# Patient Record
Sex: MALE | HISPANIC OR LATINO | Employment: UNEMPLOYED | URBAN - METROPOLITAN AREA
[De-identification: names, ages, dates, MRNs, and addresses within clinical notes are randomized per-mention and may not be internally consistent; named-entity substitution may affect disease eponyms.]

---

## 2017-07-14 ENCOUNTER — OUTSIDE FACILITY SERVICE (OUTPATIENT)
Dept: FAMILY MEDICINE CLINIC | Facility: CLINIC | Age: 62
End: 2017-07-14

## 2017-07-14 PROCEDURE — 99309 SBSQ NF CARE MODERATE MDM 30: CPT | Performed by: NURSE PRACTITIONER

## 2017-07-24 ENCOUNTER — OUTSIDE FACILITY SERVICE (OUTPATIENT)
Dept: FAMILY MEDICINE CLINIC | Facility: CLINIC | Age: 62
End: 2017-07-24

## 2017-07-24 PROCEDURE — 99309 SBSQ NF CARE MODERATE MDM 30: CPT | Performed by: NURSE PRACTITIONER

## 2017-07-27 ENCOUNTER — OUTSIDE FACILITY SERVICE (OUTPATIENT)
Dept: FAMILY MEDICINE CLINIC | Facility: CLINIC | Age: 62
End: 2017-07-27

## 2017-07-27 PROCEDURE — 99304 1ST NF CARE SF/LOW MDM 25: CPT | Performed by: FAMILY MEDICINE

## 2017-07-31 ENCOUNTER — OUTSIDE FACILITY SERVICE (OUTPATIENT)
Dept: FAMILY MEDICINE CLINIC | Facility: CLINIC | Age: 62
End: 2017-07-31

## 2017-07-31 PROCEDURE — 99308 SBSQ NF CARE LOW MDM 20: CPT | Performed by: NURSE PRACTITIONER

## 2017-08-10 ENCOUNTER — OUTSIDE FACILITY SERVICE (OUTPATIENT)
Dept: FAMILY MEDICINE CLINIC | Facility: CLINIC | Age: 62
End: 2017-08-10

## 2017-08-10 PROCEDURE — 99307 SBSQ NF CARE SF MDM 10: CPT | Performed by: FAMILY MEDICINE

## 2017-09-04 ENCOUNTER — APPOINTMENT (OUTPATIENT)
Dept: CARDIOLOGY | Facility: HOSPITAL | Age: 62
End: 2017-09-04
Attending: INTERNAL MEDICINE

## 2017-09-04 ENCOUNTER — APPOINTMENT (OUTPATIENT)
Dept: GENERAL RADIOLOGY | Facility: HOSPITAL | Age: 62
End: 2017-09-04

## 2017-09-04 ENCOUNTER — HOSPITAL ENCOUNTER (INPATIENT)
Facility: HOSPITAL | Age: 62
LOS: 32 days | Discharge: NURSING, REHAB OR LONG-TERM CARE | End: 2017-10-06
Attending: FAMILY MEDICINE | Admitting: FAMILY MEDICINE

## 2017-09-04 ENCOUNTER — APPOINTMENT (OUTPATIENT)
Dept: CT IMAGING | Facility: HOSPITAL | Age: 62
End: 2017-09-04

## 2017-09-04 DIAGNOSIS — J96.02 ACUTE RESPIRATORY FAILURE WITH HYPERCAPNIA (HCC): ICD-10-CM

## 2017-09-04 DIAGNOSIS — J95.03 TRACHEAL STENOSIS DUE TO TRACHEOSTOMY (HCC): ICD-10-CM

## 2017-09-04 DIAGNOSIS — R53.1 GENERAL WEAKNESS: ICD-10-CM

## 2017-09-04 DIAGNOSIS — R13.12 OROPHARYNGEAL DYSPHAGIA: ICD-10-CM

## 2017-09-04 DIAGNOSIS — J18.9 PNEUMONIA OF LEFT LOWER LOBE DUE TO INFECTIOUS ORGANISM: Primary | ICD-10-CM

## 2017-09-04 LAB
ALBUMIN SERPL-MCNC: 3.6 G/DL (ref 3.5–5.2)
ALBUMIN/GLOB SERPL: 0.8 G/DL
ALP SERPL-CCNC: 139 U/L (ref 39–117)
ALT SERPL W P-5'-P-CCNC: 16 U/L (ref 1–41)
ANION GAP SERPL CALCULATED.3IONS-SCNC: 5.6 MMOL/L
ANISOCYTOSIS BLD QL: NORMAL
ARTERIAL PATENCY WRIST A: POSITIVE
AST SERPL-CCNC: 18 U/L (ref 1–40)
ATMOSPHERIC PRESS: 747.7 MMHG
ATMOSPHERIC PRESS: 750.5 MMHG
ATMOSPHERIC PRESS: 751.6 MMHG
BASE EXCESS BLDA CALC-SCNC: 10.4 MMOL/L (ref 0–2)
BASE EXCESS BLDA CALC-SCNC: 12.8 MMOL/L (ref 0–2)
BASE EXCESS BLDA CALC-SCNC: 12.9 MMOL/L (ref 0–2)
BASOPHILS # BLD AUTO: 0.01 10*3/MM3 (ref 0–0.2)
BASOPHILS NFR BLD AUTO: 0.1 % (ref 0–1.5)
BDY SITE: ABNORMAL
BILIRUB SERPL-MCNC: 0.3 MG/DL (ref 0.1–1.2)
BUN BLD-MCNC: 15 MG/DL (ref 8–23)
BUN/CREAT SERPL: 18.8 (ref 7–25)
CALCIUM SPEC-SCNC: 9.4 MG/DL (ref 8.6–10.5)
CHLORIDE SERPL-SCNC: 92 MMOL/L (ref 98–107)
CO2 SERPL-SCNC: 44.4 MMOL/L (ref 22–29)
CREAT BLD-MCNC: 0.8 MG/DL (ref 0.76–1.27)
D-LACTATE SERPL-SCNC: 0.4 MMOL/L (ref 0.5–2)
DEPRECATED RDW RBC AUTO: 53.1 FL (ref 37–54)
EOSINOPHIL # BLD AUTO: 0.04 10*3/MM3 (ref 0–0.7)
EOSINOPHIL NFR BLD AUTO: 0.6 % (ref 0.3–6.2)
ERYTHROCYTE [DISTWIDTH] IN BLOOD BY AUTOMATED COUNT: 17 % (ref 11.5–14.5)
GAS FLOW AIRWAY: 4 LPM
GFR SERPL CREATININE-BSD FRML MDRD: 119 ML/MIN/1.73
GFR SERPL CREATININE-BSD FRML MDRD: 98 ML/MIN/1.73
GLOBULIN UR ELPH-MCNC: 4.5 GM/DL
GLUCOSE BLD-MCNC: 191 MG/DL (ref 65–99)
GLUCOSE BLDC GLUCOMTR-MCNC: 100 MG/DL (ref 70–130)
GLUCOSE BLDC GLUCOMTR-MCNC: 140 MG/DL (ref 70–130)
GLUCOSE BLDC GLUCOMTR-MCNC: 64 MG/DL (ref 70–130)
GLUCOSE BLDC GLUCOMTR-MCNC: 77 MG/DL (ref 70–130)
HCO3 BLDA-SCNC: 41.3 MMOL/L (ref 22–28)
HCO3 BLDA-SCNC: 47 MMOL/L (ref 22–28)
HCO3 BLDA-SCNC: 47.8 MMOL/L (ref 22–28)
HCT VFR BLD AUTO: 37.7 % (ref 40.4–52.2)
HGB BLD-MCNC: 10.1 G/DL (ref 13.7–17.6)
HOLD SPECIMEN: NORMAL
HOLD SPECIMEN: NORMAL
HOROWITZ INDEX BLD+IHG-RTO: 100 %
HOROWITZ INDEX BLD+IHG-RTO: 40 %
IMM GRANULOCYTES # BLD: 0.04 10*3/MM3 (ref 0–0.03)
IMM GRANULOCYTES NFR BLD: 0.6 % (ref 0–0.5)
LYMPHOCYTES # BLD AUTO: 0.95 10*3/MM3 (ref 0.9–4.8)
LYMPHOCYTES NFR BLD AUTO: 13.8 % (ref 19.6–45.3)
MCH RBC QN AUTO: 23 PG (ref 27–32.7)
MCHC RBC AUTO-ENTMCNC: 26.8 G/DL (ref 32.6–36.4)
MCV RBC AUTO: 85.7 FL (ref 79.8–96.2)
MODALITY: ABNORMAL
MONOCYTES # BLD AUTO: 0.56 10*3/MM3 (ref 0.2–1.2)
MONOCYTES NFR BLD AUTO: 8.2 % (ref 5–12)
NEUTROPHILS # BLD AUTO: 5.26 10*3/MM3 (ref 1.9–8.1)
NEUTROPHILS NFR BLD AUTO: 76.7 % (ref 42.7–76)
NRBC BLD MANUAL-RTO: 0 /100 WBC (ref 0–0)
NT-PROBNP SERPL-MCNC: 1456 PG/ML (ref 5–900)
O2 A-A PPRESDIFF RESPIRATORY: 0.3 MMHG
O2 A-A PPRESDIFF RESPIRATORY: 0.5 MMHG
OVALOCYTES BLD QL SMEAR: NORMAL
PCO2 BLDA: 134.6 MM HG (ref 35–45)
PCO2 BLDA: 75 MM HG (ref 35–45)
PCO2 BLDA: >157.4 MM HG (ref 35–45)
PEEP RESPIRATORY: 5 CM[H2O]
PEEP RESPIRATORY: 5 CM[H2O]
PH BLDA: 7.01 PH UNITS (ref 7.35–7.45)
PH BLDA: 7.15 PH UNITS (ref 7.35–7.45)
PH BLDA: 7.35 PH UNITS (ref 7.35–7.45)
PLAT MORPH BLD: NORMAL
PLATELET # BLD AUTO: 193 10*3/MM3 (ref 140–500)
PMV BLD AUTO: 9.5 FL (ref 6–12)
PO2 BLDA: 303.1 MM HG (ref 80–100)
PO2 BLDA: 69.7 MM HG (ref 80–100)
PO2 BLDA: 72.3 MM HG (ref 80–100)
POTASSIUM BLD-SCNC: 4.2 MMOL/L (ref 3.5–5.2)
PROCALCITONIN SERPL-MCNC: 0.05 NG/ML (ref 0.1–0.25)
PROT SERPL-MCNC: 8.1 G/DL (ref 6–8.5)
RBC # BLD AUTO: 4.4 10*6/MM3 (ref 4.6–6)
SAO2 % BLDCOA: 79.6 % (ref 92–99)
SAO2 % BLDCOA: 91.6 % (ref 92–99)
SAO2 % BLDCOA: 99.8 % (ref 92–99)
SET MECH RESP RATE: 20
SET MECH RESP RATE: 20
SODIUM BLD-SCNC: 142 MMOL/L (ref 136–145)
SPHEROCYTES BLD QL SMEAR: NORMAL
STOMATOCYTES BLD QL SMEAR: NORMAL
TOTAL RATE: 20 BREATHS/MINUTE
TOTAL RATE: 20 BREATHS/MINUTE
TOTAL RATE: 22 BREATHS/MINUTE
TROPONIN T SERPL-MCNC: <0.01 NG/ML (ref 0–0.03)
VENTILATOR MODE: ABNORMAL
VENTILATOR MODE: AC
VT ON VENT VENT: 400 ML
VT ON VENT VENT: 400 ML
WBC MORPH BLD: NORMAL
WBC NRBC COR # BLD: 6.86 10*3/MM3 (ref 4.5–10.7)
WHOLE BLOOD HOLD SPECIMEN: NORMAL
WHOLE BLOOD HOLD SPECIMEN: NORMAL

## 2017-09-04 PROCEDURE — 93306 TTE W/DOPPLER COMPLETE: CPT | Performed by: INTERNAL MEDICINE

## 2017-09-04 PROCEDURE — 87070 CULTURE OTHR SPECIMN AEROBIC: CPT | Performed by: INTERNAL MEDICINE

## 2017-09-04 PROCEDURE — 94799 UNLISTED PULMONARY SVC/PX: CPT

## 2017-09-04 PROCEDURE — 25010000002 FENTANYL CITRATE (PF) 100 MCG/2ML SOLUTION: Performed by: INTERNAL MEDICINE

## 2017-09-04 PROCEDURE — 25010000002 VANCOMYCIN 10 G RECONSTITUTED SOLUTION: Performed by: INTERNAL MEDICINE

## 2017-09-04 PROCEDURE — 87205 SMEAR GRAM STAIN: CPT | Performed by: INTERNAL MEDICINE

## 2017-09-04 PROCEDURE — 82962 GLUCOSE BLOOD TEST: CPT

## 2017-09-04 PROCEDURE — 36600 WITHDRAWAL OF ARTERIAL BLOOD: CPT

## 2017-09-04 PROCEDURE — 87581 M.PNEUMON DNA AMP PROBE: CPT | Performed by: INTERNAL MEDICINE

## 2017-09-04 PROCEDURE — 93010 ELECTROCARDIOGRAM REPORT: CPT | Performed by: INTERNAL MEDICINE

## 2017-09-04 PROCEDURE — 82803 BLOOD GASES ANY COMBINATION: CPT

## 2017-09-04 PROCEDURE — 71010 HC CHEST PA OR AP: CPT

## 2017-09-04 PROCEDURE — 85025 COMPLETE CBC W/AUTO DIFF WBC: CPT | Performed by: FAMILY MEDICINE

## 2017-09-04 PROCEDURE — 87486 CHLMYD PNEUM DNA AMP PROBE: CPT | Performed by: INTERNAL MEDICINE

## 2017-09-04 PROCEDURE — 83605 ASSAY OF LACTIC ACID: CPT | Performed by: FAMILY MEDICINE

## 2017-09-04 PROCEDURE — 85007 BL SMEAR W/DIFF WBC COUNT: CPT | Performed by: FAMILY MEDICINE

## 2017-09-04 PROCEDURE — 25010000002 ENOXAPARIN PER 10 MG: Performed by: INTERNAL MEDICINE

## 2017-09-04 PROCEDURE — 80053 COMPREHEN METABOLIC PANEL: CPT | Performed by: FAMILY MEDICINE

## 2017-09-04 PROCEDURE — 31500 INSERT EMERGENCY AIRWAY: CPT

## 2017-09-04 PROCEDURE — 84145 PROCALCITONIN (PCT): CPT | Performed by: FAMILY MEDICINE

## 2017-09-04 PROCEDURE — 71250 CT THORAX DX C-: CPT

## 2017-09-04 PROCEDURE — 25010000002 PROPOFOL 10 MG/ML EMULSION

## 2017-09-04 PROCEDURE — 5A1945Z RESPIRATORY VENTILATION, 24-96 CONSECUTIVE HOURS: ICD-10-PCS | Performed by: INTERNAL MEDICINE

## 2017-09-04 PROCEDURE — 25010000002 SUCCINYLCHOLINE PER 20 MG: Performed by: FAMILY MEDICINE

## 2017-09-04 PROCEDURE — 25010000002 METHYLPREDNISOLONE PER 40 MG: Performed by: INTERNAL MEDICINE

## 2017-09-04 PROCEDURE — 93005 ELECTROCARDIOGRAM TRACING: CPT | Performed by: FAMILY MEDICINE

## 2017-09-04 PROCEDURE — 87040 BLOOD CULTURE FOR BACTERIA: CPT | Performed by: FAMILY MEDICINE

## 2017-09-04 PROCEDURE — 0BH17EZ INSERTION OF ENDOTRACHEAL AIRWAY INTO TRACHEA, VIA NATURAL OR ARTIFICIAL OPENING: ICD-10-PCS | Performed by: INTERNAL MEDICINE

## 2017-09-04 PROCEDURE — 99285 EMERGENCY DEPT VISIT HI MDM: CPT

## 2017-09-04 PROCEDURE — 25010000002 MEROPENEM: Performed by: FAMILY MEDICINE

## 2017-09-04 PROCEDURE — 87798 DETECT AGENT NOS DNA AMP: CPT | Performed by: INTERNAL MEDICINE

## 2017-09-04 PROCEDURE — 99221 1ST HOSP IP/OBS SF/LOW 40: CPT | Performed by: FAMILY MEDICINE

## 2017-09-04 PROCEDURE — 94660 CPAP INITIATION&MGMT: CPT

## 2017-09-04 PROCEDURE — 93306 TTE W/DOPPLER COMPLETE: CPT

## 2017-09-04 PROCEDURE — 84484 ASSAY OF TROPONIN QUANT: CPT | Performed by: INTERNAL MEDICINE

## 2017-09-04 PROCEDURE — 87633 RESP VIRUS 12-25 TARGETS: CPT | Performed by: INTERNAL MEDICINE

## 2017-09-04 PROCEDURE — 94640 AIRWAY INHALATION TREATMENT: CPT

## 2017-09-04 PROCEDURE — 25010000002 VANCOMYCIN: Performed by: FAMILY MEDICINE

## 2017-09-04 PROCEDURE — 25010000002 PROPOFOL 1000 MG/ML EMULSION: Performed by: FAMILY MEDICINE

## 2017-09-04 PROCEDURE — 94002 VENT MGMT INPAT INIT DAY: CPT

## 2017-09-04 PROCEDURE — 83880 ASSAY OF NATRIURETIC PEPTIDE: CPT | Performed by: FAMILY MEDICINE

## 2017-09-04 PROCEDURE — 31500 INSERT EMERGENCY AIRWAY: CPT | Performed by: FAMILY MEDICINE

## 2017-09-04 PROCEDURE — 25010000002 MEROPENEM: Performed by: INTERNAL MEDICINE

## 2017-09-04 RX ORDER — SUCCINYLCHOLINE CHLORIDE 20 MG/ML
100 INJECTION INTRAMUSCULAR; INTRAVENOUS ONCE
Status: COMPLETED | OUTPATIENT
Start: 2017-09-04 | End: 2017-09-04

## 2017-09-04 RX ORDER — ATORVASTATIN CALCIUM 20 MG/1
20 TABLET, FILM COATED ORAL NIGHTLY
COMMUNITY

## 2017-09-04 RX ORDER — MAGNESIUM SULFATE 1 G/100ML
1 INJECTION INTRAVENOUS AS NEEDED
Status: DISCONTINUED | OUTPATIENT
Start: 2017-09-04 | End: 2017-10-06 | Stop reason: HOSPADM

## 2017-09-04 RX ORDER — IPRATROPIUM BROMIDE AND ALBUTEROL SULFATE 2.5; .5 MG/3ML; MG/3ML
3 SOLUTION RESPIRATORY (INHALATION)
COMMUNITY

## 2017-09-04 RX ORDER — FERROUS SULFATE 325(65) MG
325 TABLET ORAL 2 TIMES DAILY
COMMUNITY

## 2017-09-04 RX ORDER — SODIUM CHLORIDE 0.9 % (FLUSH) 0.9 %
10 SYRINGE (ML) INJECTION AS NEEDED
Status: DISCONTINUED | OUTPATIENT
Start: 2017-09-04 | End: 2017-10-06 | Stop reason: HOSPADM

## 2017-09-04 RX ORDER — ACETAMINOPHEN 325 MG/1
650 TABLET ORAL EVERY 4 HOURS PRN
Status: DISCONTINUED | OUTPATIENT
Start: 2017-09-04 | End: 2017-10-06 | Stop reason: HOSPADM

## 2017-09-04 RX ORDER — PROPOFOL 10 MG/ML
100 VIAL (ML) INTRAVENOUS ONCE
Status: COMPLETED | OUTPATIENT
Start: 2017-09-04 | End: 2017-09-04

## 2017-09-04 RX ORDER — ALBUTEROL SULFATE 90 UG/1
6 AEROSOL, METERED RESPIRATORY (INHALATION)
Status: DISCONTINUED | OUTPATIENT
Start: 2017-09-04 | End: 2017-09-05

## 2017-09-04 RX ORDER — SUCCINYLCHOLINE CHLORIDE 20 MG/ML
INJECTION INTRAMUSCULAR; INTRAVENOUS
Status: DISPENSED
Start: 2017-09-04 | End: 2017-09-04

## 2017-09-04 RX ORDER — POTASSIUM CHLORIDE 750 MG/1
40 CAPSULE, EXTENDED RELEASE ORAL AS NEEDED
Status: DISCONTINUED | OUTPATIENT
Start: 2017-09-04 | End: 2017-10-06 | Stop reason: HOSPADM

## 2017-09-04 RX ORDER — FAMOTIDINE 10 MG/ML
20 INJECTION, SOLUTION INTRAVENOUS EVERY 12 HOURS SCHEDULED
Status: DISCONTINUED | OUTPATIENT
Start: 2017-09-04 | End: 2017-09-06

## 2017-09-04 RX ORDER — SUCCINYLCHOLINE CHLORIDE 20 MG/ML
10 INJECTION INTRAMUSCULAR; INTRAVENOUS ONCE
Status: DISCONTINUED | OUTPATIENT
Start: 2017-09-04 | End: 2017-09-04

## 2017-09-04 RX ORDER — IPRATROPIUM BROMIDE AND ALBUTEROL SULFATE 2.5; .5 MG/3ML; MG/3ML
3 SOLUTION RESPIRATORY (INHALATION)
Status: DISCONTINUED | OUTPATIENT
Start: 2017-09-04 | End: 2017-09-09

## 2017-09-04 RX ORDER — FENTANYL CITRATE 50 UG/ML
25 INJECTION, SOLUTION INTRAMUSCULAR; INTRAVENOUS
Status: DISCONTINUED | OUTPATIENT
Start: 2017-09-04 | End: 2017-09-06

## 2017-09-04 RX ORDER — SENNA AND DOCUSATE SODIUM 50; 8.6 MG/1; MG/1
2 TABLET, FILM COATED ORAL NIGHTLY
Status: DISCONTINUED | OUTPATIENT
Start: 2017-09-04 | End: 2017-10-06 | Stop reason: HOSPADM

## 2017-09-04 RX ORDER — POTASSIUM CHLORIDE 1.5 G/1.77G
40 POWDER, FOR SOLUTION ORAL AS NEEDED
Status: DISCONTINUED | OUTPATIENT
Start: 2017-09-04 | End: 2017-10-06 | Stop reason: HOSPADM

## 2017-09-04 RX ORDER — SODIUM CHLORIDE 9 MG/ML
75 INJECTION, SOLUTION INTRAVENOUS CONTINUOUS
Status: DISCONTINUED | OUTPATIENT
Start: 2017-09-04 | End: 2017-09-05

## 2017-09-04 RX ORDER — METHYLPREDNISOLONE SODIUM SUCCINATE 40 MG/ML
40 INJECTION, POWDER, LYOPHILIZED, FOR SOLUTION INTRAMUSCULAR; INTRAVENOUS EVERY 8 HOURS
Status: DISCONTINUED | OUTPATIENT
Start: 2017-09-04 | End: 2017-09-06

## 2017-09-04 RX ORDER — PROPOFOL 10 MG/ML
VIAL (ML) INTRAVENOUS
Status: COMPLETED
Start: 2017-09-04 | End: 2017-09-04

## 2017-09-04 RX ORDER — MAGNESIUM SULFATE HEPTAHYDRATE 40 MG/ML
2 INJECTION, SOLUTION INTRAVENOUS AS NEEDED
Status: DISCONTINUED | OUTPATIENT
Start: 2017-09-04 | End: 2017-10-06 | Stop reason: HOSPADM

## 2017-09-04 RX ORDER — POTASSIUM CHLORIDE 7.45 MG/ML
10 INJECTION INTRAVENOUS
Status: DISCONTINUED | OUTPATIENT
Start: 2017-09-04 | End: 2017-10-06 | Stop reason: HOSPADM

## 2017-09-04 RX ORDER — BISACODYL 5 MG/1
10 TABLET, DELAYED RELEASE ORAL DAILY PRN
Status: ON HOLD | COMMUNITY
End: 2017-09-08

## 2017-09-04 RX ORDER — ONDANSETRON 2 MG/ML
4 INJECTION INTRAMUSCULAR; INTRAVENOUS EVERY 6 HOURS PRN
Status: DISCONTINUED | OUTPATIENT
Start: 2017-09-04 | End: 2017-10-06 | Stop reason: HOSPADM

## 2017-09-04 RX ORDER — DEXTROSE MONOHYDRATE 25 G/50ML
25 INJECTION, SOLUTION INTRAVENOUS
Status: DISCONTINUED | OUTPATIENT
Start: 2017-09-04 | End: 2017-10-04

## 2017-09-04 RX ORDER — ALBUTEROL SULFATE 2.5 MG/3ML
2.5 SOLUTION RESPIRATORY (INHALATION) EVERY 4 HOURS PRN
COMMUNITY

## 2017-09-04 RX ORDER — ONDANSETRON 4 MG/1
4 TABLET, ORALLY DISINTEGRATING ORAL EVERY 6 HOURS PRN
Status: DISCONTINUED | OUTPATIENT
Start: 2017-09-04 | End: 2017-10-06 | Stop reason: HOSPADM

## 2017-09-04 RX ORDER — NICOTINE POLACRILEX 4 MG
15 LOZENGE BUCCAL
Status: DISCONTINUED | OUTPATIENT
Start: 2017-09-04 | End: 2017-10-04

## 2017-09-04 RX ORDER — ACETAMINOPHEN 650 MG/1
650 SUPPOSITORY RECTAL EVERY 4 HOURS PRN
Status: DISCONTINUED | OUTPATIENT
Start: 2017-09-04 | End: 2017-10-06 | Stop reason: HOSPADM

## 2017-09-04 RX ORDER — ONDANSETRON 4 MG/1
4 TABLET, FILM COATED ORAL EVERY 6 HOURS PRN
Status: DISCONTINUED | OUTPATIENT
Start: 2017-09-04 | End: 2017-10-06 | Stop reason: HOSPADM

## 2017-09-04 RX ORDER — SODIUM CHLORIDE 0.9 % (FLUSH) 0.9 %
1-10 SYRINGE (ML) INJECTION AS NEEDED
Status: DISCONTINUED | OUTPATIENT
Start: 2017-09-04 | End: 2017-10-06 | Stop reason: HOSPADM

## 2017-09-04 RX ADMIN — SODIUM CHLORIDE 75 ML/HR: 9 INJECTION, SOLUTION INTRAVENOUS at 16:25

## 2017-09-04 RX ADMIN — ALBUTEROL SULFATE 6 PUFF: 90 AEROSOL, METERED RESPIRATORY (INHALATION) at 14:49

## 2017-09-04 RX ADMIN — FAMOTIDINE 20 MG: 10 INJECTION INTRAVENOUS at 20:56

## 2017-09-04 RX ADMIN — Medication 20 MG: at 08:48

## 2017-09-04 RX ADMIN — MEROPENEM 1 G: 1 INJECTION, POWDER, FOR SOLUTION INTRAVENOUS at 16:11

## 2017-09-04 RX ADMIN — PROPOFOL 25 MCG/KG/MIN: 10 INJECTION, EMULSION INTRAVENOUS at 09:10

## 2017-09-04 RX ADMIN — FENTANYL CITRATE 25 MCG: 50 INJECTION INTRAMUSCULAR; INTRAVENOUS at 20:56

## 2017-09-04 RX ADMIN — METHYLPREDNISOLONE SODIUM SUCCINATE 40 MG: 40 INJECTION, POWDER, FOR SOLUTION INTRAMUSCULAR; INTRAVENOUS at 13:48

## 2017-09-04 RX ADMIN — PROPOFOL 35 MCG/KG/MIN: 10 INJECTION, EMULSION INTRAVENOUS at 21:14

## 2017-09-04 RX ADMIN — MEROPENEM 1 G: 1 INJECTION, POWDER, FOR SOLUTION INTRAVENOUS at 09:35

## 2017-09-04 RX ADMIN — ALBUTEROL SULFATE 6 PUFF: 90 AEROSOL, METERED RESPIRATORY (INHALATION) at 20:44

## 2017-09-04 RX ADMIN — PROPOFOL 25 MCG/KG/MIN: 10 INJECTION, EMULSION INTRAVENOUS at 16:11

## 2017-09-04 RX ADMIN — ENOXAPARIN SODIUM 40 MG: 40 INJECTION SUBCUTANEOUS at 13:48

## 2017-09-04 RX ADMIN — PROPOFOL 20 MG: 10 INJECTION, EMULSION INTRAVENOUS at 08:48

## 2017-09-04 RX ADMIN — FAMOTIDINE 20 MG: 10 INJECTION INTRAVENOUS at 13:48

## 2017-09-04 RX ADMIN — MEROPENEM 1 G: 1 INJECTION, POWDER, FOR SOLUTION INTRAVENOUS at 21:22

## 2017-09-04 RX ADMIN — VANCOMYCIN HYDROCHLORIDE 1750 MG: 1 INJECTION, POWDER, LYOPHILIZED, FOR SOLUTION INTRAVENOUS at 10:28

## 2017-09-04 RX ADMIN — VANCOMYCIN HYDROCHLORIDE 1500 MG: 10 INJECTION, POWDER, LYOPHILIZED, FOR SOLUTION INTRAVENOUS at 17:25

## 2017-09-04 RX ADMIN — METHYLPREDNISOLONE SODIUM SUCCINATE 40 MG: 40 INJECTION, POWDER, FOR SOLUTION INTRAMUSCULAR; INTRAVENOUS at 20:57

## 2017-09-04 RX ADMIN — SUCCINYLCHOLINE CHLORIDE 100 MG: 20 INJECTION, SOLUTION INTRAMUSCULAR; INTRAVENOUS at 08:47

## 2017-09-05 ENCOUNTER — APPOINTMENT (OUTPATIENT)
Dept: CT IMAGING | Facility: HOSPITAL | Age: 62
End: 2017-09-05

## 2017-09-05 LAB
ALBUMIN SERPL-MCNC: 2.8 G/DL (ref 3.5–5.2)
ALBUMIN/GLOB SERPL: 0.7 G/DL
ALP SERPL-CCNC: 127 U/L (ref 39–117)
ALT SERPL W P-5'-P-CCNC: 16 U/L (ref 1–41)
ANION GAP SERPL CALCULATED.3IONS-SCNC: 12.3 MMOL/L
APTT PPP: 30.9 SECONDS (ref 22.7–35.4)
ARTERIAL PATENCY WRIST A: POSITIVE
AST SERPL-CCNC: 17 U/L (ref 1–40)
ATMOSPHERIC PRESS: 748.9 MMHG
B PERT DNA SPEC QL NAA+PROBE: NOT DETECTED
BASE EXCESS BLDA CALC-SCNC: 11.2 MMOL/L (ref 0–2)
BASOPHILS # BLD AUTO: 0 10*3/MM3 (ref 0–0.2)
BASOPHILS # BLD AUTO: 0 10*3/MM3 (ref 0–0.2)
BASOPHILS NFR BLD AUTO: 0 % (ref 0–1.5)
BASOPHILS NFR BLD AUTO: 0 % (ref 0–1.5)
BDY SITE: ABNORMAL
BH CV ECHO MEAS - ACS: 2.1 CM
BH CV ECHO MEAS - AO MEAN PG (FULL): 1 MMHG
BH CV ECHO MEAS - AO MEAN PG: 3 MMHG
BH CV ECHO MEAS - AO V2 MAX: 120 CM/SEC
BH CV ECHO MEAS - AO V2 MEAN: 83.5 CM/SEC
BH CV ECHO MEAS - AO V2 VTI: 21.9 CM
BH CV ECHO MEAS - AVA(I,A): 2.8 CM^2
BH CV ECHO MEAS - AVA(I,D): 2.8 CM^2
BH CV ECHO MEAS - BSA(HAYCOCK): 2 M^2
BH CV ECHO MEAS - BSA: 2 M^2
BH CV ECHO MEAS - BZI_BMI: 25.8 KILOGRAMS/M^2
BH CV ECHO MEAS - BZI_METRIC_HEIGHT: 177.8 CM
BH CV ECHO MEAS - BZI_METRIC_WEIGHT: 81.6 KG
BH CV ECHO MEAS - CONTRAST EF 4CH: 51.7 ML/M^2
BH CV ECHO MEAS - EDV(CUBED): 91.1 ML
BH CV ECHO MEAS - EDV(MOD-SP4): 87 ML
BH CV ECHO MEAS - EDV(TEICH): 92.4 ML
BH CV ECHO MEAS - EF(CUBED): 60.6 %
BH CV ECHO MEAS - EF(MOD-SP4): 51.7 %
BH CV ECHO MEAS - EF(TEICH): 52.3 %
BH CV ECHO MEAS - ESV(CUBED): 35.9 ML
BH CV ECHO MEAS - ESV(MOD-SP4): 42 ML
BH CV ECHO MEAS - ESV(TEICH): 44.1 ML
BH CV ECHO MEAS - FS: 26.7 %
BH CV ECHO MEAS - IVS/LVPW: 1.2
BH CV ECHO MEAS - IVSD: 1.1 CM
BH CV ECHO MEAS - LA DIMENSION: 6 CM
BH CV ECHO MEAS - LAT PEAK E' VEL: 11 CM/SEC
BH CV ECHO MEAS - LV DIASTOLIC VOL/BSA (35-75): 43.6 ML/M^2
BH CV ECHO MEAS - LV MASS(C)D: 153.3 GRAMS
BH CV ECHO MEAS - LV MASS(C)DI: 76.8 GRAMS/M^2
BH CV ECHO MEAS - LV MEAN PG: 2 MMHG
BH CV ECHO MEAS - LV SYSTOLIC VOL/BSA (12-30): 21 ML/M^2
BH CV ECHO MEAS - LV V1 MAX: 101 CM/SEC
BH CV ECHO MEAS - LV V1 MEAN: 73.1 CM/SEC
BH CV ECHO MEAS - LV V1 VTI: 22 CM
BH CV ECHO MEAS - LVIDD: 4.5 CM
BH CV ECHO MEAS - LVIDS: 3.3 CM
BH CV ECHO MEAS - LVLD AP4: 7.9 CM
BH CV ECHO MEAS - LVLS AP4: 6.8 CM
BH CV ECHO MEAS - LVOT AREA (M): 2.8 CM^2
BH CV ECHO MEAS - LVOT AREA: 2.8 CM^2
BH CV ECHO MEAS - LVOT DIAM: 1.9 CM
BH CV ECHO MEAS - LVPWD: 0.9 CM
BH CV ECHO MEAS - MED PEAK E' VEL: 6 CM/SEC
BH CV ECHO MEAS - MV A DUR: 0.09 SEC
BH CV ECHO MEAS - MV A MAX VEL: 85.4 CM/SEC
BH CV ECHO MEAS - MV DEC SLOPE: 728 CM/SEC^2
BH CV ECHO MEAS - MV DEC TIME: 0.18 SEC
BH CV ECHO MEAS - MV E MAX VEL: 103 CM/SEC
BH CV ECHO MEAS - MV E/A: 1.2
BH CV ECHO MEAS - MV MEAN PG: 3 MMHG
BH CV ECHO MEAS - MV P1/2T MAX VEL: 130 CM/SEC
BH CV ECHO MEAS - MV P1/2T: 52.3 MSEC
BH CV ECHO MEAS - MV V2 MEAN: 71.8 CM/SEC
BH CV ECHO MEAS - MV V2 VTI: 31.3 CM
BH CV ECHO MEAS - MVA P1/2T LCG: 1.7 CM^2
BH CV ECHO MEAS - MVA(P1/2T): 4.2 CM^2
BH CV ECHO MEAS - MVA(VTI): 2 CM^2
BH CV ECHO MEAS - PA ACC SLOPE: 15.4 CM/SEC^2
BH CV ECHO MEAS - PA ACC TIME: 0.09 SEC
BH CV ECHO MEAS - PA MAX PG (FULL): 2.3 MMHG
BH CV ECHO MEAS - PA MAX PG: 4.2 MMHG
BH CV ECHO MEAS - PA PR(ACCEL): 39.4 MMHG
BH CV ECHO MEAS - PA V2 MAX: 102 CM/SEC
BH CV ECHO MEAS - PULM A REVS DUR: 0.11 SEC
BH CV ECHO MEAS - PULM A REVS VEL: 33.3 CM/SEC
BH CV ECHO MEAS - PULM DIAS VEL: 51.5 CM/SEC
BH CV ECHO MEAS - PULM S/D: 1.1
BH CV ECHO MEAS - PULM SYS VEL: 54.6 CM/SEC
BH CV ECHO MEAS - PVA(V,A): 5.7 CM^2
BH CV ECHO MEAS - PVA(V,D): 5.7 CM^2
BH CV ECHO MEAS - QP/QS: 1.7
BH CV ECHO MEAS - RAP SYSTOLE: 15 MMHG
BH CV ECHO MEAS - RV MAX PG: 1.8 MMHG
BH CV ECHO MEAS - RV MEAN PG: 1 MMHG
BH CV ECHO MEAS - RV V1 MAX: 67.6 CM/SEC
BH CV ECHO MEAS - RV V1 MEAN: 43.9 CM/SEC
BH CV ECHO MEAS - RV V1 VTI: 12.3 CM
BH CV ECHO MEAS - RVOT AREA: 8.6 CM^2
BH CV ECHO MEAS - RVOT DIAM: 3.3 CM
BH CV ECHO MEAS - RVSP: 73 MMHG
BH CV ECHO MEAS - SI(CUBED): 27.7 ML/M^2
BH CV ECHO MEAS - SI(LVOT): 31.3 ML/M^2
BH CV ECHO MEAS - SI(MOD-SP4): 22.5 ML/M^2
BH CV ECHO MEAS - SI(TEICH): 24.2 ML/M^2
BH CV ECHO MEAS - SV(CUBED): 55.2 ML
BH CV ECHO MEAS - SV(LVOT): 62.4 ML
BH CV ECHO MEAS - SV(MOD-SP4): 45 ML
BH CV ECHO MEAS - SV(RVOT): 105.2 ML
BH CV ECHO MEAS - SV(TEICH): 48.3 ML
BH CV ECHO MEAS - TR MAX VEL: 380 CM/SEC
BH CV XLRA - RV BASE: 4.8 CM
BH CV XLRA - TDI S': 7 CM/SEC
BILIRUB SERPL-MCNC: 0.2 MG/DL (ref 0.1–1.2)
BUN BLD-MCNC: 19 MG/DL (ref 8–23)
BUN/CREAT SERPL: 26.8 (ref 7–25)
C PNEUM DNA NPH QL NAA+NON-PROBE: NOT DETECTED
CALCIUM SPEC-SCNC: 8.7 MG/DL (ref 8.6–10.5)
CHLORIDE SERPL-SCNC: 95 MMOL/L (ref 98–107)
CO2 SERPL-SCNC: 33.7 MMOL/L (ref 22–29)
CREAT BLD-MCNC: 0.71 MG/DL (ref 0.76–1.27)
DEPRECATED RDW RBC AUTO: 48.1 FL (ref 37–54)
DEPRECATED RDW RBC AUTO: 49.5 FL (ref 37–54)
E/E' RATIO: 14
EOSINOPHIL # BLD AUTO: 0 10*3/MM3 (ref 0–0.7)
EOSINOPHIL # BLD AUTO: 0 10*3/MM3 (ref 0–0.7)
EOSINOPHIL NFR BLD AUTO: 0 % (ref 0.3–6.2)
EOSINOPHIL NFR BLD AUTO: 0 % (ref 0.3–6.2)
ERYTHROCYTE [DISTWIDTH] IN BLOOD BY AUTOMATED COUNT: 16.6 % (ref 11.5–14.5)
ERYTHROCYTE [DISTWIDTH] IN BLOOD BY AUTOMATED COUNT: 16.7 % (ref 11.5–14.5)
FLUAV H1 2009 PAND RNA NPH QL NAA+PROBE: NOT DETECTED
FLUAV H1 HA GENE NPH QL NAA+PROBE: NOT DETECTED
FLUAV H3 RNA NPH QL NAA+PROBE: NOT DETECTED
FLUAV SUBTYP SPEC NAA+PROBE: NOT DETECTED
FLUBV RNA ISLT QL NAA+PROBE: NOT DETECTED
GFR SERPL CREATININE-BSD FRML MDRD: 112 ML/MIN/1.73
GFR SERPL CREATININE-BSD FRML MDRD: 136 ML/MIN/1.73
GLOBULIN UR ELPH-MCNC: 4.2 GM/DL
GLUCOSE BLD-MCNC: 125 MG/DL (ref 65–99)
GLUCOSE BLDC GLUCOMTR-MCNC: 107 MG/DL (ref 70–130)
GLUCOSE BLDC GLUCOMTR-MCNC: 119 MG/DL (ref 70–130)
GLUCOSE BLDC GLUCOMTR-MCNC: 124 MG/DL (ref 70–130)
GLUCOSE BLDC GLUCOMTR-MCNC: 128 MG/DL (ref 70–130)
GLUCOSE BLDC GLUCOMTR-MCNC: 135 MG/DL (ref 70–130)
GLUCOSE BLDC GLUCOMTR-MCNC: 155 MG/DL (ref 70–130)
GLUCOSE BLDC GLUCOMTR-MCNC: 166 MG/DL (ref 70–130)
GLUCOSE BLDC GLUCOMTR-MCNC: 184 MG/DL (ref 70–130)
HADV DNA SPEC NAA+PROBE: NOT DETECTED
HCO3 BLDA-SCNC: 39.3 MMOL/L (ref 22–28)
HCOV 229E RNA SPEC QL NAA+PROBE: NOT DETECTED
HCOV HKU1 RNA SPEC QL NAA+PROBE: NOT DETECTED
HCOV NL63 RNA SPEC QL NAA+PROBE: NOT DETECTED
HCOV OC43 RNA SPEC QL NAA+PROBE: NOT DETECTED
HCT VFR BLD AUTO: 32.6 % (ref 40.4–52.2)
HCT VFR BLD AUTO: 34 % (ref 40.4–52.2)
HGB BLD-MCNC: 9.6 G/DL (ref 13.7–17.6)
HGB BLD-MCNC: 9.7 G/DL (ref 13.7–17.6)
HMPV RNA NPH QL NAA+NON-PROBE: NOT DETECTED
HOROWITZ INDEX BLD+IHG-RTO: 40 %
HPIV1 RNA SPEC QL NAA+PROBE: NOT DETECTED
HPIV2 RNA SPEC QL NAA+PROBE: NOT DETECTED
HPIV3 RNA NPH QL NAA+PROBE: NOT DETECTED
HPIV4 P GENE NPH QL NAA+PROBE: NOT DETECTED
IMM GRANULOCYTES # BLD: 0 10*3/MM3 (ref 0–0.03)
IMM GRANULOCYTES # BLD: 0 10*3/MM3 (ref 0–0.03)
IMM GRANULOCYTES NFR BLD: 0 % (ref 0–0.5)
IMM GRANULOCYTES NFR BLD: 0 % (ref 0–0.5)
INR PPP: 1.32 (ref 0.9–1.1)
LEFT ATRIUM VOLUME INDEX: 20 ML/M2
LEFT ATRIUM VOLUME: 40 CM3
LYMPHOCYTES # BLD AUTO: 0.68 10*3/MM3 (ref 0.9–4.8)
LYMPHOCYTES # BLD AUTO: 0.72 10*3/MM3 (ref 0.9–4.8)
LYMPHOCYTES NFR BLD AUTO: 15.4 % (ref 19.6–45.3)
LYMPHOCYTES NFR BLD AUTO: 16 % (ref 19.6–45.3)
M PNEUMO IGG SER IA-ACNC: NOT DETECTED
MCH RBC QN AUTO: 23.2 PG (ref 27–32.7)
MCH RBC QN AUTO: 23.2 PG (ref 27–32.7)
MCHC RBC AUTO-ENTMCNC: 28.5 G/DL (ref 32.6–36.4)
MCHC RBC AUTO-ENTMCNC: 29.4 G/DL (ref 32.6–36.4)
MCV RBC AUTO: 78.7 FL (ref 79.8–96.2)
MCV RBC AUTO: 81.3 FL (ref 79.8–96.2)
MODALITY: ABNORMAL
MONOCYTES # BLD AUTO: 0.09 10*3/MM3 (ref 0.2–1.2)
MONOCYTES # BLD AUTO: 0.15 10*3/MM3 (ref 0.2–1.2)
MONOCYTES NFR BLD AUTO: 2.1 % (ref 5–12)
MONOCYTES NFR BLD AUTO: 3.2 % (ref 5–12)
NEUTROPHILS # BLD AUTO: 3.49 10*3/MM3 (ref 1.9–8.1)
NEUTROPHILS # BLD AUTO: 3.81 10*3/MM3 (ref 1.9–8.1)
NEUTROPHILS NFR BLD AUTO: 81.4 % (ref 42.7–76)
NEUTROPHILS NFR BLD AUTO: 81.9 % (ref 42.7–76)
O2 A-A PPRESDIFF RESPIRATORY: 0.4 MMHG
PCO2 BLDA: 69.8 MM HG (ref 35–45)
PEEP RESPIRATORY: 5 CM[H2O]
PH BLDA: 7.36 PH UNITS (ref 7.35–7.45)
PLATELET # BLD AUTO: 208 10*3/MM3 (ref 140–500)
PLATELET # BLD AUTO: 210 10*3/MM3 (ref 140–500)
PMV BLD AUTO: 10 FL (ref 6–12)
PMV BLD AUTO: 9.6 FL (ref 6–12)
PO2 BLDA: 77.3 MM HG (ref 80–100)
POTASSIUM BLD-SCNC: 4.4 MMOL/L (ref 3.5–5.2)
PROCALCITONIN SERPL-MCNC: 0.04 NG/ML (ref 0.1–0.25)
PROT SERPL-MCNC: 7 G/DL (ref 6–8.5)
PROTHROMBIN TIME: 15.9 SECONDS (ref 11.7–14.2)
PSV: 8 CMH2O
RBC # BLD AUTO: 4.14 10*6/MM3 (ref 4.6–6)
RBC # BLD AUTO: 4.18 10*6/MM3 (ref 4.6–6)
RHINOVIRUS RNA SPEC NAA+PROBE: NOT DETECTED
RSV RNA NPH QL NAA+NON-PROBE: NOT DETECTED
SAO2 % BLDCOA: 94 % (ref 92–99)
SET MECH RESP RATE: 23
SODIUM BLD-SCNC: 141 MMOL/L (ref 136–145)
TOTAL RATE: 23 BREATHS/MINUTE
TRIGL SERPL-MCNC: 56 MG/DL (ref 0–150)
TROPONIN T SERPL-MCNC: <0.01 NG/ML (ref 0–0.03)
TROPONIN T SERPL-MCNC: <0.01 NG/ML (ref 0–0.03)
VENTILATOR MODE: ABNORMAL
WBC NRBC COR # BLD: 4.26 10*3/MM3 (ref 4.5–10.7)
WBC NRBC COR # BLD: 4.68 10*3/MM3 (ref 4.5–10.7)

## 2017-09-05 PROCEDURE — 25010000002 MEROPENEM: Performed by: INTERNAL MEDICINE

## 2017-09-05 PROCEDURE — 84484 ASSAY OF TROPONIN QUANT: CPT | Performed by: INTERNAL MEDICINE

## 2017-09-05 PROCEDURE — 97162 PT EVAL MOD COMPLEX 30 MIN: CPT

## 2017-09-05 PROCEDURE — 25010000002 PROPOFOL 1000 MG/ML EMULSION: Performed by: FAMILY MEDICINE

## 2017-09-05 PROCEDURE — 94799 UNLISTED PULMONARY SVC/PX: CPT

## 2017-09-05 PROCEDURE — 97110 THERAPEUTIC EXERCISES: CPT

## 2017-09-05 PROCEDURE — 36600 WITHDRAWAL OF ARTERIAL BLOOD: CPT

## 2017-09-05 PROCEDURE — 71275 CT ANGIOGRAPHY CHEST: CPT

## 2017-09-05 PROCEDURE — 84478 ASSAY OF TRIGLYCERIDES: CPT | Performed by: FAMILY MEDICINE

## 2017-09-05 PROCEDURE — 63710000001 INSULIN ASPART PER 5 UNITS: Performed by: INTERNAL MEDICINE

## 2017-09-05 PROCEDURE — 25010000002 FUROSEMIDE PER 20 MG: Performed by: INTERNAL MEDICINE

## 2017-09-05 PROCEDURE — 80053 COMPREHEN METABOLIC PANEL: CPT | Performed by: INTERNAL MEDICINE

## 2017-09-05 PROCEDURE — 94003 VENT MGMT INPAT SUBQ DAY: CPT

## 2017-09-05 PROCEDURE — 85025 COMPLETE CBC W/AUTO DIFF WBC: CPT | Performed by: INTERNAL MEDICINE

## 2017-09-05 PROCEDURE — 82962 GLUCOSE BLOOD TEST: CPT

## 2017-09-05 PROCEDURE — 85610 PROTHROMBIN TIME: CPT | Performed by: INTERNAL MEDICINE

## 2017-09-05 PROCEDURE — 94640 AIRWAY INHALATION TREATMENT: CPT

## 2017-09-05 PROCEDURE — 0 IOPAMIDOL PER 1 ML: Performed by: FAMILY MEDICINE

## 2017-09-05 PROCEDURE — 25010000002 ENOXAPARIN PER 10 MG: Performed by: INTERNAL MEDICINE

## 2017-09-05 PROCEDURE — 99231 SBSQ HOSP IP/OBS SF/LOW 25: CPT | Performed by: FAMILY MEDICINE

## 2017-09-05 PROCEDURE — 82803 BLOOD GASES ANY COMBINATION: CPT

## 2017-09-05 PROCEDURE — 25010000002 METHYLPREDNISOLONE PER 40 MG: Performed by: INTERNAL MEDICINE

## 2017-09-05 PROCEDURE — 84145 PROCALCITONIN (PCT): CPT | Performed by: INTERNAL MEDICINE

## 2017-09-05 PROCEDURE — 85730 THROMBOPLASTIN TIME PARTIAL: CPT | Performed by: INTERNAL MEDICINE

## 2017-09-05 PROCEDURE — 25010000002 VANCOMYCIN 10 G RECONSTITUTED SOLUTION: Performed by: INTERNAL MEDICINE

## 2017-09-05 PROCEDURE — 25010000002 FENTANYL CITRATE (PF) 100 MCG/2ML SOLUTION: Performed by: INTERNAL MEDICINE

## 2017-09-05 RX ORDER — FUROSEMIDE 10 MG/ML
40 INJECTION INTRAMUSCULAR; INTRAVENOUS ONCE
Status: COMPLETED | OUTPATIENT
Start: 2017-09-05 | End: 2017-09-05

## 2017-09-05 RX ORDER — TRAZODONE HYDROCHLORIDE 50 MG/1
50 TABLET ORAL NIGHTLY
COMMUNITY

## 2017-09-05 RX ORDER — FUROSEMIDE 40 MG/1
40 TABLET ORAL DAILY
COMMUNITY

## 2017-09-05 RX ORDER — ALBUTEROL SULFATE 2.5 MG/3ML
2.5 SOLUTION RESPIRATORY (INHALATION)
Status: DISCONTINUED | OUTPATIENT
Start: 2017-09-05 | End: 2017-09-09

## 2017-09-05 RX ORDER — SODIUM CHLORIDE 9 MG/ML
9 INJECTION, SOLUTION INTRAVENOUS CONTINUOUS
Status: DISCONTINUED | OUTPATIENT
Start: 2017-09-06 | End: 2017-10-06 | Stop reason: HOSPADM

## 2017-09-05 RX ORDER — BUDESONIDE AND FORMOTEROL FUMARATE DIHYDRATE 160; 4.5 UG/1; UG/1
2 AEROSOL RESPIRATORY (INHALATION)
COMMUNITY

## 2017-09-05 RX ORDER — ESCITALOPRAM OXALATE 20 MG/1
20 TABLET ORAL NIGHTLY
COMMUNITY
End: 2018-05-24 | Stop reason: SDUPTHER

## 2017-09-05 RX ORDER — FLUTICASONE PROPIONATE 220 UG/1
1 AEROSOL, METERED RESPIRATORY (INHALATION)
COMMUNITY
End: 2017-10-06 | Stop reason: HOSPADM

## 2017-09-05 RX ORDER — UREA 10 %
3 LOTION (ML) TOPICAL NIGHTLY PRN
COMMUNITY

## 2017-09-05 RX ORDER — HEPARIN SODIUM 5000 [USP'U]/ML
40-80 INJECTION, SOLUTION INTRAVENOUS; SUBCUTANEOUS EVERY 6 HOURS PRN
Status: DISCONTINUED | OUTPATIENT
Start: 2017-09-05 | End: 2017-09-05

## 2017-09-05 RX ADMIN — PROPOFOL 35 MCG/KG/MIN: 10 INJECTION, EMULSION INTRAVENOUS at 02:37

## 2017-09-05 RX ADMIN — FENTANYL CITRATE 25 MCG: 50 INJECTION INTRAMUSCULAR; INTRAVENOUS at 04:12

## 2017-09-05 RX ADMIN — MEROPENEM 1 G: 1 INJECTION, POWDER, FOR SOLUTION INTRAVENOUS at 21:34

## 2017-09-05 RX ADMIN — ALBUTEROL SULFATE 2.5 MG: 2.5 SOLUTION RESPIRATORY (INHALATION) at 14:32

## 2017-09-05 RX ADMIN — ALBUTEROL SULFATE 6 PUFF: 90 AEROSOL, METERED RESPIRATORY (INHALATION) at 09:53

## 2017-09-05 RX ADMIN — ALBUTEROL SULFATE 6 PUFF: 90 AEROSOL, METERED RESPIRATORY (INHALATION) at 11:15

## 2017-09-05 RX ADMIN — ALBUTEROL SULFATE 2.5 MG: 2.5 SOLUTION RESPIRATORY (INHALATION) at 19:36

## 2017-09-05 RX ADMIN — MEROPENEM 1 G: 1 INJECTION, POWDER, FOR SOLUTION INTRAVENOUS at 03:54

## 2017-09-05 RX ADMIN — FAMOTIDINE 20 MG: 10 INJECTION INTRAVENOUS at 23:01

## 2017-09-05 RX ADMIN — INSULIN ASPART 13 UNITS: 100 INJECTION, SOLUTION INTRAVENOUS; SUBCUTANEOUS at 23:50

## 2017-09-05 RX ADMIN — FAMOTIDINE 20 MG: 10 INJECTION INTRAVENOUS at 10:21

## 2017-09-05 RX ADMIN — MEROPENEM 1 G: 1 INJECTION, POWDER, FOR SOLUTION INTRAVENOUS at 12:41

## 2017-09-05 RX ADMIN — VANCOMYCIN HYDROCHLORIDE 1500 MG: 10 INJECTION, POWDER, LYOPHILIZED, FOR SOLUTION INTRAVENOUS at 18:51

## 2017-09-05 RX ADMIN — IOPAMIDOL 95 ML: 755 INJECTION, SOLUTION INTRAVENOUS at 10:15

## 2017-09-05 RX ADMIN — SODIUM CHLORIDE 75 ML/HR: 9 INJECTION, SOLUTION INTRAVENOUS at 02:37

## 2017-09-05 RX ADMIN — METHYLPREDNISOLONE SODIUM SUCCINATE 40 MG: 40 INJECTION, POWDER, FOR SOLUTION INTRAMUSCULAR; INTRAVENOUS at 21:29

## 2017-09-05 RX ADMIN — INSULIN ASPART 5 UNITS: 100 INJECTION, SOLUTION INTRAVENOUS; SUBCUTANEOUS at 21:04

## 2017-09-05 RX ADMIN — ENOXAPARIN SODIUM 80 MG: 80 INJECTION SUBCUTANEOUS at 12:40

## 2017-09-05 RX ADMIN — METHYLPREDNISOLONE SODIUM SUCCINATE 40 MG: 40 INJECTION, POWDER, FOR SOLUTION INTRAMUSCULAR; INTRAVENOUS at 12:40

## 2017-09-05 RX ADMIN — IPRATROPIUM BROMIDE AND ALBUTEROL SULFATE 3 ML: .5; 3 SOLUTION RESPIRATORY (INHALATION) at 23:09

## 2017-09-05 RX ADMIN — FENTANYL CITRATE 25 MCG: 50 INJECTION INTRAMUSCULAR; INTRAVENOUS at 01:28

## 2017-09-05 RX ADMIN — VANCOMYCIN HYDROCHLORIDE 1500 MG: 10 INJECTION, POWDER, LYOPHILIZED, FOR SOLUTION INTRAVENOUS at 05:40

## 2017-09-05 RX ADMIN — PROPOFOL 35 MCG/KG/MIN: 10 INJECTION, EMULSION INTRAVENOUS at 07:13

## 2017-09-05 RX ADMIN — ENOXAPARIN SODIUM 80 MG: 80 INJECTION SUBCUTANEOUS at 23:50

## 2017-09-05 RX ADMIN — METHYLPREDNISOLONE SODIUM SUCCINATE 40 MG: 40 INJECTION, POWDER, FOR SOLUTION INTRAMUSCULAR; INTRAVENOUS at 03:54

## 2017-09-05 RX ADMIN — FUROSEMIDE 40 MG: 10 INJECTION, SOLUTION INTRAMUSCULAR; INTRAVENOUS at 10:22

## 2017-09-06 ENCOUNTER — APPOINTMENT (OUTPATIENT)
Dept: GENERAL RADIOLOGY | Facility: HOSPITAL | Age: 62
End: 2017-09-06
Attending: INTERNAL MEDICINE

## 2017-09-06 LAB
ANION GAP SERPL CALCULATED.3IONS-SCNC: 8 MMOL/L
ANISOCYTOSIS BLD QL: NORMAL
ARTERIAL PATENCY WRIST A: POSITIVE
ARTERIAL PATENCY WRIST A: POSITIVE
ATMOSPHERIC PRESS: 748 MMHG
ATMOSPHERIC PRESS: 750.4 MMHG
BACTERIA SPEC RESP CULT: NORMAL
BASE EXCESS BLDA CALC-SCNC: 10.6 MMOL/L (ref 0–2)
BASE EXCESS BLDA CALC-SCNC: 12.9 MMOL/L (ref 0–2)
BASOPHILS # BLD AUTO: 0 10*3/MM3 (ref 0–0.2)
BASOPHILS NFR BLD AUTO: 0 % (ref 0–1.5)
BDY SITE: ABNORMAL
BDY SITE: ABNORMAL
BUN BLD-MCNC: 23 MG/DL (ref 8–23)
BUN/CREAT SERPL: 32.9 (ref 7–25)
CALCIUM SPEC-SCNC: 9.2 MG/DL (ref 8.6–10.5)
CHLORIDE SERPL-SCNC: 95 MMOL/L (ref 98–107)
CO2 SERPL-SCNC: 37 MMOL/L (ref 22–29)
CREAT BLD-MCNC: 0.7 MG/DL (ref 0.76–1.27)
DEPRECATED RDW RBC AUTO: 50.9 FL (ref 37–54)
EOSINOPHIL # BLD AUTO: 0 10*3/MM3 (ref 0–0.7)
EOSINOPHIL NFR BLD AUTO: 0 % (ref 0.3–6.2)
ERYTHROCYTE [DISTWIDTH] IN BLOOD BY AUTOMATED COUNT: 16.8 % (ref 11.5–14.5)
GAS FLOW AIRWAY: 5 LPM
GFR SERPL CREATININE-BSD FRML MDRD: 114 ML/MIN/1.73
GFR SERPL CREATININE-BSD FRML MDRD: 138 ML/MIN/1.73
GLUCOSE BLD-MCNC: 137 MG/DL (ref 65–99)
GLUCOSE BLDC GLUCOMTR-MCNC: 139 MG/DL (ref 70–130)
GLUCOSE BLDC GLUCOMTR-MCNC: 161 MG/DL (ref 70–130)
GLUCOSE BLDC GLUCOMTR-MCNC: 176 MG/DL (ref 70–130)
GLUCOSE BLDC GLUCOMTR-MCNC: 196 MG/DL (ref 70–130)
GLUCOSE BLDC GLUCOMTR-MCNC: 196 MG/DL (ref 70–130)
GLUCOSE BLDC GLUCOMTR-MCNC: 261 MG/DL (ref 70–130)
GRAM STN SPEC: NORMAL
HCO3 BLDA-SCNC: 41.7 MMOL/L (ref 22–28)
HCO3 BLDA-SCNC: 42.5 MMOL/L (ref 22–28)
HCT VFR BLD AUTO: 35.5 % (ref 40.4–52.2)
HGB BLD-MCNC: 10.1 G/DL (ref 13.7–17.6)
HOROWITZ INDEX BLD+IHG-RTO: 40 %
HYPOCHROMIA BLD QL: NORMAL
IMM GRANULOCYTES # BLD: 0.02 10*3/MM3 (ref 0–0.03)
IMM GRANULOCYTES NFR BLD: 0.4 % (ref 0–0.5)
LYMPHOCYTES # BLD AUTO: 0.39 10*3/MM3 (ref 0.9–4.8)
LYMPHOCYTES NFR BLD AUTO: 7.4 % (ref 19.6–45.3)
MCH RBC QN AUTO: 23.4 PG (ref 27–32.7)
MCHC RBC AUTO-ENTMCNC: 28.5 G/DL (ref 32.6–36.4)
MCV RBC AUTO: 82.2 FL (ref 79.8–96.2)
MODALITY: ABNORMAL
MODALITY: ABNORMAL
MONOCYTES # BLD AUTO: 0.58 10*3/MM3 (ref 0.2–1.2)
MONOCYTES NFR BLD AUTO: 11.1 % (ref 5–12)
NEUTROPHILS # BLD AUTO: 4.25 10*3/MM3 (ref 1.9–8.1)
NEUTROPHILS NFR BLD AUTO: 81.1 % (ref 42.7–76)
NRBC BLD MANUAL-RTO: 0 /100 WBC (ref 0–0)
O2 A-A PPRESDIFF RESPIRATORY: 0.5 MMHG
OVALOCYTES BLD QL SMEAR: NORMAL
PCO2 BLDA: 81.9 MM HG (ref 35–45)
PCO2 BLDA: 95.4 MM HG (ref 35–45)
PH BLDA: 7.25 PH UNITS (ref 7.35–7.45)
PH BLDA: 7.32 PH UNITS (ref 7.35–7.45)
PLAT MORPH BLD: NORMAL
PLATELET # BLD AUTO: 216 10*3/MM3 (ref 140–500)
PMV BLD AUTO: 9.9 FL (ref 6–12)
PO2 BLDA: 105.1 MM HG (ref 80–100)
PO2 BLDA: 99.7 MM HG (ref 80–100)
POTASSIUM BLD-SCNC: 4.3 MMOL/L (ref 3.5–5.2)
RBC # BLD AUTO: 4.32 10*6/MM3 (ref 4.6–6)
SAO2 % BLDCOA: 96.3 % (ref 92–99)
SAO2 % BLDCOA: 96.6 % (ref 92–99)
SET MECH RESP RATE: 14
SET MECH RESP RATE: 26
SODIUM BLD-SCNC: 140 MMOL/L (ref 136–145)
TARGETS BLD QL SMEAR: NORMAL
TOTAL RATE: 21 BREATHS/MINUTE
VANCOMYCIN TROUGH SERPL-MCNC: 23.9 MCG/ML (ref 5–20)
VT ON VENT VENT: 600 ML
WBC MORPH BLD: NORMAL
WBC NRBC COR # BLD: 5.24 10*3/MM3 (ref 4.5–10.7)

## 2017-09-06 PROCEDURE — 82803 BLOOD GASES ANY COMBINATION: CPT

## 2017-09-06 PROCEDURE — 80202 ASSAY OF VANCOMYCIN: CPT | Performed by: INTERNAL MEDICINE

## 2017-09-06 PROCEDURE — 63710000001 PREDNISONE PER 1 MG: Performed by: INTERNAL MEDICINE

## 2017-09-06 PROCEDURE — 82962 GLUCOSE BLOOD TEST: CPT

## 2017-09-06 PROCEDURE — 25010000002 METHYLPREDNISOLONE PER 40 MG: Performed by: INTERNAL MEDICINE

## 2017-09-06 PROCEDURE — 25010000002 MEROPENEM: Performed by: INTERNAL MEDICINE

## 2017-09-06 PROCEDURE — 94799 UNLISTED PULMONARY SVC/PX: CPT

## 2017-09-06 PROCEDURE — 99232 SBSQ HOSP IP/OBS MODERATE 35: CPT | Performed by: FAMILY MEDICINE

## 2017-09-06 PROCEDURE — 94660 CPAP INITIATION&MGMT: CPT

## 2017-09-06 PROCEDURE — 71010 HC CHEST PA OR AP: CPT

## 2017-09-06 PROCEDURE — 25010000002 VANCOMYCIN 10 G RECONSTITUTED SOLUTION: Performed by: INTERNAL MEDICINE

## 2017-09-06 PROCEDURE — 85007 BL SMEAR W/DIFF WBC COUNT: CPT | Performed by: INTERNAL MEDICINE

## 2017-09-06 PROCEDURE — 80048 BASIC METABOLIC PNL TOTAL CA: CPT | Performed by: INTERNAL MEDICINE

## 2017-09-06 PROCEDURE — 36600 WITHDRAWAL OF ARTERIAL BLOOD: CPT

## 2017-09-06 PROCEDURE — 63710000001 INSULIN ASPART PER 5 UNITS: Performed by: INTERNAL MEDICINE

## 2017-09-06 PROCEDURE — 85025 COMPLETE CBC W/AUTO DIFF WBC: CPT | Performed by: INTERNAL MEDICINE

## 2017-09-06 RX ORDER — FUROSEMIDE 40 MG/1
40 TABLET ORAL DAILY
Status: DISCONTINUED | OUTPATIENT
Start: 2017-09-06 | End: 2017-09-07

## 2017-09-06 RX ORDER — SILDENAFIL CITRATE 20 MG/1
20 TABLET ORAL 3 TIMES DAILY
Status: DISCONTINUED | OUTPATIENT
Start: 2017-09-06 | End: 2017-09-09

## 2017-09-06 RX ORDER — ATORVASTATIN CALCIUM 20 MG/1
20 TABLET, FILM COATED ORAL NIGHTLY
Status: DISCONTINUED | OUTPATIENT
Start: 2017-09-06 | End: 2017-10-06 | Stop reason: HOSPADM

## 2017-09-06 RX ORDER — FAMOTIDINE 20 MG/1
20 TABLET, FILM COATED ORAL 2 TIMES DAILY
Status: DISCONTINUED | OUTPATIENT
Start: 2017-09-06 | End: 2017-10-06 | Stop reason: HOSPADM

## 2017-09-06 RX ORDER — PREDNISONE 20 MG/1
40 TABLET ORAL
Status: DISCONTINUED | OUTPATIENT
Start: 2017-09-06 | End: 2017-09-08

## 2017-09-06 RX ORDER — GUAIFENESIN 600 MG/1
1200 TABLET, EXTENDED RELEASE ORAL EVERY 12 HOURS SCHEDULED
Status: DISCONTINUED | OUTPATIENT
Start: 2017-09-06 | End: 2017-10-06 | Stop reason: HOSPADM

## 2017-09-06 RX ORDER — SODIUM CHLORIDE FOR INHALATION 7 %
4 VIAL, NEBULIZER (ML) INHALATION
Status: DISCONTINUED | OUTPATIENT
Start: 2017-09-06 | End: 2017-09-15

## 2017-09-06 RX ADMIN — ALBUTEROL SULFATE 2.5 MG: 2.5 SOLUTION RESPIRATORY (INHALATION) at 16:45

## 2017-09-06 RX ADMIN — APIXABAN 5 MG: 5 TABLET, FILM COATED ORAL at 11:31

## 2017-09-06 RX ADMIN — METOPROLOL TARTRATE 12.5 MG: 25 TABLET ORAL at 20:12

## 2017-09-06 RX ADMIN — IPRATROPIUM BROMIDE AND ALBUTEROL SULFATE 3 ML: .5; 3 SOLUTION RESPIRATORY (INHALATION) at 03:53

## 2017-09-06 RX ADMIN — INSULIN ASPART 5 UNITS: 100 INJECTION, SOLUTION INTRAVENOUS; SUBCUTANEOUS at 04:06

## 2017-09-06 RX ADMIN — ALBUTEROL SULFATE 2.5 MG: 2.5 SOLUTION RESPIRATORY (INHALATION) at 08:20

## 2017-09-06 RX ADMIN — SENNOSIDES AND DOCUSATE SODIUM 2 TABLET: 8.6; 5 TABLET ORAL at 20:16

## 2017-09-06 RX ADMIN — METHYLPREDNISOLONE SODIUM SUCCINATE 40 MG: 40 INJECTION, POWDER, FOR SOLUTION INTRAMUSCULAR; INTRAVENOUS at 04:06

## 2017-09-06 RX ADMIN — INSULIN ASPART 6 UNITS: 100 INJECTION, SOLUTION INTRAVENOUS; SUBCUTANEOUS at 08:33

## 2017-09-06 RX ADMIN — VANCOMYCIN HYDROCHLORIDE 1250 MG: 10 INJECTION, POWDER, LYOPHILIZED, FOR SOLUTION INTRAVENOUS at 14:05

## 2017-09-06 RX ADMIN — ALBUTEROL SULFATE 2.5 MG: 2.5 SOLUTION RESPIRATORY (INHALATION) at 11:41

## 2017-09-06 RX ADMIN — MEROPENEM 1 G: 1 INJECTION, POWDER, FOR SOLUTION INTRAVENOUS at 04:06

## 2017-09-06 RX ADMIN — MEROPENEM 1 G: 1 INJECTION, POWDER, FOR SOLUTION INTRAVENOUS at 20:14

## 2017-09-06 RX ADMIN — VANCOMYCIN HYDROCHLORIDE 1500 MG: 10 INJECTION, POWDER, LYOPHILIZED, FOR SOLUTION INTRAVENOUS at 06:06

## 2017-09-06 RX ADMIN — FUROSEMIDE 40 MG: 40 TABLET ORAL at 11:33

## 2017-09-06 RX ADMIN — SILDENAFIL 20 MG: 20 TABLET, FILM COATED ORAL at 20:14

## 2017-09-06 RX ADMIN — GUAIFENESIN 1200 MG: 600 TABLET, EXTENDED RELEASE ORAL at 20:19

## 2017-09-06 RX ADMIN — GUAIFENESIN 1200 MG: 600 TABLET, EXTENDED RELEASE ORAL at 14:06

## 2017-09-06 RX ADMIN — FAMOTIDINE 20 MG: 10 INJECTION INTRAVENOUS at 09:35

## 2017-09-06 RX ADMIN — FAMOTIDINE 20 MG: 20 TABLET, FILM COATED ORAL at 20:13

## 2017-09-06 RX ADMIN — ALBUTEROL SULFATE 2.5 MG: 2.5 SOLUTION RESPIRATORY (INHALATION) at 20:43

## 2017-09-06 RX ADMIN — METOPROLOL TARTRATE 12.5 MG: 25 TABLET ORAL at 11:31

## 2017-09-06 RX ADMIN — SODIUM CHLORIDE 9 ML/HR: 9 INJECTION, SOLUTION INTRAVENOUS at 04:06

## 2017-09-06 RX ADMIN — MEROPENEM 1 G: 1 INJECTION, POWDER, FOR SOLUTION INTRAVENOUS at 11:30

## 2017-09-06 RX ADMIN — APIXABAN 5 MG: 5 TABLET, FILM COATED ORAL at 20:13

## 2017-09-06 RX ADMIN — ATORVASTATIN CALCIUM 20 MG: 20 TABLET, FILM COATED ORAL at 20:13

## 2017-09-06 RX ADMIN — PREDNISONE 40 MG: 20 TABLET ORAL at 14:06

## 2017-09-07 ENCOUNTER — APPOINTMENT (OUTPATIENT)
Dept: GENERAL RADIOLOGY | Facility: HOSPITAL | Age: 62
End: 2017-09-07
Attending: INTERNAL MEDICINE

## 2017-09-07 LAB
ALBUMIN SERPL-MCNC: 3.4 G/DL (ref 3.5–5.2)
ALBUMIN/GLOB SERPL: 0.9 G/DL
ALP SERPL-CCNC: 110 U/L (ref 39–117)
ALT SERPL W P-5'-P-CCNC: 16 U/L (ref 1–41)
ANION GAP SERPL CALCULATED.3IONS-SCNC: 4.1 MMOL/L
AST SERPL-CCNC: 18 U/L (ref 1–40)
BASOPHILS # BLD AUTO: 0 10*3/MM3 (ref 0–0.2)
BASOPHILS NFR BLD AUTO: 0 % (ref 0–1.5)
BILIRUB SERPL-MCNC: 0.3 MG/DL (ref 0.1–1.2)
BUN BLD-MCNC: 22 MG/DL (ref 8–23)
BUN/CREAT SERPL: 29.7 (ref 7–25)
CALCIUM SPEC-SCNC: 8.7 MG/DL (ref 8.6–10.5)
CHLORIDE SERPL-SCNC: 99 MMOL/L (ref 98–107)
CO2 SERPL-SCNC: 41.9 MMOL/L (ref 22–29)
CREAT BLD-MCNC: 0.74 MG/DL (ref 0.76–1.27)
DEPRECATED RDW RBC AUTO: 51 FL (ref 37–54)
EOSINOPHIL # BLD AUTO: 0.01 10*3/MM3 (ref 0–0.7)
EOSINOPHIL NFR BLD AUTO: 0.2 % (ref 0.3–6.2)
ERYTHROCYTE [DISTWIDTH] IN BLOOD BY AUTOMATED COUNT: 16.9 % (ref 11.5–14.5)
GFR SERPL CREATININE-BSD FRML MDRD: 107 ML/MIN/1.73
GFR SERPL CREATININE-BSD FRML MDRD: 130 ML/MIN/1.73
GLOBULIN UR ELPH-MCNC: 3.9 GM/DL
GLUCOSE BLD-MCNC: 121 MG/DL (ref 65–99)
GLUCOSE BLDC GLUCOMTR-MCNC: 113 MG/DL (ref 70–130)
GLUCOSE BLDC GLUCOMTR-MCNC: 120 MG/DL (ref 70–130)
GLUCOSE BLDC GLUCOMTR-MCNC: 129 MG/DL (ref 70–130)
GLUCOSE BLDC GLUCOMTR-MCNC: 136 MG/DL (ref 70–130)
GLUCOSE BLDC GLUCOMTR-MCNC: 139 MG/DL (ref 70–130)
GLUCOSE BLDC GLUCOMTR-MCNC: 247 MG/DL (ref 70–130)
GLUCOSE BLDC GLUCOMTR-MCNC: 283 MG/DL (ref 70–130)
HCT VFR BLD AUTO: 34.9 % (ref 40.4–52.2)
HGB BLD-MCNC: 9.7 G/DL (ref 13.7–17.6)
IMM GRANULOCYTES # BLD: 0 10*3/MM3 (ref 0–0.03)
IMM GRANULOCYTES NFR BLD: 0 % (ref 0–0.5)
LYMPHOCYTES # BLD AUTO: 1.12 10*3/MM3 (ref 0.9–4.8)
LYMPHOCYTES NFR BLD AUTO: 17.4 % (ref 19.6–45.3)
MCH RBC QN AUTO: 22.9 PG (ref 27–32.7)
MCHC RBC AUTO-ENTMCNC: 27.8 G/DL (ref 32.6–36.4)
MCV RBC AUTO: 82.3 FL (ref 79.8–96.2)
MONOCYTES # BLD AUTO: 0.96 10*3/MM3 (ref 0.2–1.2)
MONOCYTES NFR BLD AUTO: 14.9 % (ref 5–12)
NEUTROPHILS # BLD AUTO: 4.36 10*3/MM3 (ref 1.9–8.1)
NEUTROPHILS NFR BLD AUTO: 67.5 % (ref 42.7–76)
PLATELET # BLD AUTO: 225 10*3/MM3 (ref 140–500)
PMV BLD AUTO: 9.5 FL (ref 6–12)
POTASSIUM BLD-SCNC: 4.3 MMOL/L (ref 3.5–5.2)
PROT SERPL-MCNC: 7.3 G/DL (ref 6–8.5)
RBC # BLD AUTO: 4.24 10*6/MM3 (ref 4.6–6)
SODIUM BLD-SCNC: 145 MMOL/L (ref 136–145)
VANCOMYCIN TROUGH SERPL-MCNC: 24.6 MCG/ML (ref 5–20)
WBC NRBC COR # BLD: 6.45 10*3/MM3 (ref 4.5–10.7)

## 2017-09-07 PROCEDURE — 25010000002 VANCOMYCIN 10 G RECONSTITUTED SOLUTION: Performed by: INTERNAL MEDICINE

## 2017-09-07 PROCEDURE — 85025 COMPLETE CBC W/AUTO DIFF WBC: CPT | Performed by: FAMILY MEDICINE

## 2017-09-07 PROCEDURE — 99232 SBSQ HOSP IP/OBS MODERATE 35: CPT | Performed by: FAMILY MEDICINE

## 2017-09-07 PROCEDURE — 94660 CPAP INITIATION&MGMT: CPT

## 2017-09-07 PROCEDURE — 87350 HEPATITIS BE AG IA: CPT | Performed by: INTERNAL MEDICINE

## 2017-09-07 PROCEDURE — 86704 HEP B CORE ANTIBODY TOTAL: CPT | Performed by: INTERNAL MEDICINE

## 2017-09-07 PROCEDURE — 94799 UNLISTED PULMONARY SVC/PX: CPT

## 2017-09-07 PROCEDURE — 86706 HEP B SURFACE ANTIBODY: CPT | Performed by: INTERNAL MEDICINE

## 2017-09-07 PROCEDURE — 25010000002 FUROSEMIDE PER 20 MG: Performed by: INTERNAL MEDICINE

## 2017-09-07 PROCEDURE — 82962 GLUCOSE BLOOD TEST: CPT

## 2017-09-07 PROCEDURE — 25010000002 MEROPENEM: Performed by: INTERNAL MEDICINE

## 2017-09-07 PROCEDURE — 97110 THERAPEUTIC EXERCISES: CPT

## 2017-09-07 PROCEDURE — 25010000002 METHYLPREDNISOLONE PER 40 MG: Performed by: INTERNAL MEDICINE

## 2017-09-07 PROCEDURE — 80053 COMPREHEN METABOLIC PANEL: CPT | Performed by: FAMILY MEDICINE

## 2017-09-07 PROCEDURE — 71010 HC CHEST PA OR AP: CPT

## 2017-09-07 PROCEDURE — 63710000001 INSULIN ASPART PER 5 UNITS: Performed by: INTERNAL MEDICINE

## 2017-09-07 PROCEDURE — 80202 ASSAY OF VANCOMYCIN: CPT | Performed by: INTERNAL MEDICINE

## 2017-09-07 PROCEDURE — 86707 HEPATITIS BE ANTIBODY: CPT | Performed by: INTERNAL MEDICINE

## 2017-09-07 PROCEDURE — 80074 ACUTE HEPATITIS PANEL: CPT | Performed by: INTERNAL MEDICINE

## 2017-09-07 RX ORDER — FUROSEMIDE 10 MG/ML
40 INJECTION INTRAMUSCULAR; INTRAVENOUS ONCE
Status: DISCONTINUED | OUTPATIENT
Start: 2017-09-07 | End: 2017-09-07

## 2017-09-07 RX ORDER — METHYLPREDNISOLONE SODIUM SUCCINATE 40 MG/ML
40 INJECTION, POWDER, LYOPHILIZED, FOR SOLUTION INTRAMUSCULAR; INTRAVENOUS EVERY 8 HOURS
Status: DISCONTINUED | OUTPATIENT
Start: 2017-09-07 | End: 2017-09-10

## 2017-09-07 RX ORDER — FUROSEMIDE 10 MG/ML
40 INJECTION INTRAMUSCULAR; INTRAVENOUS EVERY 6 HOURS
Status: COMPLETED | OUTPATIENT
Start: 2017-09-07 | End: 2017-09-07

## 2017-09-07 RX ADMIN — INSULIN ASPART 4 UNITS: 100 INJECTION, SOLUTION INTRAVENOUS; SUBCUTANEOUS at 22:17

## 2017-09-07 RX ADMIN — ALBUTEROL SULFATE 2.5 MG: 2.5 SOLUTION RESPIRATORY (INHALATION) at 07:20

## 2017-09-07 RX ADMIN — GUAIFENESIN 1200 MG: 600 TABLET, EXTENDED RELEASE ORAL at 09:13

## 2017-09-07 RX ADMIN — METOPROLOL TARTRATE 12.5 MG: 25 TABLET ORAL at 17:13

## 2017-09-07 RX ADMIN — SENNOSIDES AND DOCUSATE SODIUM 2 TABLET: 8.6; 5 TABLET ORAL at 20:01

## 2017-09-07 RX ADMIN — SILDENAFIL 20 MG: 20 TABLET, FILM COATED ORAL at 17:06

## 2017-09-07 RX ADMIN — FUROSEMIDE 40 MG: 10 INJECTION, SOLUTION INTRAMUSCULAR; INTRAVENOUS at 09:12

## 2017-09-07 RX ADMIN — VANCOMYCIN HYDROCHLORIDE 1250 MG: 10 INJECTION, POWDER, LYOPHILIZED, FOR SOLUTION INTRAVENOUS at 01:01

## 2017-09-07 RX ADMIN — FAMOTIDINE 20 MG: 20 TABLET, FILM COATED ORAL at 17:14

## 2017-09-07 RX ADMIN — METHYLPREDNISOLONE SODIUM SUCCINATE 40 MG: 40 INJECTION, POWDER, LYOPHILIZED, FOR SOLUTION INTRAMUSCULAR; INTRAVENOUS at 09:24

## 2017-09-07 RX ADMIN — SILDENAFIL 20 MG: 20 TABLET, FILM COATED ORAL at 20:01

## 2017-09-07 RX ADMIN — ATORVASTATIN CALCIUM 20 MG: 20 TABLET, FILM COATED ORAL at 20:01

## 2017-09-07 RX ADMIN — FUROSEMIDE 40 MG: 10 INJECTION, SOLUTION INTRAMUSCULAR; INTRAVENOUS at 15:37

## 2017-09-07 RX ADMIN — SODIUM CHLORIDE SOLN NEBU 7% 4 ML: 7 NEBU SOLN at 19:44

## 2017-09-07 RX ADMIN — APIXABAN 5 MG: 5 TABLET, FILM COATED ORAL at 09:11

## 2017-09-07 RX ADMIN — MEROPENEM 1 G: 1 INJECTION, POWDER, FOR SOLUTION INTRAVENOUS at 20:01

## 2017-09-07 RX ADMIN — MEROPENEM 1 G: 1 INJECTION, POWDER, FOR SOLUTION INTRAVENOUS at 03:57

## 2017-09-07 RX ADMIN — METHYLPREDNISOLONE SODIUM SUCCINATE 40 MG: 40 INJECTION, POWDER, LYOPHILIZED, FOR SOLUTION INTRAMUSCULAR; INTRAVENOUS at 17:12

## 2017-09-07 RX ADMIN — ALBUTEROL SULFATE 2.5 MG: 2.5 SOLUTION RESPIRATORY (INHALATION) at 12:19

## 2017-09-07 RX ADMIN — FAMOTIDINE 20 MG: 20 TABLET, FILM COATED ORAL at 09:12

## 2017-09-07 RX ADMIN — MEROPENEM 1 G: 1 INJECTION, POWDER, FOR SOLUTION INTRAVENOUS at 11:52

## 2017-09-07 RX ADMIN — VANCOMYCIN HYDROCHLORIDE 750 MG: 750 INJECTION, POWDER, LYOPHILIZED, FOR SOLUTION INTRAVENOUS at 17:23

## 2017-09-07 RX ADMIN — GUAIFENESIN 1200 MG: 600 TABLET, EXTENDED RELEASE ORAL at 20:01

## 2017-09-07 RX ADMIN — METOPROLOL TARTRATE 12.5 MG: 25 TABLET ORAL at 09:14

## 2017-09-07 RX ADMIN — APIXABAN 5 MG: 5 TABLET, FILM COATED ORAL at 17:13

## 2017-09-07 RX ADMIN — ALBUTEROL SULFATE 2.5 MG: 2.5 SOLUTION RESPIRATORY (INHALATION) at 19:43

## 2017-09-07 RX ADMIN — ALBUTEROL SULFATE 2.5 MG: 2.5 SOLUTION RESPIRATORY (INHALATION) at 16:20

## 2017-09-07 RX ADMIN — SODIUM CHLORIDE SOLN NEBU 7% 4 ML: 7 NEBU SOLN at 07:20

## 2017-09-07 RX ADMIN — SILDENAFIL 20 MG: 20 TABLET, FILM COATED ORAL at 09:16

## 2017-09-08 LAB
GLUCOSE BLDC GLUCOMTR-MCNC: 226 MG/DL (ref 70–130)
GLUCOSE BLDC GLUCOMTR-MCNC: 226 MG/DL (ref 70–130)
GLUCOSE BLDC GLUCOMTR-MCNC: 247 MG/DL (ref 70–130)
GLUCOSE BLDC GLUCOMTR-MCNC: 279 MG/DL (ref 70–130)
GLUCOSE BLDC GLUCOMTR-MCNC: 279 MG/DL (ref 70–130)
GLUCOSE BLDC GLUCOMTR-MCNC: 280 MG/DL (ref 70–130)
HBV CORE AB SER DONR QL IA: POSITIVE
HBV CORE IGM SERPL QL IA: NEGATIVE
HBV E AB SERPL QL IA: NEGATIVE
HBV E AG SERPL QL IA: NEGATIVE
HBV SURFACE AB SER QL: REACTIVE
HBV SURFACE AG SERPL QL IA: NEGATIVE
HCV AB S/CO SERPL IA: <0.1 S/CO RATIO (ref 0–0.9)
LABORATORY COMMENT REPORT: NORMAL
VANCOMYCIN TROUGH SERPL-MCNC: 13.7 MCG/ML (ref 5–20)

## 2017-09-08 PROCEDURE — 94799 UNLISTED PULMONARY SVC/PX: CPT

## 2017-09-08 PROCEDURE — 80202 ASSAY OF VANCOMYCIN: CPT | Performed by: INTERNAL MEDICINE

## 2017-09-08 PROCEDURE — 25010000002 MEROPENEM: Performed by: INTERNAL MEDICINE

## 2017-09-08 PROCEDURE — 63710000001 INSULIN DETEMER PER 5 UNITS: Performed by: INTERNAL MEDICINE

## 2017-09-08 PROCEDURE — 25010000002 METHYLPREDNISOLONE PER 40 MG: Performed by: INTERNAL MEDICINE

## 2017-09-08 PROCEDURE — 63710000001 INSULIN ASPART PER 5 UNITS: Performed by: INTERNAL MEDICINE

## 2017-09-08 PROCEDURE — 25010000002 VANCOMYCIN PER 500 MG: Performed by: INTERNAL MEDICINE

## 2017-09-08 PROCEDURE — 63710000001 PREDNISONE PER 1 MG: Performed by: INTERNAL MEDICINE

## 2017-09-08 PROCEDURE — 82962 GLUCOSE BLOOD TEST: CPT

## 2017-09-08 PROCEDURE — 97110 THERAPEUTIC EXERCISES: CPT

## 2017-09-08 PROCEDURE — 99231 SBSQ HOSP IP/OBS SF/LOW 25: CPT | Performed by: FAMILY MEDICINE

## 2017-09-08 RX ORDER — VANCOMYCIN HYDROCHLORIDE 1 G/200ML
1000 INJECTION, SOLUTION INTRAVENOUS EVERY 12 HOURS
Status: DISCONTINUED | OUTPATIENT
Start: 2017-09-08 | End: 2017-09-14

## 2017-09-08 RX ORDER — PANTOPRAZOLE SODIUM 40 MG/1
40 TABLET, DELAYED RELEASE ORAL DAILY
COMMUNITY

## 2017-09-08 RX ORDER — FUROSEMIDE 40 MG/1
40 TABLET ORAL DAILY
Status: DISCONTINUED | OUTPATIENT
Start: 2017-09-08 | End: 2017-09-13

## 2017-09-08 RX ORDER — ONDANSETRON 4 MG/1
4 TABLET, FILM COATED ORAL EVERY 6 HOURS PRN
COMMUNITY

## 2017-09-08 RX ORDER — ACETAMINOPHEN 325 MG/1
325 TABLET ORAL EVERY 4 HOURS PRN
COMMUNITY

## 2017-09-08 RX ORDER — BISACODYL 10 MG
10 SUPPOSITORY, RECTAL RECTAL DAILY PRN
COMMUNITY
End: 2017-10-06 | Stop reason: HOSPADM

## 2017-09-08 RX ORDER — SILDENAFIL CITRATE 20 MG/1
20 TABLET ORAL 3 TIMES DAILY
Status: ON HOLD | COMMUNITY
End: 2017-09-09

## 2017-09-08 RX ADMIN — MEROPENEM 1 G: 1 INJECTION, POWDER, FOR SOLUTION INTRAVENOUS at 11:19

## 2017-09-08 RX ADMIN — APIXABAN 5 MG: 5 TABLET, FILM COATED ORAL at 08:34

## 2017-09-08 RX ADMIN — INSULIN DETEMIR 8 UNITS: 100 INJECTION, SOLUTION SUBCUTANEOUS at 12:40

## 2017-09-08 RX ADMIN — SILDENAFIL 20 MG: 20 TABLET, FILM COATED ORAL at 08:36

## 2017-09-08 RX ADMIN — METOPROLOL TARTRATE 12.5 MG: 25 TABLET ORAL at 17:02

## 2017-09-08 RX ADMIN — PREDNISONE 40 MG: 20 TABLET ORAL at 08:34

## 2017-09-08 RX ADMIN — SILDENAFIL 20 MG: 20 TABLET, FILM COATED ORAL at 17:02

## 2017-09-08 RX ADMIN — INSULIN ASPART 6 UNITS: 100 INJECTION, SOLUTION INTRAVENOUS; SUBCUTANEOUS at 18:53

## 2017-09-08 RX ADMIN — GUAIFENESIN 1200 MG: 600 TABLET, EXTENDED RELEASE ORAL at 08:36

## 2017-09-08 RX ADMIN — SENNOSIDES AND DOCUSATE SODIUM 2 TABLET: 8.6; 5 TABLET ORAL at 21:42

## 2017-09-08 RX ADMIN — INSULIN ASPART 6 UNITS: 100 INJECTION, SOLUTION INTRAVENOUS; SUBCUTANEOUS at 11:46

## 2017-09-08 RX ADMIN — ALBUTEROL SULFATE 2.5 MG: 2.5 SOLUTION RESPIRATORY (INHALATION) at 06:28

## 2017-09-08 RX ADMIN — MEROPENEM 1 G: 1 INJECTION, POWDER, FOR SOLUTION INTRAVENOUS at 04:46

## 2017-09-08 RX ADMIN — MEROPENEM 1 G: 1 INJECTION, POWDER, FOR SOLUTION INTRAVENOUS at 23:00

## 2017-09-08 RX ADMIN — METOPROLOL TARTRATE 12.5 MG: 25 TABLET ORAL at 08:35

## 2017-09-08 RX ADMIN — GUAIFENESIN 1200 MG: 600 TABLET, EXTENDED RELEASE ORAL at 21:42

## 2017-09-08 RX ADMIN — APIXABAN 5 MG: 5 TABLET, FILM COATED ORAL at 17:02

## 2017-09-08 RX ADMIN — ALBUTEROL SULFATE 2.5 MG: 2.5 SOLUTION RESPIRATORY (INHALATION) at 10:08

## 2017-09-08 RX ADMIN — FAMOTIDINE 20 MG: 20 TABLET, FILM COATED ORAL at 22:59

## 2017-09-08 RX ADMIN — SILDENAFIL 20 MG: 20 TABLET, FILM COATED ORAL at 22:59

## 2017-09-08 RX ADMIN — FUROSEMIDE 40 MG: 40 TABLET ORAL at 11:11

## 2017-09-08 RX ADMIN — VANCOMYCIN HYDROCHLORIDE 750 MG: 750 INJECTION, POWDER, LYOPHILIZED, FOR SOLUTION INTRAVENOUS at 06:24

## 2017-09-08 RX ADMIN — SODIUM CHLORIDE SOLN NEBU 7% 4 ML: 7 NEBU SOLN at 10:08

## 2017-09-08 RX ADMIN — METHYLPREDNISOLONE SODIUM SUCCINATE 40 MG: 40 INJECTION, POWDER, LYOPHILIZED, FOR SOLUTION INTRAMUSCULAR; INTRAVENOUS at 02:39

## 2017-09-08 RX ADMIN — ALBUTEROL SULFATE 2.5 MG: 2.5 SOLUTION RESPIRATORY (INHALATION) at 14:51

## 2017-09-08 RX ADMIN — IPRATROPIUM BROMIDE AND ALBUTEROL SULFATE 3 ML: .5; 3 SOLUTION RESPIRATORY (INHALATION) at 18:34

## 2017-09-08 RX ADMIN — ATORVASTATIN CALCIUM 20 MG: 20 TABLET, FILM COATED ORAL at 21:42

## 2017-09-08 RX ADMIN — METHYLPREDNISOLONE SODIUM SUCCINATE 40 MG: 40 INJECTION, POWDER, LYOPHILIZED, FOR SOLUTION INTRAMUSCULAR; INTRAVENOUS at 17:02

## 2017-09-08 RX ADMIN — ALBUTEROL SULFATE 2.5 MG: 2.5 SOLUTION RESPIRATORY (INHALATION) at 21:55

## 2017-09-08 RX ADMIN — SODIUM CHLORIDE SOLN NEBU 7% 4 ML: 7 NEBU SOLN at 21:55

## 2017-09-08 RX ADMIN — VANCOMYCIN HYDROCHLORIDE 1000 MG: 1 INJECTION, SOLUTION INTRAVENOUS at 21:41

## 2017-09-08 RX ADMIN — METHYLPREDNISOLONE SODIUM SUCCINATE 40 MG: 40 INJECTION, POWDER, LYOPHILIZED, FOR SOLUTION INTRAMUSCULAR; INTRAVENOUS at 11:11

## 2017-09-08 RX ADMIN — INSULIN ASPART 4 UNITS: 100 INJECTION, SOLUTION INTRAVENOUS; SUBCUTANEOUS at 21:42

## 2017-09-08 RX ADMIN — INSULIN ASPART 4 UNITS: 100 INJECTION, SOLUTION INTRAVENOUS; SUBCUTANEOUS at 08:33

## 2017-09-08 RX ADMIN — FAMOTIDINE 20 MG: 20 TABLET, FILM COATED ORAL at 08:37

## 2017-09-09 LAB
ANION GAP SERPL CALCULATED.3IONS-SCNC: 7.7 MMOL/L
BACTERIA SPEC AEROBE CULT: NORMAL
BACTERIA SPEC AEROBE CULT: NORMAL
BASOPHILS # BLD AUTO: 0 10*3/MM3 (ref 0–0.2)
BASOPHILS NFR BLD AUTO: 0 % (ref 0–1.5)
BUN BLD-MCNC: 22 MG/DL (ref 8–23)
BUN/CREAT SERPL: 37.3 (ref 7–25)
CALCIUM SPEC-SCNC: 8.6 MG/DL (ref 8.6–10.5)
CHLORIDE SERPL-SCNC: 92 MMOL/L (ref 98–107)
CO2 SERPL-SCNC: 40.3 MMOL/L (ref 22–29)
CREAT BLD-MCNC: 0.59 MG/DL (ref 0.76–1.27)
DEPRECATED RDW RBC AUTO: 48.6 FL (ref 37–54)
EOSINOPHIL # BLD AUTO: 0 10*3/MM3 (ref 0–0.7)
EOSINOPHIL NFR BLD AUTO: 0 % (ref 0.3–6.2)
ERYTHROCYTE [DISTWIDTH] IN BLOOD BY AUTOMATED COUNT: 16.6 % (ref 11.5–14.5)
GFR SERPL CREATININE-BSD FRML MDRD: 139 ML/MIN/1.73
GFR SERPL CREATININE-BSD FRML MDRD: >150 ML/MIN/1.73
GLUCOSE BLD-MCNC: 228 MG/DL (ref 65–99)
GLUCOSE BLDC GLUCOMTR-MCNC: 198 MG/DL (ref 70–130)
GLUCOSE BLDC GLUCOMTR-MCNC: 286 MG/DL (ref 70–130)
GLUCOSE BLDC GLUCOMTR-MCNC: 303 MG/DL (ref 70–130)
GLUCOSE BLDC GLUCOMTR-MCNC: 341 MG/DL (ref 70–130)
HCT VFR BLD AUTO: 35.6 % (ref 40.4–52.2)
HGB BLD-MCNC: 10.2 G/DL (ref 13.7–17.6)
IMM GRANULOCYTES # BLD: 0 10*3/MM3 (ref 0–0.03)
IMM GRANULOCYTES NFR BLD: 0 % (ref 0–0.5)
LYMPHOCYTES # BLD AUTO: 0.55 10*3/MM3 (ref 0.9–4.8)
LYMPHOCYTES NFR BLD AUTO: 9.6 % (ref 19.6–45.3)
MCH RBC QN AUTO: 23 PG (ref 27–32.7)
MCHC RBC AUTO-ENTMCNC: 28.7 G/DL (ref 32.6–36.4)
MCV RBC AUTO: 80.2 FL (ref 79.8–96.2)
MONOCYTES # BLD AUTO: 0.63 10*3/MM3 (ref 0.2–1.2)
MONOCYTES NFR BLD AUTO: 11 % (ref 5–12)
NEUTROPHILS # BLD AUTO: 4.57 10*3/MM3 (ref 1.9–8.1)
NEUTROPHILS NFR BLD AUTO: 79.4 % (ref 42.7–76)
NT-PROBNP SERPL-MCNC: 6883 PG/ML (ref 5–900)
PLATELET # BLD AUTO: 239 10*3/MM3 (ref 140–500)
PMV BLD AUTO: 9.6 FL (ref 6–12)
POTASSIUM BLD-SCNC: 4.1 MMOL/L (ref 3.5–5.2)
RBC # BLD AUTO: 4.44 10*6/MM3 (ref 4.6–6)
SODIUM BLD-SCNC: 140 MMOL/L (ref 136–145)
WBC NRBC COR # BLD: 5.75 10*3/MM3 (ref 4.5–10.7)

## 2017-09-09 PROCEDURE — 94799 UNLISTED PULMONARY SVC/PX: CPT

## 2017-09-09 PROCEDURE — 82962 GLUCOSE BLOOD TEST: CPT

## 2017-09-09 PROCEDURE — 85025 COMPLETE CBC W/AUTO DIFF WBC: CPT | Performed by: FAMILY MEDICINE

## 2017-09-09 PROCEDURE — 80048 BASIC METABOLIC PNL TOTAL CA: CPT | Performed by: FAMILY MEDICINE

## 2017-09-09 PROCEDURE — 63710000001 INSULIN DETEMER PER 5 UNITS: Performed by: INTERNAL MEDICINE

## 2017-09-09 PROCEDURE — 25010000002 VANCOMYCIN PER 500 MG: Performed by: INTERNAL MEDICINE

## 2017-09-09 PROCEDURE — 83880 ASSAY OF NATRIURETIC PEPTIDE: CPT | Performed by: FAMILY MEDICINE

## 2017-09-09 PROCEDURE — 25010000002 METHYLPREDNISOLONE PER 40 MG: Performed by: INTERNAL MEDICINE

## 2017-09-09 PROCEDURE — 25010000002 MEROPENEM: Performed by: INTERNAL MEDICINE

## 2017-09-09 PROCEDURE — 63710000001 INSULIN ASPART PER 5 UNITS: Performed by: INTERNAL MEDICINE

## 2017-09-09 RX ORDER — IPRATROPIUM BROMIDE AND ALBUTEROL SULFATE 2.5; .5 MG/3ML; MG/3ML
3 SOLUTION RESPIRATORY (INHALATION)
Status: DISCONTINUED | OUTPATIENT
Start: 2017-09-09 | End: 2017-09-21

## 2017-09-09 RX ORDER — IPRATROPIUM BROMIDE AND ALBUTEROL SULFATE 2.5; .5 MG/3ML; MG/3ML
3 SOLUTION RESPIRATORY (INHALATION) EVERY 4 HOURS PRN
Status: DISCONTINUED | OUTPATIENT
Start: 2017-09-09 | End: 2017-10-04 | Stop reason: SDUPTHER

## 2017-09-09 RX ADMIN — FAMOTIDINE 20 MG: 20 TABLET, FILM COATED ORAL at 17:50

## 2017-09-09 RX ADMIN — METHYLPREDNISOLONE SODIUM SUCCINATE 40 MG: 40 INJECTION, POWDER, LYOPHILIZED, FOR SOLUTION INTRAMUSCULAR; INTRAVENOUS at 02:18

## 2017-09-09 RX ADMIN — MEROPENEM 1 G: 1 INJECTION, POWDER, FOR SOLUTION INTRAVENOUS at 23:55

## 2017-09-09 RX ADMIN — INSULIN ASPART 7 UNITS: 100 INJECTION, SOLUTION INTRAVENOUS; SUBCUTANEOUS at 20:35

## 2017-09-09 RX ADMIN — SODIUM CHLORIDE SOLN NEBU 7% 4 ML: 7 NEBU SOLN at 07:24

## 2017-09-09 RX ADMIN — ALBUTEROL SULFATE 2.5 MG: 2.5 SOLUTION RESPIRATORY (INHALATION) at 07:21

## 2017-09-09 RX ADMIN — ATORVASTATIN CALCIUM 20 MG: 20 TABLET, FILM COATED ORAL at 20:35

## 2017-09-09 RX ADMIN — METOPROLOL TARTRATE 12.5 MG: 25 TABLET ORAL at 17:50

## 2017-09-09 RX ADMIN — GUAIFENESIN 1200 MG: 600 TABLET, EXTENDED RELEASE ORAL at 08:45

## 2017-09-09 RX ADMIN — SILDENAFIL 20 MG: 20 TABLET, FILM COATED ORAL at 08:44

## 2017-09-09 RX ADMIN — METHYLPREDNISOLONE SODIUM SUCCINATE 40 MG: 40 INJECTION, POWDER, LYOPHILIZED, FOR SOLUTION INTRAMUSCULAR; INTRAVENOUS at 10:47

## 2017-09-09 RX ADMIN — MEROPENEM 1 G: 1 INJECTION, POWDER, FOR SOLUTION INTRAVENOUS at 07:32

## 2017-09-09 RX ADMIN — VANCOMYCIN HYDROCHLORIDE 1000 MG: 1 INJECTION, SOLUTION INTRAVENOUS at 08:50

## 2017-09-09 RX ADMIN — INSULIN DETEMIR 8 UNITS: 100 INJECTION, SOLUTION SUBCUTANEOUS at 08:45

## 2017-09-09 RX ADMIN — VANCOMYCIN HYDROCHLORIDE 1000 MG: 1 INJECTION, SOLUTION INTRAVENOUS at 20:36

## 2017-09-09 RX ADMIN — INSULIN ASPART 7 UNITS: 100 INJECTION, SOLUTION INTRAVENOUS; SUBCUTANEOUS at 17:50

## 2017-09-09 RX ADMIN — APIXABAN 5 MG: 5 TABLET, FILM COATED ORAL at 17:50

## 2017-09-09 RX ADMIN — IPRATROPIUM BROMIDE AND ALBUTEROL SULFATE 3 ML: .5; 3 SOLUTION RESPIRATORY (INHALATION) at 11:29

## 2017-09-09 RX ADMIN — METHYLPREDNISOLONE SODIUM SUCCINATE 40 MG: 40 INJECTION, POWDER, LYOPHILIZED, FOR SOLUTION INTRAMUSCULAR; INTRAVENOUS at 17:50

## 2017-09-09 RX ADMIN — GUAIFENESIN 1200 MG: 600 TABLET, EXTENDED RELEASE ORAL at 20:35

## 2017-09-09 RX ADMIN — INSULIN ASPART 2 UNITS: 100 INJECTION, SOLUTION INTRAVENOUS; SUBCUTANEOUS at 08:44

## 2017-09-09 RX ADMIN — IPRATROPIUM BROMIDE AND ALBUTEROL SULFATE 3 ML: .5; 3 SOLUTION RESPIRATORY (INHALATION) at 15:43

## 2017-09-09 RX ADMIN — APIXABAN 5 MG: 5 TABLET, FILM COATED ORAL at 08:45

## 2017-09-09 RX ADMIN — IPRATROPIUM BROMIDE AND ALBUTEROL SULFATE 3 ML: .5; 3 SOLUTION RESPIRATORY (INHALATION) at 23:18

## 2017-09-09 RX ADMIN — MEROPENEM 1 G: 1 INJECTION, POWDER, FOR SOLUTION INTRAVENOUS at 14:19

## 2017-09-09 RX ADMIN — FUROSEMIDE 40 MG: 40 TABLET ORAL at 08:45

## 2017-09-09 RX ADMIN — METOPROLOL TARTRATE 12.5 MG: 25 TABLET ORAL at 08:45

## 2017-09-09 RX ADMIN — SODIUM CHLORIDE SOLN NEBU 7% 4 ML: 7 NEBU SOLN at 19:21

## 2017-09-09 RX ADMIN — SENNOSIDES AND DOCUSATE SODIUM 2 TABLET: 8.6; 5 TABLET ORAL at 20:35

## 2017-09-09 RX ADMIN — FAMOTIDINE 20 MG: 20 TABLET, FILM COATED ORAL at 08:45

## 2017-09-09 RX ADMIN — IPRATROPIUM BROMIDE AND ALBUTEROL SULFATE 3 ML: .5; 3 SOLUTION RESPIRATORY (INHALATION) at 19:20

## 2017-09-09 RX ADMIN — INSULIN ASPART 6 UNITS: 100 INJECTION, SOLUTION INTRAVENOUS; SUBCUTANEOUS at 12:16

## 2017-09-10 LAB
GLUCOSE BLDC GLUCOMTR-MCNC: 259 MG/DL (ref 70–130)
GLUCOSE BLDC GLUCOMTR-MCNC: 292 MG/DL (ref 70–130)
GLUCOSE BLDC GLUCOMTR-MCNC: 302 MG/DL (ref 70–130)
GLUCOSE BLDC GLUCOMTR-MCNC: 305 MG/DL (ref 70–130)
GLUCOSE BLDC GLUCOMTR-MCNC: 361 MG/DL (ref 70–130)
VANCOMYCIN TROUGH SERPL-MCNC: 16.5 MCG/ML (ref 5–20)

## 2017-09-10 PROCEDURE — 80202 ASSAY OF VANCOMYCIN: CPT | Performed by: INTERNAL MEDICINE

## 2017-09-10 PROCEDURE — 25010000002 METHYLPREDNISOLONE PER 40 MG: Performed by: INTERNAL MEDICINE

## 2017-09-10 PROCEDURE — 94799 UNLISTED PULMONARY SVC/PX: CPT

## 2017-09-10 PROCEDURE — 93010 ELECTROCARDIOGRAM REPORT: CPT | Performed by: INTERNAL MEDICINE

## 2017-09-10 PROCEDURE — 93005 ELECTROCARDIOGRAM TRACING: CPT | Performed by: FAMILY MEDICINE

## 2017-09-10 PROCEDURE — 82962 GLUCOSE BLOOD TEST: CPT

## 2017-09-10 PROCEDURE — 63710000001 INSULIN ASPART PER 5 UNITS: Performed by: INTERNAL MEDICINE

## 2017-09-10 PROCEDURE — 25010000002 MEROPENEM: Performed by: INTERNAL MEDICINE

## 2017-09-10 PROCEDURE — 25010000002 VANCOMYCIN PER 500 MG: Performed by: INTERNAL MEDICINE

## 2017-09-10 RX ORDER — METHYLPREDNISOLONE SODIUM SUCCINATE 40 MG/ML
40 INJECTION, POWDER, LYOPHILIZED, FOR SOLUTION INTRAMUSCULAR; INTRAVENOUS EVERY 12 HOURS
Status: DISCONTINUED | OUTPATIENT
Start: 2017-09-10 | End: 2017-09-11

## 2017-09-10 RX ADMIN — VANCOMYCIN HYDROCHLORIDE 1000 MG: 1 INJECTION, SOLUTION INTRAVENOUS at 20:06

## 2017-09-10 RX ADMIN — METHYLPREDNISOLONE SODIUM SUCCINATE 40 MG: 40 INJECTION, POWDER, LYOPHILIZED, FOR SOLUTION INTRAMUSCULAR; INTRAVENOUS at 10:10

## 2017-09-10 RX ADMIN — INSULIN ASPART 8 UNITS: 100 INJECTION, SOLUTION INTRAVENOUS; SUBCUTANEOUS at 18:00

## 2017-09-10 RX ADMIN — FAMOTIDINE 20 MG: 20 TABLET, FILM COATED ORAL at 18:01

## 2017-09-10 RX ADMIN — IPRATROPIUM BROMIDE AND ALBUTEROL SULFATE 3 ML: .5; 3 SOLUTION RESPIRATORY (INHALATION) at 15:37

## 2017-09-10 RX ADMIN — ATORVASTATIN CALCIUM 20 MG: 20 TABLET, FILM COATED ORAL at 20:06

## 2017-09-10 RX ADMIN — METOPROLOL TARTRATE 12.5 MG: 25 TABLET ORAL at 18:01

## 2017-09-10 RX ADMIN — APIXABAN 5 MG: 5 TABLET, FILM COATED ORAL at 18:01

## 2017-09-10 RX ADMIN — METHYLPREDNISOLONE SODIUM SUCCINATE 40 MG: 40 INJECTION, POWDER, FOR SOLUTION INTRAMUSCULAR; INTRAVENOUS at 23:15

## 2017-09-10 RX ADMIN — INSULIN ASPART 6 UNITS: 100 INJECTION, SOLUTION INTRAVENOUS; SUBCUTANEOUS at 08:48

## 2017-09-10 RX ADMIN — IPRATROPIUM BROMIDE AND ALBUTEROL SULFATE 3 ML: .5; 3 SOLUTION RESPIRATORY (INHALATION) at 07:20

## 2017-09-10 RX ADMIN — INSULIN ASPART 8 UNITS: 100 INJECTION, SOLUTION INTRAVENOUS; SUBCUTANEOUS at 20:17

## 2017-09-10 RX ADMIN — GUAIFENESIN 1200 MG: 600 TABLET, EXTENDED RELEASE ORAL at 20:06

## 2017-09-10 RX ADMIN — METOPROLOL TARTRATE 12.5 MG: 25 TABLET ORAL at 08:49

## 2017-09-10 RX ADMIN — MEROPENEM 1 G: 1 INJECTION, POWDER, FOR SOLUTION INTRAVENOUS at 08:48

## 2017-09-10 RX ADMIN — IPRATROPIUM BROMIDE AND ALBUTEROL SULFATE 3 ML: .5; 3 SOLUTION RESPIRATORY (INHALATION) at 10:56

## 2017-09-10 RX ADMIN — INSULIN DETEMIR 8 UNITS: 100 INJECTION, SOLUTION SUBCUTANEOUS at 08:50

## 2017-09-10 RX ADMIN — GUAIFENESIN 1200 MG: 600 TABLET, EXTENDED RELEASE ORAL at 08:48

## 2017-09-10 RX ADMIN — MEROPENEM 1 G: 1 INJECTION, POWDER, FOR SOLUTION INTRAVENOUS at 15:55

## 2017-09-10 RX ADMIN — FUROSEMIDE 40 MG: 40 TABLET ORAL at 08:48

## 2017-09-10 RX ADMIN — IPRATROPIUM BROMIDE AND ALBUTEROL SULFATE 3 ML: .5; 3 SOLUTION RESPIRATORY (INHALATION) at 03:24

## 2017-09-10 RX ADMIN — INSULIN ASPART 6 UNITS: 100 INJECTION, SOLUTION INTRAVENOUS; SUBCUTANEOUS at 12:06

## 2017-09-10 RX ADMIN — APIXABAN 5 MG: 5 TABLET, FILM COATED ORAL at 08:48

## 2017-09-10 RX ADMIN — VANCOMYCIN HYDROCHLORIDE 1000 MG: 1 INJECTION, SOLUTION INTRAVENOUS at 10:10

## 2017-09-10 RX ADMIN — IPRATROPIUM BROMIDE AND ALBUTEROL SULFATE 3 ML: .5; 3 SOLUTION RESPIRATORY (INHALATION) at 23:55

## 2017-09-10 RX ADMIN — MEROPENEM 1 G: 1 INJECTION, POWDER, FOR SOLUTION INTRAVENOUS at 23:15

## 2017-09-10 RX ADMIN — FAMOTIDINE 20 MG: 20 TABLET, FILM COATED ORAL at 08:48

## 2017-09-10 RX ADMIN — METHYLPREDNISOLONE SODIUM SUCCINATE 40 MG: 40 INJECTION, POWDER, LYOPHILIZED, FOR SOLUTION INTRAMUSCULAR; INTRAVENOUS at 02:12

## 2017-09-10 RX ADMIN — IPRATROPIUM BROMIDE AND ALBUTEROL SULFATE 3 ML: .5; 3 SOLUTION RESPIRATORY (INHALATION) at 19:40

## 2017-09-10 RX ADMIN — SENNOSIDES AND DOCUSATE SODIUM 2 TABLET: 8.6; 5 TABLET ORAL at 20:06

## 2017-09-10 RX ADMIN — SODIUM CHLORIDE SOLN NEBU 7% 4 ML: 7 NEBU SOLN at 07:20

## 2017-09-10 RX ADMIN — SODIUM CHLORIDE SOLN NEBU 7% 4 ML: 7 NEBU SOLN at 19:48

## 2017-09-11 ENCOUNTER — APPOINTMENT (OUTPATIENT)
Dept: GENERAL RADIOLOGY | Facility: HOSPITAL | Age: 62
End: 2017-09-11
Attending: FAMILY MEDICINE

## 2017-09-11 LAB
CREAT BLD-MCNC: 0.65 MG/DL (ref 0.76–1.27)
GFR SERPL CREATININE-BSD FRML MDRD: 124 ML/MIN/1.73
GFR SERPL CREATININE-BSD FRML MDRD: >150 ML/MIN/1.73
GLUCOSE BLDC GLUCOMTR-MCNC: 240 MG/DL (ref 70–130)
GLUCOSE BLDC GLUCOMTR-MCNC: 284 MG/DL (ref 70–130)
GLUCOSE BLDC GLUCOMTR-MCNC: 295 MG/DL (ref 70–130)
GLUCOSE BLDC GLUCOMTR-MCNC: 300 MG/DL (ref 70–130)

## 2017-09-11 PROCEDURE — 97110 THERAPEUTIC EXERCISES: CPT

## 2017-09-11 PROCEDURE — 25010000002 METHYLPREDNISOLONE PER 40 MG: Performed by: INTERNAL MEDICINE

## 2017-09-11 PROCEDURE — 63710000001 INSULIN ASPART PER 5 UNITS: Performed by: INTERNAL MEDICINE

## 2017-09-11 PROCEDURE — 25010000002 VANCOMYCIN PER 500 MG: Performed by: INTERNAL MEDICINE

## 2017-09-11 PROCEDURE — 82962 GLUCOSE BLOOD TEST: CPT

## 2017-09-11 PROCEDURE — 25010000002 MEROPENEM: Performed by: INTERNAL MEDICINE

## 2017-09-11 PROCEDURE — 82565 ASSAY OF CREATININE: CPT | Performed by: INTERNAL MEDICINE

## 2017-09-11 PROCEDURE — 94799 UNLISTED PULMONARY SVC/PX: CPT

## 2017-09-11 PROCEDURE — 25010000002 FUROSEMIDE PER 20 MG: Performed by: FAMILY MEDICINE

## 2017-09-11 PROCEDURE — 71010 HC CHEST PA OR AP: CPT

## 2017-09-11 PROCEDURE — 25010000002 METHYLPREDNISOLONE PER 125 MG: Performed by: INTERNAL MEDICINE

## 2017-09-11 PROCEDURE — 99231 SBSQ HOSP IP/OBS SF/LOW 25: CPT | Performed by: FAMILY MEDICINE

## 2017-09-11 PROCEDURE — 94660 CPAP INITIATION&MGMT: CPT

## 2017-09-11 RX ORDER — DEXTROSE MONOHYDRATE 25 G/50ML
25 INJECTION, SOLUTION INTRAVENOUS
Status: DISCONTINUED | OUTPATIENT
Start: 2017-09-11 | End: 2017-09-13 | Stop reason: SDUPTHER

## 2017-09-11 RX ORDER — NICOTINE POLACRILEX 4 MG
15 LOZENGE BUCCAL
Status: DISCONTINUED | OUTPATIENT
Start: 2017-09-11 | End: 2017-09-13 | Stop reason: SDUPTHER

## 2017-09-11 RX ORDER — FUROSEMIDE 10 MG/ML
40 INJECTION INTRAMUSCULAR; INTRAVENOUS ONCE
Status: COMPLETED | OUTPATIENT
Start: 2017-09-11 | End: 2017-09-11

## 2017-09-11 RX ORDER — METHYLPREDNISOLONE SODIUM SUCCINATE 125 MG/2ML
60 INJECTION, POWDER, LYOPHILIZED, FOR SOLUTION INTRAMUSCULAR; INTRAVENOUS EVERY 6 HOURS
Status: DISCONTINUED | OUTPATIENT
Start: 2017-09-11 | End: 2017-09-15

## 2017-09-11 RX ORDER — TRAZODONE HYDROCHLORIDE 50 MG/1
50 TABLET ORAL NIGHTLY
Status: DISCONTINUED | OUTPATIENT
Start: 2017-09-11 | End: 2017-10-06 | Stop reason: HOSPADM

## 2017-09-11 RX ADMIN — APIXABAN 5 MG: 5 TABLET, FILM COATED ORAL at 17:56

## 2017-09-11 RX ADMIN — VANCOMYCIN HYDROCHLORIDE 1000 MG: 1 INJECTION, SOLUTION INTRAVENOUS at 21:57

## 2017-09-11 RX ADMIN — METHYLPREDNISOLONE SODIUM SUCCINATE 40 MG: 40 INJECTION, POWDER, FOR SOLUTION INTRAMUSCULAR; INTRAVENOUS at 10:02

## 2017-09-11 RX ADMIN — GUAIFENESIN 1200 MG: 600 TABLET, EXTENDED RELEASE ORAL at 08:21

## 2017-09-11 RX ADMIN — SODIUM CHLORIDE SOLN NEBU 7% 4 ML: 7 NEBU SOLN at 21:37

## 2017-09-11 RX ADMIN — METOPROLOL TARTRATE 12.5 MG: 25 TABLET ORAL at 17:55

## 2017-09-11 RX ADMIN — APIXABAN 5 MG: 5 TABLET, FILM COATED ORAL at 08:21

## 2017-09-11 RX ADMIN — IPRATROPIUM BROMIDE AND ALBUTEROL SULFATE 3 ML: .5; 3 SOLUTION RESPIRATORY (INHALATION) at 11:31

## 2017-09-11 RX ADMIN — FUROSEMIDE 40 MG: 40 TABLET ORAL at 08:21

## 2017-09-11 RX ADMIN — SENNOSIDES AND DOCUSATE SODIUM 2 TABLET: 8.6; 5 TABLET ORAL at 20:22

## 2017-09-11 RX ADMIN — VANCOMYCIN HYDROCHLORIDE 1000 MG: 1 INJECTION, SOLUTION INTRAVENOUS at 08:27

## 2017-09-11 RX ADMIN — METOPROLOL TARTRATE 12.5 MG: 25 TABLET ORAL at 08:21

## 2017-09-11 RX ADMIN — METHYLPREDNISOLONE SODIUM SUCCINATE 60 MG: 125 INJECTION, POWDER, FOR SOLUTION INTRAMUSCULAR; INTRAVENOUS at 21:56

## 2017-09-11 RX ADMIN — INSULIN ASPART 6 UNITS: 100 INJECTION, SOLUTION INTRAVENOUS; SUBCUTANEOUS at 17:56

## 2017-09-11 RX ADMIN — FUROSEMIDE 40 MG: 10 INJECTION, SOLUTION INTRAMUSCULAR; INTRAVENOUS at 22:30

## 2017-09-11 RX ADMIN — INSULIN ASPART 4 UNITS: 100 INJECTION, SOLUTION INTRAVENOUS; SUBCUTANEOUS at 12:38

## 2017-09-11 RX ADMIN — INSULIN ASPART 6 UNITS: 100 INJECTION, SOLUTION INTRAVENOUS; SUBCUTANEOUS at 21:56

## 2017-09-11 RX ADMIN — ATORVASTATIN CALCIUM 20 MG: 20 TABLET, FILM COATED ORAL at 20:21

## 2017-09-11 RX ADMIN — GUAIFENESIN 1200 MG: 600 TABLET, EXTENDED RELEASE ORAL at 20:22

## 2017-09-11 RX ADMIN — MEROPENEM 1 G: 1 INJECTION, POWDER, FOR SOLUTION INTRAVENOUS at 06:05

## 2017-09-11 RX ADMIN — IPRATROPIUM BROMIDE AND ALBUTEROL SULFATE 3 ML: .5; 3 SOLUTION RESPIRATORY (INHALATION) at 07:01

## 2017-09-11 RX ADMIN — FAMOTIDINE 20 MG: 20 TABLET, FILM COATED ORAL at 17:56

## 2017-09-11 RX ADMIN — IPRATROPIUM BROMIDE AND ALBUTEROL SULFATE 3 ML: .5; 3 SOLUTION RESPIRATORY (INHALATION) at 21:37

## 2017-09-11 RX ADMIN — TRAZODONE HYDROCHLORIDE 50 MG: 50 TABLET ORAL at 21:57

## 2017-09-11 RX ADMIN — METHYLPREDNISOLONE SODIUM SUCCINATE 60 MG: 125 INJECTION, POWDER, FOR SOLUTION INTRAMUSCULAR; INTRAVENOUS at 18:52

## 2017-09-11 RX ADMIN — INSULIN ASPART 7 UNITS: 100 INJECTION, SOLUTION INTRAVENOUS; SUBCUTANEOUS at 08:20

## 2017-09-11 RX ADMIN — SODIUM CHLORIDE SOLN NEBU 7% 4 ML: 7 NEBU SOLN at 07:02

## 2017-09-11 RX ADMIN — IPRATROPIUM BROMIDE AND ALBUTEROL SULFATE 3 ML: .5; 3 SOLUTION RESPIRATORY (INHALATION) at 16:10

## 2017-09-11 RX ADMIN — FAMOTIDINE 20 MG: 20 TABLET, FILM COATED ORAL at 08:21

## 2017-09-11 RX ADMIN — IPRATROPIUM BROMIDE AND ALBUTEROL SULFATE 3 ML: .5; 3 SOLUTION RESPIRATORY (INHALATION) at 03:23

## 2017-09-11 RX ADMIN — MEROPENEM 1 G: 1 INJECTION, POWDER, FOR SOLUTION INTRAVENOUS at 15:45

## 2017-09-12 ENCOUNTER — APPOINTMENT (OUTPATIENT)
Dept: GENERAL RADIOLOGY | Facility: HOSPITAL | Age: 62
End: 2017-09-12
Attending: INTERNAL MEDICINE

## 2017-09-12 LAB
ANION GAP SERPL CALCULATED.3IONS-SCNC: 7.6 MMOL/L
BASOPHILS # BLD AUTO: 0 10*3/MM3 (ref 0–0.2)
BASOPHILS NFR BLD AUTO: 0 % (ref 0–1.5)
BUN BLD-MCNC: 21 MG/DL (ref 8–23)
BUN/CREAT SERPL: 31.8 (ref 7–25)
CALCIUM SPEC-SCNC: 8.7 MG/DL (ref 8.6–10.5)
CHLORIDE SERPL-SCNC: 84 MMOL/L (ref 98–107)
CO2 SERPL-SCNC: 45.4 MMOL/L (ref 22–29)
CREAT BLD-MCNC: 0.66 MG/DL (ref 0.76–1.27)
DEPRECATED RDW RBC AUTO: 50.2 FL (ref 37–54)
EOSINOPHIL # BLD AUTO: 0 10*3/MM3 (ref 0–0.7)
EOSINOPHIL NFR BLD AUTO: 0 % (ref 0.3–6.2)
ERYTHROCYTE [DISTWIDTH] IN BLOOD BY AUTOMATED COUNT: 16.6 % (ref 11.5–14.5)
GFR SERPL CREATININE-BSD FRML MDRD: 122 ML/MIN/1.73
GFR SERPL CREATININE-BSD FRML MDRD: 148 ML/MIN/1.73
GLUCOSE BLD-MCNC: 303 MG/DL (ref 65–99)
GLUCOSE BLDC GLUCOMTR-MCNC: 246 MG/DL (ref 70–130)
GLUCOSE BLDC GLUCOMTR-MCNC: 283 MG/DL (ref 70–130)
GLUCOSE BLDC GLUCOMTR-MCNC: 301 MG/DL (ref 70–130)
GLUCOSE BLDC GLUCOMTR-MCNC: 332 MG/DL (ref 70–130)
GLUCOSE BLDC GLUCOMTR-MCNC: 343 MG/DL (ref 70–130)
HCT VFR BLD AUTO: 38.7 % (ref 40.4–52.2)
HGB BLD-MCNC: 10.9 G/DL (ref 13.7–17.6)
IMM GRANULOCYTES # BLD: 0.02 10*3/MM3 (ref 0–0.03)
IMM GRANULOCYTES NFR BLD: 0.2 % (ref 0–0.5)
LYMPHOCYTES # BLD AUTO: 0.56 10*3/MM3 (ref 0.9–4.8)
LYMPHOCYTES NFR BLD AUTO: 6.5 % (ref 19.6–45.3)
MAGNESIUM SERPL-MCNC: 2.5 MG/DL (ref 1.6–2.4)
MCH RBC QN AUTO: 23.1 PG (ref 27–32.7)
MCHC RBC AUTO-ENTMCNC: 28.2 G/DL (ref 32.6–36.4)
MCV RBC AUTO: 82 FL (ref 79.8–96.2)
MONOCYTES # BLD AUTO: 0.39 10*3/MM3 (ref 0.2–1.2)
MONOCYTES NFR BLD AUTO: 4.5 % (ref 5–12)
NEUTROPHILS # BLD AUTO: 7.67 10*3/MM3 (ref 1.9–8.1)
NEUTROPHILS NFR BLD AUTO: 88.8 % (ref 42.7–76)
NT-PROBNP SERPL-MCNC: 8296 PG/ML (ref 5–900)
PLATELET # BLD AUTO: 282 10*3/MM3 (ref 140–500)
PMV BLD AUTO: 10.1 FL (ref 6–12)
POTASSIUM BLD-SCNC: 4.5 MMOL/L (ref 3.5–5.2)
RBC # BLD AUTO: 4.72 10*6/MM3 (ref 4.6–6)
SODIUM BLD-SCNC: 137 MMOL/L (ref 136–145)
WBC NRBC COR # BLD: 8.64 10*3/MM3 (ref 4.5–10.7)

## 2017-09-12 PROCEDURE — 25010000002 METHYLPREDNISOLONE PER 125 MG: Performed by: INTERNAL MEDICINE

## 2017-09-12 PROCEDURE — 25010000002 MEROPENEM: Performed by: INTERNAL MEDICINE

## 2017-09-12 PROCEDURE — 83735 ASSAY OF MAGNESIUM: CPT | Performed by: FAMILY MEDICINE

## 2017-09-12 PROCEDURE — 71020 HC CHEST PA AND LATERAL: CPT

## 2017-09-12 PROCEDURE — 83880 ASSAY OF NATRIURETIC PEPTIDE: CPT | Performed by: FAMILY MEDICINE

## 2017-09-12 PROCEDURE — 25010000002 VANCOMYCIN PER 500 MG: Performed by: INTERNAL MEDICINE

## 2017-09-12 PROCEDURE — 80048 BASIC METABOLIC PNL TOTAL CA: CPT | Performed by: INTERNAL MEDICINE

## 2017-09-12 PROCEDURE — 82962 GLUCOSE BLOOD TEST: CPT

## 2017-09-12 PROCEDURE — 63710000001 INSULIN ASPART PER 5 UNITS: Performed by: FAMILY MEDICINE

## 2017-09-12 PROCEDURE — 94799 UNLISTED PULMONARY SVC/PX: CPT

## 2017-09-12 PROCEDURE — 85025 COMPLETE CBC W/AUTO DIFF WBC: CPT | Performed by: FAMILY MEDICINE

## 2017-09-12 PROCEDURE — 99231 SBSQ HOSP IP/OBS SF/LOW 25: CPT | Performed by: FAMILY MEDICINE

## 2017-09-12 PROCEDURE — 97110 THERAPEUTIC EXERCISES: CPT

## 2017-09-12 RX ORDER — FUROSEMIDE 40 MG/1
40 TABLET ORAL ONCE
Status: COMPLETED | OUTPATIENT
Start: 2017-09-12 | End: 2017-09-12

## 2017-09-12 RX ADMIN — TRAZODONE HYDROCHLORIDE 50 MG: 50 TABLET ORAL at 21:59

## 2017-09-12 RX ADMIN — INSULIN ASPART 12 UNITS: 100 INJECTION, SOLUTION INTRAVENOUS; SUBCUTANEOUS at 17:32

## 2017-09-12 RX ADMIN — SODIUM CHLORIDE SOLN NEBU 7% 4 ML: 7 NEBU SOLN at 07:26

## 2017-09-12 RX ADMIN — METOPROLOL TARTRATE 12.5 MG: 25 TABLET ORAL at 08:57

## 2017-09-12 RX ADMIN — INSULIN ASPART 16 UNITS: 100 INJECTION, SOLUTION INTRAVENOUS; SUBCUTANEOUS at 21:59

## 2017-09-12 RX ADMIN — GUAIFENESIN 1200 MG: 600 TABLET, EXTENDED RELEASE ORAL at 21:59

## 2017-09-12 RX ADMIN — IPRATROPIUM BROMIDE AND ALBUTEROL SULFATE 3 ML: .5; 3 SOLUTION RESPIRATORY (INHALATION) at 11:27

## 2017-09-12 RX ADMIN — IPRATROPIUM BROMIDE AND ALBUTEROL SULFATE 3 ML: .5; 3 SOLUTION RESPIRATORY (INHALATION) at 07:19

## 2017-09-12 RX ADMIN — FAMOTIDINE 20 MG: 20 TABLET, FILM COATED ORAL at 17:32

## 2017-09-12 RX ADMIN — MEROPENEM 1 G: 1 INJECTION, POWDER, FOR SOLUTION INTRAVENOUS at 06:07

## 2017-09-12 RX ADMIN — IPRATROPIUM BROMIDE AND ALBUTEROL SULFATE 3 ML: .5; 3 SOLUTION RESPIRATORY (INHALATION) at 23:12

## 2017-09-12 RX ADMIN — IPRATROPIUM BROMIDE AND ALBUTEROL SULFATE 3 ML: .5; 3 SOLUTION RESPIRATORY (INHALATION) at 03:37

## 2017-09-12 RX ADMIN — VANCOMYCIN HYDROCHLORIDE 1000 MG: 1 INJECTION, SOLUTION INTRAVENOUS at 08:59

## 2017-09-12 RX ADMIN — FUROSEMIDE 40 MG: 40 TABLET ORAL at 08:56

## 2017-09-12 RX ADMIN — INSULIN ASPART 12 UNITS: 100 INJECTION, SOLUTION INTRAVENOUS; SUBCUTANEOUS at 08:58

## 2017-09-12 RX ADMIN — FUROSEMIDE 40 MG: 40 TABLET ORAL at 11:39

## 2017-09-12 RX ADMIN — MEROPENEM 1 G: 1 INJECTION, POWDER, FOR SOLUTION INTRAVENOUS at 00:42

## 2017-09-12 RX ADMIN — IPRATROPIUM BROMIDE AND ALBUTEROL SULFATE 3 ML: .5; 3 SOLUTION RESPIRATORY (INHALATION) at 20:20

## 2017-09-12 RX ADMIN — METOPROLOL TARTRATE 12.5 MG: 25 TABLET ORAL at 17:31

## 2017-09-12 RX ADMIN — ATORVASTATIN CALCIUM 20 MG: 20 TABLET, FILM COATED ORAL at 21:59

## 2017-09-12 RX ADMIN — FAMOTIDINE 20 MG: 20 TABLET, FILM COATED ORAL at 08:57

## 2017-09-12 RX ADMIN — METHYLPREDNISOLONE SODIUM SUCCINATE 60 MG: 125 INJECTION, POWDER, FOR SOLUTION INTRAMUSCULAR; INTRAVENOUS at 05:18

## 2017-09-12 RX ADMIN — APIXABAN 5 MG: 5 TABLET, FILM COATED ORAL at 08:56

## 2017-09-12 RX ADMIN — VANCOMYCIN HYDROCHLORIDE 1000 MG: 1 INJECTION, SOLUTION INTRAVENOUS at 19:58

## 2017-09-12 RX ADMIN — SENNOSIDES AND DOCUSATE SODIUM 2 TABLET: 8.6; 5 TABLET ORAL at 21:59

## 2017-09-12 RX ADMIN — MEROPENEM 1 G: 1 INJECTION, POWDER, FOR SOLUTION INTRAVENOUS at 23:07

## 2017-09-12 RX ADMIN — METHYLPREDNISOLONE SODIUM SUCCINATE 60 MG: 125 INJECTION, POWDER, FOR SOLUTION INTRAMUSCULAR; INTRAVENOUS at 23:07

## 2017-09-12 RX ADMIN — METHYLPREDNISOLONE SODIUM SUCCINATE 60 MG: 125 INJECTION, POWDER, FOR SOLUTION INTRAMUSCULAR; INTRAVENOUS at 11:21

## 2017-09-12 RX ADMIN — APIXABAN 5 MG: 5 TABLET, FILM COATED ORAL at 17:32

## 2017-09-12 RX ADMIN — MEROPENEM 1 G: 1 INJECTION, POWDER, FOR SOLUTION INTRAVENOUS at 14:33

## 2017-09-12 RX ADMIN — SODIUM CHLORIDE SOLN NEBU 7% 4 ML: 7 NEBU SOLN at 20:25

## 2017-09-12 RX ADMIN — GUAIFENESIN 1200 MG: 600 TABLET, EXTENDED RELEASE ORAL at 08:56

## 2017-09-12 RX ADMIN — METHYLPREDNISOLONE SODIUM SUCCINATE 60 MG: 125 INJECTION, POWDER, FOR SOLUTION INTRAMUSCULAR; INTRAVENOUS at 16:28

## 2017-09-12 RX ADMIN — INSULIN ASPART 16 UNITS: 100 INJECTION, SOLUTION INTRAVENOUS; SUBCUTANEOUS at 11:38

## 2017-09-12 RX ADMIN — IPRATROPIUM BROMIDE AND ALBUTEROL SULFATE 3 ML: .5; 3 SOLUTION RESPIRATORY (INHALATION) at 15:54

## 2017-09-13 LAB
GLUCOSE BLDC GLUCOMTR-MCNC: 205 MG/DL (ref 70–130)
GLUCOSE BLDC GLUCOMTR-MCNC: 313 MG/DL (ref 70–130)
GLUCOSE BLDC GLUCOMTR-MCNC: 314 MG/DL (ref 70–130)
GLUCOSE BLDC GLUCOMTR-MCNC: 316 MG/DL (ref 70–130)

## 2017-09-13 PROCEDURE — 97110 THERAPEUTIC EXERCISES: CPT

## 2017-09-13 PROCEDURE — 63710000001 INSULIN ASPART PER 5 UNITS: Performed by: FAMILY MEDICINE

## 2017-09-13 PROCEDURE — 25010000002 VANCOMYCIN PER 500 MG: Performed by: INTERNAL MEDICINE

## 2017-09-13 PROCEDURE — 94799 UNLISTED PULMONARY SVC/PX: CPT

## 2017-09-13 PROCEDURE — 99232 SBSQ HOSP IP/OBS MODERATE 35: CPT | Performed by: FAMILY MEDICINE

## 2017-09-13 PROCEDURE — 25010000002 METHYLPREDNISOLONE PER 125 MG: Performed by: INTERNAL MEDICINE

## 2017-09-13 PROCEDURE — 82962 GLUCOSE BLOOD TEST: CPT

## 2017-09-13 PROCEDURE — 25010000002 MEROPENEM: Performed by: INTERNAL MEDICINE

## 2017-09-13 RX ORDER — FUROSEMIDE 80 MG
80 TABLET ORAL DAILY
Status: DISCONTINUED | OUTPATIENT
Start: 2017-09-14 | End: 2017-09-16

## 2017-09-13 RX ADMIN — SENNOSIDES AND DOCUSATE SODIUM 2 TABLET: 8.6; 5 TABLET ORAL at 20:43

## 2017-09-13 RX ADMIN — FAMOTIDINE 20 MG: 20 TABLET, FILM COATED ORAL at 17:27

## 2017-09-13 RX ADMIN — APIXABAN 5 MG: 5 TABLET, FILM COATED ORAL at 09:45

## 2017-09-13 RX ADMIN — SODIUM CHLORIDE SOLN NEBU 7% 4 ML: 7 NEBU SOLN at 20:30

## 2017-09-13 RX ADMIN — ATORVASTATIN CALCIUM 20 MG: 20 TABLET, FILM COATED ORAL at 20:43

## 2017-09-13 RX ADMIN — METHYLPREDNISOLONE SODIUM SUCCINATE 60 MG: 125 INJECTION, POWDER, FOR SOLUTION INTRAMUSCULAR; INTRAVENOUS at 09:44

## 2017-09-13 RX ADMIN — IPRATROPIUM BROMIDE AND ALBUTEROL SULFATE 3 ML: .5; 3 SOLUTION RESPIRATORY (INHALATION) at 23:57

## 2017-09-13 RX ADMIN — IPRATROPIUM BROMIDE AND ALBUTEROL SULFATE 3 ML: .5; 3 SOLUTION RESPIRATORY (INHALATION) at 09:18

## 2017-09-13 RX ADMIN — INSULIN ASPART 16 UNITS: 100 INJECTION, SOLUTION INTRAVENOUS; SUBCUTANEOUS at 17:26

## 2017-09-13 RX ADMIN — TRAZODONE HYDROCHLORIDE 50 MG: 50 TABLET ORAL at 20:43

## 2017-09-13 RX ADMIN — METHYLPREDNISOLONE SODIUM SUCCINATE 60 MG: 125 INJECTION, POWDER, FOR SOLUTION INTRAMUSCULAR; INTRAVENOUS at 16:19

## 2017-09-13 RX ADMIN — INSULIN ASPART 8 UNITS: 100 INJECTION, SOLUTION INTRAVENOUS; SUBCUTANEOUS at 20:43

## 2017-09-13 RX ADMIN — IPRATROPIUM BROMIDE AND ALBUTEROL SULFATE 3 ML: .5; 3 SOLUTION RESPIRATORY (INHALATION) at 03:13

## 2017-09-13 RX ADMIN — IPRATROPIUM BROMIDE AND ALBUTEROL SULFATE 3 ML: .5; 3 SOLUTION RESPIRATORY (INHALATION) at 11:56

## 2017-09-13 RX ADMIN — FAMOTIDINE 20 MG: 20 TABLET, FILM COATED ORAL at 09:45

## 2017-09-13 RX ADMIN — METOPROLOL TARTRATE 12.5 MG: 25 TABLET ORAL at 09:45

## 2017-09-13 RX ADMIN — APIXABAN 5 MG: 5 TABLET, FILM COATED ORAL at 17:27

## 2017-09-13 RX ADMIN — GUAIFENESIN 1200 MG: 600 TABLET, EXTENDED RELEASE ORAL at 09:45

## 2017-09-13 RX ADMIN — IPRATROPIUM BROMIDE AND ALBUTEROL SULFATE 3 ML: .5; 3 SOLUTION RESPIRATORY (INHALATION) at 15:25

## 2017-09-13 RX ADMIN — VANCOMYCIN HYDROCHLORIDE 1000 MG: 1 INJECTION, SOLUTION INTRAVENOUS at 09:41

## 2017-09-13 RX ADMIN — GUAIFENESIN 1200 MG: 600 TABLET, EXTENDED RELEASE ORAL at 20:43

## 2017-09-13 RX ADMIN — MEROPENEM 1 G: 1 INJECTION, POWDER, FOR SOLUTION INTRAVENOUS at 06:15

## 2017-09-13 RX ADMIN — METHYLPREDNISOLONE SODIUM SUCCINATE 60 MG: 125 INJECTION, POWDER, FOR SOLUTION INTRAMUSCULAR; INTRAVENOUS at 04:15

## 2017-09-13 RX ADMIN — METOPROLOL TARTRATE 12.5 MG: 25 TABLET ORAL at 17:26

## 2017-09-13 RX ADMIN — VANCOMYCIN HYDROCHLORIDE 1000 MG: 1 INJECTION, SOLUTION INTRAVENOUS at 20:43

## 2017-09-13 RX ADMIN — MEROPENEM 1 G: 1 INJECTION, POWDER, FOR SOLUTION INTRAVENOUS at 16:19

## 2017-09-13 RX ADMIN — IPRATROPIUM BROMIDE AND ALBUTEROL SULFATE 3 ML: .5; 3 SOLUTION RESPIRATORY (INHALATION) at 19:11

## 2017-09-13 RX ADMIN — METHYLPREDNISOLONE SODIUM SUCCINATE 60 MG: 125 INJECTION, POWDER, FOR SOLUTION INTRAMUSCULAR; INTRAVENOUS at 22:54

## 2017-09-13 RX ADMIN — MEROPENEM 1 G: 1 INJECTION, POWDER, FOR SOLUTION INTRAVENOUS at 22:54

## 2017-09-13 RX ADMIN — FUROSEMIDE 80 MG: 80 TABLET ORAL at 13:59

## 2017-09-13 RX ADMIN — INSULIN ASPART 16 UNITS: 100 INJECTION, SOLUTION INTRAVENOUS; SUBCUTANEOUS at 09:44

## 2017-09-13 RX ADMIN — INSULIN ASPART 16 UNITS: 100 INJECTION, SOLUTION INTRAVENOUS; SUBCUTANEOUS at 12:25

## 2017-09-14 ENCOUNTER — APPOINTMENT (OUTPATIENT)
Dept: GENERAL RADIOLOGY | Facility: HOSPITAL | Age: 62
End: 2017-09-14
Attending: INTERNAL MEDICINE

## 2017-09-14 LAB
ANION GAP SERPL CALCULATED.3IONS-SCNC: 5.7 MMOL/L
BASOPHILS # BLD AUTO: 0 10*3/MM3 (ref 0–0.2)
BASOPHILS NFR BLD AUTO: 0 % (ref 0–1.5)
BUN BLD-MCNC: 24 MG/DL (ref 8–23)
BUN/CREAT SERPL: 38.7 (ref 7–25)
CALCIUM SPEC-SCNC: 8.9 MG/DL (ref 8.6–10.5)
CHLORIDE SERPL-SCNC: 89 MMOL/L (ref 98–107)
CO2 SERPL-SCNC: 44.3 MMOL/L (ref 22–29)
CREAT BLD-MCNC: 0.62 MG/DL (ref 0.76–1.27)
DEPRECATED RDW RBC AUTO: 51.7 FL (ref 37–54)
EOSINOPHIL # BLD AUTO: 0 10*3/MM3 (ref 0–0.7)
EOSINOPHIL NFR BLD AUTO: 0 % (ref 0.3–6.2)
ERYTHROCYTE [DISTWIDTH] IN BLOOD BY AUTOMATED COUNT: 17.2 % (ref 11.5–14.5)
GFR SERPL CREATININE-BSD FRML MDRD: 131 ML/MIN/1.73
GFR SERPL CREATININE-BSD FRML MDRD: >150 ML/MIN/1.73
GLUCOSE BLD-MCNC: 356 MG/DL (ref 65–99)
GLUCOSE BLDC GLUCOMTR-MCNC: 278 MG/DL (ref 70–130)
GLUCOSE BLDC GLUCOMTR-MCNC: 334 MG/DL (ref 70–130)
GLUCOSE BLDC GLUCOMTR-MCNC: 348 MG/DL (ref 70–130)
GLUCOSE BLDC GLUCOMTR-MCNC: 379 MG/DL (ref 70–130)
GLUCOSE BLDC GLUCOMTR-MCNC: 401 MG/DL (ref 70–130)
HCT VFR BLD AUTO: 39.9 % (ref 40.4–52.2)
HGB BLD-MCNC: 11 G/DL (ref 13.7–17.6)
IMM GRANULOCYTES # BLD: 0.03 10*3/MM3 (ref 0–0.03)
IMM GRANULOCYTES NFR BLD: 0.4 % (ref 0–0.5)
LYMPHOCYTES # BLD AUTO: 0.46 10*3/MM3 (ref 0.9–4.8)
LYMPHOCYTES NFR BLD AUTO: 5.5 % (ref 19.6–45.3)
MCH RBC QN AUTO: 23 PG (ref 27–32.7)
MCHC RBC AUTO-ENTMCNC: 27.6 G/DL (ref 32.6–36.4)
MCV RBC AUTO: 83.5 FL (ref 79.8–96.2)
MONOCYTES # BLD AUTO: 0.28 10*3/MM3 (ref 0.2–1.2)
MONOCYTES NFR BLD AUTO: 3.3 % (ref 5–12)
NEUTROPHILS # BLD AUTO: 7.64 10*3/MM3 (ref 1.9–8.1)
NEUTROPHILS NFR BLD AUTO: 90.8 % (ref 42.7–76)
NT-PROBNP SERPL-MCNC: 3432 PG/ML (ref 5–900)
PLATELET # BLD AUTO: 312 10*3/MM3 (ref 140–500)
PMV BLD AUTO: 10 FL (ref 6–12)
POTASSIUM BLD-SCNC: 4.9 MMOL/L (ref 3.5–5.2)
RBC # BLD AUTO: 4.78 10*6/MM3 (ref 4.6–6)
SODIUM BLD-SCNC: 139 MMOL/L (ref 136–145)
WBC NRBC COR # BLD: 8.41 10*3/MM3 (ref 4.5–10.7)

## 2017-09-14 PROCEDURE — 80048 BASIC METABOLIC PNL TOTAL CA: CPT | Performed by: FAMILY MEDICINE

## 2017-09-14 PROCEDURE — 97110 THERAPEUTIC EXERCISES: CPT

## 2017-09-14 PROCEDURE — 99232 SBSQ HOSP IP/OBS MODERATE 35: CPT | Performed by: FAMILY MEDICINE

## 2017-09-14 PROCEDURE — 25010000002 METHYLPREDNISOLONE PER 125 MG: Performed by: INTERNAL MEDICINE

## 2017-09-14 PROCEDURE — 63710000001 INSULIN ASPART PER 5 UNITS: Performed by: FAMILY MEDICINE

## 2017-09-14 PROCEDURE — 85025 COMPLETE CBC W/AUTO DIFF WBC: CPT | Performed by: FAMILY MEDICINE

## 2017-09-14 PROCEDURE — 94799 UNLISTED PULMONARY SVC/PX: CPT

## 2017-09-14 PROCEDURE — 83880 ASSAY OF NATRIURETIC PEPTIDE: CPT | Performed by: FAMILY MEDICINE

## 2017-09-14 PROCEDURE — 25010000002 FUROSEMIDE PER 20 MG: Performed by: FAMILY MEDICINE

## 2017-09-14 PROCEDURE — 25010000002 VANCOMYCIN PER 500 MG: Performed by: INTERNAL MEDICINE

## 2017-09-14 PROCEDURE — 82962 GLUCOSE BLOOD TEST: CPT

## 2017-09-14 PROCEDURE — 71020 HC CHEST PA AND LATERAL: CPT

## 2017-09-14 PROCEDURE — 25010000002 MEROPENEM: Performed by: INTERNAL MEDICINE

## 2017-09-14 RX ORDER — FUROSEMIDE 10 MG/ML
40 INJECTION INTRAMUSCULAR; INTRAVENOUS ONCE
Status: COMPLETED | OUTPATIENT
Start: 2017-09-14 | End: 2017-09-14

## 2017-09-14 RX ADMIN — IPRATROPIUM BROMIDE AND ALBUTEROL SULFATE 3 ML: .5; 3 SOLUTION RESPIRATORY (INHALATION) at 23:28

## 2017-09-14 RX ADMIN — VANCOMYCIN HYDROCHLORIDE 1000 MG: 1 INJECTION, SOLUTION INTRAVENOUS at 08:55

## 2017-09-14 RX ADMIN — METHYLPREDNISOLONE SODIUM SUCCINATE 60 MG: 125 INJECTION, POWDER, FOR SOLUTION INTRAMUSCULAR; INTRAVENOUS at 16:15

## 2017-09-14 RX ADMIN — IPRATROPIUM BROMIDE AND ALBUTEROL SULFATE 3 ML: .5; 3 SOLUTION RESPIRATORY (INHALATION) at 15:31

## 2017-09-14 RX ADMIN — INSULIN ASPART 20 UNITS: 100 INJECTION, SOLUTION INTRAVENOUS; SUBCUTANEOUS at 06:39

## 2017-09-14 RX ADMIN — FUROSEMIDE 40 MG: 10 INJECTION, SOLUTION INTRAMUSCULAR; INTRAVENOUS at 14:40

## 2017-09-14 RX ADMIN — INSULIN ASPART 12 UNITS: 100 INJECTION, SOLUTION INTRAVENOUS; SUBCUTANEOUS at 11:37

## 2017-09-14 RX ADMIN — FAMOTIDINE 20 MG: 20 TABLET, FILM COATED ORAL at 08:55

## 2017-09-14 RX ADMIN — MEROPENEM 1 G: 1 INJECTION, POWDER, FOR SOLUTION INTRAVENOUS at 22:38

## 2017-09-14 RX ADMIN — METHYLPREDNISOLONE SODIUM SUCCINATE 60 MG: 125 INJECTION, POWDER, FOR SOLUTION INTRAMUSCULAR; INTRAVENOUS at 05:06

## 2017-09-14 RX ADMIN — MEROPENEM 1 G: 1 INJECTION, POWDER, FOR SOLUTION INTRAVENOUS at 06:22

## 2017-09-14 RX ADMIN — FUROSEMIDE 80 MG: 80 TABLET ORAL at 08:55

## 2017-09-14 RX ADMIN — IPRATROPIUM BROMIDE AND ALBUTEROL SULFATE 3 ML: .5; 3 SOLUTION RESPIRATORY (INHALATION) at 07:35

## 2017-09-14 RX ADMIN — FAMOTIDINE 20 MG: 20 TABLET, FILM COATED ORAL at 18:06

## 2017-09-14 RX ADMIN — SODIUM CHLORIDE SOLN NEBU 7% 4 ML: 7 NEBU SOLN at 20:08

## 2017-09-14 RX ADMIN — METOPROLOL TARTRATE 12.5 MG: 25 TABLET ORAL at 08:55

## 2017-09-14 RX ADMIN — SODIUM CHLORIDE SOLN NEBU 7% 4 ML: 7 NEBU SOLN at 07:35

## 2017-09-14 RX ADMIN — GUAIFENESIN 1200 MG: 600 TABLET, EXTENDED RELEASE ORAL at 08:55

## 2017-09-14 RX ADMIN — ATORVASTATIN CALCIUM 20 MG: 20 TABLET, FILM COATED ORAL at 21:07

## 2017-09-14 RX ADMIN — METHYLPREDNISOLONE SODIUM SUCCINATE 60 MG: 125 INJECTION, POWDER, FOR SOLUTION INTRAMUSCULAR; INTRAVENOUS at 22:38

## 2017-09-14 RX ADMIN — INSULIN ASPART 16 UNITS: 100 INJECTION, SOLUTION INTRAVENOUS; SUBCUTANEOUS at 21:07

## 2017-09-14 RX ADMIN — APIXABAN 5 MG: 5 TABLET, FILM COATED ORAL at 18:06

## 2017-09-14 RX ADMIN — INSULIN ASPART 24 UNITS: 100 INJECTION, SOLUTION INTRAVENOUS; SUBCUTANEOUS at 18:06

## 2017-09-14 RX ADMIN — TRAZODONE HYDROCHLORIDE 50 MG: 50 TABLET ORAL at 21:07

## 2017-09-14 RX ADMIN — GUAIFENESIN 1200 MG: 600 TABLET, EXTENDED RELEASE ORAL at 21:07

## 2017-09-14 RX ADMIN — SENNOSIDES AND DOCUSATE SODIUM 2 TABLET: 8.6; 5 TABLET ORAL at 21:07

## 2017-09-14 RX ADMIN — IPRATROPIUM BROMIDE AND ALBUTEROL SULFATE 3 ML: .5; 3 SOLUTION RESPIRATORY (INHALATION) at 20:08

## 2017-09-14 RX ADMIN — METOPROLOL TARTRATE 12.5 MG: 25 TABLET ORAL at 18:06

## 2017-09-14 RX ADMIN — APIXABAN 5 MG: 5 TABLET, FILM COATED ORAL at 08:55

## 2017-09-14 RX ADMIN — MEROPENEM 1 G: 1 INJECTION, POWDER, FOR SOLUTION INTRAVENOUS at 14:40

## 2017-09-14 RX ADMIN — IPRATROPIUM BROMIDE AND ALBUTEROL SULFATE 3 ML: .5; 3 SOLUTION RESPIRATORY (INHALATION) at 11:08

## 2017-09-14 RX ADMIN — METHYLPREDNISOLONE SODIUM SUCCINATE 60 MG: 125 INJECTION, POWDER, FOR SOLUTION INTRAMUSCULAR; INTRAVENOUS at 10:19

## 2017-09-15 LAB
ANION GAP SERPL CALCULATED.3IONS-SCNC: 5 MMOL/L
BASOPHILS # BLD AUTO: 0 10*3/MM3 (ref 0–0.2)
BASOPHILS NFR BLD AUTO: 0 % (ref 0–1.5)
BUN BLD-MCNC: 29 MG/DL (ref 8–23)
BUN/CREAT SERPL: 42.6 (ref 7–25)
CALCIUM SPEC-SCNC: 9.1 MG/DL (ref 8.6–10.5)
CHLORIDE SERPL-SCNC: 90 MMOL/L (ref 98–107)
CO2 SERPL-SCNC: 47 MMOL/L (ref 22–29)
CREAT BLD-MCNC: 0.68 MG/DL (ref 0.76–1.27)
DEPRECATED RDW RBC AUTO: 52.4 FL (ref 37–54)
EOSINOPHIL # BLD AUTO: 0 10*3/MM3 (ref 0–0.7)
EOSINOPHIL NFR BLD AUTO: 0 % (ref 0.3–6.2)
ERYTHROCYTE [DISTWIDTH] IN BLOOD BY AUTOMATED COUNT: 17.3 % (ref 11.5–14.5)
GFR SERPL CREATININE-BSD FRML MDRD: 118 ML/MIN/1.73
GFR SERPL CREATININE-BSD FRML MDRD: 143 ML/MIN/1.73
GLUCOSE BLD-MCNC: 86 MG/DL (ref 65–99)
GLUCOSE BLDC GLUCOMTR-MCNC: 108 MG/DL (ref 70–130)
GLUCOSE BLDC GLUCOMTR-MCNC: 281 MG/DL (ref 70–130)
GLUCOSE BLDC GLUCOMTR-MCNC: 324 MG/DL (ref 70–130)
GLUCOSE BLDC GLUCOMTR-MCNC: 326 MG/DL (ref 70–130)
HCT VFR BLD AUTO: 40.8 % (ref 40.4–52.2)
HGB BLD-MCNC: 11.3 G/DL (ref 13.7–17.6)
IMM GRANULOCYTES # BLD: 0.02 10*3/MM3 (ref 0–0.03)
IMM GRANULOCYTES NFR BLD: 0.2 % (ref 0–0.5)
LYMPHOCYTES # BLD AUTO: 0.9 10*3/MM3 (ref 0.9–4.8)
LYMPHOCYTES NFR BLD AUTO: 8.7 % (ref 19.6–45.3)
MCH RBC QN AUTO: 23.1 PG (ref 27–32.7)
MCHC RBC AUTO-ENTMCNC: 27.7 G/DL (ref 32.6–36.4)
MCV RBC AUTO: 83.4 FL (ref 79.8–96.2)
MONOCYTES # BLD AUTO: 0.89 10*3/MM3 (ref 0.2–1.2)
MONOCYTES NFR BLD AUTO: 8.6 % (ref 5–12)
NEUTROPHILS # BLD AUTO: 8.53 10*3/MM3 (ref 1.9–8.1)
NEUTROPHILS NFR BLD AUTO: 82.5 % (ref 42.7–76)
NT-PROBNP SERPL-MCNC: 2826 PG/ML (ref 5–900)
PLATELET # BLD AUTO: 291 10*3/MM3 (ref 140–500)
PMV BLD AUTO: 9.5 FL (ref 6–12)
POTASSIUM BLD-SCNC: 4.4 MMOL/L (ref 3.5–5.2)
RBC # BLD AUTO: 4.89 10*6/MM3 (ref 4.6–6)
SODIUM BLD-SCNC: 142 MMOL/L (ref 136–145)
WBC NRBC COR # BLD: 10.34 10*3/MM3 (ref 4.5–10.7)

## 2017-09-15 PROCEDURE — 94799 UNLISTED PULMONARY SVC/PX: CPT

## 2017-09-15 PROCEDURE — 94660 CPAP INITIATION&MGMT: CPT

## 2017-09-15 PROCEDURE — 83880 ASSAY OF NATRIURETIC PEPTIDE: CPT | Performed by: FAMILY MEDICINE

## 2017-09-15 PROCEDURE — 99232 SBSQ HOSP IP/OBS MODERATE 35: CPT | Performed by: FAMILY MEDICINE

## 2017-09-15 PROCEDURE — 85025 COMPLETE CBC W/AUTO DIFF WBC: CPT | Performed by: FAMILY MEDICINE

## 2017-09-15 PROCEDURE — 25010000002 METHYLPREDNISOLONE PER 125 MG: Performed by: INTERNAL MEDICINE

## 2017-09-15 PROCEDURE — 25010000002 MEROPENEM: Performed by: INTERNAL MEDICINE

## 2017-09-15 PROCEDURE — 63710000001 INSULIN ASPART PER 5 UNITS: Performed by: FAMILY MEDICINE

## 2017-09-15 PROCEDURE — 80048 BASIC METABOLIC PNL TOTAL CA: CPT | Performed by: INTERNAL MEDICINE

## 2017-09-15 PROCEDURE — 82962 GLUCOSE BLOOD TEST: CPT

## 2017-09-15 PROCEDURE — 97110 THERAPEUTIC EXERCISES: CPT

## 2017-09-15 RX ORDER — TAMSULOSIN HYDROCHLORIDE 0.4 MG/1
0.4 CAPSULE ORAL DAILY
Status: DISCONTINUED | OUTPATIENT
Start: 2017-09-15 | End: 2017-09-19

## 2017-09-15 RX ORDER — BUDESONIDE AND FORMOTEROL FUMARATE DIHYDRATE 160; 4.5 UG/1; UG/1
2 AEROSOL RESPIRATORY (INHALATION)
Status: DISCONTINUED | OUTPATIENT
Start: 2017-09-15 | End: 2017-09-21

## 2017-09-15 RX ORDER — PREDNISONE 20 MG/1
40 TABLET ORAL
Status: DISCONTINUED | OUTPATIENT
Start: 2017-09-16 | End: 2017-09-16

## 2017-09-15 RX ADMIN — TRAZODONE HYDROCHLORIDE 50 MG: 50 TABLET ORAL at 21:08

## 2017-09-15 RX ADMIN — METHYLPREDNISOLONE SODIUM SUCCINATE 60 MG: 125 INJECTION, POWDER, FOR SOLUTION INTRAMUSCULAR; INTRAVENOUS at 15:38

## 2017-09-15 RX ADMIN — FAMOTIDINE 20 MG: 20 TABLET, FILM COATED ORAL at 18:32

## 2017-09-15 RX ADMIN — METOPROLOL TARTRATE 12.5 MG: 25 TABLET ORAL at 18:32

## 2017-09-15 RX ADMIN — IPRATROPIUM BROMIDE AND ALBUTEROL SULFATE 3 ML: .5; 3 SOLUTION RESPIRATORY (INHALATION) at 15:31

## 2017-09-15 RX ADMIN — APIXABAN 5 MG: 5 TABLET, FILM COATED ORAL at 18:32

## 2017-09-15 RX ADMIN — GUAIFENESIN 1200 MG: 600 TABLET, EXTENDED RELEASE ORAL at 08:28

## 2017-09-15 RX ADMIN — INSULIN ASPART 16 UNITS: 100 INJECTION, SOLUTION INTRAVENOUS; SUBCUTANEOUS at 12:12

## 2017-09-15 RX ADMIN — TAMSULOSIN HYDROCHLORIDE 0.4 MG: 0.4 CAPSULE ORAL at 12:12

## 2017-09-15 RX ADMIN — MEROPENEM 1 G: 1 INJECTION, POWDER, FOR SOLUTION INTRAVENOUS at 15:00

## 2017-09-15 RX ADMIN — GUAIFENESIN 1200 MG: 600 TABLET, EXTENDED RELEASE ORAL at 21:08

## 2017-09-15 RX ADMIN — FAMOTIDINE 20 MG: 20 TABLET, FILM COATED ORAL at 08:28

## 2017-09-15 RX ADMIN — IPRATROPIUM BROMIDE AND ALBUTEROL SULFATE 3 ML: .5; 3 SOLUTION RESPIRATORY (INHALATION) at 07:08

## 2017-09-15 RX ADMIN — FUROSEMIDE 80 MG: 80 TABLET ORAL at 08:28

## 2017-09-15 RX ADMIN — ATORVASTATIN CALCIUM 20 MG: 20 TABLET, FILM COATED ORAL at 21:08

## 2017-09-15 RX ADMIN — INSULIN ASPART 16 UNITS: 100 INJECTION, SOLUTION INTRAVENOUS; SUBCUTANEOUS at 21:08

## 2017-09-15 RX ADMIN — SENNOSIDES AND DOCUSATE SODIUM 2 TABLET: 8.6; 5 TABLET ORAL at 21:08

## 2017-09-15 RX ADMIN — INSULIN DETEMIR 20 UNITS: 100 INJECTION, SOLUTION SUBCUTANEOUS at 08:29

## 2017-09-15 RX ADMIN — INSULIN ASPART 12 UNITS: 100 INJECTION, SOLUTION INTRAVENOUS; SUBCUTANEOUS at 18:32

## 2017-09-15 RX ADMIN — IPRATROPIUM BROMIDE AND ALBUTEROL SULFATE 3 ML: .5; 3 SOLUTION RESPIRATORY (INHALATION) at 11:05

## 2017-09-15 RX ADMIN — APIXABAN 5 MG: 5 TABLET, FILM COATED ORAL at 08:28

## 2017-09-15 RX ADMIN — BUDESONIDE AND FORMOTEROL FUMARATE DIHYDRATE 2 PUFF: 160; 4.5 AEROSOL RESPIRATORY (INHALATION) at 20:19

## 2017-09-15 RX ADMIN — METOPROLOL TARTRATE 12.5 MG: 25 TABLET ORAL at 08:28

## 2017-09-15 RX ADMIN — IPRATROPIUM BROMIDE AND ALBUTEROL SULFATE 3 ML: .5; 3 SOLUTION RESPIRATORY (INHALATION) at 03:48

## 2017-09-15 RX ADMIN — METHYLPREDNISOLONE SODIUM SUCCINATE 60 MG: 125 INJECTION, POWDER, FOR SOLUTION INTRAMUSCULAR; INTRAVENOUS at 04:15

## 2017-09-15 RX ADMIN — SODIUM CHLORIDE SOLN NEBU 7% 4 ML: 7 NEBU SOLN at 07:08

## 2017-09-15 RX ADMIN — MEROPENEM 1 G: 1 INJECTION, POWDER, FOR SOLUTION INTRAVENOUS at 06:50

## 2017-09-15 RX ADMIN — IPRATROPIUM BROMIDE AND ALBUTEROL SULFATE 3 ML: .5; 3 SOLUTION RESPIRATORY (INHALATION) at 20:19

## 2017-09-15 RX ADMIN — METHYLPREDNISOLONE SODIUM SUCCINATE 60 MG: 125 INJECTION, POWDER, FOR SOLUTION INTRAMUSCULAR; INTRAVENOUS at 11:16

## 2017-09-16 LAB
ANION GAP SERPL CALCULATED.3IONS-SCNC: 9.1 MMOL/L
BASOPHILS # BLD AUTO: 0.01 10*3/MM3 (ref 0–0.2)
BASOPHILS NFR BLD AUTO: 0.1 % (ref 0–1.5)
BUN BLD-MCNC: 30 MG/DL (ref 8–23)
BUN/CREAT SERPL: 46.2 (ref 7–25)
CALCIUM SPEC-SCNC: 9.3 MG/DL (ref 8.6–10.5)
CHLORIDE SERPL-SCNC: 87 MMOL/L (ref 98–107)
CO2 SERPL-SCNC: 42.9 MMOL/L (ref 22–29)
CREAT BLD-MCNC: 0.65 MG/DL (ref 0.76–1.27)
DEPRECATED RDW RBC AUTO: 50.9 FL (ref 37–54)
EOSINOPHIL # BLD AUTO: 0 10*3/MM3 (ref 0–0.7)
EOSINOPHIL NFR BLD AUTO: 0 % (ref 0.3–6.2)
ERYTHROCYTE [DISTWIDTH] IN BLOOD BY AUTOMATED COUNT: 17.6 % (ref 11.5–14.5)
GFR SERPL CREATININE-BSD FRML MDRD: 124 ML/MIN/1.73
GFR SERPL CREATININE-BSD FRML MDRD: >150 ML/MIN/1.73
GLUCOSE BLD-MCNC: 193 MG/DL (ref 65–99)
GLUCOSE BLDC GLUCOMTR-MCNC: 180 MG/DL (ref 70–130)
GLUCOSE BLDC GLUCOMTR-MCNC: 195 MG/DL (ref 70–130)
GLUCOSE BLDC GLUCOMTR-MCNC: 239 MG/DL (ref 70–130)
GLUCOSE BLDC GLUCOMTR-MCNC: 344 MG/DL (ref 70–130)
HCT VFR BLD AUTO: 41.1 % (ref 40.4–52.2)
HGB BLD-MCNC: 11.8 G/DL (ref 13.7–17.6)
IMM GRANULOCYTES # BLD: 0.04 10*3/MM3 (ref 0–0.03)
IMM GRANULOCYTES NFR BLD: 0.4 % (ref 0–0.5)
LYMPHOCYTES # BLD AUTO: 1.7 10*3/MM3 (ref 0.9–4.8)
LYMPHOCYTES NFR BLD AUTO: 15.7 % (ref 19.6–45.3)
MCH RBC QN AUTO: 23.1 PG (ref 27–32.7)
MCHC RBC AUTO-ENTMCNC: 28.7 G/DL (ref 32.6–36.4)
MCV RBC AUTO: 80.6 FL (ref 79.8–96.2)
MONOCYTES # BLD AUTO: 1.45 10*3/MM3 (ref 0.2–1.2)
MONOCYTES NFR BLD AUTO: 13.4 % (ref 5–12)
NEUTROPHILS # BLD AUTO: 7.66 10*3/MM3 (ref 1.9–8.1)
NEUTROPHILS NFR BLD AUTO: 70.4 % (ref 42.7–76)
NRBC BLD MANUAL-RTO: 0 /100 WBC (ref 0–0)
NT-PROBNP SERPL-MCNC: 1587 PG/ML (ref 5–900)
PLATELET # BLD AUTO: 304 10*3/MM3 (ref 140–500)
PMV BLD AUTO: 9.5 FL (ref 6–12)
POTASSIUM BLD-SCNC: 4.4 MMOL/L (ref 3.5–5.2)
RBC # BLD AUTO: 5.1 10*6/MM3 (ref 4.6–6)
SODIUM BLD-SCNC: 139 MMOL/L (ref 136–145)
WBC NRBC COR # BLD: 10.86 10*3/MM3 (ref 4.5–10.7)

## 2017-09-16 PROCEDURE — 99232 SBSQ HOSP IP/OBS MODERATE 35: CPT | Performed by: FAMILY MEDICINE

## 2017-09-16 PROCEDURE — 80048 BASIC METABOLIC PNL TOTAL CA: CPT | Performed by: FAMILY MEDICINE

## 2017-09-16 PROCEDURE — 63710000001 INSULIN ASPART PER 5 UNITS: Performed by: FAMILY MEDICINE

## 2017-09-16 PROCEDURE — 63710000001 PREDNISONE PER 1 MG: Performed by: INTERNAL MEDICINE

## 2017-09-16 PROCEDURE — 85025 COMPLETE CBC W/AUTO DIFF WBC: CPT | Performed by: FAMILY MEDICINE

## 2017-09-16 PROCEDURE — 83880 ASSAY OF NATRIURETIC PEPTIDE: CPT | Performed by: FAMILY MEDICINE

## 2017-09-16 PROCEDURE — 94799 UNLISTED PULMONARY SVC/PX: CPT

## 2017-09-16 PROCEDURE — 82962 GLUCOSE BLOOD TEST: CPT

## 2017-09-16 PROCEDURE — 97110 THERAPEUTIC EXERCISES: CPT | Performed by: PHYSICAL THERAPIST

## 2017-09-16 PROCEDURE — 94660 CPAP INITIATION&MGMT: CPT

## 2017-09-16 RX ORDER — FUROSEMIDE 40 MG/1
40 TABLET ORAL 2 TIMES DAILY
Status: DISCONTINUED | OUTPATIENT
Start: 2017-09-17 | End: 2017-09-20

## 2017-09-16 RX ORDER — AMLODIPINE BESYLATE 10 MG/1
10 TABLET ORAL
Status: DISCONTINUED | OUTPATIENT
Start: 2017-09-16 | End: 2017-10-06 | Stop reason: HOSPADM

## 2017-09-16 RX ORDER — PREDNISONE 20 MG/1
20 TABLET ORAL
Status: COMPLETED | OUTPATIENT
Start: 2017-09-17 | End: 2017-09-18

## 2017-09-16 RX ADMIN — INSULIN DETEMIR 20 UNITS: 100 INJECTION, SOLUTION SUBCUTANEOUS at 09:23

## 2017-09-16 RX ADMIN — IPRATROPIUM BROMIDE AND ALBUTEROL SULFATE 3 ML: .5; 3 SOLUTION RESPIRATORY (INHALATION) at 07:32

## 2017-09-16 RX ADMIN — INSULIN ASPART 16 UNITS: 100 INJECTION, SOLUTION INTRAVENOUS; SUBCUTANEOUS at 17:27

## 2017-09-16 RX ADMIN — FUROSEMIDE 80 MG: 80 TABLET ORAL at 09:19

## 2017-09-16 RX ADMIN — TAMSULOSIN HYDROCHLORIDE 0.4 MG: 0.4 CAPSULE ORAL at 09:19

## 2017-09-16 RX ADMIN — FAMOTIDINE 20 MG: 20 TABLET, FILM COATED ORAL at 09:19

## 2017-09-16 RX ADMIN — BUDESONIDE AND FORMOTEROL FUMARATE DIHYDRATE 2 PUFF: 160; 4.5 AEROSOL RESPIRATORY (INHALATION) at 20:17

## 2017-09-16 RX ADMIN — IPRATROPIUM BROMIDE AND ALBUTEROL SULFATE 3 ML: .5; 3 SOLUTION RESPIRATORY (INHALATION) at 04:06

## 2017-09-16 RX ADMIN — METOPROLOL TARTRATE 12.5 MG: 25 TABLET ORAL at 09:19

## 2017-09-16 RX ADMIN — APIXABAN 5 MG: 5 TABLET, FILM COATED ORAL at 17:26

## 2017-09-16 RX ADMIN — INSULIN ASPART 4 UNITS: 100 INJECTION, SOLUTION INTRAVENOUS; SUBCUTANEOUS at 21:49

## 2017-09-16 RX ADMIN — GUAIFENESIN 1200 MG: 600 TABLET, EXTENDED RELEASE ORAL at 21:49

## 2017-09-16 RX ADMIN — GUAIFENESIN 1200 MG: 600 TABLET, EXTENDED RELEASE ORAL at 09:19

## 2017-09-16 RX ADMIN — IPRATROPIUM BROMIDE AND ALBUTEROL SULFATE 3 ML: .5; 3 SOLUTION RESPIRATORY (INHALATION) at 15:04

## 2017-09-16 RX ADMIN — FAMOTIDINE 20 MG: 20 TABLET, FILM COATED ORAL at 17:26

## 2017-09-16 RX ADMIN — IPRATROPIUM BROMIDE AND ALBUTEROL SULFATE 3 ML: .5; 3 SOLUTION RESPIRATORY (INHALATION) at 00:08

## 2017-09-16 RX ADMIN — ATORVASTATIN CALCIUM 20 MG: 20 TABLET, FILM COATED ORAL at 21:49

## 2017-09-16 RX ADMIN — SENNOSIDES AND DOCUSATE SODIUM 2 TABLET: 8.6; 5 TABLET ORAL at 21:49

## 2017-09-16 RX ADMIN — BUDESONIDE AND FORMOTEROL FUMARATE DIHYDRATE 2 PUFF: 160; 4.5 AEROSOL RESPIRATORY (INHALATION) at 07:33

## 2017-09-16 RX ADMIN — METOPROLOL TARTRATE 12.5 MG: 25 TABLET ORAL at 17:26

## 2017-09-16 RX ADMIN — IPRATROPIUM BROMIDE AND ALBUTEROL SULFATE 3 ML: .5; 3 SOLUTION RESPIRATORY (INHALATION) at 20:17

## 2017-09-16 RX ADMIN — INSULIN ASPART 4 UNITS: 100 INJECTION, SOLUTION INTRAVENOUS; SUBCUTANEOUS at 09:19

## 2017-09-16 RX ADMIN — TRAZODONE HYDROCHLORIDE 50 MG: 50 TABLET ORAL at 21:49

## 2017-09-16 RX ADMIN — PREDNISONE 40 MG: 20 TABLET ORAL at 09:19

## 2017-09-16 RX ADMIN — AMLODIPINE BESYLATE 10 MG: 10 TABLET ORAL at 21:49

## 2017-09-16 RX ADMIN — IPRATROPIUM BROMIDE AND ALBUTEROL SULFATE 3 ML: .5; 3 SOLUTION RESPIRATORY (INHALATION) at 23:33

## 2017-09-16 RX ADMIN — INSULIN ASPART 8 UNITS: 100 INJECTION, SOLUTION INTRAVENOUS; SUBCUTANEOUS at 12:00

## 2017-09-16 RX ADMIN — IPRATROPIUM BROMIDE AND ALBUTEROL SULFATE 3 ML: .5; 3 SOLUTION RESPIRATORY (INHALATION) at 10:19

## 2017-09-16 RX ADMIN — APIXABAN 5 MG: 5 TABLET, FILM COATED ORAL at 09:19

## 2017-09-17 LAB
ALBUMIN SERPL-MCNC: 3.6 G/DL (ref 3.5–5.2)
ALBUMIN/GLOB SERPL: 1.3 G/DL
ALP SERPL-CCNC: 99 U/L (ref 39–117)
ALT SERPL W P-5'-P-CCNC: 51 U/L (ref 1–41)
ANION GAP SERPL CALCULATED.3IONS-SCNC: 6.6 MMOL/L
AST SERPL-CCNC: 23 U/L (ref 1–40)
BASOPHILS # BLD AUTO: 0 10*3/MM3 (ref 0–0.2)
BASOPHILS NFR BLD AUTO: 0 % (ref 0–1.5)
BILIRUB SERPL-MCNC: 0.4 MG/DL (ref 0.1–1.2)
BUN BLD-MCNC: 32 MG/DL (ref 8–23)
BUN/CREAT SERPL: 46.4 (ref 7–25)
CALCIUM SPEC-SCNC: 8.9 MG/DL (ref 8.6–10.5)
CHLORIDE SERPL-SCNC: 91 MMOL/L (ref 98–107)
CO2 SERPL-SCNC: 41.4 MMOL/L (ref 22–29)
CREAT BLD-MCNC: 0.69 MG/DL (ref 0.76–1.27)
DEPRECATED RDW RBC AUTO: 51.6 FL (ref 37–54)
EOSINOPHIL # BLD AUTO: 0.06 10*3/MM3 (ref 0–0.7)
EOSINOPHIL NFR BLD AUTO: 0.5 % (ref 0.3–6.2)
ERYTHROCYTE [DISTWIDTH] IN BLOOD BY AUTOMATED COUNT: 17.8 % (ref 11.5–14.5)
GFR SERPL CREATININE-BSD FRML MDRD: 116 ML/MIN/1.73
GFR SERPL CREATININE-BSD FRML MDRD: 141 ML/MIN/1.73
GLOBULIN UR ELPH-MCNC: 2.8 GM/DL
GLUCOSE BLD-MCNC: 288 MG/DL (ref 65–99)
GLUCOSE BLDC GLUCOMTR-MCNC: 108 MG/DL (ref 70–130)
GLUCOSE BLDC GLUCOMTR-MCNC: 125 MG/DL (ref 70–130)
GLUCOSE BLDC GLUCOMTR-MCNC: 234 MG/DL (ref 70–130)
GLUCOSE BLDC GLUCOMTR-MCNC: 386 MG/DL (ref 70–130)
GLUCOSE BLDC GLUCOMTR-MCNC: 426 MG/DL (ref 70–130)
HCT VFR BLD AUTO: 39 % (ref 40.4–52.2)
HGB BLD-MCNC: 11.4 G/DL (ref 13.7–17.6)
IMM GRANULOCYTES # BLD: 0.04 10*3/MM3 (ref 0–0.03)
IMM GRANULOCYTES NFR BLD: 0.3 % (ref 0–0.5)
LYMPHOCYTES # BLD AUTO: 2.08 10*3/MM3 (ref 0.9–4.8)
LYMPHOCYTES NFR BLD AUTO: 17.7 % (ref 19.6–45.3)
MCH RBC QN AUTO: 23.6 PG (ref 27–32.7)
MCHC RBC AUTO-ENTMCNC: 29.2 G/DL (ref 32.6–36.4)
MCV RBC AUTO: 80.6 FL (ref 79.8–96.2)
MONOCYTES # BLD AUTO: 1.39 10*3/MM3 (ref 0.2–1.2)
MONOCYTES NFR BLD AUTO: 11.8 % (ref 5–12)
NEUTROPHILS # BLD AUTO: 8.19 10*3/MM3 (ref 1.9–8.1)
NEUTROPHILS NFR BLD AUTO: 69.7 % (ref 42.7–76)
PLATELET # BLD AUTO: 287 10*3/MM3 (ref 140–500)
PMV BLD AUTO: 9.8 FL (ref 6–12)
POTASSIUM BLD-SCNC: 4.1 MMOL/L (ref 3.5–5.2)
PROT SERPL-MCNC: 6.4 G/DL (ref 6–8.5)
RBC # BLD AUTO: 4.84 10*6/MM3 (ref 4.6–6)
SODIUM BLD-SCNC: 139 MMOL/L (ref 136–145)
WBC NRBC COR # BLD: 11.76 10*3/MM3 (ref 4.5–10.7)

## 2017-09-17 PROCEDURE — 94799 UNLISTED PULMONARY SVC/PX: CPT

## 2017-09-17 PROCEDURE — 63710000001 PREDNISONE PER 1 MG: Performed by: INTERNAL MEDICINE

## 2017-09-17 PROCEDURE — 85025 COMPLETE CBC W/AUTO DIFF WBC: CPT | Performed by: FAMILY MEDICINE

## 2017-09-17 PROCEDURE — 80053 COMPREHEN METABOLIC PANEL: CPT | Performed by: FAMILY MEDICINE

## 2017-09-17 PROCEDURE — 82962 GLUCOSE BLOOD TEST: CPT

## 2017-09-17 PROCEDURE — 63710000001 INSULIN ASPART PER 5 UNITS: Performed by: FAMILY MEDICINE

## 2017-09-17 PROCEDURE — 99232 SBSQ HOSP IP/OBS MODERATE 35: CPT | Performed by: FAMILY MEDICINE

## 2017-09-17 PROCEDURE — 97110 THERAPEUTIC EXERCISES: CPT | Performed by: PHYSICAL THERAPIST

## 2017-09-17 RX ADMIN — GUAIFENESIN 1200 MG: 600 TABLET, EXTENDED RELEASE ORAL at 08:29

## 2017-09-17 RX ADMIN — BUDESONIDE AND FORMOTEROL FUMARATE DIHYDRATE 2 PUFF: 160; 4.5 AEROSOL RESPIRATORY (INHALATION) at 19:28

## 2017-09-17 RX ADMIN — IPRATROPIUM BROMIDE AND ALBUTEROL SULFATE 3 ML: .5; 3 SOLUTION RESPIRATORY (INHALATION) at 03:25

## 2017-09-17 RX ADMIN — AMLODIPINE BESYLATE 10 MG: 10 TABLET ORAL at 22:33

## 2017-09-17 RX ADMIN — APIXABAN 5 MG: 5 TABLET, FILM COATED ORAL at 17:19

## 2017-09-17 RX ADMIN — INSULIN ASPART 8 UNITS: 100 INJECTION, SOLUTION INTRAVENOUS; SUBCUTANEOUS at 08:28

## 2017-09-17 RX ADMIN — METOPROLOL TARTRATE 12.5 MG: 25 TABLET ORAL at 17:19

## 2017-09-17 RX ADMIN — FAMOTIDINE 20 MG: 20 TABLET, FILM COATED ORAL at 17:19

## 2017-09-17 RX ADMIN — FUROSEMIDE 40 MG: 40 TABLET ORAL at 08:29

## 2017-09-17 RX ADMIN — GUAIFENESIN 1200 MG: 600 TABLET, EXTENDED RELEASE ORAL at 20:52

## 2017-09-17 RX ADMIN — ATORVASTATIN CALCIUM 20 MG: 20 TABLET, FILM COATED ORAL at 20:52

## 2017-09-17 RX ADMIN — BUDESONIDE AND FORMOTEROL FUMARATE DIHYDRATE 2 PUFF: 160; 4.5 AEROSOL RESPIRATORY (INHALATION) at 06:51

## 2017-09-17 RX ADMIN — IPRATROPIUM BROMIDE AND ALBUTEROL SULFATE 3 ML: .5; 3 SOLUTION RESPIRATORY (INHALATION) at 06:51

## 2017-09-17 RX ADMIN — TRAZODONE HYDROCHLORIDE 50 MG: 50 TABLET ORAL at 20:52

## 2017-09-17 RX ADMIN — IPRATROPIUM BROMIDE AND ALBUTEROL SULFATE 3 ML: .5; 3 SOLUTION RESPIRATORY (INHALATION) at 19:28

## 2017-09-17 RX ADMIN — PREDNISONE 20 MG: 20 TABLET ORAL at 08:29

## 2017-09-17 RX ADMIN — IPRATROPIUM BROMIDE AND ALBUTEROL SULFATE 3 ML: .5; 3 SOLUTION RESPIRATORY (INHALATION) at 14:34

## 2017-09-17 RX ADMIN — IPRATROPIUM BROMIDE AND ALBUTEROL SULFATE 3 ML: .5; 3 SOLUTION RESPIRATORY (INHALATION) at 09:55

## 2017-09-17 RX ADMIN — SENNOSIDES AND DOCUSATE SODIUM 2 TABLET: 8.6; 5 TABLET ORAL at 20:52

## 2017-09-17 RX ADMIN — TAMSULOSIN HYDROCHLORIDE 0.4 MG: 0.4 CAPSULE ORAL at 08:29

## 2017-09-17 RX ADMIN — INSULIN DETEMIR 20 UNITS: 100 INJECTION, SOLUTION SUBCUTANEOUS at 08:36

## 2017-09-17 RX ADMIN — INSULIN ASPART 20 UNITS: 100 INJECTION, SOLUTION INTRAVENOUS; SUBCUTANEOUS at 17:19

## 2017-09-17 RX ADMIN — FUROSEMIDE 40 MG: 40 TABLET ORAL at 17:18

## 2017-09-17 RX ADMIN — METOPROLOL TARTRATE 12.5 MG: 25 TABLET ORAL at 08:28

## 2017-09-17 RX ADMIN — IPRATROPIUM BROMIDE AND ALBUTEROL SULFATE 3 ML: .5; 3 SOLUTION RESPIRATORY (INHALATION) at 23:23

## 2017-09-17 RX ADMIN — FAMOTIDINE 20 MG: 20 TABLET, FILM COATED ORAL at 08:29

## 2017-09-17 RX ADMIN — APIXABAN 5 MG: 5 TABLET, FILM COATED ORAL at 08:29

## 2017-09-18 ENCOUNTER — APPOINTMENT (OUTPATIENT)
Dept: GENERAL RADIOLOGY | Facility: HOSPITAL | Age: 62
End: 2017-09-18
Attending: FAMILY MEDICINE

## 2017-09-18 LAB
ANION GAP SERPL CALCULATED.3IONS-SCNC: 9.7 MMOL/L
BASOPHILS # BLD AUTO: 0.01 10*3/MM3 (ref 0–0.2)
BASOPHILS NFR BLD AUTO: 0.1 % (ref 0–1.5)
BUN BLD-MCNC: 28 MG/DL (ref 8–23)
BUN/CREAT SERPL: 33.3 (ref 7–25)
CALCIUM SPEC-SCNC: 8.8 MG/DL (ref 8.6–10.5)
CHLORIDE SERPL-SCNC: 91 MMOL/L (ref 98–107)
CO2 SERPL-SCNC: 37.3 MMOL/L (ref 22–29)
CREAT BLD-MCNC: 0.84 MG/DL (ref 0.76–1.27)
DEPRECATED RDW RBC AUTO: 51.8 FL (ref 37–54)
EOSINOPHIL # BLD AUTO: 0.22 10*3/MM3 (ref 0–0.7)
EOSINOPHIL NFR BLD AUTO: 1.6 % (ref 0.3–6.2)
ERYTHROCYTE [DISTWIDTH] IN BLOOD BY AUTOMATED COUNT: 18.1 % (ref 11.5–14.5)
GFR SERPL CREATININE-BSD FRML MDRD: 112 ML/MIN/1.73
GFR SERPL CREATININE-BSD FRML MDRD: 93 ML/MIN/1.73
GLUCOSE BLD-MCNC: 165 MG/DL (ref 65–99)
GLUCOSE BLDC GLUCOMTR-MCNC: 151 MG/DL (ref 70–130)
GLUCOSE BLDC GLUCOMTR-MCNC: 192 MG/DL (ref 70–130)
GLUCOSE BLDC GLUCOMTR-MCNC: 340 MG/DL (ref 70–130)
GLUCOSE BLDC GLUCOMTR-MCNC: 80 MG/DL (ref 70–130)
HCT VFR BLD AUTO: 40.4 % (ref 40.4–52.2)
HGB BLD-MCNC: 11.8 G/DL (ref 13.7–17.6)
IMM GRANULOCYTES # BLD: 0.06 10*3/MM3 (ref 0–0.03)
IMM GRANULOCYTES NFR BLD: 0.4 % (ref 0–0.5)
LYMPHOCYTES # BLD AUTO: 2.42 10*3/MM3 (ref 0.9–4.8)
LYMPHOCYTES NFR BLD AUTO: 17.7 % (ref 19.6–45.3)
MCH RBC QN AUTO: 23.5 PG (ref 27–32.7)
MCHC RBC AUTO-ENTMCNC: 29.2 G/DL (ref 32.6–36.4)
MCV RBC AUTO: 80.3 FL (ref 79.8–96.2)
MONOCYTES # BLD AUTO: 1.43 10*3/MM3 (ref 0.2–1.2)
MONOCYTES NFR BLD AUTO: 10.5 % (ref 5–12)
NEUTROPHILS # BLD AUTO: 9.54 10*3/MM3 (ref 1.9–8.1)
NEUTROPHILS NFR BLD AUTO: 69.7 % (ref 42.7–76)
NT-PROBNP SERPL-MCNC: 759.1 PG/ML (ref 5–900)
PLATELET # BLD AUTO: 259 10*3/MM3 (ref 140–500)
PMV BLD AUTO: 9.5 FL (ref 6–12)
POTASSIUM BLD-SCNC: 4.2 MMOL/L (ref 3.5–5.2)
RBC # BLD AUTO: 5.03 10*6/MM3 (ref 4.6–6)
SODIUM BLD-SCNC: 138 MMOL/L (ref 136–145)
WBC NRBC COR # BLD: 13.68 10*3/MM3 (ref 4.5–10.7)

## 2017-09-18 PROCEDURE — 83880 ASSAY OF NATRIURETIC PEPTIDE: CPT | Performed by: FAMILY MEDICINE

## 2017-09-18 PROCEDURE — 97110 THERAPEUTIC EXERCISES: CPT

## 2017-09-18 PROCEDURE — 63710000001 PREDNISONE PER 1 MG: Performed by: INTERNAL MEDICINE

## 2017-09-18 PROCEDURE — 94799 UNLISTED PULMONARY SVC/PX: CPT

## 2017-09-18 PROCEDURE — 99232 SBSQ HOSP IP/OBS MODERATE 35: CPT | Performed by: FAMILY MEDICINE

## 2017-09-18 PROCEDURE — 82962 GLUCOSE BLOOD TEST: CPT

## 2017-09-18 PROCEDURE — 80048 BASIC METABOLIC PNL TOTAL CA: CPT | Performed by: FAMILY MEDICINE

## 2017-09-18 PROCEDURE — 71020 HC CHEST PA AND LATERAL: CPT

## 2017-09-18 PROCEDURE — 85025 COMPLETE CBC W/AUTO DIFF WBC: CPT | Performed by: FAMILY MEDICINE

## 2017-09-18 PROCEDURE — 63710000001 INSULIN ASPART PER 5 UNITS: Performed by: FAMILY MEDICINE

## 2017-09-18 RX ORDER — ZOLPIDEM TARTRATE 5 MG/1
5 TABLET ORAL NIGHTLY PRN
Status: DISCONTINUED | OUTPATIENT
Start: 2017-09-18 | End: 2017-10-06 | Stop reason: HOSPADM

## 2017-09-18 RX ADMIN — TAMSULOSIN HYDROCHLORIDE 0.4 MG: 0.4 CAPSULE ORAL at 08:15

## 2017-09-18 RX ADMIN — INSULIN ASPART 4 UNITS: 100 INJECTION, SOLUTION INTRAVENOUS; SUBCUTANEOUS at 22:39

## 2017-09-18 RX ADMIN — IPRATROPIUM BROMIDE AND ALBUTEROL SULFATE 3 ML: .5; 3 SOLUTION RESPIRATORY (INHALATION) at 23:19

## 2017-09-18 RX ADMIN — INSULIN DETEMIR 20 UNITS: 100 INJECTION, SOLUTION SUBCUTANEOUS at 08:18

## 2017-09-18 RX ADMIN — INSULIN ASPART 16 UNITS: 100 INJECTION, SOLUTION INTRAVENOUS; SUBCUTANEOUS at 17:08

## 2017-09-18 RX ADMIN — FUROSEMIDE 40 MG: 40 TABLET ORAL at 20:07

## 2017-09-18 RX ADMIN — GUAIFENESIN 1200 MG: 600 TABLET, EXTENDED RELEASE ORAL at 20:07

## 2017-09-18 RX ADMIN — AMLODIPINE BESYLATE 10 MG: 10 TABLET ORAL at 08:15

## 2017-09-18 RX ADMIN — METOPROLOL TARTRATE 12.5 MG: 25 TABLET ORAL at 08:16

## 2017-09-18 RX ADMIN — INSULIN ASPART 4 UNITS: 100 INJECTION, SOLUTION INTRAVENOUS; SUBCUTANEOUS at 08:16

## 2017-09-18 RX ADMIN — IPRATROPIUM BROMIDE AND ALBUTEROL SULFATE 3 ML: .5; 3 SOLUTION RESPIRATORY (INHALATION) at 07:57

## 2017-09-18 RX ADMIN — BUDESONIDE AND FORMOTEROL FUMARATE DIHYDRATE 2 PUFF: 160; 4.5 AEROSOL RESPIRATORY (INHALATION) at 07:57

## 2017-09-18 RX ADMIN — METOPROLOL TARTRATE 12.5 MG: 25 TABLET ORAL at 17:09

## 2017-09-18 RX ADMIN — IPRATROPIUM BROMIDE AND ALBUTEROL SULFATE 3 ML: .5; 3 SOLUTION RESPIRATORY (INHALATION) at 02:59

## 2017-09-18 RX ADMIN — APIXABAN 5 MG: 5 TABLET, FILM COATED ORAL at 08:16

## 2017-09-18 RX ADMIN — PREDNISONE 20 MG: 20 TABLET ORAL at 08:15

## 2017-09-18 RX ADMIN — ACETAMINOPHEN 650 MG: 325 TABLET ORAL at 20:17

## 2017-09-18 RX ADMIN — BUDESONIDE AND FORMOTEROL FUMARATE DIHYDRATE 2 PUFF: 160; 4.5 AEROSOL RESPIRATORY (INHALATION) at 19:16

## 2017-09-18 RX ADMIN — IPRATROPIUM BROMIDE AND ALBUTEROL SULFATE 3 ML: .5; 3 SOLUTION RESPIRATORY (INHALATION) at 15:48

## 2017-09-18 RX ADMIN — ZOLPIDEM TARTRATE 5 MG: 5 TABLET, FILM COATED ORAL at 21:51

## 2017-09-18 RX ADMIN — SENNOSIDES AND DOCUSATE SODIUM 2 TABLET: 8.6; 5 TABLET ORAL at 20:05

## 2017-09-18 RX ADMIN — IPRATROPIUM BROMIDE AND ALBUTEROL SULFATE 3 ML: .5; 3 SOLUTION RESPIRATORY (INHALATION) at 19:16

## 2017-09-18 RX ADMIN — ATORVASTATIN CALCIUM 20 MG: 20 TABLET, FILM COATED ORAL at 20:06

## 2017-09-18 RX ADMIN — IPRATROPIUM BROMIDE AND ALBUTEROL SULFATE 3 ML: .5; 3 SOLUTION RESPIRATORY (INHALATION) at 11:52

## 2017-09-18 RX ADMIN — FAMOTIDINE 20 MG: 20 TABLET, FILM COATED ORAL at 17:07

## 2017-09-18 RX ADMIN — APIXABAN 5 MG: 5 TABLET, FILM COATED ORAL at 17:07

## 2017-09-18 RX ADMIN — FAMOTIDINE 20 MG: 20 TABLET, FILM COATED ORAL at 08:15

## 2017-09-18 RX ADMIN — GUAIFENESIN 1200 MG: 600 TABLET, EXTENDED RELEASE ORAL at 08:16

## 2017-09-18 RX ADMIN — ACETAMINOPHEN 650 MG: 325 TABLET ORAL at 08:28

## 2017-09-18 RX ADMIN — FUROSEMIDE 40 MG: 40 TABLET ORAL at 08:15

## 2017-09-19 LAB
ANION GAP SERPL CALCULATED.3IONS-SCNC: 8.4 MMOL/L
BASOPHILS # BLD AUTO: 0 10*3/MM3 (ref 0–0.2)
BASOPHILS NFR BLD AUTO: 0 % (ref 0–1.5)
BUN BLD-MCNC: 20 MG/DL (ref 8–23)
BUN/CREAT SERPL: 30.3 (ref 7–25)
C DIFF TOX GENS STL QL NAA+PROBE: POSITIVE
CALCIUM SPEC-SCNC: 8.7 MG/DL (ref 8.6–10.5)
CHLORIDE SERPL-SCNC: 93 MMOL/L (ref 98–107)
CO2 SERPL-SCNC: 36.6 MMOL/L (ref 22–29)
CREAT BLD-MCNC: 0.66 MG/DL (ref 0.76–1.27)
DEPRECATED RDW RBC AUTO: 52.6 FL (ref 37–54)
EOSINOPHIL # BLD AUTO: 0.42 10*3/MM3 (ref 0–0.7)
EOSINOPHIL NFR BLD AUTO: 3.2 % (ref 0.3–6.2)
ERYTHROCYTE [DISTWIDTH] IN BLOOD BY AUTOMATED COUNT: 18.1 % (ref 11.5–14.5)
GFR SERPL CREATININE-BSD FRML MDRD: 122 ML/MIN/1.73
GFR SERPL CREATININE-BSD FRML MDRD: 148 ML/MIN/1.73
GLUCOSE BLD-MCNC: 194 MG/DL (ref 65–99)
GLUCOSE BLDC GLUCOMTR-MCNC: 156 MG/DL (ref 70–130)
GLUCOSE BLDC GLUCOMTR-MCNC: 159 MG/DL (ref 70–130)
GLUCOSE BLDC GLUCOMTR-MCNC: 164 MG/DL (ref 70–130)
GLUCOSE BLDC GLUCOMTR-MCNC: 180 MG/DL (ref 70–130)
GLUCOSE BLDC GLUCOMTR-MCNC: 270 MG/DL (ref 70–130)
HCT VFR BLD AUTO: 38.9 % (ref 40.4–52.2)
HGB BLD-MCNC: 11.2 G/DL (ref 13.7–17.6)
IMM GRANULOCYTES # BLD: 0.06 10*3/MM3 (ref 0–0.03)
IMM GRANULOCYTES NFR BLD: 0.5 % (ref 0–0.5)
LYMPHOCYTES # BLD AUTO: 1.81 10*3/MM3 (ref 0.9–4.8)
LYMPHOCYTES NFR BLD AUTO: 14 % (ref 19.6–45.3)
MCH RBC QN AUTO: 23.2 PG (ref 27–32.7)
MCHC RBC AUTO-ENTMCNC: 28.8 G/DL (ref 32.6–36.4)
MCV RBC AUTO: 80.7 FL (ref 79.8–96.2)
MONOCYTES # BLD AUTO: 1.43 10*3/MM3 (ref 0.2–1.2)
MONOCYTES NFR BLD AUTO: 11.1 % (ref 5–12)
NEUTROPHILS # BLD AUTO: 9.21 10*3/MM3 (ref 1.9–8.1)
NEUTROPHILS NFR BLD AUTO: 71.2 % (ref 42.7–76)
NT-PROBNP SERPL-MCNC: 512.7 PG/ML (ref 0–900)
PLATELET # BLD AUTO: 247 10*3/MM3 (ref 140–500)
PMV BLD AUTO: 9.5 FL (ref 6–12)
POTASSIUM BLD-SCNC: 4.2 MMOL/L (ref 3.5–5.2)
RBC # BLD AUTO: 4.82 10*6/MM3 (ref 4.6–6)
SODIUM BLD-SCNC: 138 MMOL/L (ref 136–145)
WBC NRBC COR # BLD: 12.93 10*3/MM3 (ref 4.5–10.7)

## 2017-09-19 PROCEDURE — 80048 BASIC METABOLIC PNL TOTAL CA: CPT | Performed by: FAMILY MEDICINE

## 2017-09-19 PROCEDURE — 63710000001 INSULIN ASPART PER 5 UNITS: Performed by: FAMILY MEDICINE

## 2017-09-19 PROCEDURE — 94799 UNLISTED PULMONARY SVC/PX: CPT

## 2017-09-19 PROCEDURE — 85025 COMPLETE CBC W/AUTO DIFF WBC: CPT | Performed by: FAMILY MEDICINE

## 2017-09-19 PROCEDURE — 83880 ASSAY OF NATRIURETIC PEPTIDE: CPT | Performed by: FAMILY MEDICINE

## 2017-09-19 PROCEDURE — 99232 SBSQ HOSP IP/OBS MODERATE 35: CPT | Performed by: FAMILY MEDICINE

## 2017-09-19 PROCEDURE — 87493 C DIFF AMPLIFIED PROBE: CPT | Performed by: FAMILY MEDICINE

## 2017-09-19 PROCEDURE — 97110 THERAPEUTIC EXERCISES: CPT

## 2017-09-19 PROCEDURE — 82962 GLUCOSE BLOOD TEST: CPT

## 2017-09-19 RX ORDER — TAMSULOSIN HYDROCHLORIDE 0.4 MG/1
0.4 CAPSULE ORAL 2 TIMES DAILY
Status: DISCONTINUED | OUTPATIENT
Start: 2017-09-19 | End: 2017-10-06 | Stop reason: HOSPADM

## 2017-09-19 RX ADMIN — INSULIN ASPART 4 UNITS: 100 INJECTION, SOLUTION INTRAVENOUS; SUBCUTANEOUS at 12:22

## 2017-09-19 RX ADMIN — TAMSULOSIN HYDROCHLORIDE 0.4 MG: 0.4 CAPSULE ORAL at 08:09

## 2017-09-19 RX ADMIN — BUDESONIDE AND FORMOTEROL FUMARATE DIHYDRATE 2 PUFF: 160; 4.5 AEROSOL RESPIRATORY (INHALATION) at 19:35

## 2017-09-19 RX ADMIN — INSULIN DETEMIR 20 UNITS: 100 INJECTION, SOLUTION SUBCUTANEOUS at 08:11

## 2017-09-19 RX ADMIN — ATORVASTATIN CALCIUM 20 MG: 20 TABLET, FILM COATED ORAL at 20:20

## 2017-09-19 RX ADMIN — GUAIFENESIN 1200 MG: 600 TABLET, EXTENDED RELEASE ORAL at 08:09

## 2017-09-19 RX ADMIN — IPRATROPIUM BROMIDE AND ALBUTEROL SULFATE 3 ML: .5; 3 SOLUTION RESPIRATORY (INHALATION) at 23:34

## 2017-09-19 RX ADMIN — FUROSEMIDE 40 MG: 40 TABLET ORAL at 08:09

## 2017-09-19 RX ADMIN — AMLODIPINE BESYLATE 10 MG: 10 TABLET ORAL at 08:09

## 2017-09-19 RX ADMIN — METOPROLOL TARTRATE 12.5 MG: 25 TABLET ORAL at 17:30

## 2017-09-19 RX ADMIN — SENNOSIDES AND DOCUSATE SODIUM 2 TABLET: 8.6; 5 TABLET ORAL at 20:20

## 2017-09-19 RX ADMIN — APIXABAN 5 MG: 5 TABLET, FILM COATED ORAL at 17:30

## 2017-09-19 RX ADMIN — IPRATROPIUM BROMIDE AND ALBUTEROL SULFATE 3 ML: .5; 3 SOLUTION RESPIRATORY (INHALATION) at 10:55

## 2017-09-19 RX ADMIN — GUAIFENESIN 1200 MG: 600 TABLET, EXTENDED RELEASE ORAL at 20:20

## 2017-09-19 RX ADMIN — FAMOTIDINE 20 MG: 20 TABLET, FILM COATED ORAL at 17:30

## 2017-09-19 RX ADMIN — APIXABAN 5 MG: 5 TABLET, FILM COATED ORAL at 08:09

## 2017-09-19 RX ADMIN — FUROSEMIDE 40 MG: 40 TABLET ORAL at 17:30

## 2017-09-19 RX ADMIN — BUDESONIDE AND FORMOTEROL FUMARATE DIHYDRATE 2 PUFF: 160; 4.5 AEROSOL RESPIRATORY (INHALATION) at 07:02

## 2017-09-19 RX ADMIN — INSULIN ASPART 4 UNITS: 100 INJECTION, SOLUTION INTRAVENOUS; SUBCUTANEOUS at 17:32

## 2017-09-19 RX ADMIN — IPRATROPIUM BROMIDE AND ALBUTEROL SULFATE 3 ML: .5; 3 SOLUTION RESPIRATORY (INHALATION) at 07:02

## 2017-09-19 RX ADMIN — TAMSULOSIN HYDROCHLORIDE 0.4 MG: 0.4 CAPSULE ORAL at 17:34

## 2017-09-19 RX ADMIN — IPRATROPIUM BROMIDE AND ALBUTEROL SULFATE 3 ML: .5; 3 SOLUTION RESPIRATORY (INHALATION) at 19:35

## 2017-09-19 RX ADMIN — INSULIN ASPART 4 UNITS: 100 INJECTION, SOLUTION INTRAVENOUS; SUBCUTANEOUS at 07:52

## 2017-09-19 RX ADMIN — ACETAMINOPHEN 650 MG: 325 TABLET ORAL at 02:20

## 2017-09-19 RX ADMIN — INSULIN ASPART 12 UNITS: 100 INJECTION, SOLUTION INTRAVENOUS; SUBCUTANEOUS at 21:33

## 2017-09-19 RX ADMIN — ZOLPIDEM TARTRATE 5 MG: 5 TABLET, FILM COATED ORAL at 21:32

## 2017-09-19 RX ADMIN — IPRATROPIUM BROMIDE AND ALBUTEROL SULFATE 3 ML: .5; 3 SOLUTION RESPIRATORY (INHALATION) at 04:19

## 2017-09-19 RX ADMIN — IPRATROPIUM BROMIDE AND ALBUTEROL SULFATE 3 ML: .5; 3 SOLUTION RESPIRATORY (INHALATION) at 15:40

## 2017-09-19 RX ADMIN — FAMOTIDINE 20 MG: 20 TABLET, FILM COATED ORAL at 08:09

## 2017-09-19 RX ADMIN — METOPROLOL TARTRATE 12.5 MG: 25 TABLET ORAL at 08:09

## 2017-09-19 RX ADMIN — METRONIDAZOLE 500 MG: 500 INJECTION, SOLUTION INTRAVENOUS at 23:42

## 2017-09-20 ENCOUNTER — APPOINTMENT (OUTPATIENT)
Dept: CARDIOLOGY | Facility: HOSPITAL | Age: 62
End: 2017-09-20
Attending: INTERNAL MEDICINE

## 2017-09-20 ENCOUNTER — APPOINTMENT (OUTPATIENT)
Dept: GENERAL RADIOLOGY | Facility: HOSPITAL | Age: 62
End: 2017-09-20
Attending: INTERNAL MEDICINE

## 2017-09-20 LAB
ANION GAP SERPL CALCULATED.3IONS-SCNC: 10.2 MMOL/L
BASOPHILS # BLD AUTO: 0.01 10*3/MM3 (ref 0–0.2)
BASOPHILS NFR BLD AUTO: 0.1 % (ref 0–1.5)
BH CV ECHO MEAS - ACS: 1.6 CM
BH CV ECHO MEAS - AO MEAN PG (FULL): 5 MMHG
BH CV ECHO MEAS - AO MEAN PG: 6 MMHG
BH CV ECHO MEAS - AO ROOT AREA (BSA CORRECTED): 1.2
BH CV ECHO MEAS - AO ROOT AREA: 3.8 CM^2
BH CV ECHO MEAS - AO ROOT DIAM: 2.2 CM
BH CV ECHO MEAS - AO V2 MAX: 165 CM/SEC
BH CV ECHO MEAS - AO V2 MEAN: 109 CM/SEC
BH CV ECHO MEAS - AO V2 VTI: 27.1 CM
BH CV ECHO MEAS - AVA(I,A): 1.5 CM^2
BH CV ECHO MEAS - AVA(I,D): 1.5 CM^2
BH CV ECHO MEAS - BSA(HAYCOCK): 1.9 M^2
BH CV ECHO MEAS - BSA: 1.9 M^2
BH CV ECHO MEAS - BZI_BMI: 22.5 KILOGRAMS/M^2
BH CV ECHO MEAS - BZI_METRIC_HEIGHT: 177.8 CM
BH CV ECHO MEAS - BZI_METRIC_WEIGHT: 71.2 KG
BH CV ECHO MEAS - CONTRAST EF 4CH: 63.1 ML/M^2
BH CV ECHO MEAS - EDV(CUBED): 79.5 ML
BH CV ECHO MEAS - EDV(MOD-SP4): 84 ML
BH CV ECHO MEAS - EDV(TEICH): 83.1 ML
BH CV ECHO MEAS - EF(CUBED): 80.3 %
BH CV ECHO MEAS - EF(MOD-SP4): 63.1 %
BH CV ECHO MEAS - EF(TEICH): 73.1 %
BH CV ECHO MEAS - ESV(CUBED): 15.6 ML
BH CV ECHO MEAS - ESV(MOD-SP4): 31 ML
BH CV ECHO MEAS - ESV(TEICH): 22.3 ML
BH CV ECHO MEAS - FS: 41.9 %
BH CV ECHO MEAS - IVS/LVPW: 1
BH CV ECHO MEAS - IVSD: 1.2 CM
BH CV ECHO MEAS - LAT PEAK E' VEL: 9 CM/SEC
BH CV ECHO MEAS - LV DIASTOLIC VOL/BSA (35-75): 44.6 ML/M^2
BH CV ECHO MEAS - LV MASS(C)D: 184.7 GRAMS
BH CV ECHO MEAS - LV MASS(C)DI: 98.1 GRAMS/M^2
BH CV ECHO MEAS - LV MEAN PG: 1 MMHG
BH CV ECHO MEAS - LV SYSTOLIC VOL/BSA (12-30): 16.5 ML/M^2
BH CV ECHO MEAS - LV V1 MAX: 73 CM/SEC
BH CV ECHO MEAS - LV V1 MEAN: 50.3 CM/SEC
BH CV ECHO MEAS - LV V1 VTI: 12.9 CM
BH CV ECHO MEAS - LVIDD: 4.3 CM
BH CV ECHO MEAS - LVIDS: 2.5 CM
BH CV ECHO MEAS - LVLD AP4: 6.9 CM
BH CV ECHO MEAS - LVLS AP4: 5.4 CM
BH CV ECHO MEAS - LVOT AREA (M): 3.1 CM^2
BH CV ECHO MEAS - LVOT AREA: 3.1 CM^2
BH CV ECHO MEAS - LVOT DIAM: 2 CM
BH CV ECHO MEAS - LVPWD: 1.2 CM
BH CV ECHO MEAS - MED PEAK E' VEL: 5 CM/SEC
BH CV ECHO MEAS - MV A DUR: 0.13 SEC
BH CV ECHO MEAS - MV A MAX VEL: 50.5 CM/SEC
BH CV ECHO MEAS - MV DEC SLOPE: 518 CM/SEC^2
BH CV ECHO MEAS - MV DEC TIME: 0.12 SEC
BH CV ECHO MEAS - MV E MAX VEL: 81.8 CM/SEC
BH CV ECHO MEAS - MV E/A: 1.6
BH CV ECHO MEAS - MV MEAN PG: 1 MMHG
BH CV ECHO MEAS - MV P1/2T MAX VEL: 86.9 CM/SEC
BH CV ECHO MEAS - MV P1/2T: 49.1 MSEC
BH CV ECHO MEAS - MV V2 MEAN: 55.1 CM/SEC
BH CV ECHO MEAS - MV V2 VTI: 18.9 CM
BH CV ECHO MEAS - MVA P1/2T LCG: 2.5 CM^2
BH CV ECHO MEAS - MVA(P1/2T): 4.5 CM^2
BH CV ECHO MEAS - MVA(VTI): 2.1 CM^2
BH CV ECHO MEAS - PA ACC SLOPE: 44.1 CM/SEC^2
BH CV ECHO MEAS - PA ACC TIME: 0.07 SEC
BH CV ECHO MEAS - PA MAX PG (FULL): 1.1 MMHG
BH CV ECHO MEAS - PA MAX PG: 3.4 MMHG
BH CV ECHO MEAS - PA PR(ACCEL): 45.7 MMHG
BH CV ECHO MEAS - PA V2 MAX: 92.3 CM/SEC
BH CV ECHO MEAS - PULM A REVS DUR: 0.09 SEC
BH CV ECHO MEAS - PULM A REVS VEL: 34.3 CM/SEC
BH CV ECHO MEAS - PULM DIAS VEL: 62.1 CM/SEC
BH CV ECHO MEAS - PULM S/D: 0.89
BH CV ECHO MEAS - PULM SYS VEL: 55.1 CM/SEC
BH CV ECHO MEAS - PVA(V,A): 9.2 CM^2
BH CV ECHO MEAS - PVA(V,D): 9.2 CM^2
BH CV ECHO MEAS - QP/QS: 2.9
BH CV ECHO MEAS - RAP SYSTOLE: 3 MMHG
BH CV ECHO MEAS - RV MAX PG: 2.3 MMHG
BH CV ECHO MEAS - RV MEAN PG: 1 MMHG
BH CV ECHO MEAS - RV V1 MAX: 75.2 CM/SEC
BH CV ECHO MEAS - RV V1 MEAN: 42.2 CM/SEC
BH CV ECHO MEAS - RV V1 VTI: 10.5 CM
BH CV ECHO MEAS - RVOT AREA: 11.3 CM^2
BH CV ECHO MEAS - RVOT DIAM: 3.8 CM
BH CV ECHO MEAS - RVSP: 52.8 MMHG
BH CV ECHO MEAS - SI(AO): 54.7 ML/M^2
BH CV ECHO MEAS - SI(CUBED): 33.9 ML/M^2
BH CV ECHO MEAS - SI(LVOT): 21.5 ML/M^2
BH CV ECHO MEAS - SI(MOD-SP4): 28.1 ML/M^2
BH CV ECHO MEAS - SI(TEICH): 32.3 ML/M^2
BH CV ECHO MEAS - SUP REN AO DIAM: 1.4 CM
BH CV ECHO MEAS - SV(AO): 103 ML
BH CV ECHO MEAS - SV(CUBED): 63.9 ML
BH CV ECHO MEAS - SV(LVOT): 40.5 ML
BH CV ECHO MEAS - SV(MOD-SP4): 53 ML
BH CV ECHO MEAS - SV(RVOT): 119.1 ML
BH CV ECHO MEAS - SV(TEICH): 60.7 ML
BH CV ECHO MEAS - TAPSE (>1.6): 2.4 CM2
BH CV ECHO MEAS - TR MAX VEL: 353 CM/SEC
BH CV VAS BP RIGHT ARM: NORMAL MMHG
BH CV XLRA - RV BASE: 4.4 CM
BH CV XLRA - TDI S': 8 CM/SEC
BUN BLD-MCNC: 19 MG/DL (ref 8–23)
BUN/CREAT SERPL: 27.5 (ref 7–25)
CALCIUM SPEC-SCNC: 9 MG/DL (ref 8.6–10.5)
CHLORIDE SERPL-SCNC: 91 MMOL/L (ref 98–107)
CO2 SERPL-SCNC: 35.8 MMOL/L (ref 22–29)
CREAT BLD-MCNC: 0.69 MG/DL (ref 0.76–1.27)
DEPRECATED RDW RBC AUTO: 51.4 FL (ref 37–54)
E/E' RATIO: 13
EOSINOPHIL # BLD AUTO: 0.6 10*3/MM3 (ref 0–0.7)
EOSINOPHIL NFR BLD AUTO: 4 % (ref 0.3–6.2)
ERYTHROCYTE [DISTWIDTH] IN BLOOD BY AUTOMATED COUNT: 18.4 % (ref 11.5–14.5)
GFR SERPL CREATININE-BSD FRML MDRD: 116 ML/MIN/1.73
GFR SERPL CREATININE-BSD FRML MDRD: 141 ML/MIN/1.73
GLUCOSE BLD-MCNC: 89 MG/DL (ref 65–99)
GLUCOSE BLDC GLUCOMTR-MCNC: 119 MG/DL (ref 70–130)
GLUCOSE BLDC GLUCOMTR-MCNC: 187 MG/DL (ref 70–130)
GLUCOSE BLDC GLUCOMTR-MCNC: 205 MG/DL (ref 70–130)
GLUCOSE BLDC GLUCOMTR-MCNC: 322 MG/DL (ref 70–130)
HCT VFR BLD AUTO: 38.3 % (ref 40.4–52.2)
HGB BLD-MCNC: 11.2 G/DL (ref 13.7–17.6)
IMM GRANULOCYTES # BLD: 0.08 10*3/MM3 (ref 0–0.03)
IMM GRANULOCYTES NFR BLD: 0.5 % (ref 0–0.5)
LV EF 2D ECHO EST: 63 %
LYMPHOCYTES # BLD AUTO: 2.34 10*3/MM3 (ref 0.9–4.8)
LYMPHOCYTES NFR BLD AUTO: 15.6 % (ref 19.6–45.3)
MCH RBC QN AUTO: 23.3 PG (ref 27–32.7)
MCHC RBC AUTO-ENTMCNC: 29.2 G/DL (ref 32.6–36.4)
MCV RBC AUTO: 79.6 FL (ref 79.8–96.2)
MONOCYTES # BLD AUTO: 1.11 10*3/MM3 (ref 0.2–1.2)
MONOCYTES NFR BLD AUTO: 7.4 % (ref 5–12)
NEUTROPHILS # BLD AUTO: 10.87 10*3/MM3 (ref 1.9–8.1)
NEUTROPHILS NFR BLD AUTO: 72.4 % (ref 42.7–76)
NT-PROBNP SERPL-MCNC: 467.8 PG/ML (ref 5–900)
PLATELET # BLD AUTO: 232 10*3/MM3 (ref 140–500)
PMV BLD AUTO: 9.5 FL (ref 6–12)
POTASSIUM BLD-SCNC: 4.3 MMOL/L (ref 3.5–5.2)
RBC # BLD AUTO: 4.81 10*6/MM3 (ref 4.6–6)
SODIUM BLD-SCNC: 137 MMOL/L (ref 136–145)
WBC NRBC COR # BLD: 15.01 10*3/MM3 (ref 4.5–10.7)

## 2017-09-20 PROCEDURE — 83880 ASSAY OF NATRIURETIC PEPTIDE: CPT | Performed by: FAMILY MEDICINE

## 2017-09-20 PROCEDURE — 94799 UNLISTED PULMONARY SVC/PX: CPT

## 2017-09-20 PROCEDURE — 93306 TTE W/DOPPLER COMPLETE: CPT | Performed by: INTERNAL MEDICINE

## 2017-09-20 PROCEDURE — 63710000001 INSULIN ASPART PER 5 UNITS: Performed by: FAMILY MEDICINE

## 2017-09-20 PROCEDURE — 80048 BASIC METABOLIC PNL TOTAL CA: CPT | Performed by: FAMILY MEDICINE

## 2017-09-20 PROCEDURE — 25010000002 ONDANSETRON PER 1 MG: Performed by: INTERNAL MEDICINE

## 2017-09-20 PROCEDURE — 93306 TTE W/DOPPLER COMPLETE: CPT

## 2017-09-20 PROCEDURE — 85025 COMPLETE CBC W/AUTO DIFF WBC: CPT | Performed by: FAMILY MEDICINE

## 2017-09-20 PROCEDURE — 71020 HC CHEST PA AND LATERAL: CPT

## 2017-09-20 PROCEDURE — 82962 GLUCOSE BLOOD TEST: CPT

## 2017-09-20 PROCEDURE — 99231 SBSQ HOSP IP/OBS SF/LOW 25: CPT | Performed by: FAMILY MEDICINE

## 2017-09-20 PROCEDURE — 97110 THERAPEUTIC EXERCISES: CPT

## 2017-09-20 RX ORDER — FUROSEMIDE 40 MG/1
40 TABLET ORAL DAILY
Status: DISCONTINUED | OUTPATIENT
Start: 2017-09-21 | End: 2017-10-06 | Stop reason: HOSPADM

## 2017-09-20 RX ORDER — LIDOCAINE HYDROCHLORIDE 40 MG/ML
4 INJECTION, SOLUTION RETROBULBAR; TOPICAL ONCE
Status: COMPLETED | OUTPATIENT
Start: 2017-09-21 | End: 2017-09-21

## 2017-09-20 RX ORDER — OXYMETAZOLINE HYDROCHLORIDE 0.05 G/100ML
4 SPRAY NASAL ONCE
Status: COMPLETED | OUTPATIENT
Start: 2017-09-21 | End: 2017-09-21

## 2017-09-20 RX ADMIN — INSULIN ASPART 4 UNITS: 100 INJECTION, SOLUTION INTRAVENOUS; SUBCUTANEOUS at 09:35

## 2017-09-20 RX ADMIN — METRONIDAZOLE 500 MG: 500 INJECTION, SOLUTION INTRAVENOUS at 04:06

## 2017-09-20 RX ADMIN — APIXABAN 5 MG: 5 TABLET, FILM COATED ORAL at 18:42

## 2017-09-20 RX ADMIN — AMLODIPINE BESYLATE 10 MG: 10 TABLET ORAL at 09:34

## 2017-09-20 RX ADMIN — IPRATROPIUM BROMIDE AND ALBUTEROL SULFATE 3 ML: .5; 3 SOLUTION RESPIRATORY (INHALATION) at 16:39

## 2017-09-20 RX ADMIN — SENNOSIDES AND DOCUSATE SODIUM 2 TABLET: 8.6; 5 TABLET ORAL at 20:50

## 2017-09-20 RX ADMIN — METOPROLOL TARTRATE 12.5 MG: 25 TABLET ORAL at 09:34

## 2017-09-20 RX ADMIN — INSULIN DETEMIR 20 UNITS: 100 INJECTION, SOLUTION SUBCUTANEOUS at 09:36

## 2017-09-20 RX ADMIN — ACETAMINOPHEN 650 MG: 325 TABLET ORAL at 02:45

## 2017-09-20 RX ADMIN — INSULIN ASPART 8 UNITS: 100 INJECTION, SOLUTION INTRAVENOUS; SUBCUTANEOUS at 12:27

## 2017-09-20 RX ADMIN — TAMSULOSIN HYDROCHLORIDE 0.4 MG: 0.4 CAPSULE ORAL at 09:35

## 2017-09-20 RX ADMIN — IPRATROPIUM BROMIDE AND ALBUTEROL SULFATE 3 ML: .5; 3 SOLUTION RESPIRATORY (INHALATION) at 03:04

## 2017-09-20 RX ADMIN — FUROSEMIDE 40 MG: 40 TABLET ORAL at 09:35

## 2017-09-20 RX ADMIN — APIXABAN 5 MG: 5 TABLET, FILM COATED ORAL at 09:35

## 2017-09-20 RX ADMIN — GUAIFENESIN 1200 MG: 600 TABLET, EXTENDED RELEASE ORAL at 20:50

## 2017-09-20 RX ADMIN — IPRATROPIUM BROMIDE AND ALBUTEROL SULFATE 3 ML: .5; 3 SOLUTION RESPIRATORY (INHALATION) at 20:56

## 2017-09-20 RX ADMIN — ZOLPIDEM TARTRATE 5 MG: 5 TABLET, FILM COATED ORAL at 21:12

## 2017-09-20 RX ADMIN — METRONIDAZOLE 500 MG: 500 INJECTION, SOLUTION INTRAVENOUS at 16:15

## 2017-09-20 RX ADMIN — ACETAMINOPHEN 650 MG: 325 TABLET ORAL at 16:15

## 2017-09-20 RX ADMIN — ONDANSETRON 4 MG: 2 INJECTION INTRAMUSCULAR; INTRAVENOUS at 18:42

## 2017-09-20 RX ADMIN — TAMSULOSIN HYDROCHLORIDE 0.4 MG: 0.4 CAPSULE ORAL at 18:42

## 2017-09-20 RX ADMIN — FAMOTIDINE 20 MG: 20 TABLET, FILM COATED ORAL at 18:42

## 2017-09-20 RX ADMIN — IPRATROPIUM BROMIDE AND ALBUTEROL SULFATE 3 ML: .5; 3 SOLUTION RESPIRATORY (INHALATION) at 23:18

## 2017-09-20 RX ADMIN — BUDESONIDE AND FORMOTEROL FUMARATE DIHYDRATE 2 PUFF: 160; 4.5 AEROSOL RESPIRATORY (INHALATION) at 20:56

## 2017-09-20 RX ADMIN — FAMOTIDINE 20 MG: 20 TABLET, FILM COATED ORAL at 09:35

## 2017-09-20 RX ADMIN — IPRATROPIUM BROMIDE AND ALBUTEROL SULFATE 3 ML: .5; 3 SOLUTION RESPIRATORY (INHALATION) at 07:25

## 2017-09-20 RX ADMIN — BUDESONIDE AND FORMOTEROL FUMARATE DIHYDRATE 2 PUFF: 160; 4.5 AEROSOL RESPIRATORY (INHALATION) at 07:25

## 2017-09-20 RX ADMIN — METRONIDAZOLE 500 MG: 500 INJECTION, SOLUTION INTRAVENOUS at 20:51

## 2017-09-20 RX ADMIN — ATORVASTATIN CALCIUM 20 MG: 20 TABLET, FILM COATED ORAL at 20:50

## 2017-09-20 RX ADMIN — METOPROLOL TARTRATE 12.5 MG: 25 TABLET ORAL at 18:42

## 2017-09-20 RX ADMIN — IPRATROPIUM BROMIDE AND ALBUTEROL SULFATE 3 ML: .5; 3 SOLUTION RESPIRATORY (INHALATION) at 11:42

## 2017-09-20 RX ADMIN — GUAIFENESIN 1200 MG: 600 TABLET, EXTENDED RELEASE ORAL at 09:35

## 2017-09-20 RX ADMIN — METRONIDAZOLE 500 MG: 500 INJECTION, SOLUTION INTRAVENOUS at 09:34

## 2017-09-20 RX ADMIN — INSULIN ASPART 16 UNITS: 100 INJECTION, SOLUTION INTRAVENOUS; SUBCUTANEOUS at 21:13

## 2017-09-21 ENCOUNTER — APPOINTMENT (OUTPATIENT)
Dept: GENERAL RADIOLOGY | Facility: HOSPITAL | Age: 62
End: 2017-09-21

## 2017-09-21 ENCOUNTER — APPOINTMENT (OUTPATIENT)
Dept: CT IMAGING | Facility: HOSPITAL | Age: 62
End: 2017-09-21

## 2017-09-21 ENCOUNTER — DOCUMENTATION (OUTPATIENT)
Dept: OTOLARYNGOLOGY | Facility: HOSPITAL | Age: 62
End: 2017-09-21

## 2017-09-21 LAB
ANION GAP SERPL CALCULATED.3IONS-SCNC: 7.6 MMOL/L
ARTERIAL PATENCY WRIST A: ABNORMAL
ARTERIAL PATENCY WRIST A: POSITIVE
ATMOSPHERIC PRESS: 748.8 MMHG
ATMOSPHERIC PRESS: 749 MMHG
ATMOSPHERIC PRESS: 751.6 MMHG
BASE EXCESS BLDA CALC-SCNC: 10.8 MMOL/L (ref 0–2)
BASE EXCESS BLDA CALC-SCNC: 7.6 MMOL/L (ref 0–2)
BASE EXCESS BLDV CALC-SCNC: 11.3 MMOL/L
BASOPHILS # BLD AUTO: 0.01 10*3/MM3 (ref 0–0.2)
BASOPHILS NFR BLD AUTO: 0.1 % (ref 0–1.5)
BDY SITE: ABNORMAL
BUN BLD-MCNC: 19 MG/DL (ref 8–23)
BUN/CREAT SERPL: 25.3 (ref 7–25)
CALCIUM SPEC-SCNC: 8.8 MG/DL (ref 8.6–10.5)
CHLORIDE SERPL-SCNC: 90 MMOL/L (ref 98–107)
CO2 SERPL-SCNC: 34.4 MMOL/L (ref 22–29)
CREAT BLD-MCNC: 0.75 MG/DL (ref 0.76–1.27)
D-LACTATE SERPL-SCNC: 1.1 MMOL/L (ref 0.5–2)
DEPRECATED RDW RBC AUTO: 53.1 FL (ref 37–54)
EOSINOPHIL # BLD AUTO: 0.54 10*3/MM3 (ref 0–0.7)
EOSINOPHIL NFR BLD AUTO: 3.5 % (ref 0.3–6.2)
ERYTHROCYTE [DISTWIDTH] IN BLOOD BY AUTOMATED COUNT: 18.7 % (ref 11.5–14.5)
GAS FLOW AIRWAY: 5 LPM
GAS FLOW AIRWAY: 6 LPM
GFR SERPL CREATININE-BSD FRML MDRD: 106 ML/MIN/1.73
GFR SERPL CREATININE-BSD FRML MDRD: 128 ML/MIN/1.73
GLUCOSE BLD-MCNC: 192 MG/DL (ref 65–99)
GLUCOSE BLDC GLUCOMTR-MCNC: 141 MG/DL (ref 70–130)
GLUCOSE BLDC GLUCOMTR-MCNC: 155 MG/DL (ref 70–130)
GLUCOSE BLDC GLUCOMTR-MCNC: 176 MG/DL (ref 70–130)
GLUCOSE BLDC GLUCOMTR-MCNC: 183 MG/DL (ref 70–130)
GLUCOSE BLDC GLUCOMTR-MCNC: 207 MG/DL (ref 70–130)
GLUCOSE BLDC GLUCOMTR-MCNC: 361 MG/DL (ref 70–130)
GLUCOSE BLDC GLUCOMTR-MCNC: 388 MG/DL (ref 70–130)
GLUCOSE BLDC GLUCOMTR-MCNC: 61 MG/DL (ref 70–130)
HCO3 BLDA-SCNC: 39.4 MMOL/L (ref 22–28)
HCO3 BLDA-SCNC: 41.2 MMOL/L (ref 22–28)
HCO3 BLDV-SCNC: 41.9 MMOL/L (ref 22–28)
HCT VFR BLD AUTO: 39.8 % (ref 40.4–52.2)
HGB BLD-MCNC: 11.6 G/DL (ref 13.7–17.6)
HOROWITZ INDEX BLD+IHG-RTO: 40 %
IMM GRANULOCYTES # BLD: 0.07 10*3/MM3 (ref 0–0.03)
IMM GRANULOCYTES NFR BLD: 0.5 % (ref 0–0.5)
LYMPHOCYTES # BLD AUTO: 2.17 10*3/MM3 (ref 0.9–4.8)
LYMPHOCYTES NFR BLD AUTO: 14.1 % (ref 19.6–45.3)
MCH RBC QN AUTO: 23.5 PG (ref 27–32.7)
MCHC RBC AUTO-ENTMCNC: 29.1 G/DL (ref 32.6–36.4)
MCV RBC AUTO: 80.7 FL (ref 79.8–96.2)
MODALITY: ABNORMAL
MONOCYTES # BLD AUTO: 1.73 10*3/MM3 (ref 0.2–1.2)
MONOCYTES NFR BLD AUTO: 11.2 % (ref 5–12)
NEUTROPHILS # BLD AUTO: 10.89 10*3/MM3 (ref 1.9–8.1)
NEUTROPHILS NFR BLD AUTO: 70.6 % (ref 42.7–76)
NEUTS HYPERSEG # BLD: NORMAL 10*3/UL
NT-PROBNP SERPL-MCNC: 468.9 PG/ML (ref 5–900)
O2 A-A PPRESDIFF RESPIRATORY: 0.5 MMHG
PCO2 BLDA: 85.2 MM HG (ref 35–45)
PCO2 BLDA: 96.8 MM HG (ref 35–45)
PCO2 BLDV: 85.4 MM HG (ref 41–51)
PH BLDA: 7.22 PH UNITS (ref 7.35–7.45)
PH BLDA: 7.29 PH UNITS (ref 7.35–7.45)
PH BLDV: 7.3 PH UNITS (ref 7.31–7.41)
PLAT MORPH BLD: NORMAL
PLATELET # BLD AUTO: 165 10*3/MM3 (ref 140–500)
PMV BLD AUTO: 9.8 FL (ref 6–12)
PO2 BLDA: 74 MM HG (ref 80–100)
PO2 BLDA: 93.5 MM HG (ref 80–100)
PO2 BLDV: 58.2 MM HG (ref 35–45)
POTASSIUM BLD-SCNC: 5.5 MMOL/L (ref 3.5–5.2)
RBC # BLD AUTO: 4.93 10*6/MM3 (ref 4.6–6)
SAO2 % BLDCOA: 84.6 % (ref 92–99)
SAO2 % BLDCOA: 89.7 % (ref 92–99)
SAO2 % BLDCOA: 95.6 % (ref 92–99)
SET MECH RESP RATE: 14
SET MECH RESP RATE: 32
SODIUM BLD-SCNC: 132 MMOL/L (ref 136–145)
STOMATOCYTES BLD QL SMEAR: NORMAL
TARGETS BLD QL SMEAR: NORMAL
TOTAL RATE: 18 BREATHS/MINUTE
TOTAL RATE: 24 BREATHS/MINUTE
VT ON VENT VENT: 681 ML
WBC NRBC COR # BLD: 15.41 10*3/MM3 (ref 4.5–10.7)

## 2017-09-21 PROCEDURE — 36600 WITHDRAWAL OF ARTERIAL BLOOD: CPT

## 2017-09-21 PROCEDURE — 85007 BL SMEAR W/DIFF WBC COUNT: CPT | Performed by: FAMILY MEDICINE

## 2017-09-21 PROCEDURE — 0 IOPAMIDOL 61 % SOLUTION: Performed by: FAMILY MEDICINE

## 2017-09-21 PROCEDURE — 99231 SBSQ HOSP IP/OBS SF/LOW 25: CPT | Performed by: FAMILY MEDICINE

## 2017-09-21 PROCEDURE — 83880 ASSAY OF NATRIURETIC PEPTIDE: CPT | Performed by: FAMILY MEDICINE

## 2017-09-21 PROCEDURE — 25010000002 METHYLPREDNISOLONE PER 40 MG: Performed by: INTERNAL MEDICINE

## 2017-09-21 PROCEDURE — 94640 AIRWAY INHALATION TREATMENT: CPT

## 2017-09-21 PROCEDURE — 94799 UNLISTED PULMONARY SVC/PX: CPT

## 2017-09-21 PROCEDURE — 94660 CPAP INITIATION&MGMT: CPT

## 2017-09-21 PROCEDURE — 99254 IP/OBS CNSLTJ NEW/EST MOD 60: CPT | Performed by: INTERNAL MEDICINE

## 2017-09-21 PROCEDURE — 71010 HC CHEST PA OR AP: CPT

## 2017-09-21 PROCEDURE — 97110 THERAPEUTIC EXERCISES: CPT

## 2017-09-21 PROCEDURE — 63710000001 INSULIN ASPART PER 5 UNITS: Performed by: FAMILY MEDICINE

## 2017-09-21 PROCEDURE — 70492 CT SFT TSUE NCK W/O & W/DYE: CPT

## 2017-09-21 PROCEDURE — 83605 ASSAY OF LACTIC ACID: CPT | Performed by: FAMILY MEDICINE

## 2017-09-21 PROCEDURE — 82803 BLOOD GASES ANY COMBINATION: CPT

## 2017-09-21 PROCEDURE — 63710000001 INSULIN DETEMER PER 5 UNITS: Performed by: INTERNAL MEDICINE

## 2017-09-21 PROCEDURE — 85025 COMPLETE CBC W/AUTO DIFF WBC: CPT | Performed by: FAMILY MEDICINE

## 2017-09-21 PROCEDURE — 82962 GLUCOSE BLOOD TEST: CPT

## 2017-09-21 PROCEDURE — 80048 BASIC METABOLIC PNL TOTAL CA: CPT | Performed by: FAMILY MEDICINE

## 2017-09-21 RX ORDER — BUMETANIDE 0.25 MG/ML
2 INJECTION INTRAMUSCULAR; INTRAVENOUS ONCE
Status: COMPLETED | OUTPATIENT
Start: 2017-09-21 | End: 2017-09-21

## 2017-09-21 RX ORDER — IPRATROPIUM BROMIDE AND ALBUTEROL SULFATE 2.5; .5 MG/3ML; MG/3ML
3 SOLUTION RESPIRATORY (INHALATION)
Status: DISCONTINUED | OUTPATIENT
Start: 2017-09-21 | End: 2017-09-29

## 2017-09-21 RX ORDER — METHYLPREDNISOLONE SODIUM SUCCINATE 40 MG/ML
40 INJECTION, POWDER, LYOPHILIZED, FOR SOLUTION INTRAMUSCULAR; INTRAVENOUS EVERY 8 HOURS
Status: DISCONTINUED | OUTPATIENT
Start: 2017-09-21 | End: 2017-09-21

## 2017-09-21 RX ORDER — LIDOCAINE HYDROCHLORIDE 40 MG/ML
4 INJECTION, SOLUTION RETROBULBAR; TOPICAL ONCE
Status: DISCONTINUED | OUTPATIENT
Start: 2017-09-21 | End: 2017-10-06 | Stop reason: HOSPADM

## 2017-09-21 RX ORDER — DEXTROSE MONOHYDRATE 25 G/50ML
INJECTION, SOLUTION INTRAVENOUS
Status: COMPLETED
Start: 2017-09-21 | End: 2017-09-21

## 2017-09-21 RX ORDER — METRONIDAZOLE 500 MG/1
500 TABLET ORAL EVERY 6 HOURS SCHEDULED
Status: DISCONTINUED | OUTPATIENT
Start: 2017-09-21 | End: 2017-10-05

## 2017-09-21 RX ORDER — PREDNISONE 20 MG/1
40 TABLET ORAL
Status: DISCONTINUED | OUTPATIENT
Start: 2017-09-22 | End: 2017-09-23

## 2017-09-21 RX ADMIN — GUAIFENESIN 1200 MG: 600 TABLET, EXTENDED RELEASE ORAL at 21:04

## 2017-09-21 RX ADMIN — TAMSULOSIN HYDROCHLORIDE 0.4 MG: 0.4 CAPSULE ORAL at 17:14

## 2017-09-21 RX ADMIN — OXYMETAZOLINE HYDROCHLORIDE 4 SPRAY: 5 SPRAY NASAL at 10:08

## 2017-09-21 RX ADMIN — BUMETANIDE 2 MG: 0.25 INJECTION INTRAMUSCULAR; INTRAVENOUS at 10:10

## 2017-09-21 RX ADMIN — DEXTROSE MONOHYDRATE 50 ML: 25 INJECTION, SOLUTION INTRAVENOUS at 02:48

## 2017-09-21 RX ADMIN — IPRATROPIUM BROMIDE AND ALBUTEROL SULFATE 3 ML: .5; 3 SOLUTION RESPIRATORY (INHALATION) at 06:55

## 2017-09-21 RX ADMIN — AMLODIPINE BESYLATE 10 MG: 10 TABLET ORAL at 09:07

## 2017-09-21 RX ADMIN — METOPROLOL TARTRATE 12.5 MG: 25 TABLET ORAL at 17:14

## 2017-09-21 RX ADMIN — METHYLPREDNISOLONE SODIUM SUCCINATE 40 MG: 40 INJECTION, POWDER, FOR SOLUTION INTRAMUSCULAR; INTRAVENOUS at 06:38

## 2017-09-21 RX ADMIN — FUROSEMIDE 40 MG: 40 TABLET ORAL at 09:07

## 2017-09-21 RX ADMIN — METRONIDAZOLE 500 MG: 500 INJECTION, SOLUTION INTRAVENOUS at 09:08

## 2017-09-21 RX ADMIN — GUAIFENESIN 1200 MG: 600 TABLET, EXTENDED RELEASE ORAL at 09:07

## 2017-09-21 RX ADMIN — METOPROLOL TARTRATE 12.5 MG: 25 TABLET ORAL at 09:08

## 2017-09-21 RX ADMIN — INSULIN ASPART 20 UNITS: 100 INJECTION, SOLUTION INTRAVENOUS; SUBCUTANEOUS at 11:48

## 2017-09-21 RX ADMIN — IPRATROPIUM BROMIDE AND ALBUTEROL SULFATE 3 ML: .5; 3 SOLUTION RESPIRATORY (INHALATION) at 09:54

## 2017-09-21 RX ADMIN — ZOLPIDEM TARTRATE 5 MG: 5 TABLET, FILM COATED ORAL at 23:14

## 2017-09-21 RX ADMIN — ATORVASTATIN CALCIUM 20 MG: 20 TABLET, FILM COATED ORAL at 21:03

## 2017-09-21 RX ADMIN — INSULIN ASPART 4 UNITS: 100 INJECTION, SOLUTION INTRAVENOUS; SUBCUTANEOUS at 21:14

## 2017-09-21 RX ADMIN — IOPAMIDOL 100 ML: 612 INJECTION, SOLUTION INTRAVENOUS at 15:30

## 2017-09-21 RX ADMIN — SENNOSIDES AND DOCUSATE SODIUM 2 TABLET: 8.6; 5 TABLET ORAL at 21:03

## 2017-09-21 RX ADMIN — FAMOTIDINE 20 MG: 20 TABLET, FILM COATED ORAL at 17:14

## 2017-09-21 RX ADMIN — IPRATROPIUM BROMIDE AND ALBUTEROL SULFATE 3 ML: .5; 3 SOLUTION RESPIRATORY (INHALATION) at 20:49

## 2017-09-21 RX ADMIN — APIXABAN 5 MG: 5 TABLET, FILM COATED ORAL at 09:08

## 2017-09-21 RX ADMIN — LIDOCAINE HYDROCHLORIDE 4 ML: 40 INJECTION, SOLUTION RETROBULBAR; TOPICAL at 09:19

## 2017-09-21 RX ADMIN — INSULIN ASPART 8 UNITS: 100 INJECTION, SOLUTION INTRAVENOUS; SUBCUTANEOUS at 17:14

## 2017-09-21 RX ADMIN — APIXABAN 5 MG: 5 TABLET, FILM COATED ORAL at 17:14

## 2017-09-21 RX ADMIN — METRONIDAZOLE 500 MG: 500 TABLET, FILM COATED ORAL at 14:30

## 2017-09-21 RX ADMIN — IPRATROPIUM BROMIDE AND ALBUTEROL SULFATE 3 ML: .5; 3 SOLUTION RESPIRATORY (INHALATION) at 02:42

## 2017-09-21 RX ADMIN — TAMSULOSIN HYDROCHLORIDE 0.4 MG: 0.4 CAPSULE ORAL at 09:07

## 2017-09-21 RX ADMIN — METRONIDAZOLE 500 MG: 500 INJECTION, SOLUTION INTRAVENOUS at 04:12

## 2017-09-21 RX ADMIN — IPRATROPIUM BROMIDE AND ALBUTEROL SULFATE 3 ML: .5; 3 SOLUTION RESPIRATORY (INHALATION) at 15:32

## 2017-09-21 RX ADMIN — FAMOTIDINE 20 MG: 20 TABLET, FILM COATED ORAL at 09:08

## 2017-09-21 RX ADMIN — INSULIN DETEMIR 20 UNITS: 100 INJECTION, SOLUTION SUBCUTANEOUS at 10:10

## 2017-09-21 RX ADMIN — TRAZODONE HYDROCHLORIDE 50 MG: 50 TABLET ORAL at 21:05

## 2017-09-21 NOTE — PROGRESS NOTES
Mr. Valdez is a 62-year-old gentleman who was admitted to [facility] on 09/04/17 with symptoms of shortness of air/acute respiratory failure, requiring endotracheal intubation, while in the emergency room, subsequently treated with mechanical ventilation, and currently extubated.    Mr. Valdez’s current medical problems also include:  1. COPD  2. Left lower lobe pneumonia  3. Pulmonary hypertension  4. Cardiomegaly.    I have been asked to evaluate Mr. Valdez to exclude subglottic or tracheal stenosis and/or paradoxical vocal cord movement.    A CT scan of his chest, without contrast (09/04/17) excluded any obvious abnormalities involving his left hilum, suggestive of lung cancer.    Mr. Valdez previously underwent a tracheostomy two years ago, during another hospitalization for respiratory failure.    EXAMINATION:   Mr. Valdez was examined at bedside.  Communication with him was through an radha from Czech to English and English to Czech on his cell phone.    The examination of Mr. Valdez’s oral cavity showed his remaining maxillary/mandibular dentition to appear in good repair, but was otherwise unremarkable.    The oropharynx was not well seen because of an active gag tendency.      The intranasal examination revealed marked intranasal mucosal desiccation (receiving O2 by nasal cannula).    A left-sided transnasal flexible fiberoptic laryngoscopy was performed, showing no abnormalities to involve the nasopharynx, tongue base, or hypopharynx.    Mr. Valdez’s laryngeal examination revealed decreased abduction of the left true vocal cord.  I noted no obvious evidence of paradoxical vocal cord movement.    Despite active gagging and coughing, the endoscope was advanced through the glottis into the upper trachea.    The examination of this area was incomplete (the endoscope malfunctioned).    Palpation of Mr. Valdez’s neck did not demonstrate any abnormal  cervical mass, cervical lymphadenopathy, or any abnormal enlargement, tenderness, or nodularity of the major salivary glands or of the thyroid gland.        A 1.5 cm tracheostomy incisional scar was present in the lower anterior neck, just above the suprasternal notch.    The cause of Mr. Valdez’s left vocal cord immobility is unclear, but should not have been caused by his previous tracheostomy.    RECOMMENDATION: 1.  CT scan of the neck, including upper trachea and subglottic region, will be obtained to exclude tracheal or subglottic stenosis.  SSC/blb

## 2017-09-22 ENCOUNTER — DOCUMENTATION (OUTPATIENT)
Dept: OTOLARYNGOLOGY | Facility: HOSPITAL | Age: 62
End: 2017-09-22

## 2017-09-22 PROBLEM — I27.24 CTEPH (CHRONIC THROMBOEMBOLIC PULMONARY HYPERTENSION) (HCC): Status: ACTIVE | Noted: 2017-09-22

## 2017-09-22 PROBLEM — J96.22 ACUTE ON CHRONIC RESPIRATORY FAILURE WITH HYPOXIA AND HYPERCAPNIA (HCC): Status: ACTIVE | Noted: 2017-09-22

## 2017-09-22 PROBLEM — A04.72 CLOSTRIDIUM DIFFICILE ENTEROCOLITIS: Status: ACTIVE | Noted: 2017-09-22

## 2017-09-22 PROBLEM — J96.02 ACUTE RESPIRATORY FAILURE WITH HYPERCAPNIA (HCC): Status: ACTIVE | Noted: 2017-09-22

## 2017-09-22 PROBLEM — J95.03 TRACHEAL STENOSIS DUE TO TRACHEOSTOMY (HCC): Status: ACTIVE | Noted: 2017-09-22

## 2017-09-22 PROBLEM — J44.1 COPD EXACERBATION (HCC): Status: ACTIVE | Noted: 2017-09-22

## 2017-09-22 PROBLEM — J96.21 ACUTE ON CHRONIC RESPIRATORY FAILURE WITH HYPOXIA AND HYPERCAPNIA (HCC): Status: ACTIVE | Noted: 2017-09-22

## 2017-09-22 PROBLEM — J18.9 PNEUMONIA OF LEFT LOWER LOBE DUE TO INFECTIOUS ORGANISM: Status: RESOLVED | Noted: 2017-09-04 | Resolved: 2017-09-22

## 2017-09-22 LAB
ANION GAP SERPL CALCULATED.3IONS-SCNC: 7.1 MMOL/L
BUN BLD-MCNC: 24 MG/DL (ref 8–23)
BUN/CREAT SERPL: 32.4 (ref 7–25)
CALCIUM SPEC-SCNC: 8.8 MG/DL (ref 8.6–10.5)
CHLORIDE SERPL-SCNC: 91 MMOL/L (ref 98–107)
CO2 SERPL-SCNC: 37.9 MMOL/L (ref 22–29)
CREAT BLD-MCNC: 0.74 MG/DL (ref 0.76–1.27)
DEPRECATED RDW RBC AUTO: 50.9 FL (ref 37–54)
ERYTHROCYTE [DISTWIDTH] IN BLOOD BY AUTOMATED COUNT: 18 % (ref 11.5–14.5)
GFR SERPL CREATININE-BSD FRML MDRD: 107 ML/MIN/1.73
GFR SERPL CREATININE-BSD FRML MDRD: 130 ML/MIN/1.73
GLUCOSE BLD-MCNC: 214 MG/DL (ref 65–99)
GLUCOSE BLDC GLUCOMTR-MCNC: 227 MG/DL (ref 70–130)
GLUCOSE BLDC GLUCOMTR-MCNC: 236 MG/DL (ref 70–130)
GLUCOSE BLDC GLUCOMTR-MCNC: 286 MG/DL (ref 70–130)
GLUCOSE BLDC GLUCOMTR-MCNC: 308 MG/DL (ref 70–130)
HBA1C MFR BLD: 7.9 % (ref 4.8–5.6)
HCT VFR BLD AUTO: 33.3 % (ref 40.4–52.2)
HGB BLD-MCNC: 9.7 G/DL (ref 13.7–17.6)
MAGNESIUM SERPL-MCNC: 2.2 MG/DL (ref 1.6–2.4)
MCH RBC QN AUTO: 23.2 PG (ref 27–32.7)
MCHC RBC AUTO-ENTMCNC: 29.1 G/DL (ref 32.6–36.4)
MCV RBC AUTO: 79.5 FL (ref 79.8–96.2)
PHOSPHATE SERPL-MCNC: 3.3 MG/DL (ref 2.5–4.5)
PLATELET # BLD AUTO: 150 10*3/MM3 (ref 140–500)
PMV BLD AUTO: 10.1 FL (ref 6–12)
POTASSIUM BLD-SCNC: 4.2 MMOL/L (ref 3.5–5.2)
RBC # BLD AUTO: 4.19 10*6/MM3 (ref 4.6–6)
SODIUM BLD-SCNC: 136 MMOL/L (ref 136–145)
WBC NRBC COR # BLD: 8.16 10*3/MM3 (ref 4.5–10.7)

## 2017-09-22 PROCEDURE — 94799 UNLISTED PULMONARY SVC/PX: CPT

## 2017-09-22 PROCEDURE — 63710000001 INSULIN ASPART PER 5 UNITS: Performed by: INTERNAL MEDICINE

## 2017-09-22 PROCEDURE — 83735 ASSAY OF MAGNESIUM: CPT | Performed by: INTERNAL MEDICINE

## 2017-09-22 PROCEDURE — 84100 ASSAY OF PHOSPHORUS: CPT | Performed by: INTERNAL MEDICINE

## 2017-09-22 PROCEDURE — 83036 HEMOGLOBIN GLYCOSYLATED A1C: CPT | Performed by: INTERNAL MEDICINE

## 2017-09-22 PROCEDURE — 63710000001 INSULIN ASPART PER 5 UNITS: Performed by: FAMILY MEDICINE

## 2017-09-22 PROCEDURE — 85027 COMPLETE CBC AUTOMATED: CPT | Performed by: INTERNAL MEDICINE

## 2017-09-22 PROCEDURE — 82962 GLUCOSE BLOOD TEST: CPT

## 2017-09-22 PROCEDURE — 63710000001 PREDNISONE PER 1 MG: Performed by: INTERNAL MEDICINE

## 2017-09-22 PROCEDURE — 80048 BASIC METABOLIC PNL TOTAL CA: CPT | Performed by: INTERNAL MEDICINE

## 2017-09-22 PROCEDURE — 97110 THERAPEUTIC EXERCISES: CPT

## 2017-09-22 PROCEDURE — 94660 CPAP INITIATION&MGMT: CPT

## 2017-09-22 PROCEDURE — 99232 SBSQ HOSP IP/OBS MODERATE 35: CPT | Performed by: FAMILY MEDICINE

## 2017-09-22 RX ORDER — ZOLPIDEM TARTRATE 5 MG/1
5 TABLET ORAL NIGHTLY PRN
Qty: 30 TABLET | Refills: 0 | Status: SHIPPED | OUTPATIENT
Start: 2017-09-22 | End: 2017-09-28

## 2017-09-22 RX ORDER — PREDNISONE 20 MG/1
40 TABLET ORAL
Qty: 3 TABLET | Refills: 0 | Status: SHIPPED | OUTPATIENT
Start: 2017-09-23 | End: 2017-09-26

## 2017-09-22 RX ORDER — AMLODIPINE BESYLATE 10 MG/1
10 TABLET ORAL
Qty: 30 TABLET | Refills: 0 | Status: SHIPPED | OUTPATIENT
Start: 2017-09-23

## 2017-09-22 RX ORDER — TAMSULOSIN HYDROCHLORIDE 0.4 MG/1
1 CAPSULE ORAL DAILY
Qty: 30 CAPSULE | Refills: 0 | Status: SHIPPED | OUTPATIENT
Start: 2017-09-22

## 2017-09-22 RX ORDER — METRONIDAZOLE 500 MG/1
500 TABLET ORAL EVERY 6 HOURS SCHEDULED
Qty: 30 TABLET | Refills: 0 | Status: SHIPPED | OUTPATIENT
Start: 2017-09-22 | End: 2017-10-04

## 2017-09-22 RX ORDER — SENNA AND DOCUSATE SODIUM 50; 8.6 MG/1; MG/1
2 TABLET, FILM COATED ORAL NIGHTLY
Qty: 30 TABLET | Refills: 0 | Status: SHIPPED | OUTPATIENT
Start: 2017-09-22

## 2017-09-22 RX ADMIN — GUAIFENESIN 1200 MG: 600 TABLET, EXTENDED RELEASE ORAL at 08:54

## 2017-09-22 RX ADMIN — METRONIDAZOLE 500 MG: 500 TABLET, FILM COATED ORAL at 23:17

## 2017-09-22 RX ADMIN — METOPROLOL TARTRATE 12.5 MG: 25 TABLET ORAL at 18:10

## 2017-09-22 RX ADMIN — METRONIDAZOLE 500 MG: 500 TABLET, FILM COATED ORAL at 08:55

## 2017-09-22 RX ADMIN — ATORVASTATIN CALCIUM 20 MG: 20 TABLET, FILM COATED ORAL at 21:41

## 2017-09-22 RX ADMIN — INSULIN DETEMIR 20 UNITS: 100 INJECTION, SOLUTION SUBCUTANEOUS at 08:56

## 2017-09-22 RX ADMIN — IPRATROPIUM BROMIDE AND ALBUTEROL SULFATE 3 ML: .5; 3 SOLUTION RESPIRATORY (INHALATION) at 15:49

## 2017-09-22 RX ADMIN — INSULIN ASPART 12 UNITS: 100 INJECTION, SOLUTION INTRAVENOUS; SUBCUTANEOUS at 18:12

## 2017-09-22 RX ADMIN — IPRATROPIUM BROMIDE AND ALBUTEROL SULFATE 3 ML: .5; 3 SOLUTION RESPIRATORY (INHALATION) at 20:37

## 2017-09-22 RX ADMIN — FAMOTIDINE 20 MG: 20 TABLET, FILM COATED ORAL at 08:54

## 2017-09-22 RX ADMIN — METOPROLOL TARTRATE 12.5 MG: 25 TABLET ORAL at 08:54

## 2017-09-22 RX ADMIN — IPRATROPIUM BROMIDE AND ALBUTEROL SULFATE 3 ML: .5; 3 SOLUTION RESPIRATORY (INHALATION) at 06:52

## 2017-09-22 RX ADMIN — TRAZODONE HYDROCHLORIDE 50 MG: 50 TABLET ORAL at 21:43

## 2017-09-22 RX ADMIN — FUROSEMIDE 40 MG: 40 TABLET ORAL at 08:54

## 2017-09-22 RX ADMIN — PREDNISONE 40 MG: 20 TABLET ORAL at 08:54

## 2017-09-22 RX ADMIN — INSULIN ASPART 16 UNITS: 100 INJECTION, SOLUTION INTRAVENOUS; SUBCUTANEOUS at 21:41

## 2017-09-22 RX ADMIN — METRONIDAZOLE 500 MG: 500 TABLET, FILM COATED ORAL at 18:12

## 2017-09-22 RX ADMIN — TAMSULOSIN HYDROCHLORIDE 0.4 MG: 0.4 CAPSULE ORAL at 08:55

## 2017-09-22 RX ADMIN — METRONIDAZOLE 500 MG: 500 TABLET, FILM COATED ORAL at 00:00

## 2017-09-22 RX ADMIN — TAMSULOSIN HYDROCHLORIDE 0.4 MG: 0.4 CAPSULE ORAL at 18:12

## 2017-09-22 RX ADMIN — METRONIDAZOLE 500 MG: 500 TABLET, FILM COATED ORAL at 13:24

## 2017-09-22 RX ADMIN — FAMOTIDINE 20 MG: 20 TABLET, FILM COATED ORAL at 18:10

## 2017-09-22 RX ADMIN — INSULIN ASPART 8 UNITS: 100 INJECTION, SOLUTION INTRAVENOUS; SUBCUTANEOUS at 08:54

## 2017-09-22 RX ADMIN — ZOLPIDEM TARTRATE 5 MG: 5 TABLET, FILM COATED ORAL at 22:22

## 2017-09-22 RX ADMIN — IPRATROPIUM BROMIDE AND ALBUTEROL SULFATE 3 ML: .5; 3 SOLUTION RESPIRATORY (INHALATION) at 11:40

## 2017-09-22 RX ADMIN — SENNOSIDES AND DOCUSATE SODIUM 2 TABLET: 8.6; 5 TABLET ORAL at 21:41

## 2017-09-22 RX ADMIN — APIXABAN 5 MG: 5 TABLET, FILM COATED ORAL at 08:54

## 2017-09-22 RX ADMIN — INSULIN ASPART 8 UNITS: 100 INJECTION, SOLUTION INTRAVENOUS; SUBCUTANEOUS at 13:24

## 2017-09-22 RX ADMIN — GUAIFENESIN 1200 MG: 600 TABLET, EXTENDED RELEASE ORAL at 21:41

## 2017-09-22 RX ADMIN — AMLODIPINE BESYLATE 10 MG: 10 TABLET ORAL at 08:54

## 2017-09-22 NOTE — PROGRESS NOTES
Mr. 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    #2 an evaluation by a surgeon experienced in managing tracheal stenosis.

## 2017-09-23 LAB
ANION GAP SERPL CALCULATED.3IONS-SCNC: 11 MMOL/L
BILIRUB UR QL STRIP: NEGATIVE
BUN BLD-MCNC: 19 MG/DL (ref 8–23)
BUN/CREAT SERPL: 33.3 (ref 7–25)
CALCIUM SPEC-SCNC: 9 MG/DL (ref 8.6–10.5)
CHLORIDE SERPL-SCNC: 94 MMOL/L (ref 98–107)
CLARITY UR: CLEAR
CO2 SERPL-SCNC: 34 MMOL/L (ref 22–29)
COLOR UR: YELLOW
CREAT BLD-MCNC: 0.57 MG/DL (ref 0.76–1.27)
DEPRECATED RDW RBC AUTO: 52.2 FL (ref 37–54)
ERYTHROCYTE [DISTWIDTH] IN BLOOD BY AUTOMATED COUNT: 18.3 % (ref 11.5–14.5)
GFR SERPL CREATININE-BSD FRML MDRD: 145 ML/MIN/1.73
GFR SERPL CREATININE-BSD FRML MDRD: >150 ML/MIN/1.73
GLUCOSE BLD-MCNC: 121 MG/DL (ref 65–99)
GLUCOSE BLDC GLUCOMTR-MCNC: 123 MG/DL (ref 70–130)
GLUCOSE BLDC GLUCOMTR-MCNC: 246 MG/DL (ref 70–130)
GLUCOSE BLDC GLUCOMTR-MCNC: 317 MG/DL (ref 70–130)
GLUCOSE BLDC GLUCOMTR-MCNC: 366 MG/DL (ref 70–130)
GLUCOSE UR STRIP-MCNC: ABNORMAL MG/DL
HCT VFR BLD AUTO: 34.7 % (ref 40.4–52.2)
HGB BLD-MCNC: 10.2 G/DL (ref 13.7–17.6)
HGB UR QL STRIP.AUTO: NEGATIVE
KETONES UR QL STRIP: NEGATIVE
LEUKOCYTE ESTERASE UR QL STRIP.AUTO: NEGATIVE
MCH RBC QN AUTO: 23.4 PG (ref 27–32.7)
MCHC RBC AUTO-ENTMCNC: 29.4 G/DL (ref 32.6–36.4)
MCV RBC AUTO: 79.6 FL (ref 79.8–96.2)
NITRITE UR QL STRIP: NEGATIVE
PH UR STRIP.AUTO: 7 [PH] (ref 5–8)
PLATELET # BLD AUTO: 168 10*3/MM3 (ref 140–500)
PMV BLD AUTO: 10 FL (ref 6–12)
POTASSIUM BLD-SCNC: 3.5 MMOL/L (ref 3.5–5.2)
PROT UR QL STRIP: NEGATIVE
RBC # BLD AUTO: 4.36 10*6/MM3 (ref 4.6–6)
SODIUM BLD-SCNC: 139 MMOL/L (ref 136–145)
SP GR UR STRIP: 1.02 (ref 1–1.03)
UROBILINOGEN UR QL STRIP: ABNORMAL
WBC NRBC COR # BLD: 9.13 10*3/MM3 (ref 4.5–10.7)

## 2017-09-23 PROCEDURE — 94799 UNLISTED PULMONARY SVC/PX: CPT

## 2017-09-23 PROCEDURE — 85027 COMPLETE CBC AUTOMATED: CPT | Performed by: INTERNAL MEDICINE

## 2017-09-23 PROCEDURE — 94660 CPAP INITIATION&MGMT: CPT

## 2017-09-23 PROCEDURE — 63710000001 PREDNISONE PER 1 MG: Performed by: INTERNAL MEDICINE

## 2017-09-23 PROCEDURE — 81003 URINALYSIS AUTO W/O SCOPE: CPT | Performed by: INTERNAL MEDICINE

## 2017-09-23 PROCEDURE — 63710000001 INSULIN ASPART PER 5 UNITS: Performed by: INTERNAL MEDICINE

## 2017-09-23 PROCEDURE — 80048 BASIC METABOLIC PNL TOTAL CA: CPT | Performed by: INTERNAL MEDICINE

## 2017-09-23 PROCEDURE — 82962 GLUCOSE BLOOD TEST: CPT

## 2017-09-23 RX ORDER — PREDNISONE 20 MG/1
20 TABLET ORAL
Status: DISCONTINUED | OUTPATIENT
Start: 2017-09-24 | End: 2017-09-26

## 2017-09-23 RX ADMIN — METRONIDAZOLE 500 MG: 500 TABLET, FILM COATED ORAL at 23:45

## 2017-09-23 RX ADMIN — FAMOTIDINE 20 MG: 20 TABLET, FILM COATED ORAL at 08:19

## 2017-09-23 RX ADMIN — APIXABAN 5 MG: 5 TABLET, FILM COATED ORAL at 18:04

## 2017-09-23 RX ADMIN — INSULIN ASPART 16 UNITS: 100 INJECTION, SOLUTION INTRAVENOUS; SUBCUTANEOUS at 17:12

## 2017-09-23 RX ADMIN — GUAIFENESIN 1200 MG: 600 TABLET, EXTENDED RELEASE ORAL at 20:34

## 2017-09-23 RX ADMIN — METRONIDAZOLE 500 MG: 500 TABLET, FILM COATED ORAL at 13:15

## 2017-09-23 RX ADMIN — IPRATROPIUM BROMIDE AND ALBUTEROL SULFATE 3 ML: .5; 3 SOLUTION RESPIRATORY (INHALATION) at 16:17

## 2017-09-23 RX ADMIN — GUAIFENESIN 1200 MG: 600 TABLET, EXTENDED RELEASE ORAL at 08:21

## 2017-09-23 RX ADMIN — INSULIN ASPART 20 UNITS: 100 INJECTION, SOLUTION INTRAVENOUS; SUBCUTANEOUS at 13:15

## 2017-09-23 RX ADMIN — INSULIN DETEMIR 20 UNITS: 100 INJECTION, SOLUTION SUBCUTANEOUS at 08:22

## 2017-09-23 RX ADMIN — METOPROLOL TARTRATE 12.5 MG: 25 TABLET ORAL at 18:03

## 2017-09-23 RX ADMIN — ATORVASTATIN CALCIUM 20 MG: 20 TABLET, FILM COATED ORAL at 20:33

## 2017-09-23 RX ADMIN — IPRATROPIUM BROMIDE AND ALBUTEROL SULFATE 3 ML: .5; 3 SOLUTION RESPIRATORY (INHALATION) at 19:20

## 2017-09-23 RX ADMIN — IPRATROPIUM BROMIDE AND ALBUTEROL SULFATE 3 ML: .5; 3 SOLUTION RESPIRATORY (INHALATION) at 07:13

## 2017-09-23 RX ADMIN — AMLODIPINE BESYLATE 10 MG: 10 TABLET ORAL at 08:22

## 2017-09-23 RX ADMIN — TAMSULOSIN HYDROCHLORIDE 0.4 MG: 0.4 CAPSULE ORAL at 08:22

## 2017-09-23 RX ADMIN — ZOLPIDEM TARTRATE 5 MG: 5 TABLET, FILM COATED ORAL at 23:00

## 2017-09-23 RX ADMIN — TRAZODONE HYDROCHLORIDE 50 MG: 50 TABLET ORAL at 23:00

## 2017-09-23 RX ADMIN — MAGNESIUM HYDROXIDE 10 ML: 2400 SUSPENSION ORAL at 22:55

## 2017-09-23 RX ADMIN — SENNOSIDES AND DOCUSATE SODIUM 2 TABLET: 8.6; 5 TABLET ORAL at 20:34

## 2017-09-23 RX ADMIN — METRONIDAZOLE 500 MG: 500 TABLET, FILM COATED ORAL at 08:19

## 2017-09-23 RX ADMIN — METOPROLOL TARTRATE 12.5 MG: 25 TABLET ORAL at 08:21

## 2017-09-23 RX ADMIN — TAMSULOSIN HYDROCHLORIDE 0.4 MG: 0.4 CAPSULE ORAL at 18:04

## 2017-09-23 RX ADMIN — FAMOTIDINE 20 MG: 20 TABLET, FILM COATED ORAL at 18:04

## 2017-09-23 RX ADMIN — MICONAZOLE NITRATE: 2 POWDER TOPICAL at 23:46

## 2017-09-23 RX ADMIN — METRONIDAZOLE 500 MG: 500 TABLET, FILM COATED ORAL at 18:03

## 2017-09-23 RX ADMIN — IPRATROPIUM BROMIDE AND ALBUTEROL SULFATE 3 ML: .5; 3 SOLUTION RESPIRATORY (INHALATION) at 11:36

## 2017-09-23 RX ADMIN — INSULIN ASPART 12 UNITS: 100 INJECTION, SOLUTION INTRAVENOUS; SUBCUTANEOUS at 20:48

## 2017-09-23 RX ADMIN — FUROSEMIDE 40 MG: 40 TABLET ORAL at 08:19

## 2017-09-23 RX ADMIN — PREDNISONE 40 MG: 20 TABLET ORAL at 08:19

## 2017-09-24 LAB
ANION GAP SERPL CALCULATED.3IONS-SCNC: 8.8 MMOL/L
BUN BLD-MCNC: 22 MG/DL (ref 8–23)
BUN/CREAT SERPL: 33.3 (ref 7–25)
CALCIUM SPEC-SCNC: 9.1 MG/DL (ref 8.6–10.5)
CHLORIDE SERPL-SCNC: 94 MMOL/L (ref 98–107)
CO2 SERPL-SCNC: 36.2 MMOL/L (ref 22–29)
CREAT BLD-MCNC: 0.66 MG/DL (ref 0.76–1.27)
DEPRECATED RDW RBC AUTO: 50.6 FL (ref 37–54)
ERYTHROCYTE [DISTWIDTH] IN BLOOD BY AUTOMATED COUNT: 18.2 % (ref 11.5–14.5)
GFR SERPL CREATININE-BSD FRML MDRD: 122 ML/MIN/1.73
GFR SERPL CREATININE-BSD FRML MDRD: 148 ML/MIN/1.73
GLUCOSE BLD-MCNC: 139 MG/DL (ref 65–99)
GLUCOSE BLDC GLUCOMTR-MCNC: 100 MG/DL (ref 70–130)
GLUCOSE BLDC GLUCOMTR-MCNC: 261 MG/DL (ref 70–130)
GLUCOSE BLDC GLUCOMTR-MCNC: 272 MG/DL (ref 70–130)
GLUCOSE BLDC GLUCOMTR-MCNC: 285 MG/DL (ref 70–130)
HCT VFR BLD AUTO: 34.5 % (ref 40.4–52.2)
HGB BLD-MCNC: 10.4 G/DL (ref 13.7–17.6)
MAGNESIUM SERPL-MCNC: 2.2 MG/DL (ref 1.6–2.4)
MCH RBC QN AUTO: 23.7 PG (ref 27–32.7)
MCHC RBC AUTO-ENTMCNC: 30.1 G/DL (ref 32.6–36.4)
MCV RBC AUTO: 78.6 FL (ref 79.8–96.2)
PLATELET # BLD AUTO: 155 10*3/MM3 (ref 140–500)
PMV BLD AUTO: 9.2 FL (ref 6–12)
POTASSIUM BLD-SCNC: 4 MMOL/L (ref 3.5–5.2)
RBC # BLD AUTO: 4.39 10*6/MM3 (ref 4.6–6)
SODIUM BLD-SCNC: 139 MMOL/L (ref 136–145)
WBC NRBC COR # BLD: 8.72 10*3/MM3 (ref 4.5–10.7)

## 2017-09-24 PROCEDURE — 94799 UNLISTED PULMONARY SVC/PX: CPT

## 2017-09-24 PROCEDURE — 63710000001 PREDNISONE PER 1 MG: Performed by: INTERNAL MEDICINE

## 2017-09-24 PROCEDURE — 93010 ELECTROCARDIOGRAM REPORT: CPT | Performed by: INTERNAL MEDICINE

## 2017-09-24 PROCEDURE — 85027 COMPLETE CBC AUTOMATED: CPT | Performed by: INTERNAL MEDICINE

## 2017-09-24 PROCEDURE — 83735 ASSAY OF MAGNESIUM: CPT | Performed by: INTERNAL MEDICINE

## 2017-09-24 PROCEDURE — 82962 GLUCOSE BLOOD TEST: CPT

## 2017-09-24 PROCEDURE — 63710000001 INSULIN DETEMER PER 5 UNITS: Performed by: INTERNAL MEDICINE

## 2017-09-24 PROCEDURE — 93005 ELECTROCARDIOGRAM TRACING: CPT | Performed by: INTERNAL MEDICINE

## 2017-09-24 PROCEDURE — 63710000001 INSULIN ASPART PER 5 UNITS: Performed by: INTERNAL MEDICINE

## 2017-09-24 PROCEDURE — 80048 BASIC METABOLIC PNL TOTAL CA: CPT | Performed by: INTERNAL MEDICINE

## 2017-09-24 RX ADMIN — METRONIDAZOLE 500 MG: 500 TABLET, FILM COATED ORAL at 17:15

## 2017-09-24 RX ADMIN — IPRATROPIUM BROMIDE AND ALBUTEROL SULFATE 3 ML: .5; 3 SOLUTION RESPIRATORY (INHALATION) at 20:15

## 2017-09-24 RX ADMIN — GUAIFENESIN 1200 MG: 600 TABLET, EXTENDED RELEASE ORAL at 21:50

## 2017-09-24 RX ADMIN — IPRATROPIUM BROMIDE AND ALBUTEROL SULFATE 3 ML: .5; 3 SOLUTION RESPIRATORY (INHALATION) at 06:50

## 2017-09-24 RX ADMIN — INSULIN DETEMIR 10 UNITS: 100 INJECTION, SOLUTION SUBCUTANEOUS at 21:48

## 2017-09-24 RX ADMIN — ATORVASTATIN CALCIUM 20 MG: 20 TABLET, FILM COATED ORAL at 21:50

## 2017-09-24 RX ADMIN — METOPROLOL TARTRATE 12.5 MG: 25 TABLET ORAL at 17:14

## 2017-09-24 RX ADMIN — METRONIDAZOLE 500 MG: 500 TABLET, FILM COATED ORAL at 08:03

## 2017-09-24 RX ADMIN — GUAIFENESIN 1200 MG: 600 TABLET, EXTENDED RELEASE ORAL at 08:03

## 2017-09-24 RX ADMIN — AMLODIPINE BESYLATE 10 MG: 10 TABLET ORAL at 08:04

## 2017-09-24 RX ADMIN — FUROSEMIDE 40 MG: 40 TABLET ORAL at 08:04

## 2017-09-24 RX ADMIN — TAMSULOSIN HYDROCHLORIDE 0.4 MG: 0.4 CAPSULE ORAL at 08:04

## 2017-09-24 RX ADMIN — PREDNISONE 20 MG: 20 TABLET ORAL at 08:03

## 2017-09-24 RX ADMIN — TAMSULOSIN HYDROCHLORIDE 0.4 MG: 0.4 CAPSULE ORAL at 17:15

## 2017-09-24 RX ADMIN — IPRATROPIUM BROMIDE AND ALBUTEROL SULFATE 3 ML: .5; 3 SOLUTION RESPIRATORY (INHALATION) at 11:44

## 2017-09-24 RX ADMIN — INSULIN ASPART 12 UNITS: 100 INJECTION, SOLUTION INTRAVENOUS; SUBCUTANEOUS at 12:14

## 2017-09-24 RX ADMIN — FAMOTIDINE 20 MG: 20 TABLET, FILM COATED ORAL at 17:15

## 2017-09-24 RX ADMIN — FAMOTIDINE 20 MG: 20 TABLET, FILM COATED ORAL at 08:03

## 2017-09-24 RX ADMIN — TRAZODONE HYDROCHLORIDE 50 MG: 50 TABLET ORAL at 21:50

## 2017-09-24 RX ADMIN — MICONAZOLE NITRATE: 2 POWDER TOPICAL at 22:30

## 2017-09-24 RX ADMIN — ZOLPIDEM TARTRATE 5 MG: 5 TABLET, FILM COATED ORAL at 21:50

## 2017-09-24 RX ADMIN — IPRATROPIUM BROMIDE AND ALBUTEROL SULFATE 3 ML: .5; 3 SOLUTION RESPIRATORY (INHALATION) at 15:23

## 2017-09-24 RX ADMIN — METOPROLOL TARTRATE 12.5 MG: 25 TABLET ORAL at 08:03

## 2017-09-24 RX ADMIN — INSULIN ASPART 12 UNITS: 100 INJECTION, SOLUTION INTRAVENOUS; SUBCUTANEOUS at 21:49

## 2017-09-24 RX ADMIN — INSULIN ASPART 12 UNITS: 100 INJECTION, SOLUTION INTRAVENOUS; SUBCUTANEOUS at 17:15

## 2017-09-25 ENCOUNTER — APPOINTMENT (OUTPATIENT)
Dept: ULTRASOUND IMAGING | Facility: HOSPITAL | Age: 62
End: 2017-09-25
Attending: INTERNAL MEDICINE

## 2017-09-25 ENCOUNTER — APPOINTMENT (OUTPATIENT)
Dept: CT IMAGING | Facility: HOSPITAL | Age: 62
End: 2017-09-25
Attending: INTERNAL MEDICINE

## 2017-09-25 LAB
ANION GAP SERPL CALCULATED.3IONS-SCNC: 8.6 MMOL/L
BUN BLD-MCNC: 15 MG/DL (ref 8–23)
BUN/CREAT SERPL: 21.1 (ref 7–25)
CALCIUM SPEC-SCNC: 9 MG/DL (ref 8.6–10.5)
CHLORIDE SERPL-SCNC: 93 MMOL/L (ref 98–107)
CO2 SERPL-SCNC: 33.4 MMOL/L (ref 22–29)
CREAT BLD-MCNC: 0.71 MG/DL (ref 0.76–1.27)
DEPRECATED RDW RBC AUTO: 52.1 FL (ref 37–54)
ERYTHROCYTE [DISTWIDTH] IN BLOOD BY AUTOMATED COUNT: 18.8 % (ref 11.5–14.5)
GFR SERPL CREATININE-BSD FRML MDRD: 112 ML/MIN/1.73
GFR SERPL CREATININE-BSD FRML MDRD: 136 ML/MIN/1.73
GLUCOSE BLD-MCNC: 313 MG/DL (ref 65–99)
GLUCOSE BLDC GLUCOMTR-MCNC: 142 MG/DL (ref 70–130)
GLUCOSE BLDC GLUCOMTR-MCNC: 224 MG/DL (ref 70–130)
GLUCOSE BLDC GLUCOMTR-MCNC: 230 MG/DL (ref 70–130)
GLUCOSE BLDC GLUCOMTR-MCNC: 344 MG/DL (ref 70–130)
HCT VFR BLD AUTO: 35.8 % (ref 40.4–52.2)
HGB BLD-MCNC: 10.8 G/DL (ref 13.7–17.6)
INR PPP: 1.09 (ref 0.9–1.1)
MCH RBC QN AUTO: 23.4 PG (ref 27–32.7)
MCHC RBC AUTO-ENTMCNC: 30.2 G/DL (ref 32.6–36.4)
MCV RBC AUTO: 77.7 FL (ref 79.8–96.2)
PLATELET # BLD AUTO: 168 10*3/MM3 (ref 140–500)
PMV BLD AUTO: 9.7 FL (ref 6–12)
POTASSIUM BLD-SCNC: 4.5 MMOL/L (ref 3.5–5.2)
PROTHROMBIN TIME: 13.7 SECONDS (ref 11.7–14.2)
RBC # BLD AUTO: 4.61 10*6/MM3 (ref 4.6–6)
SODIUM BLD-SCNC: 135 MMOL/L (ref 136–145)
WBC NRBC COR # BLD: 10.63 10*3/MM3 (ref 4.5–10.7)

## 2017-09-25 PROCEDURE — 88305 TISSUE EXAM BY PATHOLOGIST: CPT | Performed by: FAMILY MEDICINE

## 2017-09-25 PROCEDURE — 87015 SPECIMEN INFECT AGNT CONCNTJ: CPT | Performed by: INTERNAL MEDICINE

## 2017-09-25 PROCEDURE — 87070 CULTURE OTHR SPECIMN AEROBIC: CPT | Performed by: INTERNAL MEDICINE

## 2017-09-25 PROCEDURE — 88300 SURGICAL PATH GROSS: CPT | Performed by: FAMILY MEDICINE

## 2017-09-25 PROCEDURE — 88189 FLOWCYTOMETRY/READ 16 & >: CPT | Performed by: FAMILY MEDICINE

## 2017-09-25 PROCEDURE — 99232 SBSQ HOSP IP/OBS MODERATE 35: CPT | Performed by: FAMILY MEDICINE

## 2017-09-25 PROCEDURE — 94799 UNLISTED PULMONARY SVC/PX: CPT

## 2017-09-25 PROCEDURE — 77012 CT SCAN FOR NEEDLE BIOPSY: CPT

## 2017-09-25 PROCEDURE — 88185 FLOWCYTOMETRY/TC ADD-ON: CPT | Performed by: FAMILY MEDICINE

## 2017-09-25 PROCEDURE — 63710000001 INSULIN ASPART PER 5 UNITS: Performed by: INTERNAL MEDICINE

## 2017-09-25 PROCEDURE — 82962 GLUCOSE BLOOD TEST: CPT

## 2017-09-25 PROCEDURE — 87205 SMEAR GRAM STAIN: CPT | Performed by: INTERNAL MEDICINE

## 2017-09-25 PROCEDURE — 63710000001 PREDNISONE PER 1 MG: Performed by: INTERNAL MEDICINE

## 2017-09-25 PROCEDURE — 80048 BASIC METABOLIC PNL TOTAL CA: CPT | Performed by: INTERNAL MEDICINE

## 2017-09-25 PROCEDURE — 85027 COMPLETE CBC AUTOMATED: CPT | Performed by: INTERNAL MEDICINE

## 2017-09-25 PROCEDURE — 88184 FLOWCYTOMETRY/ TC 1 MARKER: CPT | Performed by: FAMILY MEDICINE

## 2017-09-25 PROCEDURE — 85610 PROTHROMBIN TIME: CPT | Performed by: INTERNAL MEDICINE

## 2017-09-25 PROCEDURE — 07B63ZX EXCISION OF LEFT AXILLARY LYMPHATIC, PERCUTANEOUS APPROACH, DIAGNOSTIC: ICD-10-PCS | Performed by: RADIOLOGY

## 2017-09-25 RX ORDER — LIDOCAINE HYDROCHLORIDE 10 MG/ML
20 INJECTION, SOLUTION INFILTRATION; PERINEURAL ONCE
Status: COMPLETED | OUTPATIENT
Start: 2017-09-25 | End: 2017-09-25

## 2017-09-25 RX ADMIN — INSULIN DETEMIR 10 UNITS: 100 INJECTION, SOLUTION SUBCUTANEOUS at 22:46

## 2017-09-25 RX ADMIN — MICONAZOLE NITRATE: 2 POWDER TOPICAL at 07:48

## 2017-09-25 RX ADMIN — METRONIDAZOLE 500 MG: 500 TABLET, FILM COATED ORAL at 12:20

## 2017-09-25 RX ADMIN — METOPROLOL TARTRATE 12.5 MG: 25 TABLET ORAL at 17:35

## 2017-09-25 RX ADMIN — METRONIDAZOLE 500 MG: 500 TABLET, FILM COATED ORAL at 21:07

## 2017-09-25 RX ADMIN — MICONAZOLE NITRATE: 2 POWDER TOPICAL at 21:07

## 2017-09-25 RX ADMIN — METOPROLOL TARTRATE 12.5 MG: 25 TABLET ORAL at 07:45

## 2017-09-25 RX ADMIN — INSULIN ASPART 8 UNITS: 100 INJECTION, SOLUTION INTRAVENOUS; SUBCUTANEOUS at 07:44

## 2017-09-25 RX ADMIN — ACETAMINOPHEN 650 MG: 325 TABLET ORAL at 11:37

## 2017-09-25 RX ADMIN — INSULIN ASPART 8 UNITS: 100 INJECTION, SOLUTION INTRAVENOUS; SUBCUTANEOUS at 11:30

## 2017-09-25 RX ADMIN — IPRATROPIUM BROMIDE AND ALBUTEROL SULFATE 3 ML: .5; 3 SOLUTION RESPIRATORY (INHALATION) at 23:17

## 2017-09-25 RX ADMIN — GUAIFENESIN 1200 MG: 600 TABLET, EXTENDED RELEASE ORAL at 07:45

## 2017-09-25 RX ADMIN — ATORVASTATIN CALCIUM 20 MG: 20 TABLET, FILM COATED ORAL at 21:06

## 2017-09-25 RX ADMIN — METRONIDAZOLE 500 MG: 500 TABLET, FILM COATED ORAL at 06:14

## 2017-09-25 RX ADMIN — IPRATROPIUM BROMIDE AND ALBUTEROL SULFATE 3 ML: .5; 3 SOLUTION RESPIRATORY (INHALATION) at 10:37

## 2017-09-25 RX ADMIN — LIDOCAINE HYDROCHLORIDE 20 ML: 10 INJECTION, SOLUTION INFILTRATION; PERINEURAL at 14:54

## 2017-09-25 RX ADMIN — GUAIFENESIN 1200 MG: 600 TABLET, EXTENDED RELEASE ORAL at 21:06

## 2017-09-25 RX ADMIN — METRONIDAZOLE 500 MG: 500 TABLET, FILM COATED ORAL at 23:01

## 2017-09-25 RX ADMIN — AMLODIPINE BESYLATE 10 MG: 10 TABLET ORAL at 09:29

## 2017-09-25 RX ADMIN — PREDNISONE 20 MG: 20 TABLET ORAL at 09:29

## 2017-09-25 RX ADMIN — FAMOTIDINE 20 MG: 20 TABLET, FILM COATED ORAL at 07:45

## 2017-09-25 RX ADMIN — FUROSEMIDE 40 MG: 40 TABLET ORAL at 09:29

## 2017-09-25 RX ADMIN — IPRATROPIUM BROMIDE AND ALBUTEROL SULFATE 3 ML: .5; 3 SOLUTION RESPIRATORY (INHALATION) at 06:58

## 2017-09-25 RX ADMIN — INSULIN ASPART 20 UNITS: 100 INJECTION, SOLUTION INTRAVENOUS; SUBCUTANEOUS at 22:46

## 2017-09-25 RX ADMIN — METRONIDAZOLE 500 MG: 500 TABLET, FILM COATED ORAL at 00:31

## 2017-09-25 RX ADMIN — TRAZODONE HYDROCHLORIDE 50 MG: 50 TABLET ORAL at 22:45

## 2017-09-25 RX ADMIN — FAMOTIDINE 20 MG: 20 TABLET, FILM COATED ORAL at 17:35

## 2017-09-25 RX ADMIN — TAMSULOSIN HYDROCHLORIDE 0.4 MG: 0.4 CAPSULE ORAL at 11:29

## 2017-09-25 RX ADMIN — ZOLPIDEM TARTRATE 5 MG: 5 TABLET, FILM COATED ORAL at 22:46

## 2017-09-25 RX ADMIN — TAMSULOSIN HYDROCHLORIDE 0.4 MG: 0.4 CAPSULE ORAL at 21:06

## 2017-09-25 RX ADMIN — IPRATROPIUM BROMIDE AND ALBUTEROL SULFATE 3 ML: .5; 3 SOLUTION RESPIRATORY (INHALATION) at 03:56

## 2017-09-26 LAB
ANION GAP SERPL CALCULATED.3IONS-SCNC: 7.9 MMOL/L
BUN BLD-MCNC: 15 MG/DL (ref 8–23)
BUN/CREAT SERPL: 23.4 (ref 7–25)
CALCIUM SPEC-SCNC: 9.3 MG/DL (ref 8.6–10.5)
CHLORIDE SERPL-SCNC: 92 MMOL/L (ref 98–107)
CO2 SERPL-SCNC: 37.1 MMOL/L (ref 22–29)
CREAT BLD-MCNC: 0.64 MG/DL (ref 0.76–1.27)
DEPRECATED RDW RBC AUTO: 53.4 FL (ref 37–54)
ERYTHROCYTE [DISTWIDTH] IN BLOOD BY AUTOMATED COUNT: 18.8 % (ref 11.5–14.5)
GFR SERPL CREATININE-BSD FRML MDRD: 127 ML/MIN/1.73
GFR SERPL CREATININE-BSD FRML MDRD: >150 ML/MIN/1.73
GLUCOSE BLD-MCNC: 40 MG/DL (ref 65–99)
GLUCOSE BLDC GLUCOMTR-MCNC: 248 MG/DL (ref 70–130)
GLUCOSE BLDC GLUCOMTR-MCNC: 269 MG/DL (ref 70–130)
GLUCOSE BLDC GLUCOMTR-MCNC: 352 MG/DL (ref 70–130)
GLUCOSE BLDC GLUCOMTR-MCNC: 50 MG/DL (ref 70–130)
GLUCOSE BLDC GLUCOMTR-MCNC: 76 MG/DL (ref 70–130)
HCT VFR BLD AUTO: 38.3 % (ref 40.4–52.2)
HGB BLD-MCNC: 11.5 G/DL (ref 13.7–17.6)
MCH RBC QN AUTO: 23.9 PG (ref 27–32.7)
MCHC RBC AUTO-ENTMCNC: 30 G/DL (ref 32.6–36.4)
MCV RBC AUTO: 79.5 FL (ref 79.8–96.2)
PLATELET # BLD AUTO: 151 10*3/MM3 (ref 140–500)
PMV BLD AUTO: 9.3 FL (ref 6–12)
POTASSIUM BLD-SCNC: 4.2 MMOL/L (ref 3.5–5.2)
RBC # BLD AUTO: 4.82 10*6/MM3 (ref 4.6–6)
SODIUM BLD-SCNC: 137 MMOL/L (ref 136–145)
WBC NRBC COR # BLD: 15 10*3/MM3 (ref 4.5–10.7)

## 2017-09-26 PROCEDURE — 99232 SBSQ HOSP IP/OBS MODERATE 35: CPT | Performed by: INTERNAL MEDICINE

## 2017-09-26 PROCEDURE — 97110 THERAPEUTIC EXERCISES: CPT

## 2017-09-26 PROCEDURE — 85027 COMPLETE CBC AUTOMATED: CPT | Performed by: INTERNAL MEDICINE

## 2017-09-26 PROCEDURE — 82962 GLUCOSE BLOOD TEST: CPT

## 2017-09-26 PROCEDURE — 80048 BASIC METABOLIC PNL TOTAL CA: CPT | Performed by: INTERNAL MEDICINE

## 2017-09-26 PROCEDURE — 94799 UNLISTED PULMONARY SVC/PX: CPT

## 2017-09-26 PROCEDURE — 63710000001 INSULIN ASPART PER 5 UNITS: Performed by: INTERNAL MEDICINE

## 2017-09-26 PROCEDURE — 99231 SBSQ HOSP IP/OBS SF/LOW 25: CPT | Performed by: FAMILY MEDICINE

## 2017-09-26 PROCEDURE — 63710000001 PREDNISONE PER 1 MG: Performed by: INTERNAL MEDICINE

## 2017-09-26 RX ORDER — PREDNISONE 10 MG/1
10 TABLET ORAL
Status: DISCONTINUED | OUTPATIENT
Start: 2017-09-27 | End: 2017-09-29

## 2017-09-26 RX ADMIN — PREDNISONE 20 MG: 20 TABLET ORAL at 07:50

## 2017-09-26 RX ADMIN — INSULIN DETEMIR 10 UNITS: 100 INJECTION, SOLUTION SUBCUTANEOUS at 22:32

## 2017-09-26 RX ADMIN — METOPROLOL TARTRATE 12.5 MG: 25 TABLET ORAL at 07:52

## 2017-09-26 RX ADMIN — GUAIFENESIN 1200 MG: 600 TABLET, EXTENDED RELEASE ORAL at 19:44

## 2017-09-26 RX ADMIN — INSULIN ASPART 20 UNITS: 100 INJECTION, SOLUTION INTRAVENOUS; SUBCUTANEOUS at 16:56

## 2017-09-26 RX ADMIN — IPRATROPIUM BROMIDE AND ALBUTEROL SULFATE 3 ML: .5; 3 SOLUTION RESPIRATORY (INHALATION) at 19:23

## 2017-09-26 RX ADMIN — SENNOSIDES AND DOCUSATE SODIUM 2 TABLET: 8.6; 5 TABLET ORAL at 19:34

## 2017-09-26 RX ADMIN — METRONIDAZOLE 500 MG: 500 TABLET, FILM COATED ORAL at 17:00

## 2017-09-26 RX ADMIN — ZOLPIDEM TARTRATE 5 MG: 5 TABLET, FILM COATED ORAL at 22:34

## 2017-09-26 RX ADMIN — METRONIDAZOLE 500 MG: 500 TABLET, FILM COATED ORAL at 11:35

## 2017-09-26 RX ADMIN — IPRATROPIUM BROMIDE AND ALBUTEROL SULFATE 3 ML: .5; 3 SOLUTION RESPIRATORY (INHALATION) at 10:32

## 2017-09-26 RX ADMIN — ATORVASTATIN CALCIUM 20 MG: 20 TABLET, FILM COATED ORAL at 19:33

## 2017-09-26 RX ADMIN — MICONAZOLE NITRATE: 2 POWDER TOPICAL at 19:36

## 2017-09-26 RX ADMIN — FAMOTIDINE 20 MG: 20 TABLET, FILM COATED ORAL at 07:51

## 2017-09-26 RX ADMIN — FUROSEMIDE 40 MG: 40 TABLET ORAL at 07:51

## 2017-09-26 RX ADMIN — FAMOTIDINE 20 MG: 20 TABLET, FILM COATED ORAL at 17:00

## 2017-09-26 RX ADMIN — TAMSULOSIN HYDROCHLORIDE 0.4 MG: 0.4 CAPSULE ORAL at 17:00

## 2017-09-26 RX ADMIN — METRONIDAZOLE 500 MG: 500 TABLET, FILM COATED ORAL at 06:13

## 2017-09-26 RX ADMIN — TRAZODONE HYDROCHLORIDE 50 MG: 50 TABLET ORAL at 22:34

## 2017-09-26 RX ADMIN — IPRATROPIUM BROMIDE AND ALBUTEROL SULFATE 3 ML: .5; 3 SOLUTION RESPIRATORY (INHALATION) at 05:49

## 2017-09-26 RX ADMIN — METRONIDAZOLE 500 MG: 500 TABLET, FILM COATED ORAL at 23:18

## 2017-09-26 RX ADMIN — GUAIFENESIN 1200 MG: 600 TABLET, EXTENDED RELEASE ORAL at 07:51

## 2017-09-26 RX ADMIN — INSULIN ASPART 8 UNITS: 100 INJECTION, SOLUTION INTRAVENOUS; SUBCUTANEOUS at 11:35

## 2017-09-26 RX ADMIN — TAMSULOSIN HYDROCHLORIDE 0.4 MG: 0.4 CAPSULE ORAL at 07:50

## 2017-09-26 RX ADMIN — IPRATROPIUM BROMIDE AND ALBUTEROL SULFATE 3 ML: .5; 3 SOLUTION RESPIRATORY (INHALATION) at 15:28

## 2017-09-26 RX ADMIN — AMLODIPINE BESYLATE 10 MG: 10 TABLET ORAL at 07:50

## 2017-09-26 RX ADMIN — METOPROLOL TARTRATE 25 MG: 25 TABLET ORAL at 17:00

## 2017-09-27 ENCOUNTER — ANESTHESIA EVENT (OUTPATIENT)
Dept: GASTROENTEROLOGY | Facility: HOSPITAL | Age: 62
End: 2017-09-27

## 2017-09-27 ENCOUNTER — ANESTHESIA (OUTPATIENT)
Dept: GASTROENTEROLOGY | Facility: HOSPITAL | Age: 62
End: 2017-09-27

## 2017-09-27 LAB
ANION GAP SERPL CALCULATED.3IONS-SCNC: 10.2 MMOL/L
BUN BLD-MCNC: 15 MG/DL (ref 8–23)
BUN/CREAT SERPL: 21.1 (ref 7–25)
CALCIUM SPEC-SCNC: 9.2 MG/DL (ref 8.6–10.5)
CHLORIDE SERPL-SCNC: 92 MMOL/L (ref 98–107)
CO2 SERPL-SCNC: 32.8 MMOL/L (ref 22–29)
CREAT BLD-MCNC: 0.71 MG/DL (ref 0.76–1.27)
DEPRECATED RDW RBC AUTO: 55.1 FL (ref 37–54)
ERYTHROCYTE [DISTWIDTH] IN BLOOD BY AUTOMATED COUNT: 19.4 % (ref 11.5–14.5)
GFR SERPL CREATININE-BSD FRML MDRD: 112 ML/MIN/1.73
GFR SERPL CREATININE-BSD FRML MDRD: 136 ML/MIN/1.73
GLUCOSE BLD-MCNC: 138 MG/DL (ref 65–99)
GLUCOSE BLDC GLUCOMTR-MCNC: 103 MG/DL (ref 70–130)
GLUCOSE BLDC GLUCOMTR-MCNC: 105 MG/DL (ref 70–130)
GLUCOSE BLDC GLUCOMTR-MCNC: 126 MG/DL (ref 70–130)
GLUCOSE BLDC GLUCOMTR-MCNC: 137 MG/DL (ref 70–130)
GLUCOSE BLDC GLUCOMTR-MCNC: 183 MG/DL (ref 70–130)
GLUCOSE BLDC GLUCOMTR-MCNC: 348 MG/DL (ref 70–130)
HCT VFR BLD AUTO: 40.1 % (ref 40.4–52.2)
HGB BLD-MCNC: 12 G/DL (ref 13.7–17.6)
MCH RBC QN AUTO: 23.8 PG (ref 27–32.7)
MCHC RBC AUTO-ENTMCNC: 29.9 G/DL (ref 32.6–36.4)
MCV RBC AUTO: 79.6 FL (ref 79.8–96.2)
PLATELET # BLD AUTO: 148 10*3/MM3 (ref 140–500)
PMV BLD AUTO: 9.4 FL (ref 6–12)
POTASSIUM BLD-SCNC: 4.5 MMOL/L (ref 3.5–5.2)
RBC # BLD AUTO: 5.04 10*6/MM3 (ref 4.6–6)
REF LAB TEST METHOD: NORMAL
SODIUM BLD-SCNC: 135 MMOL/L (ref 136–145)
WBC NRBC COR # BLD: 14.01 10*3/MM3 (ref 4.5–10.7)

## 2017-09-27 PROCEDURE — 99231 SBSQ HOSP IP/OBS SF/LOW 25: CPT | Performed by: FAMILY MEDICINE

## 2017-09-27 PROCEDURE — 94799 UNLISTED PULMONARY SVC/PX: CPT

## 2017-09-27 PROCEDURE — 80048 BASIC METABOLIC PNL TOTAL CA: CPT | Performed by: INTERNAL MEDICINE

## 2017-09-27 PROCEDURE — 85027 COMPLETE CBC AUTOMATED: CPT | Performed by: INTERNAL MEDICINE

## 2017-09-27 PROCEDURE — C1726 CATH, BAL DIL, NON-VASCULAR: HCPCS | Performed by: INTERNAL MEDICINE

## 2017-09-27 PROCEDURE — 0B9D8ZX DRAINAGE OF RIGHT MIDDLE LUNG LOBE, VIA NATURAL OR ARTIFICIAL OPENING ENDOSCOPIC, DIAGNOSTIC: ICD-10-PCS | Performed by: INTERNAL MEDICINE

## 2017-09-27 PROCEDURE — 0B9H8ZX DRAINAGE OF LUNG LINGULA, VIA NATURAL OR ARTIFICIAL OPENING ENDOSCOPIC, DIAGNOSTIC: ICD-10-PCS | Performed by: INTERNAL MEDICINE

## 2017-09-27 PROCEDURE — 87102 FUNGUS ISOLATION CULTURE: CPT | Performed by: INTERNAL MEDICINE

## 2017-09-27 PROCEDURE — 87205 SMEAR GRAM STAIN: CPT | Performed by: INTERNAL MEDICINE

## 2017-09-27 PROCEDURE — 82962 GLUCOSE BLOOD TEST: CPT

## 2017-09-27 PROCEDURE — 63710000001 PREDNISONE PER 5 MG: Performed by: INTERNAL MEDICINE

## 2017-09-27 PROCEDURE — 87106 FUNGI IDENTIFICATION YEAST: CPT | Performed by: INTERNAL MEDICINE

## 2017-09-27 PROCEDURE — 87116 MYCOBACTERIA CULTURE: CPT | Performed by: INTERNAL MEDICINE

## 2017-09-27 PROCEDURE — 25010000002 PROPOFOL 10 MG/ML EMULSION: Performed by: ANESTHESIOLOGY

## 2017-09-27 PROCEDURE — 87206 SMEAR FLUORESCENT/ACID STAI: CPT | Performed by: INTERNAL MEDICINE

## 2017-09-27 PROCEDURE — 87071 CULTURE AEROBIC QUANT OTHER: CPT | Performed by: INTERNAL MEDICINE

## 2017-09-27 RX ORDER — LIDOCAINE HYDROCHLORIDE 20 MG/ML
INJECTION, SOLUTION INFILTRATION; PERINEURAL AS NEEDED
Status: DISCONTINUED | OUTPATIENT
Start: 2017-09-27 | End: 2017-09-27 | Stop reason: SURG

## 2017-09-27 RX ORDER — PROPOFOL 10 MG/ML
VIAL (ML) INTRAVENOUS AS NEEDED
Status: DISCONTINUED | OUTPATIENT
Start: 2017-09-27 | End: 2017-09-27 | Stop reason: SURG

## 2017-09-27 RX ORDER — SODIUM CHLORIDE, SODIUM LACTATE, POTASSIUM CHLORIDE, CALCIUM CHLORIDE 600; 310; 30; 20 MG/100ML; MG/100ML; MG/100ML; MG/100ML
1000 INJECTION, SOLUTION INTRAVENOUS CONTINUOUS PRN
Status: DISCONTINUED | OUTPATIENT
Start: 2017-09-27 | End: 2017-10-06 | Stop reason: HOSPADM

## 2017-09-27 RX ORDER — ONDANSETRON 2 MG/ML
4 INJECTION INTRAMUSCULAR; INTRAVENOUS ONCE AS NEEDED
Status: DISCONTINUED | OUTPATIENT
Start: 2017-09-27 | End: 2017-09-29 | Stop reason: HOSPADM

## 2017-09-27 RX ORDER — IPRATROPIUM BROMIDE AND ALBUTEROL SULFATE 2.5; .5 MG/3ML; MG/3ML
3 SOLUTION RESPIRATORY (INHALATION) ONCE AS NEEDED
Status: COMPLETED | OUTPATIENT
Start: 2017-09-27 | End: 2017-09-27

## 2017-09-27 RX ORDER — LIDOCAINE HYDROCHLORIDE 10 MG/ML
INJECTION, SOLUTION EPIDURAL; INFILTRATION; INTRACAUDAL; PERINEURAL AS NEEDED
Status: DISCONTINUED | OUTPATIENT
Start: 2017-09-27 | End: 2017-09-27 | Stop reason: HOSPADM

## 2017-09-27 RX ADMIN — ATORVASTATIN CALCIUM 20 MG: 20 TABLET, FILM COATED ORAL at 20:03

## 2017-09-27 RX ADMIN — SODIUM CHLORIDE, POTASSIUM CHLORIDE, SODIUM LACTATE AND CALCIUM CHLORIDE 1000 ML: 600; 310; 30; 20 INJECTION, SOLUTION INTRAVENOUS at 13:23

## 2017-09-27 RX ADMIN — TAMSULOSIN HYDROCHLORIDE 0.4 MG: 0.4 CAPSULE ORAL at 16:45

## 2017-09-27 RX ADMIN — IPRATROPIUM BROMIDE AND ALBUTEROL SULFATE 3 ML: .5; 3 SOLUTION RESPIRATORY (INHALATION) at 15:03

## 2017-09-27 RX ADMIN — INSULIN DETEMIR 10 UNITS: 100 INJECTION, SOLUTION SUBCUTANEOUS at 23:18

## 2017-09-27 RX ADMIN — FAMOTIDINE 20 MG: 20 TABLET, FILM COATED ORAL at 20:03

## 2017-09-27 RX ADMIN — METOPROLOL TARTRATE 25 MG: 25 TABLET ORAL at 16:45

## 2017-09-27 RX ADMIN — PROPOFOL 200 MG: 10 INJECTION, EMULSION INTRAVENOUS at 14:05

## 2017-09-27 RX ADMIN — METRONIDAZOLE 500 MG: 500 TABLET, FILM COATED ORAL at 16:45

## 2017-09-27 RX ADMIN — MICONAZOLE NITRATE: 2 POWDER TOPICAL at 20:04

## 2017-09-27 RX ADMIN — IPRATROPIUM BROMIDE AND ALBUTEROL SULFATE 3 ML: .5; 3 SOLUTION RESPIRATORY (INHALATION) at 04:49

## 2017-09-27 RX ADMIN — IPRATROPIUM BROMIDE AND ALBUTEROL SULFATE 3 ML: .5; 3 SOLUTION RESPIRATORY (INHALATION) at 11:01

## 2017-09-27 RX ADMIN — IPRATROPIUM BROMIDE AND ALBUTEROL SULFATE 3 ML: .5; 3 SOLUTION RESPIRATORY (INHALATION) at 15:49

## 2017-09-27 RX ADMIN — GUAIFENESIN 1200 MG: 600 TABLET, EXTENDED RELEASE ORAL at 16:44

## 2017-09-27 RX ADMIN — METRONIDAZOLE 500 MG: 500 TABLET, FILM COATED ORAL at 23:26

## 2017-09-27 RX ADMIN — LIDOCAINE HYDROCHLORIDE 100 MG: 20 INJECTION, SOLUTION INFILTRATION; PERINEURAL at 13:52

## 2017-09-27 RX ADMIN — TRAZODONE HYDROCHLORIDE 50 MG: 50 TABLET ORAL at 23:19

## 2017-09-27 RX ADMIN — PROPOFOL 200 MG: 10 INJECTION, EMULSION INTRAVENOUS at 13:52

## 2017-09-27 RX ADMIN — SENNOSIDES AND DOCUSATE SODIUM 2 TABLET: 8.6; 5 TABLET ORAL at 20:03

## 2017-09-27 RX ADMIN — FAMOTIDINE 20 MG: 20 TABLET, FILM COATED ORAL at 16:45

## 2017-09-27 RX ADMIN — ACETAMINOPHEN 650 MG: 325 TABLET ORAL at 21:21

## 2017-09-27 RX ADMIN — IPRATROPIUM BROMIDE AND ALBUTEROL SULFATE 3 ML: .5; 3 SOLUTION RESPIRATORY (INHALATION) at 07:16

## 2017-09-27 RX ADMIN — METRONIDAZOLE 500 MG: 500 TABLET, FILM COATED ORAL at 06:31

## 2017-09-27 RX ADMIN — AMLODIPINE BESYLATE 10 MG: 10 TABLET ORAL at 16:45

## 2017-09-27 RX ADMIN — FUROSEMIDE 40 MG: 40 TABLET ORAL at 16:45

## 2017-09-27 RX ADMIN — METOPROLOL TARTRATE 25 MG: 25 TABLET ORAL at 09:32

## 2017-09-27 RX ADMIN — ZOLPIDEM TARTRATE 5 MG: 5 TABLET, FILM COATED ORAL at 23:19

## 2017-09-27 RX ADMIN — MICONAZOLE NITRATE: 2 POWDER TOPICAL at 16:46

## 2017-09-27 RX ADMIN — PREDNISONE 10 MG: 10 TABLET ORAL at 16:45

## 2017-09-27 NOTE — ANESTHESIA PROCEDURE NOTES
Airway  Urgency: elective      General Information and Staff    Patient location during procedure: OR (endo 4)  Anesthesiologist: IGNACIA BLUE    Indications and Patient Condition  Indications for airway management: airway protection    Preoxygenated: yes  MILS not maintained throughout  Mask difficulty assessment: 1 - vent by mask    Final Airway Details  Final airway type: supraglottic airway      Successful airway: classic  Size 5    Number of attempts at approach: 1    Additional Comments  Inserted easily, Atraumatic  Teeth ok

## 2017-09-27 NOTE — ANESTHESIA POSTPROCEDURE EVALUATION
"Patient: Chuckie Valdez    Procedure Summary     Date Anesthesia Start Anesthesia Stop Room / Location    09/27/17 [id number]  [facility]       Procedure Diagnosis Surgeon Provider    BRONCHOSCOPY WITH WASHINGS RIGHT MIDDLE LOBE AND LINGULAR with balloon dilation 12MM-15MM AND 18MM-19MM (N/A Bronchus) Tracheal stenosis due to tracheostomy  (Tracheal stenosis due to tracheostomy [J95.09]) MD Catherine Multani MD          Anesthesia Type: No value filed.  Last vitals  BP   159/85 (09/27/17 1459)    Temp   37.3 °C (99.1 °F) (09/27/17 1459)    Pulse   95 (09/27/17 1503)   Resp   22 (09/27/17 1503)    SpO2   94 % (09/27/17 1503)      Post Anesthesia Care and Evaluation    Patient location during evaluation: PACU  Patient participation: complete - patient participated  Level of consciousness: awake and alert  Pain management: adequate  Airway patency: patent  Anesthetic complications: No anesthetic complications    Cardiovascular status: acceptable  Respiratory status: acceptable  Hydration status: acceptable    Comments: /85 (BP Location: Right arm, Patient Position: Sitting)  Pulse 95  Temp 37.3 °C (99.1 °F) (Oral)   Resp 22  Ht 70\" (177.8 cm)  Wt 148 lb 11.2 oz (67.4 kg)  SpO2 94%  BMI 21.34 kg/m2      "

## 2017-09-27 NOTE — ANESTHESIA PREPROCEDURE EVALUATION
Anesthesia Evaluation     Patient summary reviewed and Nursing notes reviewed   NPO Solid Status: > 8 hours  NPO Liquid Status: > 8 hours     Airway   Mallampati: III  TM distance: <3 FB  Neck ROM: limited  difficult intubation highly probable  Dental    (+) poor dentition    Pulmonary    (+) a smoker Former, COPD severe, decreased breath sounds,     ROS comment: Pulmonary htn  Cardiovascular     ECG reviewed  Rhythm: regular  Rate: normal    (-) hypertension      Neuro/Psych  GI/Hepatic/Renal/Endo    (+)  liver disease, diabetes mellitus type 2 well controlled,     Musculoskeletal     Abdominal    Substance History      OB/GYN          Other                                      Anesthesia Plan    ASA 3     MAC     Anesthetic plan and risks discussed with patient.

## 2017-09-28 LAB
ANION GAP SERPL CALCULATED.3IONS-SCNC: 7.9 MMOL/L
BUN BLD-MCNC: 14 MG/DL (ref 8–23)
BUN/CREAT SERPL: 22.2 (ref 7–25)
CALCIUM SPEC-SCNC: 9.1 MG/DL (ref 8.6–10.5)
CHLORIDE SERPL-SCNC: 94 MMOL/L (ref 98–107)
CO2 SERPL-SCNC: 36.1 MMOL/L (ref 22–29)
CREAT BLD-MCNC: 0.63 MG/DL (ref 0.76–1.27)
DEPRECATED RDW RBC AUTO: 55.2 FL (ref 37–54)
ERYTHROCYTE [DISTWIDTH] IN BLOOD BY AUTOMATED COUNT: 19.2 % (ref 11.5–14.5)
GFR SERPL CREATININE-BSD FRML MDRD: 129 ML/MIN/1.73
GFR SERPL CREATININE-BSD FRML MDRD: >150 ML/MIN/1.73
GLUCOSE BLD-MCNC: 177 MG/DL (ref 65–99)
GLUCOSE BLDC GLUCOMTR-MCNC: 167 MG/DL (ref 70–130)
GLUCOSE BLDC GLUCOMTR-MCNC: 172 MG/DL (ref 70–130)
GLUCOSE BLDC GLUCOMTR-MCNC: 221 MG/DL (ref 70–130)
GLUCOSE BLDC GLUCOMTR-MCNC: 226 MG/DL (ref 70–130)
GLUCOSE BLDC GLUCOMTR-MCNC: 257 MG/DL (ref 70–130)
HCT VFR BLD AUTO: 38.4 % (ref 40.4–52.2)
HGB BLD-MCNC: 11.2 G/DL (ref 13.7–17.6)
MCH RBC QN AUTO: 23.4 PG (ref 27–32.7)
MCHC RBC AUTO-ENTMCNC: 29.2 G/DL (ref 32.6–36.4)
MCV RBC AUTO: 80.3 FL (ref 79.8–96.2)
PLATELET # BLD AUTO: 163 10*3/MM3 (ref 140–500)
PMV BLD AUTO: 9.2 FL (ref 6–12)
POTASSIUM BLD-SCNC: 4.2 MMOL/L (ref 3.5–5.2)
RBC # BLD AUTO: 4.78 10*6/MM3 (ref 4.6–6)
SODIUM BLD-SCNC: 138 MMOL/L (ref 136–145)
WBC NRBC COR # BLD: 12.27 10*3/MM3 (ref 4.5–10.7)

## 2017-09-28 PROCEDURE — 94660 CPAP INITIATION&MGMT: CPT

## 2017-09-28 PROCEDURE — 63710000001 PREDNISONE PER 5 MG: Performed by: INTERNAL MEDICINE

## 2017-09-28 PROCEDURE — 94799 UNLISTED PULMONARY SVC/PX: CPT

## 2017-09-28 PROCEDURE — 63710000001 INSULIN ASPART PER 5 UNITS: Performed by: INTERNAL MEDICINE

## 2017-09-28 PROCEDURE — 99231 SBSQ HOSP IP/OBS SF/LOW 25: CPT | Performed by: FAMILY MEDICINE

## 2017-09-28 PROCEDURE — 82962 GLUCOSE BLOOD TEST: CPT

## 2017-09-28 PROCEDURE — 80048 BASIC METABOLIC PNL TOTAL CA: CPT | Performed by: INTERNAL MEDICINE

## 2017-09-28 PROCEDURE — 97110 THERAPEUTIC EXERCISES: CPT

## 2017-09-28 PROCEDURE — 85027 COMPLETE CBC AUTOMATED: CPT | Performed by: INTERNAL MEDICINE

## 2017-09-28 RX ADMIN — INSULIN DETEMIR 10 UNITS: 100 INJECTION, SOLUTION SUBCUTANEOUS at 21:29

## 2017-09-28 RX ADMIN — MICONAZOLE NITRATE: 2 POWDER TOPICAL at 08:51

## 2017-09-28 RX ADMIN — FAMOTIDINE 20 MG: 20 TABLET, FILM COATED ORAL at 08:50

## 2017-09-28 RX ADMIN — ZOLPIDEM TARTRATE 5 MG: 5 TABLET, FILM COATED ORAL at 22:46

## 2017-09-28 RX ADMIN — GUAIFENESIN 1200 MG: 600 TABLET, EXTENDED RELEASE ORAL at 20:19

## 2017-09-28 RX ADMIN — INSULIN ASPART 12 UNITS: 100 INJECTION, SOLUTION INTRAVENOUS; SUBCUTANEOUS at 12:07

## 2017-09-28 RX ADMIN — PREDNISONE 10 MG: 10 TABLET ORAL at 08:50

## 2017-09-28 RX ADMIN — GUAIFENESIN 1200 MG: 600 TABLET, EXTENDED RELEASE ORAL at 08:50

## 2017-09-28 RX ADMIN — TRAZODONE HYDROCHLORIDE 50 MG: 50 TABLET ORAL at 20:19

## 2017-09-28 RX ADMIN — FUROSEMIDE 40 MG: 40 TABLET ORAL at 08:50

## 2017-09-28 RX ADMIN — INSULIN ASPART 4 UNITS: 100 INJECTION, SOLUTION INTRAVENOUS; SUBCUTANEOUS at 18:20

## 2017-09-28 RX ADMIN — FAMOTIDINE 20 MG: 20 TABLET, FILM COATED ORAL at 18:21

## 2017-09-28 RX ADMIN — IPRATROPIUM BROMIDE AND ALBUTEROL SULFATE 3 ML: .5; 3 SOLUTION RESPIRATORY (INHALATION) at 04:50

## 2017-09-28 RX ADMIN — INSULIN ASPART 8 UNITS: 100 INJECTION, SOLUTION INTRAVENOUS; SUBCUTANEOUS at 21:29

## 2017-09-28 RX ADMIN — IPRATROPIUM BROMIDE AND ALBUTEROL SULFATE 3 ML: .5; 3 SOLUTION RESPIRATORY (INHALATION) at 12:11

## 2017-09-28 RX ADMIN — METRONIDAZOLE 500 MG: 500 TABLET, FILM COATED ORAL at 18:20

## 2017-09-28 RX ADMIN — AMLODIPINE BESYLATE 10 MG: 10 TABLET ORAL at 08:50

## 2017-09-28 RX ADMIN — INSULIN ASPART 4 UNITS: 100 INJECTION, SOLUTION INTRAVENOUS; SUBCUTANEOUS at 08:49

## 2017-09-28 RX ADMIN — METRONIDAZOLE 500 MG: 500 TABLET, FILM COATED ORAL at 05:51

## 2017-09-28 RX ADMIN — SENNOSIDES AND DOCUSATE SODIUM 2 TABLET: 8.6; 5 TABLET ORAL at 20:19

## 2017-09-28 RX ADMIN — ATORVASTATIN CALCIUM 20 MG: 20 TABLET, FILM COATED ORAL at 20:19

## 2017-09-28 RX ADMIN — METOPROLOL TARTRATE 25 MG: 25 TABLET ORAL at 18:21

## 2017-09-28 RX ADMIN — IPRATROPIUM BROMIDE AND ALBUTEROL SULFATE 3 ML: .5; 3 SOLUTION RESPIRATORY (INHALATION) at 20:56

## 2017-09-28 RX ADMIN — METOPROLOL TARTRATE 25 MG: 25 TABLET ORAL at 08:50

## 2017-09-28 RX ADMIN — METRONIDAZOLE 500 MG: 500 TABLET, FILM COATED ORAL at 23:27

## 2017-09-28 RX ADMIN — METRONIDAZOLE 500 MG: 500 TABLET, FILM COATED ORAL at 12:07

## 2017-09-28 RX ADMIN — IPRATROPIUM BROMIDE AND ALBUTEROL SULFATE 3 ML: .5; 3 SOLUTION RESPIRATORY (INHALATION) at 15:39

## 2017-09-28 RX ADMIN — TAMSULOSIN HYDROCHLORIDE 0.4 MG: 0.4 CAPSULE ORAL at 08:50

## 2017-09-28 RX ADMIN — IPRATROPIUM BROMIDE AND ALBUTEROL SULFATE 3 ML: .5; 3 SOLUTION RESPIRATORY (INHALATION) at 07:25

## 2017-09-28 RX ADMIN — TAMSULOSIN HYDROCHLORIDE 0.4 MG: 0.4 CAPSULE ORAL at 18:21

## 2017-09-28 RX ADMIN — MICONAZOLE NITRATE: 2 POWDER TOPICAL at 20:29

## 2017-09-29 ENCOUNTER — ANESTHESIA EVENT (OUTPATIENT)
Dept: PERIOP | Facility: HOSPITAL | Age: 62
End: 2017-09-29

## 2017-09-29 ENCOUNTER — APPOINTMENT (OUTPATIENT)
Dept: GENERAL RADIOLOGY | Facility: HOSPITAL | Age: 62
End: 2017-09-29

## 2017-09-29 ENCOUNTER — ANESTHESIA (OUTPATIENT)
Dept: PERIOP | Facility: HOSPITAL | Age: 62
End: 2017-09-29

## 2017-09-29 LAB
ANION GAP SERPL CALCULATED.3IONS-SCNC: 12.4 MMOL/L
ARTERIAL PATENCY WRIST A: ABNORMAL
ARTERIAL PATENCY WRIST A: ABNORMAL
ATMOSPHERIC PRESS: 752.3 MMHG
ATMOSPHERIC PRESS: 752.8 MMHG
BACTERIA FLD CULT: NORMAL
BACTERIA SPEC AEROBE CULT: NORMAL
BACTERIA SPEC AEROBE CULT: NORMAL
BASE EXCESS BLDA CALC-SCNC: 10.9 MMOL/L (ref 0–2)
BASE EXCESS BLDA CALC-SCNC: 8.7 MMOL/L (ref 0–2)
BDY SITE: ABNORMAL
BDY SITE: ABNORMAL
BUN BLD-MCNC: 14 MG/DL (ref 8–23)
BUN/CREAT SERPL: 22.6 (ref 7–25)
CALCIUM SPEC-SCNC: 9.4 MG/DL (ref 8.6–10.5)
CHLORIDE SERPL-SCNC: 93 MMOL/L (ref 98–107)
CO2 SERPL-SCNC: 35.6 MMOL/L (ref 22–29)
CREAT BLD-MCNC: 0.62 MG/DL (ref 0.76–1.27)
DEPRECATED RDW RBC AUTO: 54.8 FL (ref 37–54)
ERYTHROCYTE [DISTWIDTH] IN BLOOD BY AUTOMATED COUNT: 19.1 % (ref 11.5–14.5)
GFR SERPL CREATININE-BSD FRML MDRD: 131 ML/MIN/1.73
GFR SERPL CREATININE-BSD FRML MDRD: >150 ML/MIN/1.73
GLUCOSE BLD-MCNC: 76 MG/DL (ref 65–99)
GLUCOSE BLDC GLUCOMTR-MCNC: 101 MG/DL (ref 70–130)
GLUCOSE BLDC GLUCOMTR-MCNC: 101 MG/DL (ref 70–130)
GLUCOSE BLDC GLUCOMTR-MCNC: 110 MG/DL (ref 70–130)
GLUCOSE BLDC GLUCOMTR-MCNC: 26 MG/DL (ref 70–130)
GLUCOSE BLDC GLUCOMTR-MCNC: 40 MG/DL (ref 70–130)
GLUCOSE BLDC GLUCOMTR-MCNC: 44 MG/DL (ref 70–130)
GLUCOSE BLDC GLUCOMTR-MCNC: 65 MG/DL (ref 70–130)
GLUCOSE BLDC GLUCOMTR-MCNC: 78 MG/DL (ref 70–130)
GLUCOSE BLDC GLUCOMTR-MCNC: 80 MG/DL (ref 70–130)
GLUCOSE BLDC GLUCOMTR-MCNC: 91 MG/DL (ref 70–130)
GLUCOSE BLDC GLUCOMTR-MCNC: 94 MG/DL (ref 70–130)
GRAM STN SPEC: NORMAL
HCO3 BLDA-SCNC: 43.2 MMOL/L (ref 22–28)
HCO3 BLDA-SCNC: 43.3 MMOL/L (ref 22–28)
HCT VFR BLD AUTO: 40.3 % (ref 40.4–52.2)
HGB BLD-MCNC: 11.7 G/DL (ref 13.7–17.6)
HOROWITZ INDEX BLD+IHG-RTO: 100 %
HOROWITZ INDEX BLD+IHG-RTO: 60 %
MCH RBC QN AUTO: 23.3 PG (ref 27–32.7)
MCHC RBC AUTO-ENTMCNC: 29 G/DL (ref 32.6–36.4)
MCV RBC AUTO: 80.3 FL (ref 79.8–96.2)
MODALITY: ABNORMAL
MODALITY: ABNORMAL
O2 A-A PPRESDIFF RESPIRATORY: 0.2 MMHG
O2 A-A PPRESDIFF RESPIRATORY: 0.4 MMHG
PCO2 BLDA: 136.2 MM HG (ref 35–45)
PCO2 BLDA: 97.3 MM HG (ref 35–45)
PH BLDA: 7.11 PH UNITS (ref 7.35–7.45)
PH BLDA: 7.26 PH UNITS (ref 7.35–7.45)
PLATELET # BLD AUTO: 178 10*3/MM3 (ref 140–500)
PMV BLD AUTO: 9.8 FL (ref 6–12)
PO2 BLDA: 129.9 MM HG (ref 80–100)
PO2 BLDA: 136.5 MM HG (ref 80–100)
POTASSIUM BLD-SCNC: 4.2 MMOL/L (ref 3.5–5.2)
RBC # BLD AUTO: 5.02 10*6/MM3 (ref 4.6–6)
SAO2 % BLDCOA: 96.9 % (ref 92–99)
SAO2 % BLDCOA: 98.3 % (ref 92–99)
SET MECH RESP RATE: 28
SET MECH RESP RATE: 28
SODIUM BLD-SCNC: 141 MMOL/L (ref 136–145)
VENTILATOR MODE: ABNORMAL
VT ON VENT VENT: 500 ML
WBC NRBC COR # BLD: 10.75 10*3/MM3 (ref 4.5–10.7)

## 2017-09-29 PROCEDURE — 99231 SBSQ HOSP IP/OBS SF/LOW 25: CPT | Performed by: FAMILY MEDICINE

## 2017-09-29 PROCEDURE — 94799 UNLISTED PULMONARY SVC/PX: CPT

## 2017-09-29 PROCEDURE — 25010000002 PROPOFOL 10 MG/ML EMULSION: Performed by: NURSE ANESTHETIST, CERTIFIED REGISTERED

## 2017-09-29 PROCEDURE — 25010000002 PHENYLEPHRINE PER 1 ML: Performed by: NURSE ANESTHETIST, CERTIFIED REGISTERED

## 2017-09-29 PROCEDURE — 82803 BLOOD GASES ANY COMBINATION: CPT

## 2017-09-29 PROCEDURE — C1726 CATH, BAL DIL, NON-VASCULAR: HCPCS | Performed by: INTERNAL MEDICINE

## 2017-09-29 PROCEDURE — 76000 FLUOROSCOPY <1 HR PHYS/QHP: CPT

## 2017-09-29 PROCEDURE — 36600 WITHDRAWAL OF ARTERIAL BLOOD: CPT

## 2017-09-29 PROCEDURE — 94660 CPAP INITIATION&MGMT: CPT

## 2017-09-29 PROCEDURE — 0B718DZ DILATION OF TRACHEA WITH INTRALUMINAL DEVICE, VIA NATURAL OR ARTIFICIAL OPENING ENDOSCOPIC: ICD-10-PCS | Performed by: INTERNAL MEDICINE

## 2017-09-29 PROCEDURE — 80048 BASIC METABOLIC PNL TOTAL CA: CPT | Performed by: INTERNAL MEDICINE

## 2017-09-29 PROCEDURE — 0BP18DZ REMOVAL OF INTRALUMINAL DEVICE FROM TRACHEA, VIA NATURAL OR ARTIFICIAL OPENING ENDOSCOPIC: ICD-10-PCS | Performed by: INTERNAL MEDICINE

## 2017-09-29 PROCEDURE — 82962 GLUCOSE BLOOD TEST: CPT

## 2017-09-29 PROCEDURE — 71010 HC CHEST PA OR AP: CPT

## 2017-09-29 PROCEDURE — 63710000001 PREDNISONE PER 5 MG: Performed by: INTERNAL MEDICINE

## 2017-09-29 PROCEDURE — 25010000002 MIDAZOLAM PER 1 MG: Performed by: ANESTHESIOLOGY

## 2017-09-29 PROCEDURE — 25010000002 FUROSEMIDE PER 20 MG: Performed by: INTERNAL MEDICINE

## 2017-09-29 PROCEDURE — C1769 GUIDE WIRE: HCPCS | Performed by: INTERNAL MEDICINE

## 2017-09-29 PROCEDURE — 85027 COMPLETE CBC AUTOMATED: CPT | Performed by: INTERNAL MEDICINE

## 2017-09-29 PROCEDURE — 25010000002 MORPHINE SULFATE (PF) 2 MG/ML SOLUTION

## 2017-09-29 PROCEDURE — C1874 STENT, COATED/COV W/DEL SYS: HCPCS | Performed by: INTERNAL MEDICINE

## 2017-09-29 PROCEDURE — 25010000002 METHYLPREDNISOLONE PER 125 MG: Performed by: INTERNAL MEDICINE

## 2017-09-29 PROCEDURE — 25010000002 FENTANYL CITRATE (PF) 100 MCG/2ML SOLUTION: Performed by: ANESTHESIOLOGY

## 2017-09-29 DEVICE — DISTAL RELEASE STENT SYSTEM
Type: IMPLANTABLE DEVICE | Site: BRONCHUS | Status: FUNCTIONAL
Brand: ULTRAFLEX™ ESOPHAGEAL NG

## 2017-09-29 RX ORDER — PROMETHAZINE HYDROCHLORIDE 25 MG/ML
12.5 INJECTION, SOLUTION INTRAMUSCULAR; INTRAVENOUS ONCE AS NEEDED
Status: DISCONTINUED | OUTPATIENT
Start: 2017-09-29 | End: 2017-09-29 | Stop reason: HOSPADM

## 2017-09-29 RX ORDER — MIDAZOLAM HYDROCHLORIDE 1 MG/ML
1 INJECTION INTRAMUSCULAR; INTRAVENOUS
Status: DISCONTINUED | OUTPATIENT
Start: 2017-09-29 | End: 2017-09-29 | Stop reason: HOSPADM

## 2017-09-29 RX ORDER — MORPHINE SULFATE 2 MG/ML
1 INJECTION, SOLUTION INTRAMUSCULAR; INTRAVENOUS ONCE AS NEEDED
Status: COMPLETED | OUTPATIENT
Start: 2017-09-29 | End: 2017-09-29

## 2017-09-29 RX ORDER — FUROSEMIDE 10 MG/ML
40 INJECTION INTRAMUSCULAR; INTRAVENOUS ONCE
Status: COMPLETED | OUTPATIENT
Start: 2017-09-29 | End: 2017-09-29

## 2017-09-29 RX ORDER — SODIUM CHLORIDE, SODIUM LACTATE, POTASSIUM CHLORIDE, CALCIUM CHLORIDE 600; 310; 30; 20 MG/100ML; MG/100ML; MG/100ML; MG/100ML
9 INJECTION, SOLUTION INTRAVENOUS CONTINUOUS
Status: DISCONTINUED | OUTPATIENT
Start: 2017-09-29 | End: 2017-10-06 | Stop reason: HOSPADM

## 2017-09-29 RX ORDER — HYDROMORPHONE HYDROCHLORIDE 1 MG/ML
0.5 INJECTION, SOLUTION INTRAMUSCULAR; INTRAVENOUS; SUBCUTANEOUS
Status: DISCONTINUED | OUTPATIENT
Start: 2017-09-29 | End: 2017-09-29 | Stop reason: HOSPADM

## 2017-09-29 RX ORDER — EPHEDRINE SULFATE 50 MG/ML
5 INJECTION, SOLUTION INTRAVENOUS ONCE AS NEEDED
Status: DISCONTINUED | OUTPATIENT
Start: 2017-09-29 | End: 2017-09-29 | Stop reason: HOSPADM

## 2017-09-29 RX ORDER — SODIUM CHLORIDE 0.9 % (FLUSH) 0.9 %
1-10 SYRINGE (ML) INJECTION AS NEEDED
Status: DISCONTINUED | OUTPATIENT
Start: 2017-09-29 | End: 2017-09-29 | Stop reason: HOSPADM

## 2017-09-29 RX ORDER — PROPOFOL 10 MG/ML
VIAL (ML) INTRAVENOUS AS NEEDED
Status: DISCONTINUED | OUTPATIENT
Start: 2017-09-29 | End: 2017-09-29 | Stop reason: SURG

## 2017-09-29 RX ORDER — NALOXONE HCL 0.4 MG/ML
0.2 VIAL (ML) INJECTION AS NEEDED
Status: DISCONTINUED | OUTPATIENT
Start: 2017-09-29 | End: 2017-09-29 | Stop reason: HOSPADM

## 2017-09-29 RX ORDER — PROMETHAZINE HYDROCHLORIDE 25 MG/1
25 TABLET ORAL ONCE AS NEEDED
Status: DISCONTINUED | OUTPATIENT
Start: 2017-09-29 | End: 2017-09-29 | Stop reason: HOSPADM

## 2017-09-29 RX ORDER — LIDOCAINE HYDROCHLORIDE 20 MG/ML
INJECTION, SOLUTION INFILTRATION; PERINEURAL AS NEEDED
Status: DISCONTINUED | OUTPATIENT
Start: 2017-09-29 | End: 2017-09-29 | Stop reason: SURG

## 2017-09-29 RX ORDER — LORAZEPAM 2 MG/ML
1 INJECTION INTRAMUSCULAR ONCE AS NEEDED
Status: DISCONTINUED | OUTPATIENT
Start: 2017-09-29 | End: 2017-09-29 | Stop reason: HOSPADM

## 2017-09-29 RX ORDER — PROMETHAZINE HYDROCHLORIDE 25 MG/1
12.5 TABLET ORAL ONCE AS NEEDED
Status: DISCONTINUED | OUTPATIENT
Start: 2017-09-29 | End: 2017-09-29 | Stop reason: HOSPADM

## 2017-09-29 RX ORDER — FENTANYL CITRATE 50 UG/ML
50 INJECTION, SOLUTION INTRAMUSCULAR; INTRAVENOUS
Status: DISCONTINUED | OUTPATIENT
Start: 2017-09-29 | End: 2017-09-29 | Stop reason: HOSPADM

## 2017-09-29 RX ORDER — METHYLPREDNISOLONE SODIUM SUCCINATE 125 MG/2ML
60 INJECTION, POWDER, LYOPHILIZED, FOR SOLUTION INTRAMUSCULAR; INTRAVENOUS EVERY 6 HOURS
Status: DISCONTINUED | OUTPATIENT
Start: 2017-09-29 | End: 2017-09-30

## 2017-09-29 RX ORDER — ONDANSETRON 2 MG/ML
4 INJECTION INTRAMUSCULAR; INTRAVENOUS ONCE AS NEEDED
Status: DISCONTINUED | OUTPATIENT
Start: 2017-09-29 | End: 2017-09-29 | Stop reason: HOSPADM

## 2017-09-29 RX ORDER — LABETALOL HYDROCHLORIDE 5 MG/ML
5 INJECTION, SOLUTION INTRAVENOUS
Status: DISCONTINUED | OUTPATIENT
Start: 2017-09-29 | End: 2017-09-29 | Stop reason: HOSPADM

## 2017-09-29 RX ORDER — IPRATROPIUM BROMIDE AND ALBUTEROL SULFATE 2.5; .5 MG/3ML; MG/3ML
3 SOLUTION RESPIRATORY (INHALATION)
Status: DISCONTINUED | OUTPATIENT
Start: 2017-09-29 | End: 2017-10-06 | Stop reason: HOSPADM

## 2017-09-29 RX ORDER — LIDOCAINE HYDROCHLORIDE 10 MG/ML
INJECTION, SOLUTION EPIDURAL; INFILTRATION; INTRACAUDAL; PERINEURAL AS NEEDED
Status: DISCONTINUED | OUTPATIENT
Start: 2017-09-29 | End: 2017-09-29 | Stop reason: HOSPADM

## 2017-09-29 RX ORDER — HYDROCODONE BITARTRATE AND ACETAMINOPHEN 7.5; 325 MG/1; MG/1
1 TABLET ORAL ONCE AS NEEDED
Status: DISCONTINUED | OUTPATIENT
Start: 2017-09-29 | End: 2017-09-29 | Stop reason: HOSPADM

## 2017-09-29 RX ORDER — FLUMAZENIL 0.1 MG/ML
0.2 INJECTION INTRAVENOUS AS NEEDED
Status: DISCONTINUED | OUTPATIENT
Start: 2017-09-29 | End: 2017-09-29 | Stop reason: HOSPADM

## 2017-09-29 RX ORDER — MORPHINE SULFATE 2 MG/ML
INJECTION, SOLUTION INTRAMUSCULAR; INTRAVENOUS
Status: COMPLETED
Start: 2017-09-29 | End: 2017-09-29

## 2017-09-29 RX ORDER — PROMETHAZINE HYDROCHLORIDE 25 MG/1
25 SUPPOSITORY RECTAL ONCE AS NEEDED
Status: DISCONTINUED | OUTPATIENT
Start: 2017-09-29 | End: 2017-09-29 | Stop reason: HOSPADM

## 2017-09-29 RX ORDER — GLYCOPYRROLATE 0.2 MG/ML
0.4 INJECTION INTRAMUSCULAR; INTRAVENOUS ONCE
Status: DISCONTINUED | OUTPATIENT
Start: 2017-09-29 | End: 2017-09-29

## 2017-09-29 RX ORDER — GLYCOPYRROLATE 0.2 MG/ML
0.2 INJECTION INTRAMUSCULAR; INTRAVENOUS ONCE AS NEEDED
Status: COMPLETED | OUTPATIENT
Start: 2017-09-29 | End: 2017-09-29

## 2017-09-29 RX ORDER — FAMOTIDINE 10 MG/ML
20 INJECTION, SOLUTION INTRAVENOUS ONCE
Status: COMPLETED | OUTPATIENT
Start: 2017-09-29 | End: 2017-09-29

## 2017-09-29 RX ORDER — MIDAZOLAM HYDROCHLORIDE 1 MG/ML
2 INJECTION INTRAMUSCULAR; INTRAVENOUS
Status: DISCONTINUED | OUTPATIENT
Start: 2017-09-29 | End: 2017-09-29 | Stop reason: HOSPADM

## 2017-09-29 RX ORDER — GLYCOPYRROLATE 0.2 MG/ML
INJECTION INTRAMUSCULAR; INTRAVENOUS
Status: COMPLETED
Start: 2017-09-29 | End: 2017-09-29

## 2017-09-29 RX ORDER — PROPOFOL 10 MG/ML
VIAL (ML) INTRAVENOUS CONTINUOUS PRN
Status: DISCONTINUED | OUTPATIENT
Start: 2017-09-29 | End: 2017-09-29 | Stop reason: SURG

## 2017-09-29 RX ORDER — HYDRALAZINE HYDROCHLORIDE 20 MG/ML
5 INJECTION INTRAMUSCULAR; INTRAVENOUS
Status: DISCONTINUED | OUTPATIENT
Start: 2017-09-29 | End: 2017-09-29 | Stop reason: HOSPADM

## 2017-09-29 RX ORDER — DIPHENHYDRAMINE HYDROCHLORIDE 50 MG/ML
12.5 INJECTION INTRAMUSCULAR; INTRAVENOUS
Status: DISCONTINUED | OUTPATIENT
Start: 2017-09-29 | End: 2017-09-29 | Stop reason: HOSPADM

## 2017-09-29 RX ORDER — IPRATROPIUM BROMIDE AND ALBUTEROL SULFATE 2.5; .5 MG/3ML; MG/3ML
3 SOLUTION RESPIRATORY (INHALATION) EVERY 4 HOURS PRN
Status: DISCONTINUED | OUTPATIENT
Start: 2017-09-29 | End: 2017-10-06 | Stop reason: HOSPADM

## 2017-09-29 RX ORDER — DEXTROSE MONOHYDRATE 25 G/50ML
50 INJECTION, SOLUTION INTRAVENOUS
Status: DISCONTINUED | OUTPATIENT
Start: 2017-09-29 | End: 2017-09-29 | Stop reason: HOSPADM

## 2017-09-29 RX ORDER — OXYCODONE AND ACETAMINOPHEN 7.5; 325 MG/1; MG/1
1 TABLET ORAL ONCE AS NEEDED
Status: DISCONTINUED | OUTPATIENT
Start: 2017-09-29 | End: 2017-09-29 | Stop reason: HOSPADM

## 2017-09-29 RX ADMIN — MORPHINE SULFATE 1 MG: 2 INJECTION, SOLUTION INTRAMUSCULAR; INTRAVENOUS at 11:43

## 2017-09-29 RX ADMIN — IPRATROPIUM BROMIDE AND ALBUTEROL SULFATE 3 ML: .5; 3 SOLUTION RESPIRATORY (INHALATION) at 19:32

## 2017-09-29 RX ADMIN — LIDOCAINE HYDROCHLORIDE 60 MG: 20 INJECTION, SOLUTION INFILTRATION; PERINEURAL at 09:58

## 2017-09-29 RX ADMIN — GLYCOPYRROLATE 0.2 MG: 0.2 INJECTION, SOLUTION INTRAMUSCULAR; INTRAVENOUS at 12:22

## 2017-09-29 RX ADMIN — DEXTROSE MONOHYDRATE 25 G: 25 INJECTION, SOLUTION INTRAVENOUS at 18:45

## 2017-09-29 RX ADMIN — FUROSEMIDE 40 MG: 10 INJECTION, SOLUTION INTRAMUSCULAR; INTRAVENOUS at 13:14

## 2017-09-29 RX ADMIN — DEXTROSE MONOHYDRATE 25 G: 25 INJECTION, SOLUTION INTRAVENOUS at 05:16

## 2017-09-29 RX ADMIN — IPRATROPIUM BROMIDE AND ALBUTEROL SULFATE 3 ML: .5; 3 SOLUTION RESPIRATORY (INHALATION) at 13:44

## 2017-09-29 RX ADMIN — TRAZODONE HYDROCHLORIDE 50 MG: 50 TABLET ORAL at 22:29

## 2017-09-29 RX ADMIN — GLYCOPYRROLATE 0.2 MG: 0.2 INJECTION, SOLUTION INTRAMUSCULAR; INTRAVENOUS at 11:57

## 2017-09-29 RX ADMIN — SODIUM CHLORIDE, POTASSIUM CHLORIDE, SODIUM LACTATE AND CALCIUM CHLORIDE 9 ML/HR: 600; 310; 30; 20 INJECTION, SOLUTION INTRAVENOUS at 09:19

## 2017-09-29 RX ADMIN — ATORVASTATIN CALCIUM 20 MG: 20 TABLET, FILM COATED ORAL at 22:50

## 2017-09-29 RX ADMIN — LIDOCAINE HYDROCHLORIDE 100 MG: 20 INJECTION, SOLUTION INFILTRATION; PERINEURAL at 14:34

## 2017-09-29 RX ADMIN — PROPOFOL 140 MCG/KG/MIN: 10 INJECTION, EMULSION INTRAVENOUS at 09:58

## 2017-09-29 RX ADMIN — METHYLPREDNISOLONE SODIUM SUCCINATE 60 MG: 125 INJECTION, POWDER, FOR SOLUTION INTRAMUSCULAR; INTRAVENOUS at 13:14

## 2017-09-29 RX ADMIN — FAMOTIDINE 20 MG: 10 INJECTION, SOLUTION INTRAVENOUS at 09:32

## 2017-09-29 RX ADMIN — FENTANYL CITRATE 50 MCG: 50 INJECTION INTRAMUSCULAR; INTRAVENOUS at 12:23

## 2017-09-29 RX ADMIN — MICONAZOLE NITRATE: 2 POWDER TOPICAL at 22:49

## 2017-09-29 RX ADMIN — PROPOFOL 100 MG: 10 INJECTION, EMULSION INTRAVENOUS at 09:58

## 2017-09-29 RX ADMIN — IPRATROPIUM BROMIDE AND ALBUTEROL SULFATE 3 ML: .5; 3 SOLUTION RESPIRATORY (INHALATION) at 04:58

## 2017-09-29 RX ADMIN — METRONIDAZOLE 500 MG: 500 TABLET, FILM COATED ORAL at 22:30

## 2017-09-29 RX ADMIN — DEXTROSE MONOHYDRATE 50 ML: 25 INJECTION, SOLUTION INTRAVENOUS at 13:39

## 2017-09-29 RX ADMIN — METOPROLOL TARTRATE 25 MG: 25 TABLET ORAL at 08:46

## 2017-09-29 RX ADMIN — EPHEDRINE SULFATE 10 MG: 50 INJECTION INTRAMUSCULAR; INTRAVENOUS; SUBCUTANEOUS at 10:08

## 2017-09-29 RX ADMIN — GUAIFENESIN 1200 MG: 600 TABLET, EXTENDED RELEASE ORAL at 22:30

## 2017-09-29 RX ADMIN — IPRATROPIUM BROMIDE AND ALBUTEROL SULFATE 3 ML: .5; 3 SOLUTION RESPIRATORY (INHALATION) at 16:41

## 2017-09-29 RX ADMIN — PROPOFOL 150 MG: 10 INJECTION, EMULSION INTRAVENOUS at 14:34

## 2017-09-29 RX ADMIN — PHENYLEPHRINE HYDROCHLORIDE 200 MCG: 10 INJECTION INTRAVENOUS at 10:05

## 2017-09-29 RX ADMIN — FENTANYL CITRATE 50 MCG: 50 INJECTION INTRAMUSCULAR; INTRAVENOUS at 11:34

## 2017-09-29 RX ADMIN — METRONIDAZOLE 500 MG: 500 TABLET, FILM COATED ORAL at 05:16

## 2017-09-29 RX ADMIN — IPRATROPIUM BROMIDE AND ALBUTEROL SULFATE 3 ML: .5; 3 SOLUTION RESPIRATORY (INHALATION) at 23:51

## 2017-09-29 RX ADMIN — PHENYLEPHRINE HYDROCHLORIDE 100 MCG: 10 INJECTION INTRAVENOUS at 10:01

## 2017-09-29 RX ADMIN — SODIUM CHLORIDE, POTASSIUM CHLORIDE, SODIUM LACTATE AND CALCIUM CHLORIDE: 600; 310; 30; 20 INJECTION, SOLUTION INTRAVENOUS at 08:02

## 2017-09-29 RX ADMIN — MIDAZOLAM 1 MG: 1 INJECTION INTRAMUSCULAR; INTRAVENOUS at 09:39

## 2017-09-29 RX ADMIN — PHENYLEPHRINE HYDROCHLORIDE 200 MCG: 10 INJECTION INTRAVENOUS at 10:20

## 2017-09-29 NOTE — ANESTHESIA PREPROCEDURE EVALUATION
Anesthesia Evaluation            Airway   Mallampati: III  difficult intubation highly probable  Dental    (+) poor dentition    Pulmonary    (+) a smoker Former,   Cardiovascular     Rhythm: irregular  Rate: normal        Neuro/Psych  GI/Hepatic/Renal/Endo    (+)  diabetes mellitus type 2 using insulin,     Musculoskeletal     Abdominal    Substance History      OB/GYN          Other                                        Anesthesia Plan    ASA 3     general   (Tracheal stenosis  Multiple medical problems  )  intravenous induction   Anesthetic plan and risks discussed with patient.

## 2017-09-29 NOTE — ANESTHESIA PROCEDURE NOTES
Airway  Date/Time: 9/29/2017 2:35 PM  Airway not difficult    General Information and Staff    Patient location during procedure: OR  Anesthesiologist: ALYSSA BARROSO  CRNA: RADHA SERRA    Indications and Patient Condition  Indications for airway management: airway protection    Preoxygenated: yes  MILS maintained throughout  Mask difficulty assessment: 1 - vent by mask    Final Airway Details  Final airway type: supraglottic airway      Successful airway: unique  Size 5    Number of attempts at approach: 1

## 2017-09-29 NOTE — ANESTHESIA PREPROCEDURE EVALUATION
Anesthesia Evaluation            Airway   Mallampati: III  difficult intubation highly probable  Dental    (+) poor dentition    Pulmonary    (+) a smoker Former, COPD severe,     ROS comment: tracheo-malacia  Cardiovascular     Rhythm: irregular  Rate: normal        Neuro/Psych  GI/Hepatic/Renal/Endo    (+)  hepatitis C, liver disease, diabetes mellitus type 2 using insulin,     Musculoskeletal     Abdominal    Substance History      OB/GYN          Other                                          Anesthesia Plan    ASA 3 - emergent     general   (Tracheal stenosis  Multiple medical problems  )  intravenous induction   Anesthetic plan and risks discussed with patient.

## 2017-09-29 NOTE — ANESTHESIA POSTPROCEDURE EVALUATION
Patient: Chuckie Valdez    Procedure Summary     Date Anesthesia Start Anesthesia Stop Room / Location    09/29/17 1431 1510  [facility] / [facility] MAIN OR       Procedure Diagnosis Surgeon Provider    BRONCHOSCOPY WITH STENT REMOVAL (N/A Bronchus) No diagnosis on file. MD Ebenezer Multani MD          Anesthesia Type: general  Last vitals  BP   120/70 (09/29/17 1525)    Temp   36.6 °C (97.8 °F) (09/29/17 1507)    Pulse   89 (09/29/17 1525)   Resp   16 (09/29/17 1525)    SpO2   100 % (09/29/17 1525)      Post Anesthesia Care and Evaluation    Patient location during evaluation: bedside  Patient participation: complete - patient participated  Level of consciousness: awake  Pain score: 1  Pain management: adequate  Airway patency: patent  Anesthetic complications: No anesthetic complications    Cardiovascular status: acceptable  Respiratory status: acceptable  Hydration status: acceptable    Comments: --------------------            09/29/17               1525     --------------------   BP:       120/70     Pulse:      89       Resp:       16       Temp:                SpO2:      100%     --------------------

## 2017-09-29 NOTE — ANESTHESIA POSTPROCEDURE EVALUATION
Patient: Chuckie Valdez    Procedure Summary     Date Anesthesia Start Anesthesia Stop Room / Location    09/29/17 0948 1057  [facility] / [facility]       Procedure Diagnosis Surgeon Provider    BRONCHOSCOPY WITH BALLON DILATION 19MM AND STENT PLACEMENT (N/A Bronchus) Tracheal stenosis due to tracheostomy  (Tracheal stenosis due to tracheostomy [J95.09]) MD Noe Multani MD          Anesthesia Type: general  Last vitals  BP   125/71 (09/29/17 1225)    Temp        Pulse   93 (09/29/17 1230)   Resp   23 (09/29/17 1230)    SpO2   (!) 88 % (09/29/17 1230)      Post Anesthesia Care and Evaluation    Patient location during evaluation: PACU  Patient participation: complete - patient cannot participate  Level of consciousness: obtunded/minimal responses  Airway patency: fixed obstruction  Anesthetic complications: No anesthetic complications    Cardiovascular status: acceptable  Respiratory status: unstable, spontaneous ventilation and CPAP    Comments: Sub vocal cord stent placed by pulmonary  Patient retaining CO2 and breathing inefficiently  CPAP applied

## 2017-09-30 LAB
ANION GAP SERPL CALCULATED.3IONS-SCNC: 8.1 MMOL/L
BUN BLD-MCNC: 20 MG/DL (ref 8–23)
BUN/CREAT SERPL: 29.9 (ref 7–25)
CALCIUM SPEC-SCNC: 9.5 MG/DL (ref 8.6–10.5)
CHLORIDE SERPL-SCNC: 89 MMOL/L (ref 98–107)
CO2 SERPL-SCNC: 38.9 MMOL/L (ref 22–29)
CREAT BLD-MCNC: 0.67 MG/DL (ref 0.76–1.27)
DEPRECATED RDW RBC AUTO: 54.6 FL (ref 37–54)
ERYTHROCYTE [DISTWIDTH] IN BLOOD BY AUTOMATED COUNT: 19 % (ref 11.5–14.5)
GFR SERPL CREATININE-BSD FRML MDRD: 120 ML/MIN/1.73
GFR SERPL CREATININE-BSD FRML MDRD: 146 ML/MIN/1.73
GLUCOSE BLD-MCNC: 191 MG/DL (ref 65–99)
GLUCOSE BLDC GLUCOMTR-MCNC: 235 MG/DL (ref 70–130)
GLUCOSE BLDC GLUCOMTR-MCNC: 259 MG/DL (ref 70–130)
GLUCOSE BLDC GLUCOMTR-MCNC: 259 MG/DL (ref 70–130)
GLUCOSE BLDC GLUCOMTR-MCNC: 289 MG/DL (ref 70–130)
GLUCOSE BLDC GLUCOMTR-MCNC: 385 MG/DL (ref 70–130)
GLUCOSE BLDC GLUCOMTR-MCNC: 386 MG/DL (ref 70–130)
GLUCOSE BLDC GLUCOMTR-MCNC: 411 MG/DL (ref 70–130)
GLUCOSE BLDC GLUCOMTR-MCNC: 84 MG/DL (ref 70–130)
HCT VFR BLD AUTO: 37.8 % (ref 40.4–52.2)
HGB BLD-MCNC: 11 G/DL (ref 13.7–17.6)
MCH RBC QN AUTO: 23.3 PG (ref 27–32.7)
MCHC RBC AUTO-ENTMCNC: 29.1 G/DL (ref 32.6–36.4)
MCV RBC AUTO: 79.9 FL (ref 79.8–96.2)
PLATELET # BLD AUTO: 225 10*3/MM3 (ref 140–500)
PMV BLD AUTO: 9.4 FL (ref 6–12)
POTASSIUM BLD-SCNC: 4.7 MMOL/L (ref 3.5–5.2)
PROCALCITONIN SERPL-MCNC: 0.08 NG/ML (ref 0.1–0.25)
RBC # BLD AUTO: 4.73 10*6/MM3 (ref 4.6–6)
SODIUM BLD-SCNC: 136 MMOL/L (ref 136–145)
WBC NRBC COR # BLD: 4.72 10*3/MM3 (ref 4.5–10.7)

## 2017-09-30 PROCEDURE — 80048 BASIC METABOLIC PNL TOTAL CA: CPT | Performed by: INTERNAL MEDICINE

## 2017-09-30 PROCEDURE — 84145 PROCALCITONIN (PCT): CPT | Performed by: INTERNAL MEDICINE

## 2017-09-30 PROCEDURE — 63710000001 INSULIN ASPART PER 5 UNITS: Performed by: INTERNAL MEDICINE

## 2017-09-30 PROCEDURE — 99231 SBSQ HOSP IP/OBS SF/LOW 25: CPT | Performed by: FAMILY MEDICINE

## 2017-09-30 PROCEDURE — 94799 UNLISTED PULMONARY SVC/PX: CPT

## 2017-09-30 PROCEDURE — 82962 GLUCOSE BLOOD TEST: CPT

## 2017-09-30 PROCEDURE — 93005 ELECTROCARDIOGRAM TRACING: CPT | Performed by: INTERNAL MEDICINE

## 2017-09-30 PROCEDURE — 92610 EVALUATE SWALLOWING FUNCTION: CPT

## 2017-09-30 PROCEDURE — 85027 COMPLETE CBC AUTOMATED: CPT | Performed by: INTERNAL MEDICINE

## 2017-09-30 PROCEDURE — 25010000002 METHYLPREDNISOLONE PER 125 MG: Performed by: INTERNAL MEDICINE

## 2017-09-30 PROCEDURE — 93010 ELECTROCARDIOGRAM REPORT: CPT | Performed by: INTERNAL MEDICINE

## 2017-09-30 RX ORDER — METHYLPREDNISOLONE SODIUM SUCCINATE 40 MG/ML
40 INJECTION, POWDER, LYOPHILIZED, FOR SOLUTION INTRAMUSCULAR; INTRAVENOUS EVERY 6 HOURS
Status: DISCONTINUED | OUTPATIENT
Start: 2017-09-30 | End: 2017-10-01

## 2017-09-30 RX ADMIN — IPRATROPIUM BROMIDE AND ALBUTEROL SULFATE 3 ML: .5; 3 SOLUTION RESPIRATORY (INHALATION) at 03:30

## 2017-09-30 RX ADMIN — MICONAZOLE NITRATE: 2 POWDER TOPICAL at 12:07

## 2017-09-30 RX ADMIN — SENNOSIDES AND DOCUSATE SODIUM 2 TABLET: 8.6; 5 TABLET ORAL at 21:26

## 2017-09-30 RX ADMIN — INSULIN ASPART 12 UNITS: 100 INJECTION, SOLUTION INTRAVENOUS; SUBCUTANEOUS at 21:41

## 2017-09-30 RX ADMIN — IPRATROPIUM BROMIDE AND ALBUTEROL SULFATE 3 ML: .5; 3 SOLUTION RESPIRATORY (INHALATION) at 11:56

## 2017-09-30 RX ADMIN — ATORVASTATIN CALCIUM 20 MG: 20 TABLET, FILM COATED ORAL at 21:29

## 2017-09-30 RX ADMIN — IPRATROPIUM BROMIDE AND ALBUTEROL SULFATE 3 ML: .5; 3 SOLUTION RESPIRATORY (INHALATION) at 07:07

## 2017-09-30 RX ADMIN — METHYLPREDNISOLONE SODIUM SUCCINATE 40 MG: 125 INJECTION, POWDER, FOR SOLUTION INTRAMUSCULAR; INTRAVENOUS at 12:23

## 2017-09-30 RX ADMIN — APIXABAN 5 MG: 5 TABLET, FILM COATED ORAL at 09:01

## 2017-09-30 RX ADMIN — TRAZODONE HYDROCHLORIDE 50 MG: 50 TABLET ORAL at 22:44

## 2017-09-30 RX ADMIN — METOPROLOL TARTRATE 25 MG: 25 TABLET ORAL at 09:01

## 2017-09-30 RX ADMIN — ZOLPIDEM TARTRATE 5 MG: 5 TABLET, FILM COATED ORAL at 22:44

## 2017-09-30 RX ADMIN — METRONIDAZOLE 500 MG: 500 TABLET, FILM COATED ORAL at 05:49

## 2017-09-30 RX ADMIN — METRONIDAZOLE 500 MG: 500 TABLET, FILM COATED ORAL at 12:23

## 2017-09-30 RX ADMIN — INSULIN ASPART 20 UNITS: 100 INJECTION, SOLUTION INTRAVENOUS; SUBCUTANEOUS at 17:30

## 2017-09-30 RX ADMIN — FAMOTIDINE 20 MG: 20 TABLET, FILM COATED ORAL at 09:01

## 2017-09-30 RX ADMIN — GUAIFENESIN 1200 MG: 600 TABLET, EXTENDED RELEASE ORAL at 21:26

## 2017-09-30 RX ADMIN — IPRATROPIUM BROMIDE AND ALBUTEROL SULFATE 3 ML: .5; 3 SOLUTION RESPIRATORY (INHALATION) at 20:11

## 2017-09-30 RX ADMIN — INSULIN ASPART 20 UNITS: 100 INJECTION, SOLUTION INTRAVENOUS; SUBCUTANEOUS at 12:15

## 2017-09-30 RX ADMIN — METHYLPREDNISOLONE SODIUM SUCCINATE 40 MG: 125 INJECTION, POWDER, FOR SOLUTION INTRAMUSCULAR; INTRAVENOUS at 18:27

## 2017-09-30 RX ADMIN — METRONIDAZOLE 500 MG: 500 TABLET, FILM COATED ORAL at 18:27

## 2017-09-30 RX ADMIN — TAMSULOSIN HYDROCHLORIDE 0.4 MG: 0.4 CAPSULE ORAL at 09:01

## 2017-09-30 RX ADMIN — INSULIN ASPART 12 UNITS: 100 INJECTION, SOLUTION INTRAVENOUS; SUBCUTANEOUS at 09:08

## 2017-09-30 RX ADMIN — FUROSEMIDE 40 MG: 40 TABLET ORAL at 09:02

## 2017-09-30 RX ADMIN — MICONAZOLE NITRATE: 2 POWDER TOPICAL at 21:28

## 2017-09-30 RX ADMIN — APIXABAN 5 MG: 5 TABLET, FILM COATED ORAL at 21:27

## 2017-09-30 RX ADMIN — METOPROLOL TARTRATE 25 MG: 25 TABLET ORAL at 18:27

## 2017-09-30 RX ADMIN — GUAIFENESIN 1200 MG: 600 TABLET, EXTENDED RELEASE ORAL at 09:01

## 2017-09-30 RX ADMIN — FAMOTIDINE 20 MG: 20 TABLET, FILM COATED ORAL at 18:27

## 2017-09-30 RX ADMIN — TAMSULOSIN HYDROCHLORIDE 0.4 MG: 0.4 CAPSULE ORAL at 18:27

## 2017-09-30 RX ADMIN — METHYLPREDNISOLONE SODIUM SUCCINATE 60 MG: 125 INJECTION, POWDER, FOR SOLUTION INTRAMUSCULAR; INTRAVENOUS at 00:28

## 2017-09-30 RX ADMIN — IPRATROPIUM BROMIDE AND ALBUTEROL SULFATE 3 ML: .5; 3 SOLUTION RESPIRATORY (INHALATION) at 16:14

## 2017-09-30 RX ADMIN — METHYLPREDNISOLONE SODIUM SUCCINATE 60 MG: 125 INJECTION, POWDER, FOR SOLUTION INTRAMUSCULAR; INTRAVENOUS at 05:49

## 2017-09-30 RX ADMIN — AMLODIPINE BESYLATE 10 MG: 10 TABLET ORAL at 09:08

## 2017-09-30 RX ADMIN — INSULIN DETEMIR 10 UNITS: 100 INJECTION, SOLUTION SUBCUTANEOUS at 21:26

## 2017-10-01 ENCOUNTER — APPOINTMENT (OUTPATIENT)
Dept: CT IMAGING | Facility: HOSPITAL | Age: 62
End: 2017-10-01

## 2017-10-01 ENCOUNTER — APPOINTMENT (OUTPATIENT)
Dept: GENERAL RADIOLOGY | Facility: HOSPITAL | Age: 62
End: 2017-10-01

## 2017-10-01 LAB
ANION GAP SERPL CALCULATED.3IONS-SCNC: 11 MMOL/L
BUN BLD-MCNC: 20 MG/DL (ref 8–23)
BUN/CREAT SERPL: 35.1 (ref 7–25)
CALCIUM SPEC-SCNC: 9.4 MG/DL (ref 8.6–10.5)
CHLORIDE SERPL-SCNC: 92 MMOL/L (ref 98–107)
CO2 SERPL-SCNC: 33 MMOL/L (ref 22–29)
CREAT BLD-MCNC: 0.57 MG/DL (ref 0.76–1.27)
DEPRECATED RDW RBC AUTO: 52.1 FL (ref 37–54)
ERYTHROCYTE [DISTWIDTH] IN BLOOD BY AUTOMATED COUNT: 18.7 % (ref 11.5–14.5)
GFR SERPL CREATININE-BSD FRML MDRD: 145 ML/MIN/1.73
GFR SERPL CREATININE-BSD FRML MDRD: >150 ML/MIN/1.73
GLUCOSE BLD-MCNC: 241 MG/DL (ref 65–99)
GLUCOSE BLDC GLUCOMTR-MCNC: 225 MG/DL (ref 70–130)
GLUCOSE BLDC GLUCOMTR-MCNC: 237 MG/DL (ref 70–130)
GLUCOSE BLDC GLUCOMTR-MCNC: 243 MG/DL (ref 70–130)
GLUCOSE BLDC GLUCOMTR-MCNC: 266 MG/DL (ref 70–130)
GLUCOSE BLDC GLUCOMTR-MCNC: 287 MG/DL (ref 70–130)
GLUCOSE BLDC GLUCOMTR-MCNC: 400 MG/DL (ref 70–130)
HCT VFR BLD AUTO: 35.5 % (ref 40.4–52.2)
HGB BLD-MCNC: 10.7 G/DL (ref 13.7–17.6)
MCH RBC QN AUTO: 23.3 PG (ref 27–32.7)
MCHC RBC AUTO-ENTMCNC: 30.1 G/DL (ref 32.6–36.4)
MCV RBC AUTO: 77.2 FL (ref 79.8–96.2)
PLATELET # BLD AUTO: 285 10*3/MM3 (ref 140–500)
PMV BLD AUTO: 9.6 FL (ref 6–12)
POTASSIUM BLD-SCNC: 4 MMOL/L (ref 3.5–5.2)
RBC # BLD AUTO: 4.6 10*6/MM3 (ref 4.6–6)
SODIUM BLD-SCNC: 136 MMOL/L (ref 136–145)
WBC NRBC COR # BLD: 5.92 10*3/MM3 (ref 4.5–10.7)

## 2017-10-01 PROCEDURE — 71250 CT THORAX DX C-: CPT

## 2017-10-01 PROCEDURE — 82962 GLUCOSE BLOOD TEST: CPT

## 2017-10-01 PROCEDURE — 94799 UNLISTED PULMONARY SVC/PX: CPT

## 2017-10-01 PROCEDURE — 25010000002 METHYLPREDNISOLONE PER 40 MG: Performed by: INTERNAL MEDICINE

## 2017-10-01 PROCEDURE — 71010 HC CHEST PA OR AP: CPT

## 2017-10-01 PROCEDURE — 80048 BASIC METABOLIC PNL TOTAL CA: CPT | Performed by: INTERNAL MEDICINE

## 2017-10-01 PROCEDURE — 63710000001 INSULIN ASPART PER 5 UNITS: Performed by: INTERNAL MEDICINE

## 2017-10-01 PROCEDURE — 70490 CT SOFT TISSUE NECK W/O DYE: CPT

## 2017-10-01 PROCEDURE — 99231 SBSQ HOSP IP/OBS SF/LOW 25: CPT | Performed by: FAMILY MEDICINE

## 2017-10-01 PROCEDURE — 63710000001 PREDNISONE PER 1 MG: Performed by: INTERNAL MEDICINE

## 2017-10-01 PROCEDURE — 85027 COMPLETE CBC AUTOMATED: CPT | Performed by: INTERNAL MEDICINE

## 2017-10-01 RX ORDER — PREDNISONE 20 MG/1
40 TABLET ORAL
Status: DISCONTINUED | OUTPATIENT
Start: 2017-10-01 | End: 2017-10-02

## 2017-10-01 RX ADMIN — MICONAZOLE NITRATE: 2 POWDER TOPICAL at 09:05

## 2017-10-01 RX ADMIN — IPRATROPIUM BROMIDE AND ALBUTEROL SULFATE 3 ML: .5; 3 SOLUTION RESPIRATORY (INHALATION) at 11:44

## 2017-10-01 RX ADMIN — INSULIN ASPART 12 UNITS: 100 INJECTION, SOLUTION INTRAVENOUS; SUBCUTANEOUS at 17:23

## 2017-10-01 RX ADMIN — IPRATROPIUM BROMIDE AND ALBUTEROL SULFATE 3 ML: .5; 3 SOLUTION RESPIRATORY (INHALATION) at 07:10

## 2017-10-01 RX ADMIN — FUROSEMIDE 40 MG: 40 TABLET ORAL at 09:05

## 2017-10-01 RX ADMIN — FAMOTIDINE 20 MG: 20 TABLET, FILM COATED ORAL at 17:23

## 2017-10-01 RX ADMIN — INSULIN ASPART 12 UNITS: 100 INJECTION, SOLUTION INTRAVENOUS; SUBCUTANEOUS at 21:05

## 2017-10-01 RX ADMIN — IPRATROPIUM BROMIDE AND ALBUTEROL SULFATE 3 ML: .5; 3 SOLUTION RESPIRATORY (INHALATION) at 20:40

## 2017-10-01 RX ADMIN — FAMOTIDINE 20 MG: 20 TABLET, FILM COATED ORAL at 09:05

## 2017-10-01 RX ADMIN — METHYLPREDNISOLONE SODIUM SUCCINATE 40 MG: 125 INJECTION, POWDER, FOR SOLUTION INTRAMUSCULAR; INTRAVENOUS at 06:23

## 2017-10-01 RX ADMIN — IPRATROPIUM BROMIDE AND ALBUTEROL SULFATE 3 ML: .5; 3 SOLUTION RESPIRATORY (INHALATION) at 00:27

## 2017-10-01 RX ADMIN — METHYLPREDNISOLONE SODIUM SUCCINATE 40 MG: 125 INJECTION, POWDER, FOR SOLUTION INTRAMUSCULAR; INTRAVENOUS at 00:18

## 2017-10-01 RX ADMIN — MICONAZOLE NITRATE: 2 POWDER TOPICAL at 21:09

## 2017-10-01 RX ADMIN — IPRATROPIUM BROMIDE AND ALBUTEROL SULFATE 3 ML: .5; 3 SOLUTION RESPIRATORY (INHALATION) at 15:44

## 2017-10-01 RX ADMIN — METRONIDAZOLE 500 MG: 500 TABLET, FILM COATED ORAL at 12:46

## 2017-10-01 RX ADMIN — IPRATROPIUM BROMIDE AND ALBUTEROL SULFATE 3 ML: .5; 3 SOLUTION RESPIRATORY (INHALATION) at 04:37

## 2017-10-01 RX ADMIN — METRONIDAZOLE 500 MG: 500 TABLET, FILM COATED ORAL at 17:23

## 2017-10-01 RX ADMIN — ATORVASTATIN CALCIUM 20 MG: 20 TABLET, FILM COATED ORAL at 21:03

## 2017-10-01 RX ADMIN — AMLODIPINE BESYLATE 10 MG: 10 TABLET ORAL at 09:05

## 2017-10-01 RX ADMIN — METOPROLOL TARTRATE 25 MG: 25 TABLET ORAL at 09:05

## 2017-10-01 RX ADMIN — INSULIN ASPART 20 UNITS: 100 INJECTION, SOLUTION INTRAVENOUS; SUBCUTANEOUS at 12:46

## 2017-10-01 RX ADMIN — APIXABAN 5 MG: 5 TABLET, FILM COATED ORAL at 09:05

## 2017-10-01 RX ADMIN — TAMSULOSIN HYDROCHLORIDE 0.4 MG: 0.4 CAPSULE ORAL at 17:23

## 2017-10-01 RX ADMIN — METOPROLOL TARTRATE 25 MG: 25 TABLET ORAL at 17:23

## 2017-10-01 RX ADMIN — GUAIFENESIN 1200 MG: 600 TABLET, EXTENDED RELEASE ORAL at 21:03

## 2017-10-01 RX ADMIN — PREDNISONE 40 MG: 20 TABLET ORAL at 12:46

## 2017-10-01 RX ADMIN — METRONIDAZOLE 500 MG: 500 TABLET, FILM COATED ORAL at 00:18

## 2017-10-01 RX ADMIN — INSULIN DETEMIR 10 UNITS: 100 INJECTION, SOLUTION SUBCUTANEOUS at 21:11

## 2017-10-01 RX ADMIN — APIXABAN 5 MG: 5 TABLET, FILM COATED ORAL at 17:23

## 2017-10-01 RX ADMIN — METRONIDAZOLE 500 MG: 500 TABLET, FILM COATED ORAL at 06:24

## 2017-10-01 RX ADMIN — GUAIFENESIN 1200 MG: 600 TABLET, EXTENDED RELEASE ORAL at 09:05

## 2017-10-01 RX ADMIN — INSULIN ASPART 8 UNITS: 100 INJECTION, SOLUTION INTRAVENOUS; SUBCUTANEOUS at 06:30

## 2017-10-01 RX ADMIN — TAMSULOSIN HYDROCHLORIDE 0.4 MG: 0.4 CAPSULE ORAL at 09:05

## 2017-10-01 NOTE — PROGRESS NOTES
Chuckie Valdez  62 y.o.   LOS: 27 days   Patient Care Team:  Douglas Jimenez MD as PCP - General (Family Medicine)    Chief Complaint:  sob  I have reviewed the patient's medical history in detail and updated the computerized patient record.    Subjective     Patient is a 62 y.o. male presents with sob. Found duento COPD and R heart failure. Further evaluation also showed tracheal stenosis and malacea contributing to SOB. Failed stent placemen on 9-29.    History taken from: patient chart    History  Past Medical History:   Diagnosis Date   • COPD (chronic obstructive pulmonary disease)    • CTEPH (chronic thromboembolic pulmonary hypertension)    • HCV (hepatitis C virus)    • History of tracheostomy    • Pulmonary hypertension    • Type 2 diabetes mellitus      Past Surgical History:   Procedure Laterality Date   • BRONCHOSCOPY N/A 9/27/2017    Procedure: BRONCHOSCOPY WITH WASHINGS RIGHT MIDDLE LOBE AND LINGULAR with balloon dilation 12MM-15MM AND 18MM-19MM;  Surgeon: Herb Anglin MD;  Location: [facility] ENDOSCOPY;  Service:      History reviewed. No pertinent family history.  Social History     Social History   • Marital status: Single     Spouse name: N/A   • Number of children: N/A   • Years of education: N/A     Social History Main Topics   • Smoking status: Former Smoker     Packs/day: 1.00     Years: 15.00     Types: Cigarettes     Quit date: 2008   • Smokeless tobacco: None   • Alcohol use None   • Drug use: None   • Sexual activity: Not Asked     Other Topics Concern   • None     Social History Narrative         Review of Systems  {All systems were reviewed and negative except for:  Behavioral/Psych: positive for  anxiety    Objective     Lab Results (last 24 hours)     Procedure Component Value Units Date/Time    POC Glucose Fingerstick [[id number]]  (Abnormal) Collected:  09/30/17 1527    Specimen:  Blood Updated:  09/30/17 1531     Glucose 411 (H) mg/dL     Narrative:       Repeat Test Meter:  [id number] : [id number] Richy Daiy    POC Glucose Fingerstick [[id number]]  (Abnormal) Collected:  09/30/17 1529    Specimen:  Blood Updated:  09/30/17 1531     Glucose 385 (H) mg/dL     Narrative:       Meter: [id number] : [id number] Richy Daiy    POC Glucose Fingerstick [[id number]]  (Abnormal) Collected:  09/30/17 1924    Specimen:  Blood Updated:  09/30/17 1926     Glucose 289 (H) mg/dL     Narrative:       Meter: [id number] : [id number] [employer] J    POC Glucose Fingerstick [[id number]]  (Abnormal) Collected:  09/30/17 2136    Specimen:  Blood Updated:  09/30/17 2138     Glucose 259 (H) mg/dL     Narrative:       Meter: [id number] : [id number] [email or web address]    POC Glucose Fingerstick [[id number]]  (Abnormal) Collected:  09/30/17 2318    Specimen:  Blood Updated:  09/30/17 2320     Glucose 235 (H) mg/dL     Narrative:       Meter: [id number] : [id number] [employer] J    POC Glucose Fingerstick [[id number]]  (Abnormal) Collected:  10/01/17 0355    Specimen:  Blood Updated:  10/01/17 0357     Glucose 243 (H) mg/dL     Narrative:       Meter: [id number] : [id number] [employer] Domenica J    POC Glucose Fingerstick [[id number]]  (Abnormal) Collected:  10/01/17 0626    Specimen:  Blood Updated:  10/01/17 0627     Glucose 237 (H) mg/dL     Narrative:       Meter: [id number] : [id number] [employer]    POC Glucose Fingerstick [[id number]]  (Abnormal) Collected:  10/01/17 0740    Specimen:  Blood Updated:  10/01/17 0743     Glucose 225 (H) mg/dL     Narrative:       Meter: [id number] : [id number] Jeff Suzette    CBC (No Diff) [[id number]]  (Abnormal) Collected:  10/01/17 0816    Specimen:  Blood Updated:  10/01/17 0850     WBC 5.92 10*3/mm3      RBC 4.60 10*6/mm3      Hemoglobin 10.7 (L) g/dL      Hematocrit 35.5 (L) %      MCV 77.2 (L) fL      MCH 23.3 (L) pg      MCHC 30.1 (L) g/dL      RDW 18.7 (H) %      RDW-SD 52.1 fl      MPV 9.6 fL      Platelets 285 10*3/mm3      "Basic Metabolic Panel [[id number]]  (Abnormal) Collected:  10/01/17 0816    Specimen:  Blood Updated:  10/01/17 0909     Glucose 241 (H) mg/dL      BUN 20 mg/dL      Creatinine 0.57 (L) mg/dL      Sodium 136 mmol/L      Potassium 4.0 mmol/L      Chloride 92 (L) mmol/L      CO2 33.0 (H) mmol/L      Calcium 9.4 mg/dL      eGFR  African Amer >150 mL/min/1.73      eGFR Non African Amer 145 mL/min/1.73      BUN/Creatinine Ratio 35.1 (H)     Anion Gap 11.0 mmol/L     Narrative:       GFR Normal >60  Chronic Kidney Disease <60  Kidney Failure <15          Tests reviewed: yes, Labs for last 24 hrs  Tests of interest are: NO CHANGE    Vital Signs  Temp:  [97.5 °F (36.4 °C)-97.7 °F (36.5 °C)] 97.5 °F (36.4 °C)  Heart Rate:  [] 84  Resp:  [18-28] 18  BP: ()/(21-86) 141/83    Flowsheet Rows         First Filed Value    Admission Height  70\" (177.8 cm) Documented at 09/04/2017 0728    Admission Weight  180 lb (81.6 kg) Documented at 09/04/2017 0728            Intake/Output Summary (Last 24 hours) at 10/01/17 1135  Last data filed at 10/01/17 0823   Gross per 24 hour   Intake              480 ml   Output              775 ml   Net             -295 ml       Physical Exam:  General Appearance alert, appears stated age and cooperative  Lungs clear to auscultation, respirations regular, respirations even and respirations unlabored  Heart regular rhythm & normal rate, normal S1, S2, no murmur, no phoebe, no rub and no click  Abdomen normal bowel sounds, no masses, no hepatomegaly, no splenomegaly, soft non-tender, no guarding and no rebound tenderness  Extremities moves extremities well, no edema, no cyanosis and no redness  Neurologic Mental Status orientated to person, place, time and situation       Medication Review:   Nitrates, organic and Penicillins  Current Facility-Administered Medications   Medication Dose Route Frequency Provider Last Rate Last Dose   • acetaminophen (TYLENOL) tablet 650 mg  650 mg Oral Q4H PRN " Shane Yao MD   650 mg at 09/27/17 2121    Or   • acetaminophen (TYLENOL) suppository 650 mg  650 mg Rectal Q4H PRN Shane Yao MD       • amLODIPine (NORVASC) tablet 10 mg  10 mg Oral Q24H Daniel Hernandez MD   10 mg at 10/01/17 0905   • apixaban (ELIQUIS) tablet 5 mg  5 mg Oral BID Yovany Hernandes MD   5 mg at 10/01/17 0905   • atorvastatin (LIPITOR) tablet 20 mg  20 mg Oral Nightly Shane Yao MD   20 mg at 09/30/17 2129   • dextrose (D50W) solution 25 g  25 g Intravenous Q15 Min PRN Shane Yao MD   25 g at 09/29/17 1845   • dextrose (GLUTOSE) oral gel 15 g  15 g Oral Q15 Min PRN hSane Yao MD       • famotidine (PEPCID) tablet 20 mg  20 mg Oral BID Shane Yao MD   20 mg at 10/01/17 0905   • furosemide (LASIX) tablet 40 mg  40 mg Oral Daily Daniel Hernandez MD   40 mg at 10/01/17 0905   • glucagon (human recombinant) (GLUCAGEN DIAGNOSTIC) injection 1 mg  1 mg Subcutaneous Q15 Min PRN Douglas Jimenez MD       • guaiFENesin (MUCINEX) 12 hr tablet 1,200 mg  1,200 mg Oral Q12H Shane Yao MD   1,200 mg at 10/01/17 0905   • HYDROcodone-homatropine (HYCODAN) 5-1.5 MG/5ML syrup 5 mL  5 mL Oral Q4H PRN Herb Anglin MD       • insulin aspart (novoLOG) injection 0-24 Units  0-24 Units Subcutaneous 4x Daily AC & at Bedtime Daniel Hernandez MD   8 Units at 10/01/17 0630   • insulin detemir (LEVEMIR) injection 10 Units  10 Units Subcutaneous Nightly Lj Marvin MD   10 Units at 09/30/17 2126   • [START ON 10/2/2017] insulin detemir (LEVEMIR) injection 10 Units  10 Units Subcutaneous QAM Daniel Hernandez MD       • ipratropium-albuterol (DUO-NEB) nebulizer solution 3 mL  3 mL Nebulization Q4H PRN Herb Anglin MD   3 mL at 09/29/17 0458   • ipratropium-albuterol (DUO-NEB) nebulizer solution 3 mL  3 mL Nebulization Q4H - RT Herb Anglin MD   3 mL at 10/01/17 0710   • ipratropium-albuterol (DUO-NEB) nebulizer solution 3 mL  3 mL Nebulization  Q4H PRN Herb Anglin MD       • lactated ringers infusion 1,000 mL  1,000 mL Intravenous Continuous PRN Herb Anglin MD 25 mL/hr at 09/27/17 1323     • lactated ringers infusion  9 mL/hr Intravenous Continuous Noe Mcgraw MD 9 mL/hr at 09/29/17 0919 9 mL/hr at 09/29/17 0919   • lidocaine (XYLOCAINE) 4 % nebulizer solution 4 mL  4 mL Nebulization Once Yovany Hernandes MD       • Magnesium Sulfate 2 gram infusion - Mg less than or equal to 1.5 mg/dL  2 g Intravenous PRN Shnae Yao MD        Or   • Magesium Sulfate 1 gram infusion - Mg 1.6-1.9 mg/dL  1 g Intravenous PRN Shane Yao MD       • magnesium hydroxide (MILK OF MAGNESIA) suspension 2400 mg/10mL 10 mL  10 mL Oral Daily PRN Lj Marvin MD   10 mL at 09/23/17 2255   • metoprolol tartrate (LOPRESSOR) tablet 25 mg  25 mg Oral BID Eduar Tucker MD   25 mg at 10/01/17 0905   • metroNIDAZOLE (FLAGYL) tablet 500 mg  500 mg Oral Q6H Ricki Coyle Jr., MD   500 mg at 10/01/17 0624   • miconazole (MICOTIN) 2 % powder   Topical Q12H Diamond Ann MD       • ondansetron (ZOFRAN) tablet 4 mg  4 mg Oral Q6H PRN Shane Yoa MD        Or   • ondansetron ODT (ZOFRAN-ODT) disintegrating tablet 4 mg  4 mg Oral Q6H PRN Shane Yao MD        Or   • ondansetron (ZOFRAN) injection 4 mg  4 mg Intravenous Q6H PRN Shane Yao MD   4 mg at 09/20/17 1842   • potassium chloride (MICRO-K) CR capsule 40 mEq  40 mEq Oral PRN Shane Yao MD        Or   • potassium chloride (KLOR-CON) packet 40 mEq  40 mEq Oral PRN Shane Yao MD        Or   • potassium chloride 10 mEq in 100 mL IVPB  10 mEq Intravenous Q1H PRN Shane Yao MD       • predniSONE (DELTASONE) tablet 40 mg  40 mg Oral Daily With Breakfast Daniel Thiago Nidadavolu, MD       • sennosides-docusate sodium (SENOKOT-S) 8.6-50 MG tablet 2 tablet  2 tablet Oral Nightly Shane Yao MD   2 tablet at 09/30/17 2121   • sodium chloride 0.9 % flush 1-10 mL  1-10 mL  Intravenous PRN Shane Yao MD       • sodium chloride 0.9 % flush 10 mL  10 mL Intravenous PRN Josh Valentin MD       • sodium chloride 0.9 % infusion  9 mL/hr Intravenous Continuous Jeevan Melendrez MD 9 mL/hr at 09/06/17 0406 9 mL/hr at 09/06/17 0406   • tamsulosin (FLOMAX) 24 hr capsule 0.4 mg  0.4 mg Oral BID Douglas Jimenez MD   0.4 mg at 10/01/17 0905   • traZODone (DESYREL) tablet 50 mg  50 mg Oral Nightly Lj Marvin MD   50 mg at 09/30/17 2244   • zolpidem (AMBIEN) tablet 5 mg  5 mg Oral Nightly PRN Douglas Jimenez MD   5 mg at 09/30/17 2244     Prescriptions Prior to Admission   Medication Sig Dispense Refill Last Dose   • acetaminophen (TYLENOL) 325 MG tablet Take 325 mg by mouth Every 4 (Four) Hours As Needed for Mild Pain .      • albuterol (PROVENTIL) (2.5 MG/3ML) 0.083% nebulizer solution Take 2.5 mg by nebulization Every 4 (Four) Hours As Needed for Wheezing (every 2 hours as needed for soa related to copd).      • apixaban (ELIQUIS) 5 MG tablet tablet Take 5 mg by mouth 2 (Two) Times a Day.      • atorvastatin (LIPITOR) 20 MG tablet Take 20 mg by mouth Every Night.      • bisacodyl (DULCOLAX) 10 MG suppository Insert 10 mg into the rectum Daily As Needed for Constipation.      • budesonide-formoterol (SYMBICORT) 160-4.5 MCG/ACT inhaler Inhale 2 puffs 2 (Two) Times a Day.      • escitalopram (LEXAPRO) 20 MG tablet Take 20 mg by mouth Every Night.      • ferrous sulfate 325 (65 FE) MG tablet Take 325 mg by mouth 2 (Two) Times a Day.      • fluticasone (FLOVENT HFA) 220 MCG/ACT inhaler Inhale 1 puff 2 (Two) Times a Day.      • furosemide (LASIX) 40 MG tablet Take 40 mg by mouth Daily.      • insulin detemir (LEVEMIR) 100 UNIT/ML injection Inject 8 Units under the skin Daily.      • ipratropium-albuterol (DUO-NEB) 0.5-2.5 mg/mL nebulizer Take 3 mL by nebulization Every 4 (Four) Hours.      • melatonin 1 MG tablet Take 3 mg by mouth At Night As Needed for Sleep (for sleep).      • metoprolol  tartrate (LOPRESSOR) 25 MG tablet Take 12.5 mg by mouth 2 (Two) Times a Day.   9/29/2017 at Unknown time   • Multiple Vitamins-Minerals (MULTIVITAMIN ADULT PO) Take 1 tablet by mouth Daily.      • ondansetron (ZOFRAN) 4 MG tablet Take 4 mg by mouth Every 6 (Six) Hours As Needed for Nausea or Vomiting.      • pantoprazole (PROTONIX) 40 MG EC tablet Take 40 mg by mouth Daily.      • Sennosides-Docusate Sodium 8.6-50 MG capsule Take 2 capsules by mouth 2 (Two) Times a Day.      • traZODone (DESYREL) 50 MG tablet Take 50 mg by mouth Every Night.          Medications reviewed: yes, and changes made are: NONE    Assessment:  1) COPD  2) r chf  3) TRACHEAL STENOSIS AND MALACEA      Plan:Continue current medications.     Treatment Plans:  1.    Consultations: NO NEW2.    Douglas Jimenez MD  10/01/17  11:35 AM

## 2017-10-01 NOTE — PROGRESS NOTES
Daniel Hernandez MD                         [phone]      Patient ID:    Name:  Chuckie Valdez    MRN:  [id number]    1955   62 y.o.  male            CC/Reason for visit: Respiratory failure follow-up    Subjective: Patient seen and examined this a.m.  No acute overnight events.  His friends are not with him today.  I had a conversation using his phone .  He says that he does not have any worsening cough, fever, chills, hemoptysis, shortness of breath.  He had questions about the future plan.  Is tolerating a regular diet  LOS: 27    ROS: Review of Systems  As above    Objective     Vital Signs past 24hrs  BP range: BP: ()/(21-86) 141/83  Pulse range: Heart Rate:  [] 84  Resp rate range: Resp:  [18-28] 18  Temp range: Temp (24hrs), Av.6 °F (36.4 °C), Min:97.5 °F (36.4 °C), Max:97.7 °F (36.5 °C)    O2 Device: nasal cannula FiO2 (%): 40 %   144 lb 12.8 oz (65.7 kg); Body mass index is 20.78 kg/(m^2).    Intake/Output Summary (Last 24 hours) at 10/01/17 1041  Last data filed at 10/01/17 0823   Gross per 24 hour   Intake              480 ml   Output             1025 ml   Net             -545 ml       Exam:  GEN:  No respiratory distress, comfortably lying, does have conversational dyspnea  EYES:   EOM-i, anicteric sclera bilat  ENT:    External ears/nose normal, OP clear  NECK:  Supple, midline trachea, No JVD or cervical LApathy  LUNGS: Bilateral breath sounds heard, bilateral breath sounds heard but decreased in intensity.  No significant stridor heard today  CV:  Regular rate and rhythm, No murmur  ABD:  Non tender, no distended, no palpable liver or masses  EXT:  No cyanosis or clubbing, trace peripheral/sacral edema    Scheduled meds:    amLODIPine 10 mg Oral Q24H   apixaban 5 mg Oral BID   atorvastatin 20 mg Oral Nightly   famotidine 20 mg Oral BID   furosemide 40 mg Oral Daily   guaiFENesin 1,200 mg Oral Q12H   insulin aspart 0-24 Units  Subcutaneous 4x Daily AC & at Bedtime   insulin detemir 10 Units Subcutaneous Nightly   insulin detemir 20 Units Subcutaneous QAM   ipratropium-albuterol 3 mL Nebulization Q4H - RT   lidocaine 4 mL Nebulization Once   methylPREDNISolone sodium succinate 40 mg Intravenous Q6H   metoprolol tartrate 25 mg Oral BID   metroNIDAZOLE 500 mg Oral Q6H   miconazole  Topical Q12H   sennosides-docusate sodium 2 tablet Oral Nightly   tamsulosin 0.4 mg Oral BID   traZODone 50 mg Oral Nightly     IV meds:                        lactated ringers 1,000 mL Last Rate: 1,000 mL (09/27/17 1323)   lactated ringers 9 mL/hr Last Rate: 9 mL/hr (09/29/17 0919)   sodium chloride 9 mL/hr Last Rate: 9 mL/hr (09/06/17 0406)       Data Review:        Results from last 7 days  Lab Units 10/01/17  0816 09/30/17  0604 09/29/17  0814  09/25/17  0836   SODIUM mmol/L 136 136 141  < > 135*   POTASSIUM mmol/L 4.0 4.7 4.2  < > 4.5   CHLORIDE mmol/L 92* 89* 93*  < > 93*   CO2 mmol/L 33.0* 38.9* 35.6*  < > 33.4*   BUN mg/dL 20 20 14  < > 15   CREATININE mg/dL 0.57* 0.67* 0.62*  < > 0.71*   CALCIUM mg/dL 9.4 9.5 9.4  < > 9.0   GLUCOSE mg/dL 241* 191* 76  < > 313*   WBC 10*3/mm3 5.92 4.72 10.75*  < > 10.63   HEMOGLOBIN g/dL 10.7* 11.0* 11.7*  < > 10.8*   PLATELETS 10*3/mm3 285 225 178  < > 168   INR   --   --   --   --  1.09   PROCALCITONIN ng/mL  --  0.08*  --   --   --    < > = values in this interval not displayed.    Lab Results   Component Value Date    CALCIUM 9.4 10/01/2017    PHOS 3.3 09/22/2017         Results from last 7 days  Lab Units 09/27/17  1422 09/25/17  1545   BODYFLDCX   --  No growth at 3 days   GRAM STAIN RESULT  No WBCs or organisms seen  No epithelial cells seen No WBCs or organisms seen             Results from last 7 days  Lab Units 09/29/17  1336 09/29/17  1159   PH, ARTERIAL pH units 7.257* 7.109*   PCO2, ARTERIAL mm Hg 97.3* 136.2*   PO2 ART mm Hg 136.5* 129.9*   MODALITY  BiPap face tent   O2 SATURATION CALC % 98.3 96.9              Estimated Creatinine Clearance: 124.9 mL/min (by C-G formula based on Cr of 0.57).    Results Review:    I have reviewed the laboratory and imaging data since the last note by [facility] physician.    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  I will wean his steroids quickly.  I will follow up with his lymph node biopsy results.  I will start the request for home trilogy ventilator given chronic respiratory failure with recurrent exacerbations while on BiPAP in the setting of tracheal stenosis and tracheomalacia.  I will repeat a CT of the neck and chest given his abnormal chest x-ray to rule out a pneumonia and check for his current status of tracheal stenosis post dilation.  Would hold off on antibiotics for now.  If we are able to get the patient a trilogy ventilator, I will discharge the patient and the plan is to follow up with Dr. Morrow at the [facility] for further management of tracheal stenosis/ tracheomalacia.  Repeat stent vs trach reconstruction vs permanent trach are his options.      I spent 40 minutes in the care of the patient more than half the time interacting with him about the future plan    Daniel Hernandez MD  10/1/2017

## 2017-10-01 NOTE — PLAN OF CARE
Problem: COPD, Chronic Bronchitis/Emphysema (Adult)  Goal: Signs and Symptoms of Listed Potential Problems Will be Absent or Manageable (COPD, Chronic Bronchitis/Emphysema)  Outcome: Ongoing (interventions implemented as appropriate)    Problem: Patient Care Overview (Adult)  Goal: Plan of Care Review  Outcome: Ongoing (interventions implemented as appropriate)    10/01/17 0635   Patient Care Overview   Progress no change   Outcome Evaluation   Outcome Summary/Follow up Plan VSS through night. BG >200 this shift. On 3L NC, BiPAP @ [facility].    Coping/Psychosocial Response Interventions   Plan Of Care Reviewed With patient       Goal: Adult Individualization and Mutuality  Outcome: Ongoing (interventions implemented as appropriate)  Goal: Discharge Needs Assessment  Outcome: Ongoing (interventions implemented as appropriate)

## 2017-10-01 NOTE — PLAN OF CARE
Problem: COPD, Chronic Bronchitis/Emphysema (Adult)  Goal: Signs and Symptoms of Listed Potential Problems Will be Absent or Manageable (COPD, Chronic Bronchitis/Emphysema)  Outcome: Ongoing (interventions implemented as appropriate)    Problem: Patient Care Overview (Adult)  Goal: Plan of Care Review  Outcome: Ongoing (interventions implemented as appropriate)    10/01/17 9605   Outcome Evaluation   Outcome Summary/Follow up Plan VSS. Up to chair all day. Tele OF. 2L 02.        Goal: Adult Individualization and Mutuality  Outcome: Ongoing (interventions implemented as appropriate)  Goal: Discharge Needs Assessment  Outcome: Ongoing (interventions implemented as appropriate)    Problem: Pneumonia (Adult)  Goal: Signs and Symptoms of Listed Potential Problems Will be Absent or Manageable (Pneumonia)  Outcome: Ongoing (interventions implemented as appropriate)    Problem: Cardiac: Heart Failure (Adult)  Goal: Signs and Symptoms of Listed Potential Problems Will be Absent or Manageable (Cardiac: Heart Failure)  Outcome: Ongoing (interventions implemented as appropriate)    Problem: GI Endoscopy (Adult)  Goal: Signs and Symptoms of Listed Potential Problems Will be Absent or Manageable (GI Endoscopy)  Outcome: Ongoing (interventions implemented as appropriate)    Problem: Perioperative Period (Adult)  Goal: Signs and Symptoms of Listed Potential Problems Will be Absent or Manageable (Perioperative Period)  Outcome: Ongoing (interventions implemented as appropriate)    Problem: Communication Impaired, Verbal (Adult,Obstetrics,Pediatric)  Goal: Identify Related Risk Factors and Signs and Symptoms  Outcome: Ongoing (interventions implemented as appropriate)  Goal: Improved/Effective Communication  Outcome: Ongoing (interventions implemented as appropriate)

## 2017-10-01 NOTE — PROGRESS NOTES
Chuckie Valdez  62 y.o.   LOS: 27 days   Patient Care Team:  Douglas Jimenez MD as PCP - General (Family Medicine)    Chief Complaint:  sob    I have reviewed the patient's medical history in detail and updated the computerized patient record.    Subjective     Patient is a 62 y.o. male presents with SOB due to COPD,CHF, tracheo malacea and stenosis    History taken from: patient chart RN    History  Past Medical History:   Diagnosis Date   • COPD (chronic obstructive pulmonary disease)    • CTEPH (chronic thromboembolic pulmonary hypertension)    • HCV (hepatitis C virus)    • History of tracheostomy    • Pulmonary hypertension    • Type 2 diabetes mellitus      Past Surgical History:   Procedure Laterality Date   • BRONCHOSCOPY N/A 9/27/2017    Procedure: BRONCHOSCOPY WITH WASHINGS RIGHT MIDDLE LOBE AND LINGULAR with balloon dilation 12MM-15MM AND 18MM-19MM;  Surgeon: Herb Anglin MD;  Location: [facility] ENDOSCOPY;  Service:      History reviewed. No pertinent family history.  Social History     Social History   • Marital status: Single     Spouse name: N/A   • Number of children: N/A   • Years of education: N/A     Social History Main Topics   • Smoking status: Former Smoker     Packs/day: 1.00     Years: 15.00     Types: Cigarettes     Quit date: 2008   • Smokeless tobacco: None   • Alcohol use None   • Drug use: None   • Sexual activity: Not Asked     Other Topics Concern   • None     Social History Narrative         Review of Systems  {All systems were reviewed and negative except for:  Respiratory: positive for  heavy mucous    Objective     Lab Results (last 24 hours)     Procedure Component Value Units Date/Time    POC Glucose Fingerstick [[id number]]  (Abnormal) Collected:  09/30/17 1527    Specimen:  Blood Updated:  09/30/17 1531     Glucose 411 (H) mg/dL     Narrative:       Repeat Test Meter: [id number] : [id number] Richy Stevens    POC Glucose Fingerstick [[id number]]  (Abnormal) Collected:   09/30/17 1529    Specimen:  Blood Updated:  09/30/17 1531     Glucose 385 (H) mg/dL     Narrative:       Meter: [id number] : [id number] Richy Stevens    POC Glucose Fingerstick [[id number]]  (Abnormal) Collected:  09/30/17 1924    Specimen:  Blood Updated:  09/30/17 1926     Glucose 289 (H) mg/dL     Narrative:       Meter: [id number] : [id number] [facility]    POC Glucose Fingerstick [[id number]]  (Abnormal) Collected:  09/30/17 2136    Specimen:  Blood Updated:  09/30/17 2138     Glucose 259 (H) mg/dL     Narrative:       Meter: [id number] : [id number] [employer]    POC Glucose Fingerstick [[id number]]  (Abnormal) Collected:  09/30/17 2318    Specimen:  Blood Updated:  09/30/17 2320     Glucose 235 (H) mg/dL     Narrative:       Meter: [id number] : [id number] [facility]    POC Glucose Fingerstick [[id number]]  (Abnormal) Collected:  10/01/17 0355    Specimen:  Blood Updated:  10/01/17 0357     Glucose 243 (H) mg/dL     Narrative:       Meter: [id number] : [id number] AOL    POC Glucose Fingerstick [[id number]]  (Abnormal) Collected:  10/01/17 0626    Specimen:  Blood Updated:  10/01/17 0627     Glucose 237 (H) mg/dL     Narrative:       Meter: [id number] : [id number] [employer]kr[employer]    POC Glucose Fingerstick [[id number]]  (Abnormal) Collected:  10/01/17 0740    Specimen:  Blood Updated:  10/01/17 0743     Glucose 225 (H) mg/dL     Narrative:       Meter: [id number] : [id number] Jeff Bradford    CBC (No Diff) [[id number]]  (Abnormal) Collected:  10/01/17 0816    Specimen:  Blood Updated:  10/01/17 0850     WBC 5.92 10*3/mm3      RBC 4.60 10*6/mm3      Hemoglobin 10.7 (L) g/dL      Hematocrit 35.5 (L) %      MCV 77.2 (L) fL      MCH 23.3 (L) pg      MCHC 30.1 (L) g/dL      RDW 18.7 (H) %      RDW-SD 52.1 fl      MPV 9.6 fL      Platelets 285 10*3/mm3     Basic Metabolic Panel [[id number]]  (Abnormal) Collected:  10/01/17 0816    Specimen:  Blood Updated:   "10/01/17 0909     Glucose 241 (H) mg/dL      BUN 20 mg/dL      Creatinine 0.57 (L) mg/dL      Sodium 136 mmol/L      Potassium 4.0 mmol/L      Chloride 92 (L) mmol/L      CO2 33.0 (H) mmol/L      Calcium 9.4 mg/dL      eGFR  African Amer >150 mL/min/1.73      eGFR Non African Amer 145 mL/min/1.73      BUN/Creatinine Ratio 35.1 (H)     Anion Gap 11.0 mmol/L     Narrative:       GFR Normal >60  Chronic Kidney Disease <60  Kidney Failure <15    POC Glucose Fingerstick [[id number]]  (Abnormal) Collected:  10/01/17 1212    Specimen:  Blood Updated:  10/01/17 1214     Glucose 400 (H) mg/dL     Narrative:       Repeat Test Meter: [id number] : [id number] Lucina Torres          Tests reviewed: yes, Labs for last 24 hrs  Tests of interest are: ok      Vital Signs  Temp:  [97.5 °F (36.4 °C)-97.7 °F (36.5 °C)] 97.5 °F (36.4 °C)  Heart Rate:  [] 85  Resp:  [18-28] 18  BP: ()/(21-86) 141/83    Flowsheet Rows         First Filed Value    Admission Height  70\" (177.8 cm) Documented at 09/04/2017 0728    Admission Weight  180 lb (81.6 kg) Documented at 09/04/2017 0728            Intake/Output Summary (Last 24 hours) at 10/01/17 1231  Last data filed at 10/01/17 0823   Gross per 24 hour   Intake              480 ml   Output              775 ml   Net             -295 ml         Physical Exam:  General Appearance alert, appears stated age and cooperative  Lungs clear to auscultation, respirations regular, respirations even and respirations unlabored  Heart regular rhythm & normal rate, normal S1, S2, no murmur, no phoebe, no rub and no click  Abdomen normal bowel sounds, no masses, no hepatomegaly, no splenomegaly, soft non-tender, no guarding and no rebound tenderness  Extremities moves extremities well, no edema, no cyanosis and no redness  Neurologic Mental Status orientated to person, place, time and situation       Medication Review:   Nitrates, organic and Penicillins  Current Facility-Administered Medications "   Medication Dose Route Frequency Provider Last Rate Last Dose   • acetaminophen (TYLENOL) tablet 650 mg  650 mg Oral Q4H PRN Shane Yao MD   650 mg at 09/27/17 2121    Or   • acetaminophen (TYLENOL) suppository 650 mg  650 mg Rectal Q4H PRN Shane Yao MD       • amLODIPine (NORVASC) tablet 10 mg  10 mg Oral Q24H Daniel Hernandez MD   10 mg at 10/01/17 0905   • apixaban (ELIQUIS) tablet 5 mg  5 mg Oral BID Yovany Hernandes MD   5 mg at 10/01/17 0905   • atorvastatin (LIPITOR) tablet 20 mg  20 mg Oral Nightly Shane Yao MD   20 mg at 09/30/17 2129   • dextrose (D50W) solution 25 g  25 g Intravenous Q15 Min PRN Shane Yao MD   25 g at 09/29/17 1845   • dextrose (GLUTOSE) oral gel 15 g  15 g Oral Q15 Min PRN Shane Yao MD       • famotidine (PEPCID) tablet 20 mg  20 mg Oral BID Shane Yao MD   20 mg at 10/01/17 0905   • furosemide (LASIX) tablet 40 mg  40 mg Oral Daily Daniel Hernandez MD   40 mg at 10/01/17 0905   • glucagon (human recombinant) (GLUCAGEN DIAGNOSTIC) injection 1 mg  1 mg Subcutaneous Q15 Min PRN Douglas Jimenez MD       • guaiFENesin (MUCINEX) 12 hr tablet 1,200 mg  1,200 mg Oral Q12H Shane Yao MD   1,200 mg at 10/01/17 0905   • HYDROcodone-homatropine (HYCODAN) 5-1.5 MG/5ML syrup 5 mL  5 mL Oral Q4H PRN Herb Anglin MD       • insulin aspart (novoLOG) injection 0-24 Units  0-24 Units Subcutaneous 4x Daily AC & at Bedtime Daniel Hernandez MD   8 Units at 10/01/17 0630   • insulin detemir (LEVEMIR) injection 10 Units  10 Units Subcutaneous Nightly Lj Marvin MD   10 Units at 09/30/17 2126   • [START ON 10/2/2017] insulin detemir (LEVEMIR) injection 10 Units  10 Units Subcutaneous QAM Daniel Hernandez MD       • ipratropium-albuterol (DUO-NEB) nebulizer solution 3 mL  3 mL Nebulization Q4H PRN Herb Anglin MD   3 mL at 09/29/17 0458   • ipratropium-albuterol (DUO-NEB) nebulizer solution 3 mL  3 mL Nebulization Q4H - RT  Herb Anglin MD   3 mL at 10/01/17 1144   • ipratropium-albuterol (DUO-NEB) nebulizer solution 3 mL  3 mL Nebulization Q4H PRN Herb Anglin MD       • lactated ringers infusion 1,000 mL  1,000 mL Intravenous Continuous PRN eHrb Anglin MD 25 mL/hr at 09/27/17 1323     • lactated ringers infusion  9 mL/hr Intravenous Continuous Neo Mcgraw MD 9 mL/hr at 09/29/17 0919 9 mL/hr at 09/29/17 0919   • lidocaine (XYLOCAINE) 4 % nebulizer solution 4 mL  4 mL Nebulization Once Yovany Hernandes MD       • Magnesium Sulfate 2 gram infusion - Mg less than or equal to 1.5 mg/dL  2 g Intravenous PRN Shane Yao MD        Or   • Magesium Sulfate 1 gram infusion - Mg 1.6-1.9 mg/dL  1 g Intravenous PRN Shane Yao MD       • magnesium hydroxide (MILK OF MAGNESIA) suspension 2400 mg/10mL 10 mL  10 mL Oral Daily PRN Lj Marvin MD   10 mL at 09/23/17 2255   • metoprolol tartrate (LOPRESSOR) tablet 25 mg  25 mg Oral BID Eduar Tucker MD   25 mg at 10/01/17 0905   • metroNIDAZOLE (FLAGYL) tablet 500 mg  500 mg Oral Q6H Ricki Coyle Jr., MD   500 mg at 10/01/17 0624   • miconazole (MICOTIN) 2 % powder   Topical Q12H Diamond Ann MD       • ondansetron (ZOFRAN) tablet 4 mg  4 mg Oral Q6H PRN Shane Yao MD        Or   • ondansetron ODT (ZOFRAN-ODT) disintegrating tablet 4 mg  4 mg Oral Q6H PRN Shane Yao MD        Or   • ondansetron (ZOFRAN) injection 4 mg  4 mg Intravenous Q6H PRN Shane Yao MD   4 mg at 09/20/17 1842   • potassium chloride (MICRO-K) CR capsule 40 mEq  40 mEq Oral PRN Shane Yao MD        Or   • potassium chloride (KLOR-CON) packet 40 mEq  40 mEq Oral PRN Shane Yao MD        Or   • potassium chloride 10 mEq in 100 mL IVPB  10 mEq Intravenous Q1H PRN Shane Yao MD       • predniSONE (DELTASONE) tablet 40 mg  40 mg Oral Daily With Breakfast Daniel Hernandez MD       • sennosides-docusate sodium (SENOKOT-S) 8.6-50 MG tablet 2  tablet  2 tablet Oral Nightly Shane Yao MD   2 tablet at 09/30/17 2126   • sodium chloride 0.9 % flush 1-10 mL  1-10 mL Intravenous PRN Shane Yao MD       • sodium chloride 0.9 % flush 10 mL  10 mL Intravenous PRN Josh Valentin MD       • sodium chloride 0.9 % infusion  9 mL/hr Intravenous Continuous Jeevan Melendrez MD 9 mL/hr at 09/06/17 0406 9 mL/hr at 09/06/17 0406   • tamsulosin (FLOMAX) 24 hr capsule 0.4 mg  0.4 mg Oral BID Douglas Jimenez MD   0.4 mg at 10/01/17 0905   • traZODone (DESYREL) tablet 50 mg  50 mg Oral Nightly Lj Marvin MD   50 mg at 09/30/17 2244   • zolpidem (AMBIEN) tablet 5 mg  5 mg Oral Nightly PRN Douglas Jimenez MD   5 mg at 09/30/17 2244     Prescriptions Prior to Admission   Medication Sig Dispense Refill Last Dose   • acetaminophen (TYLENOL) 325 MG tablet Take 325 mg by mouth Every 4 (Four) Hours As Needed for Mild Pain .      • albuterol (PROVENTIL) (2.5 MG/3ML) 0.083% nebulizer solution Take 2.5 mg by nebulization Every 4 (Four) Hours As Needed for Wheezing (every 2 hours as needed for soa related to copd).      • apixaban (ELIQUIS) 5 MG tablet tablet Take 5 mg by mouth 2 (Two) Times a Day.      • atorvastatin (LIPITOR) 20 MG tablet Take 20 mg by mouth Every Night.      • bisacodyl (DULCOLAX) 10 MG suppository Insert 10 mg into the rectum Daily As Needed for Constipation.      • budesonide-formoterol (SYMBICORT) 160-4.5 MCG/ACT inhaler Inhale 2 puffs 2 (Two) Times a Day.      • escitalopram (LEXAPRO) 20 MG tablet Take 20 mg by mouth Every Night.      • ferrous sulfate 325 (65 FE) MG tablet Take 325 mg by mouth 2 (Two) Times a Day.      • fluticasone (FLOVENT HFA) 220 MCG/ACT inhaler Inhale 1 puff 2 (Two) Times a Day.      • furosemide (LASIX) 40 MG tablet Take 40 mg by mouth Daily.      • insulin detemir (LEVEMIR) 100 UNIT/ML injection Inject 8 Units under the skin Daily.      • ipratropium-albuterol (DUO-NEB) 0.5-2.5 mg/mL nebulizer Take 3 mL by nebulization Every 4  (Four) Hours.      • melatonin 1 MG tablet Take 3 mg by mouth At Night As Needed for Sleep (for sleep).      • metoprolol tartrate (LOPRESSOR) 25 MG tablet Take 12.5 mg by mouth 2 (Two) Times a Day.   9/29/2017 at Unknown time   • Multiple Vitamins-Minerals (MULTIVITAMIN ADULT PO) Take 1 tablet by mouth Daily.      • ondansetron (ZOFRAN) 4 MG tablet Take 4 mg by mouth Every 6 (Six) Hours As Needed for Nausea or Vomiting.      • pantoprazole (PROTONIX) 40 MG EC tablet Take 40 mg by mouth Daily.      • Sennosides-Docusate Sodium 8.6-50 MG capsule Take 2 capsules by mouth 2 (Two) Times a Day.      • traZODone (DESYREL) 50 MG tablet Take 50 mg by mouth Every Night.          Medications reviewed: yes, and changes made are: none      Assessment:  1) COPD  2) Diastolic CHF  3)tracheal stenosis and malacea      Plan:Continue current medications.     Treatment Plans:  1.    Consultations: madison Jimenez MD  10/01/17  12:31 PM

## 2017-10-02 ENCOUNTER — APPOINTMENT (OUTPATIENT)
Dept: GENERAL RADIOLOGY | Facility: HOSPITAL | Age: 62
End: 2017-10-02
Attending: INTERNAL MEDICINE

## 2017-10-02 PROBLEM — I87.1 SUPERIOR VENA CAVA STENOSIS: Chronic | Status: ACTIVE | Noted: 2017-10-02

## 2017-10-02 LAB
ANION GAP SERPL CALCULATED.3IONS-SCNC: 9.2 MMOL/L
BUN BLD-MCNC: 21 MG/DL (ref 8–23)
BUN/CREAT SERPL: 31.3 (ref 7–25)
CALCIUM SPEC-SCNC: 9 MG/DL (ref 8.6–10.5)
CHLORIDE SERPL-SCNC: 96 MMOL/L (ref 98–107)
CO2 SERPL-SCNC: 35.8 MMOL/L (ref 22–29)
CREAT BLD-MCNC: 0.67 MG/DL (ref 0.76–1.27)
DEPRECATED RDW RBC AUTO: 53.8 FL (ref 37–54)
ERYTHROCYTE [DISTWIDTH] IN BLOOD BY AUTOMATED COUNT: 19 % (ref 11.5–14.5)
GFR SERPL CREATININE-BSD FRML MDRD: 120 ML/MIN/1.73
GFR SERPL CREATININE-BSD FRML MDRD: 146 ML/MIN/1.73
GLUCOSE BLD-MCNC: 163 MG/DL (ref 65–99)
GLUCOSE BLDC GLUCOMTR-MCNC: 128 MG/DL (ref 70–130)
GLUCOSE BLDC GLUCOMTR-MCNC: 154 MG/DL (ref 70–130)
GLUCOSE BLDC GLUCOMTR-MCNC: 240 MG/DL (ref 70–130)
GLUCOSE BLDC GLUCOMTR-MCNC: 292 MG/DL (ref 70–130)
HCT VFR BLD AUTO: 35.3 % (ref 40.4–52.2)
HGB BLD-MCNC: 10.7 G/DL (ref 13.7–17.6)
MCH RBC QN AUTO: 23.6 PG (ref 27–32.7)
MCHC RBC AUTO-ENTMCNC: 30.3 G/DL (ref 32.6–36.4)
MCV RBC AUTO: 77.9 FL (ref 79.8–96.2)
PLATELET # BLD AUTO: 291 10*3/MM3 (ref 140–500)
PMV BLD AUTO: 9.6 FL (ref 6–12)
POTASSIUM BLD-SCNC: 3.6 MMOL/L (ref 3.5–5.2)
RBC # BLD AUTO: 4.53 10*6/MM3 (ref 4.6–6)
SODIUM BLD-SCNC: 141 MMOL/L (ref 136–145)
WBC NRBC COR # BLD: 7.82 10*3/MM3 (ref 4.5–10.7)

## 2017-10-02 PROCEDURE — 97110 THERAPEUTIC EXERCISES: CPT

## 2017-10-02 PROCEDURE — 63710000001 PREDNISONE PER 1 MG: Performed by: INTERNAL MEDICINE

## 2017-10-02 PROCEDURE — 94799 UNLISTED PULMONARY SVC/PX: CPT

## 2017-10-02 PROCEDURE — 63710000001 INSULIN ASPART PER 5 UNITS: Performed by: INTERNAL MEDICINE

## 2017-10-02 PROCEDURE — 63710000001 INSULIN DETEMER PER 5 UNITS: Performed by: INTERNAL MEDICINE

## 2017-10-02 PROCEDURE — 82962 GLUCOSE BLOOD TEST: CPT

## 2017-10-02 PROCEDURE — 80048 BASIC METABOLIC PNL TOTAL CA: CPT | Performed by: INTERNAL MEDICINE

## 2017-10-02 PROCEDURE — 99231 SBSQ HOSP IP/OBS SF/LOW 25: CPT | Performed by: FAMILY MEDICINE

## 2017-10-02 PROCEDURE — 85027 COMPLETE CBC AUTOMATED: CPT | Performed by: INTERNAL MEDICINE

## 2017-10-02 RX ORDER — ARFORMOTEROL TARTRATE 15 UG/2ML
15 SOLUTION RESPIRATORY (INHALATION)
Status: DISCONTINUED | OUTPATIENT
Start: 2017-10-02 | End: 2017-10-06 | Stop reason: HOSPADM

## 2017-10-02 RX ORDER — BUDESONIDE 0.5 MG/2ML
0.5 INHALANT ORAL
Status: DISCONTINUED | OUTPATIENT
Start: 2017-10-02 | End: 2017-10-06 | Stop reason: HOSPADM

## 2017-10-02 RX ADMIN — METOPROLOL TARTRATE 25 MG: 25 TABLET ORAL at 08:29

## 2017-10-02 RX ADMIN — TAMSULOSIN HYDROCHLORIDE 0.4 MG: 0.4 CAPSULE ORAL at 08:28

## 2017-10-02 RX ADMIN — IPRATROPIUM BROMIDE AND ALBUTEROL SULFATE 3 ML: .5; 3 SOLUTION RESPIRATORY (INHALATION) at 15:39

## 2017-10-02 RX ADMIN — APIXABAN 5 MG: 5 TABLET, FILM COATED ORAL at 18:07

## 2017-10-02 RX ADMIN — FAMOTIDINE 20 MG: 20 TABLET, FILM COATED ORAL at 08:28

## 2017-10-02 RX ADMIN — ZOLPIDEM TARTRATE 5 MG: 5 TABLET, FILM COATED ORAL at 00:24

## 2017-10-02 RX ADMIN — TAMSULOSIN HYDROCHLORIDE 0.4 MG: 0.4 CAPSULE ORAL at 18:07

## 2017-10-02 RX ADMIN — METRONIDAZOLE 500 MG: 500 TABLET, FILM COATED ORAL at 11:59

## 2017-10-02 RX ADMIN — GUAIFENESIN 1200 MG: 600 TABLET, EXTENDED RELEASE ORAL at 08:28

## 2017-10-02 RX ADMIN — INSULIN DETEMIR 10 UNITS: 100 INJECTION, SOLUTION SUBCUTANEOUS at 22:00

## 2017-10-02 RX ADMIN — ARFORMOTEROL TARTRATE 15 MCG: 15 SOLUTION RESPIRATORY (INHALATION) at 23:38

## 2017-10-02 RX ADMIN — AMLODIPINE BESYLATE 10 MG: 10 TABLET ORAL at 08:28

## 2017-10-02 RX ADMIN — INSULIN ASPART 8 UNITS: 100 INJECTION, SOLUTION INTRAVENOUS; SUBCUTANEOUS at 22:04

## 2017-10-02 RX ADMIN — BUDESONIDE 0.5 MG: 0.5 INHALANT RESPIRATORY (INHALATION) at 23:38

## 2017-10-02 RX ADMIN — METOPROLOL TARTRATE 25 MG: 25 TABLET ORAL at 18:07

## 2017-10-02 RX ADMIN — APIXABAN 5 MG: 5 TABLET, FILM COATED ORAL at 08:28

## 2017-10-02 RX ADMIN — IPRATROPIUM BROMIDE AND ALBUTEROL SULFATE 3 ML: .5; 3 SOLUTION RESPIRATORY (INHALATION) at 20:02

## 2017-10-02 RX ADMIN — METRONIDAZOLE 500 MG: 500 TABLET, FILM COATED ORAL at 18:07

## 2017-10-02 RX ADMIN — METRONIDAZOLE 500 MG: 500 TABLET, FILM COATED ORAL at 00:29

## 2017-10-02 RX ADMIN — TRAZODONE HYDROCHLORIDE 50 MG: 50 TABLET ORAL at 22:04

## 2017-10-02 RX ADMIN — INSULIN ASPART 4 UNITS: 100 INJECTION, SOLUTION INTRAVENOUS; SUBCUTANEOUS at 08:31

## 2017-10-02 RX ADMIN — MICONAZOLE NITRATE: 2 POWDER TOPICAL at 08:29

## 2017-10-02 RX ADMIN — METRONIDAZOLE 500 MG: 500 TABLET, FILM COATED ORAL at 05:33

## 2017-10-02 RX ADMIN — MICONAZOLE NITRATE 1 APPLICATION: 2 POWDER TOPICAL at 22:04

## 2017-10-02 RX ADMIN — IPRATROPIUM BROMIDE AND ALBUTEROL SULFATE 3 ML: .5; 3 SOLUTION RESPIRATORY (INHALATION) at 00:41

## 2017-10-02 RX ADMIN — TRAZODONE HYDROCHLORIDE 50 MG: 50 TABLET ORAL at 00:24

## 2017-10-02 RX ADMIN — SENNOSIDES AND DOCUSATE SODIUM 2 TABLET: 8.6; 5 TABLET ORAL at 22:02

## 2017-10-02 RX ADMIN — INSULIN ASPART 12 UNITS: 100 INJECTION, SOLUTION INTRAVENOUS; SUBCUTANEOUS at 18:07

## 2017-10-02 RX ADMIN — FAMOTIDINE 20 MG: 20 TABLET, FILM COATED ORAL at 18:07

## 2017-10-02 RX ADMIN — IPRATROPIUM BROMIDE AND ALBUTEROL SULFATE 3 ML: .5; 3 SOLUTION RESPIRATORY (INHALATION) at 07:03

## 2017-10-02 RX ADMIN — IPRATROPIUM BROMIDE AND ALBUTEROL SULFATE 3 ML: .5; 3 SOLUTION RESPIRATORY (INHALATION) at 11:28

## 2017-10-02 RX ADMIN — IPRATROPIUM BROMIDE AND ALBUTEROL SULFATE 3 ML: .5; 3 SOLUTION RESPIRATORY (INHALATION) at 04:21

## 2017-10-02 RX ADMIN — METRONIDAZOLE 500 MG: 500 TABLET, FILM COATED ORAL at 23:28

## 2017-10-02 RX ADMIN — INSULIN ASPART 12 UNITS: 100 INJECTION, SOLUTION INTRAVENOUS; SUBCUTANEOUS at 11:59

## 2017-10-02 RX ADMIN — GUAIFENESIN 1200 MG: 600 TABLET, EXTENDED RELEASE ORAL at 22:03

## 2017-10-02 RX ADMIN — FUROSEMIDE 40 MG: 40 TABLET ORAL at 08:28

## 2017-10-02 RX ADMIN — ATORVASTATIN CALCIUM 20 MG: 20 TABLET, FILM COATED ORAL at 22:03

## 2017-10-02 RX ADMIN — PREDNISONE 40 MG: 20 TABLET ORAL at 08:28

## 2017-10-02 RX ADMIN — ZOLPIDEM TARTRATE 5 MG: 5 TABLET, FILM COATED ORAL at 22:04

## 2017-10-02 NOTE — PLAN OF CARE
Problem: Patient Care Overview (Adult)  Goal: Plan of Care Review  Outcome: Ongoing (interventions implemented as appropriate)    10/02/17 4359   Patient Care Overview   Progress progress toward functional goals as expected   Outcome Evaluation   Outcome Summary/Follow up Plan Pt to 6 N around 5:30 pm, up in chair eating dinner. No complaints. VSS, see flowsheets. On 3L NC sounds dim/wheezes, has productive cough, using [place]. SR per tele monitor. on Reg Cons Carb diet without issues. Translation application on pt cell phone per his request used to communicate with patient in Turkish. Blood glucose elevated but still within ordered novolog parameters. Will continue to monitor closey.   Coping/Psychosocial Response Interventions   Plan Of Care Reviewed With patient

## 2017-10-02 NOTE — THERAPY TREATMENT NOTE
Acute Care - Physical Therapy Treatment Note  [facility]     Patient Name: Chuckie Valdez  : 1955  MRN: [id number]  Today's Date: 10/2/2017  Onset of Illness/Injury or Date of Surgery Date: 17  Date of Referral to PT: 17  Referring Physician: Tony    Admit Date: 2017    Visit Dx:    ICD-10-CM ICD-9-CM   1. Pneumonia of left lower lobe due to infectious organism J18.9 483.8   2. Acute respiratory failure with hypercapnia J96.02 518.81   3. General weakness R53.1 780.79   4. Tracheal stenosis due to tracheostomy J95.09 519.02   5. Oropharyngeal dysphagia R13.12 787.22     Patient Active Problem List   Diagnosis   • Acute on chronic respiratory failure with hypoxia and hypercapnia   • Tracheal stenosis due to tracheostomy   • CTEPH (chronic thromboembolic pulmonary hypertension)   • COPD exacerbation   • Clostridium difficile enterocolitis   • Superior vena cava stenosis               Adult Rehabilitation Note       10/02/17 1548          Rehab Assessment/Intervention    Discipline physical therapist  -PC      Document Type therapy note (daily note)  -PC      Subjective Information agree to therapy  -PC      Patient Effort, Rehab Treatment good  -PC      Symptoms Noted During/After Treatment shortness of breath  -PC      Symptoms Noted Comment low endurance, SOA with minimal activity  -PC      Recorded by [PC] Christen Demarco, PT      Vital Signs    Pre SpO2 (%) 91  -PC      O2 Delivery Pre Treatment supplemental O2   2l  -PC      Intra SpO2 (%) 88  -PC      O2 Delivery Intra Treatment supplemental O2  -PC      Post SpO2 (%) 91  -PC      O2 Delivery Post Treatment supplemental O2  -PC      Recorded by [PC] Christen Demarco, PT      Pain Assessment    Pain Assessment No/denies pain  -PC      Recorded by [PC] Christen Demarco, PT      Cognitive Assessment/Intervention    Current Cognitive/Communication Assessment functional   speaks Faroese and uses phone   -PC      Recorded by [PC]  Christen Demarco, PT      Bed Mobility, Assessment/Treatment    Bed Mob, Supine to Sit, [place] not tested  -PC      Bed Mobility, Comment in chair  -PC      Recorded by [PC] Christen Demarco PT      Transfer Assessment/Treatment    Transfers, Sit-Stand [place] minimum assist (75% patient effort)  -PC      Transfers, Stand-Sit [place] minimum assist (75% patient effort)  -PC      Transfers, Sit-Stand-Sit, Assist Device rolling walker  -PC      Recorded by [PC] Christen Demarco PT      Gait Assessment/Treatment    Gait, [place] Level minimum assist (75% patient effort)  -PC      Gait, Assistive Device rolling walker  -PC      Gait, Distance (Feet) 30   in room due to lines, no telepak available  -PC      Gait, Gait Deviations ataxia;decreased heel strike;right:  -PC      Gait, Impairments impaired balance;strength decreased  -PC      Gait, Comment low tolerance to activity due to SOA, pt very shaky today  -PC      Recorded by [PC] Christen Demarco, PT      Therapy Exercises    Bilateral Lower Extremities 5 reps;ankle pumps/circles;LAQ  -PC      Recorded by [PC] Christen Demarco PT      Positioning and Restraints    Pre-Treatment Position sitting in chair/recliner  -PC      Post Treatment Position chair  -PC      In Chair sitting;call light within reach;encouraged to call for assist  -PC      Recorded by [PC] Christen Demarco PT        User Key  (r) = Recorded By, (t) = Taken By, (c) = Cosigned By    Initials Name Effective Dates    PC Christen Demarco PT 12/01/15 -                 IP PT Goals       10/02/17 1556 10/02/17 1555 09/20/17 1540    Bed Mobility PT LTG    Bed Mobility PT LTG, Time to Achieve   1 wk  -EF    Bed Mobility PT LTG, Activity Type   all bed mobility  -EF    Bed Mobility PT LTG, [place] Level   independent  -EF    Bed Mobility PT LTG, Date Goal Reviewed  10/02/17  -PC 09/20/17  -EF    Bed Mobility PT LTG, Outcome   goal ongoing  -EF    Transfer Training PT LTG    Transfer Training  PT LTG, Time to Achieve   1 wk  -EF    Transfer Training PT LTG, Activity Type   all transfers  -EF    Transfer Training PT LTG, [place] Level   supervision required  -EF    Transfer Training PT  LTG, Date Goal Reviewed 10/02/17  -PC      Transfer Training PT LTG, Outcome   goal ongoing  -EF    Gait Training PT LTG    Gait Training Goal PT LTG, Date Established 10/02/17  -PC      Gait Training Goal PT LTG, Time to Achieve   1 wk  -EF    Gait Training Goal PT LTG, [place] Level   supervision required  -EF    Gait Training Goal PT LTG, Assist Device   walker, rolling  -EF    Gait Training Goal PT LTG, Distance to Achieve   80  -EF    Gait Training Goal PT LTG, Date Goal Reviewed 10/02/17  -PC  09/20/17  -EF    Gait Training Goal PT LTG, Outcome goal ongoing  -PC  goal revised  -EF      User Key  (r) = Recorded By, (t) = Taken By, (c) = Cosigned By    Initials Name Provider Type    EF Shanice Ash, PT Physical Therapist    PC Christen Demarco, PT Physical Therapist          Physical Therapy Education     Title: PT OT SLP Therapies (Active)     Topic: Physical Therapy (Active)     Point: Mobility training (Active)    Learning Progress Summary    Learner Readiness Method Response Comment Documented by Status   Patient Acceptance E,D NR  PC 10/02/17 1553 Active    Acceptance E,TB VU,MASON  CW 09/28/17 1621 Done    Acceptance E,D VU,NR pt used  on phone to communicate-reported incr wkness and incr SOA JM 09/26/17 1053 Done    Acceptance E,D VU,DU  PC 09/22/17 1522 Done    Acceptance E,D DU,VU  PC 09/22/17 1521 Done    Acceptance E,D VU,DU  PC 09/21/17 1444 Done    Acceptance E NR  EF 09/20/17 1539 Active    Acceptance E NR  ESME 09/19/17 1521 Active    Acceptance E VU,NR  ESME 09/18/17 1443 Done    Acceptance E VU  KH 09/17/17 1629 Done    Acceptance E VU,NR  KH 09/16/17 1539 Done    Acceptance E,TB,D VU,NR  RW 09/15/17 1504 Done    Acceptance E,D VU,NR  RW 09/14/17 1653 Done    Acceptance E VU,NR  ESME  09/13/17 1259 Done    Acceptance E NR  AR 09/12/17 1452 Active    Acceptance E NR  ESME 09/11/17 1434 Active    Acceptance E NR  EM 09/08/17 1342 Active    Acceptance E,D NR  PC 09/07/17 1407 Active    Acceptance E,D NR  PC 09/05/17 1421 Active   Family Acceptance E NR  AR 09/12/17 1452 Active               Point: Home exercise program (Active)    Learning Progress Summary    Learner Readiness Method Response Comment Documented by Status   Patient Acceptance E,D NR  PC 10/02/17 1553 Active    Acceptance E,TB VU,DU  CW 09/28/17 1621 Done    Acceptance E,D VU,NR pt used  on phone to communicate-reported incr wkness and incr SOA  09/26/17 1053 Done    Acceptance E,D VU,DU  PC 09/21/17 1444 Done    Acceptance E NR  ESME 09/19/17 1521 Active    Acceptance E VU,NR  ESME 09/18/17 1443 Done    Acceptance E VU,NR   09/16/17 1539 Done    Acceptance E,TB,D VU,NR  RW 09/15/17 1504 Done    Acceptance E VU,NR  ESME 09/13/17 1259 Done    Acceptance E NR  AR 09/12/17 1452 Active    Acceptance E NR  ESME 09/11/17 1434 Active    Acceptance E,D NR  PC 09/07/17 1407 Active   Family Acceptance E NR  AR 09/12/17 1452 Active               Point: Body mechanics (Active)    Learning Progress Summary    Learner Readiness Method Response Comment Documented by Status   Patient Acceptance E,D NR  PC 10/02/17 1553 Active    Acceptance E,TB VU,DU  CW 09/28/17 1621 Done    Acceptance E,D VU,NR pt used  on phone to communicate-reported incr wkness and incr SOA  09/26/17 1053 Done    Acceptance E,D VU,DU  PC 09/22/17 1522 Done    Acceptance E,D DU,VU  PC 09/22/17 1521 Done    Acceptance E,D VU,DU  PC 09/21/17 1444 Done    Acceptance E NR  ESME 09/19/17 1521 Active    Acceptance E VU,NR  ESME 09/18/17 1443 Done    Acceptance E,TB,D VU,NR  RW 09/15/17 1504 Done    Acceptance E,D VU,NR  RW 09/14/17 1653 Done    Acceptance E VU,NR  ESME 09/13/17 1259 Done    Acceptance E NR  AR 09/12/17 1452 Active    Acceptance E NR  ESME 09/11/17 1434 Active     Acceptance E,D NR  PC 09/07/17 1407 Active   Family Acceptance E NR  AR 09/12/17 1452 Active               Point: Precautions (Active)    Learning Progress Summary    Learner Readiness Method Response Comment Documented by Status   Patient Acceptance E,D NR  PC 10/02/17 1553 Active    Acceptance E,TB VU,DU  CW 09/28/17 1621 Done    Acceptance E,D VU,NR pt used  on phone to communicate-reported incr wkness and incr SOA  09/26/17 1053 Done    Acceptance E,D VU,DU  PC 09/22/17 1522 Done    Acceptance E,D DU,VU  PC 09/22/17 1521 Done    Acceptance E,D VU,DU  PC 09/21/17 1444 Done    Acceptance E NR  EF 09/20/17 1539 Active    Acceptance E NR  ESME 09/19/17 1521 Active    Acceptance E VU,NR  ESME 09/18/17 1443 Done    Acceptance E VU   09/17/17 1629 Done    Acceptance E VU,NR   09/16/17 1539 Done    Acceptance E,TB,D VU,NR  RW 09/15/17 1504 Done    Acceptance E,D VU,NR  RW 09/14/17 1653 Done    Acceptance E VU,NR  ESME 09/13/17 1259 Done    Acceptance E NR  AR 09/12/17 1452 Active    Acceptance E NR  ESME 09/11/17 1434 Active    Acceptance E,D NR  PC 09/07/17 1407 Active   Family Acceptance E NR  AR 09/12/17 1452 Active                      User Key     Initials Effective Dates Name Provider Type Discipline     05/18/15 -  Diana Pedraza, PT Physical Therapist PT    EF 04/24/15 -  Shanice Ash, PT Physical Therapist PT    PC 12/01/15 -  Christen Demarco, PT Physical Therapist PT    EM 12/01/15 -  Shanice Arango, PT Physical Therapist PT     02/18/16 -  Tona Oviedo, PTA Physical Therapy Assistant PT    AR 06/27/16 -  Beatriz Anna, PT Physical Therapist PT    RW 04/06/16 -  Ruthy Berg, PTA Physical Therapy Assistant PT    ESME 04/24/15 -  Branden Baez, PTA Physical Therapy Assistant PT     12/13/16 -  Timur Pereira Physical Therapy Assistant PT                    PT Recommendation and Plan  Anticipated Discharge Disposition: extended care facility  Planned Therapy  Interventions: gait training, bed mobility training, balance training, strengthening, transfer training  PT Frequency: 5 times/wk  Plan of Care Review  Plan Of Care Reviewed With: patient  Progress: progress towards functional goals is fair  Outcome Summary/Follow up Plan: pt was able to walk with walker with min assist on 2L o2  approx 30 ft, he fatigues quickly with SOA with minimal activity.           Outcome Measures       10/02/17 1500          How much help from another person do you currently need...    Turning from your back to your side while in flat bed without using bedrails? 4  -PC      Moving from lying on back to sitting on the side of a flat bed without bedrails? 3  -PC      Moving to and from a bed to a chair (including a wheelchair)? 3  -PC      Standing up from a chair using your arms (e.g., wheelchair, bedside chair)? 3  -PC      Climbing 3-5 steps with a railing? 2  -PC      To walk in hospital room? 3  -PC      AM-PAC 6 Clicks Score 18  -PC        User Key  (r) = Recorded By, (t) = Taken By, (c) = Cosigned By    Initials Name Provider Type    PC Christen Demarco PT Physical Therapist           Time Calculation:         PT Charges       10/02/17 1554          Time Calculation    Start Time 1530  -PC      Stop Time 1545  -PC      Time Calculation (min) 15 min  -PC      PT Received On 10/02/17  -PC      PT - Next Appointment 10/03/17  -PC      PT Goal Re-Cert Due Date 10/09/17  -PC        User Key  (r) = Recorded By, (t) = Taken By, (c) = Cosigned By    Initials Name Provider Type    PC Christen Demarco PT Physical Therapist          Therapy Charges for Today     Code Description Service Date Service Provider Modifiers Qty    05128542926 HC PT THER PROC EA 15 MIN 10/2/2017 Christen Demarco PT GP 1    07096534955 HC PT THER SUPP EA 15 MIN 10/2/2017 Christen Demarco PT GP 1          PT G-Codes  Outcome Measure Options: AM-PAC 6 Clicks Basic Mobility (PT)    Christen Demarco PT  10/2/2017

## 2017-10-02 NOTE — PROGRESS NOTES
Chuckie Valdez  62 y.o.   LOS: 28 days   Patient Care Team:  Douglas Jimenez MD as PCP - General (Family Medicine)    Chief Complaint:  sob    I have reviewed the patient's medical history in detail and updated the computerized patient record.    Subjective     Patient is a 62 y.o. male presents with sob, now know due to copd,R heart failure and tracheal stenosis and malacea, Since filed a stent attempt not sure what next,    History taken from: patient chart    History  Past Medical History:   Diagnosis Date   • COPD (chronic obstructive pulmonary disease)    • CTEPH (chronic thromboembolic pulmonary hypertension)    • HCV (hepatitis C virus)    • History of tracheostomy    • Pulmonary hypertension    • Type 2 diabetes mellitus      Past Surgical History:   Procedure Laterality Date   • BRONCHOSCOPY N/A 9/27/2017    Procedure: BRONCHOSCOPY WITH WASHINGS RIGHT MIDDLE LOBE AND LINGULAR with balloon dilation 12MM-15MM AND 18MM-19MM;  Surgeon: Herb Anglin MD;  Location: [facility];  Service:      History reviewed. No pertinent family history.  Social History     Social History   • Marital status: Single     Spouse name: N/A   • Number of children: N/A   • Years of education: N/A     Social History Main Topics   • Smoking status: Former Smoker     Packs/day: 1.00     Years: 15.00     Types: Cigarettes     Quit date: 2008   • Smokeless tobacco: None   • Alcohol use None   • Drug use: None   • Sexual activity: Not Asked     Other Topics Concern   • None     Social History Narrative         Review of Systems  {All systems were reviewed and negative except for:  Behavioral/Psych: positive for  anxiety    Objective     Lab Results (last 24 hours)     Procedure Component Value Units Date/Time    POC Glucose Fingerstick [[id number]]  (Abnormal) Collected:  10/01/17 1212    Specimen:  Blood Updated:  10/01/17 1214     Glucose 400 (H) mg/dL     Narrative:       Repeat Test Meter: [id number] : [id number]  Lucina Torres    POC Glucose Fingerstick [[id number]]  (Abnormal) Collected:  10/01/17 1640    Specimen:  Blood Updated:  10/01/17 1642     Glucose 266 (H) mg/dL     Narrative:       Meter: [id number] : [id number] Reybay VincentMelissa    POC Glucose Fingerstick [[id number]]  (Abnormal) Collected:  10/01/17 2014    Specimen:  Blood Updated:  10/01/17 2022     Glucose 287 (H) mg/dL     Narrative:       Meter: [id number] : [id number] Samuel Bazzi RN    POC Glucose Fingerstick [[id number]]  (Normal) Collected:  10/02/17 0622    Specimen:  Blood Updated:  10/02/17 0623     Glucose 128 mg/dL     Narrative:       Meter: [id number] : [id number] Samuel Bazzi RN    POC Glucose Fingerstick [[id number]]  (Abnormal) Collected:  10/02/17 0740    Specimen:  Blood Updated:  10/02/17 0742     Glucose 154 (H) mg/dL     Narrative:       Meter: [id number] : [id number] Gerardo HARDWICK    CBC (No Diff) [[id number]]  (Abnormal) Collected:  10/02/17 0808    Specimen:  Blood Updated:  10/02/17 0843     WBC 7.82 10*3/mm3      RBC 4.53 (L) 10*6/mm3      Hemoglobin 10.7 (L) g/dL      Hematocrit 35.3 (L) %      MCV 77.9 (L) fL      MCH 23.6 (L) pg      MCHC 30.3 (L) g/dL      RDW 19.0 (H) %      RDW-SD 53.8 fl      MPV 9.6 fL      Platelets 291 10*3/mm3     Basic Metabolic Panel [[id number]]  (Abnormal) Collected:  10/02/17 0807    Specimen:  Blood Updated:  10/02/17 0906     Glucose 163 (H) mg/dL      BUN 21 mg/dL      Creatinine 0.67 (L) mg/dL      Sodium 141 mmol/L      Potassium 3.6 mmol/L      Chloride 96 (L) mmol/L      CO2 35.8 (H) mmol/L      Calcium 9.0 mg/dL      eGFR  African Amer 146 mL/min/1.73      eGFR Non African Amer 120 mL/min/1.73      BUN/Creatinine Ratio 31.3 (H)     Anion Gap 9.2 mmol/L     Narrative:       GFR Normal >60  Chronic Kidney Disease <60  Kidney Failure <15          Tests reviewed: yes, Labs for last 24 hrs  Tests of interest are: ok    Vital Signs  Temp:  [97 °F (36.1 °C)-98.7 °F (37.1 °C)] 97.4 °F (36.3  "°C)  Heart Rate:  [] 88  Resp:  [16-21] 18  BP: ()/() 111/66    Flowsheet Rows         First Filed Value    Admission Height  70\" (177.8 cm) Documented at 09/04/2017 0728    Admission Weight  180 lb (81.6 kg) Documented at 09/04/2017 0728            Intake/Output Summary (Last 24 hours) at 10/02/17 0944  Last data filed at 10/02/17 0115   Gross per 24 hour   Intake                0 ml   Output             1050 ml   Net            -1050 ml       Physical Exam:  General Appearance alert, appears stated age and cooperative  Lungs clear to auscultation, respirations regular, respirations even and respirations unlabored  Heart regular rhythm & normal rate, normal S1, S2, no murmur, no phoebe, no rub and no click  Extremities moves extremities well, no edema, no cyanosis and no redness  Neurologic Mental Status orientated to person, place, time and situation       Medication Review:   Nitrates, organic and Penicillins  Current Facility-Administered Medications   Medication Dose Route Frequency Provider Last Rate Last Dose   • acetaminophen (TYLENOL) tablet 650 mg  650 mg Oral Q4H PRN Shane Yao MD   650 mg at 09/27/17 2121    Or   • acetaminophen (TYLENOL) suppository 650 mg  650 mg Rectal Q4H PRN Shane Yao MD       • amLODIPine (NORVASC) tablet 10 mg  10 mg Oral Q24H Daniel Hernandez MD   10 mg at 10/02/17 0828   • apixaban (ELIQUIS) tablet 5 mg  5 mg Oral BID Yovany Hernandes MD   5 mg at 10/02/17 0828   • atorvastatin (LIPITOR) tablet 20 mg  20 mg Oral Nightly Shane Yao MD   20 mg at 10/01/17 2103   • dextrose (D50W) solution 25 g  25 g Intravenous Q15 Min PRN Shane Yao MD   25 g at 09/29/17 1845   • dextrose (GLUTOSE) oral gel 15 g  15 g Oral Q15 Min PRN Shane Yao MD       • famotidine (PEPCID) tablet 20 mg  20 mg Oral BID Shane Yao MD   20 mg at 10/02/17 0828   • furosemide (LASIX) tablet 40 mg  40 mg Oral Daily Daniel Hernandez MD   40 mg at " 10/02/17 0828   • glucagon (human recombinant) (GLUCAGEN DIAGNOSTIC) injection 1 mg  1 mg Subcutaneous Q15 Min PRN Douglas iJmenez MD       • guaiFENesin (MUCINEX) 12 hr tablet 1,200 mg  1,200 mg Oral Q12H Shane Yao MD   1,200 mg at 10/02/17 0828   • HYDROcodone-homatropine (HYCODAN) 5-1.5 MG/5ML syrup 5 mL  5 mL Oral Q4H PRN Herb Anglin MD       • insulin aspart (novoLOG) injection 0-24 Units  0-24 Units Subcutaneous 4x Daily AC & at Bedtime Daniel Thiago Hernandez MD   4 Units at 10/02/17 0831   • insulin detemir (LEVEMIR) injection 10 Units  10 Units Subcutaneous Nightly Lj Marvin MD   10 Units at 10/01/17 2111   • insulin detemir (LEVEMIR) injection 10 Units  10 Units Subcutaneous QAM Daniel Hernandez MD   10 Units at 10/02/17 0714   • ipratropium-albuterol (DUO-NEB) nebulizer solution 3 mL  3 mL Nebulization Q4H PRN Herb Anglin MD   3 mL at 09/29/17 0458   • ipratropium-albuterol (DUO-NEB) nebulizer solution 3 mL  3 mL Nebulization Q4H - RT Herb Anglin MD   3 mL at 10/02/17 0703   • ipratropium-albuterol (DUO-NEB) nebulizer solution 3 mL  3 mL Nebulization Q4H PRN Herb Anglin MD   3 mL at 10/01/17 1544   • lactated ringers infusion 1,000 mL  1,000 mL Intravenous Continuous PRN Herb Anglin MD 25 mL/hr at 09/27/17 1323     • lactated ringers infusion  9 mL/hr Intravenous Continuous Noe Mcgraw MD 9 mL/hr at 09/29/17 0919 9 mL/hr at 09/29/17 0919   • lidocaine (XYLOCAINE) 4 % nebulizer solution 4 mL  4 mL Nebulization Once Yovany Hernandes MD       • Magnesium Sulfate 2 gram infusion - Mg less than or equal to 1.5 mg/dL  2 g Intravenous PRN Shane Yao MD        Or   • Magesium Sulfate 1 gram infusion - Mg 1.6-1.9 mg/dL  1 g Intravenous PRN Shane Yao MD       • magnesium hydroxide (MILK OF MAGNESIA) suspension 2400 mg/10mL 10 mL  10 mL Oral Daily PRN Lj Marvin MD   10 mL at 09/23/17 4597   • metoprolol tartrate (LOPRESSOR) tablet  25 mg  25 mg Oral BID Eduar Tucker MD   25 mg at 10/02/17 0829   • metroNIDAZOLE (FLAGYL) tablet 500 mg  500 mg Oral Q6H Ricki Coyle Jr., MD   500 mg at 10/02/17 0533   • miconazole (MICOTIN) 2 % powder   Topical Q12H Diamond Ann MD       • ondansetron (ZOFRAN) tablet 4 mg  4 mg Oral Q6H PRN Shane Yao MD        Or   • ondansetron ODT (ZOFRAN-ODT) disintegrating tablet 4 mg  4 mg Oral Q6H PRN Shane Yao MD        Or   • ondansetron (ZOFRAN) injection 4 mg  4 mg Intravenous Q6H PRN Shane Yao MD   4 mg at 09/20/17 1842   • potassium chloride (MICRO-K) CR capsule 40 mEq  40 mEq Oral PRN Shane Yao MD        Or   • potassium chloride (KLOR-CON) packet 40 mEq  40 mEq Oral PRN Shane Yao MD        Or   • potassium chloride 10 mEq in 100 mL IVPB  10 mEq Intravenous Q1H PRN Shane Yao MD       • predniSONE (DELTASONE) tablet 40 mg  40 mg Oral Daily With Breakfast Daniel Thiago Hernandez MD   40 mg at 10/02/17 0828   • sennosides-docusate sodium (SENOKOT-S) 8.6-50 MG tablet 2 tablet  2 tablet Oral Nightly Shane Yao MD   2 tablet at 09/30/17 2126   • sodium chloride 0.9 % flush 1-10 mL  1-10 mL Intravenous PRN Shane Yao MD       • sodium chloride 0.9 % flush 10 mL  10 mL Intravenous PRN Josh Valentin MD       • sodium chloride 0.9 % infusion  9 mL/hr Intravenous Continuous Jeevan Melendrez MD 9 mL/hr at 09/06/17 0406 9 mL/hr at 09/06/17 0406   • tamsulosin (FLOMAX) 24 hr capsule 0.4 mg  0.4 mg Oral BID Douglas Jimenez MD   0.4 mg at 10/02/17 0828   • traZODone (DESYREL) tablet 50 mg  50 mg Oral Nightly Lj Marvin MD   50 mg at 10/02/17 0024   • zolpidem (AMBIEN) tablet 5 mg  5 mg Oral Nightly PRN Douglas Jimenez MD   5 mg at 10/02/17 0024     Prescriptions Prior to Admission   Medication Sig Dispense Refill Last Dose   • acetaminophen (TYLENOL) 325 MG tablet Take 325 mg by mouth Every 4 (Four) Hours As Needed for Mild Pain .      • albuterol (PROVENTIL) (2.5  MG/3ML) 0.083% nebulizer solution Take 2.5 mg by nebulization Every 4 (Four) Hours As Needed for Wheezing (every 2 hours as needed for soa related to copd).      • apixaban (ELIQUIS) 5 MG tablet tablet Take 5 mg by mouth 2 (Two) Times a Day.      • atorvastatin (LIPITOR) 20 MG tablet Take 20 mg by mouth Every Night.      • bisacodyl (DULCOLAX) 10 MG suppository Insert 10 mg into the rectum Daily As Needed for Constipation.      • budesonide-formoterol (SYMBICORT) 160-4.5 MCG/ACT inhaler Inhale 2 puffs 2 (Two) Times a Day.      • escitalopram (LEXAPRO) 20 MG tablet Take 20 mg by mouth Every Night.      • ferrous sulfate 325 (65 FE) MG tablet Take 325 mg by mouth 2 (Two) Times a Day.      • fluticasone (FLOVENT HFA) 220 MCG/ACT inhaler Inhale 1 puff 2 (Two) Times a Day.      • furosemide (LASIX) 40 MG tablet Take 40 mg by mouth Daily.      • insulin detemir (LEVEMIR) 100 UNIT/ML injection Inject 8 Units under the skin Daily.      • ipratropium-albuterol (DUO-NEB) 0.5-2.5 mg/mL nebulizer Take 3 mL by nebulization Every 4 (Four) Hours.      • melatonin 1 MG tablet Take 3 mg by mouth At Night As Needed for Sleep (for sleep).      • metoprolol tartrate (LOPRESSOR) 25 MG tablet Take 12.5 mg by mouth 2 (Two) Times a Day.   9/29/2017 at Unknown time   • Multiple Vitamins-Minerals (MULTIVITAMIN ADULT PO) Take 1 tablet by mouth Daily.      • ondansetron (ZOFRAN) 4 MG tablet Take 4 mg by mouth Every 6 (Six) Hours As Needed for Nausea or Vomiting.      • pantoprazole (PROTONIX) 40 MG EC tablet Take 40 mg by mouth Daily.      • Sennosides-Docusate Sodium 8.6-50 MG capsule Take 2 capsules by mouth 2 (Two) Times a Day.      • traZODone (DESYREL) 50 MG tablet Take 50 mg by mouth Every Night.          Medications reviewed: yes, and changes made are: none    Assessment:  1)copd  2) R CHF  3)Tracheostenosis and malacea      Plan:Continue current medications.     Treatment Plans:  1.    Consultations: no new3.    Disposition: LTC soon  ?  Douglas Jimenez MD  10/02/17  9:44 AM

## 2017-10-02 NOTE — PLAN OF CARE
Problem: Inpatient Physical Therapy  Goal: Transfer Training Goal 1 LTG- PT  Outcome: Ongoing (interventions implemented as appropriate)    09/05/17 1422 09/12/17 1453 09/20/17 1540   Transfer Training PT LTG   Transfer Training PT LTG, Date Established 09/05/17 --  --    Transfer Training PT LTG, Time to Achieve --  --  1 wk   Transfer Training PT LTG, Activity Type --  --  all transfers   Transfer Training PT LTG, [place] Level --  --  supervision required   Transfer Training PT LTG, Assist Device --  walker, rolling --    Transfer Training PT LTG, Date Goal Reviewed --  --  --    Transfer Training PT LTG, Outcome --  --  goal ongoing     10/02/17 1556   Transfer Training PT LTG   Transfer Training PT LTG, Date Established --    Transfer Training PT LTG, Time to Achieve --    Transfer Training PT LTG, Activity Type --    Transfer Training PT LTG, [place] Level --    Transfer Training PT LTG, Assist Device --    Transfer Training PT LTG, Date Goal Reviewed 10/02/17   Transfer Training PT LTG, Outcome --        Goal: Gait Training Goal LTG- PT  Outcome: Ongoing (interventions implemented as appropriate)    09/20/17 1540 10/02/17 1556   Gait Training PT LTG   Gait Training Goal PT LTG, Date Established --  10/02/17   Gait Training Goal PT LTG, Time to Achieve 1 wk --    Gait Training Goal PT LTG, [place] Level supervision required --    Gait Training Goal PT LTG, Assist Device walker, rolling --    Gait Training Goal PT LTG, Distance to Achieve 80 --    Gait Training Goal PT LTG, Date Goal Reviewed --  10/02/17   Gait Training Goal PT LTG, Outcome --  goal ongoing

## 2017-10-02 NOTE — PLAN OF CARE
Problem: COPD, Chronic Bronchitis/Emphysema (Adult)  Goal: Signs and Symptoms of Listed Potential Problems Will be Absent or Manageable (COPD, Chronic Bronchitis/Emphysema)  Outcome: Ongoing (interventions implemented as appropriate)    Problem: Patient Care Overview (Adult)  Goal: Plan of Care Review  Outcome: Ongoing (interventions implemented as appropriate)    10/02/17 0648   Outcome Evaluation   Outcome Summary/Follow up Plan Pt vitals remain stable. CT overnight. Waiting for tele bed       Goal: Adult Individualization and Mutuality  Outcome: Ongoing (interventions implemented as appropriate)  Goal: Discharge Needs Assessment  Outcome: Ongoing (interventions implemented as appropriate)    Problem: Cardiac: Heart Failure (Adult)  Goal: Signs and Symptoms of Listed Potential Problems Will be Absent or Manageable (Cardiac: Heart Failure)  Outcome: Ongoing (interventions implemented as appropriate)    Problem: Perioperative Period (Adult)  Goal: Signs and Symptoms of Listed Potential Problems Will be Absent or Manageable (Perioperative Period)  Outcome: Ongoing (interventions implemented as appropriate)    Problem: Communication Impaired, Verbal (Adult,Obstetrics,Pediatric)  Goal: Identify Related Risk Factors and Signs and Symptoms  Outcome: Ongoing (interventions implemented as appropriate)  Goal: Improved/Effective Communication  Outcome: Ongoing (interventions implemented as appropriate)

## 2017-10-02 NOTE — PLAN OF CARE
Problem: Patient Care Overview (Adult)  Goal: Plan of Care Review  Outcome: Ongoing (interventions implemented as appropriate)    10/02/17 1397   Patient Care Overview   Progress progress towards functional goals is fair   Outcome Evaluation   Outcome Summary/Follow up Plan pt was able to walk with walker with min assist on 2L o2 approx 30 ft, he fatigues quickly with SOA with minimal activity.    Coping/Psychosocial Response Interventions   Plan Of Care Reviewed With patient

## 2017-10-02 NOTE — PLAN OF CARE
Problem: Inpatient Physical Therapy  Goal: Bed Mobility Goal LTG- PT    09/20/17 1540 10/02/17 1555   Bed Mobility PT LTG   Bed Mobility PT LTG, Time to Achieve 1 wk --    Bed Mobility PT LTG, Activity Type all bed mobility --    Bed Mobility PT LTG, [place] Level independent --    Bed Mobility PT LTG, Date Goal Reviewed --  10/02/17   Bed Mobility PT LTG, Outcome goal ongoing --

## 2017-10-02 NOTE — SIGNIFICANT NOTE
The patient was scheduled for a VFSS at 1145 this date.  Transport sent back as pt was eating lunch.  Transport was delayed getting patient to radiology after he completed his lunch.  Due to a full VFSS schedule the VFSS will need to be completed tomorrow.      10/02/17 1318   Rehab Treatment   Discipline speech language pathologist   Recommendations   SLP - Next Appointment 10/03/17

## 2017-10-02 NOTE — CONSULTS
Name: Chuckie Valdez ADMIT: 2017   : 1955  PCP: Douglas Jimenez MD    MRN: [id number] LOS: 28 days   AGE/SEX: 62 y.o. male  ROOM: [facility]1     Inpatient Consult to Vascular Surgery  Consult performed by: MELISSA WAGNER  Consult ordered by: INA REESE MD     LOS: 28 days   Patient Care Team:  Douglas Jimenez MD as PCP - General (Family Medicine)    Subjective     History of Present Illness  62 y.o. male with history of superior vena cava stenosis seen on CT angiogram of chest last month with past previous admission.  Patient with remote history of long-term central venous catheter as well as tracheostomy with previous illness which have been removed.  Patient also had a gastrostomy tube which has been removed at that time.  Patient admitted with significant shortness of breath, supraventricular tachycardia, and pulmonary problems.  Patient also with tracheal stenosis that was treated by pulmonary with bronchoscopy and attempted stent but removed.  Discussed with patient who only speaks [place]ander with use of his cell phone and [place] .  Also discussed with patient's nurse and apparent plans to transfer patient for further treatment of this at another facility.  Patient with no upper extremity swelling at all bilaterally and no neck or head swelling.  Patient does have lymphadenopathy in the left supraclavicular area by CT scanning of the neck.  CT also shows dilated azygos veins as well as cephalad innominate and superior vena cava veins consistent with severe stenosis and/or occlusion of the superior vena cava at the junction with the right atrium.  With large collaterals this is likely chronic from his prior chronic illness years ago.  No clinical evidence or CT scan evidence of acute process or thrombus.  Patient is on long-term Eliquis and is continued.    Review of Systems   Constitutional: Negative.    HENT: Negative.    Eyes: Negative.     Respiratory: Positive for cough, shortness of breath and wheezing. Negative for apnea, choking, chest tightness and stridor.    Cardiovascular: Positive for palpitations. Negative for chest pain and leg swelling.   Gastrointestinal: Negative.    Genitourinary: Negative.    Musculoskeletal: Negative.    Skin: Negative.    Neurological: Negative.    Hematological: Negative.    Psychiatric/Behavioral: Negative.        Past Medical History:   Diagnosis Date   • COPD (chronic obstructive pulmonary disease)    • CTEPH (chronic thromboembolic pulmonary hypertension)    • HCV (hepatitis C virus)    • History of tracheostomy    • Pulmonary hypertension    • Type 2 diabetes mellitus        Past Surgical History:   Procedure Laterality Date   • BRONCHOSCOPY N/A 9/27/2017    Procedure: BRONCHOSCOPY WITH WASHINGS RIGHT MIDDLE LOBE AND LINGULAR with balloon dilation 12MM-15MM AND 18MM-19MM;  Surgeon: Herb Anglin MD;  Location: [facility] ENDOSCOPY;  Service:    • BRONCHOSCOPY N/A 9/29/2017    Procedure: BRONCHOSCOPY WITH BALLON DILATION 19MM AND STENT PLACEMENT;  Surgeon: Herb Anglin MD;  Location: [facility] MAIN OR;  Service:    • BRONCHOSCOPY N/A 9/29/2017    Procedure: BRONCHOSCOPY WITH STENT REMOVAL;  Surgeon: Herb Anglin MD;  Location: [facility] OR;  Service:        History reviewed. No pertinent family history.    Social History   Substance Use Topics   • Smoking status: Former Smoker     Packs/day: 1.00     Years: 15.00     Types: Cigarettes     Quit date: 2008   • Smokeless tobacco: None   • Alcohol use None       Allergies: Nitrates, organic and Penicillins    Prescriptions Prior to Admission   Medication Sig Dispense Refill Last Dose   • acetaminophen (TYLENOL) 325 MG tablet Take 325 mg by mouth Every 4 (Four) Hours As Needed for Mild Pain .      • albuterol (PROVENTIL) (2.5 MG/3ML) 0.083% nebulizer solution Take 2.5 mg by nebulization Every 4 (Four) Hours As Needed for Wheezing (every 2 hours as  needed for soa related to copd).      • apixaban (ELIQUIS) 5 MG tablet tablet Take 5 mg by mouth 2 (Two) Times a Day.      • atorvastatin (LIPITOR) 20 MG tablet Take 20 mg by mouth Every Night.      • bisacodyl (DULCOLAX) 10 MG suppository Insert 10 mg into the rectum Daily As Needed for Constipation.      • budesonide-formoterol (SYMBICORT) 160-4.5 MCG/ACT inhaler Inhale 2 puffs 2 (Two) Times a Day.      • escitalopram (LEXAPRO) 20 MG tablet Take 20 mg by mouth Every Night.      • ferrous sulfate 325 (65 FE) MG tablet Take 325 mg by mouth 2 (Two) Times a Day.      • fluticasone (FLOVENT HFA) 220 MCG/ACT inhaler Inhale 1 puff 2 (Two) Times a Day.      • furosemide (LASIX) 40 MG tablet Take 40 mg by mouth Daily.      • insulin detemir (LEVEMIR) 100 UNIT/ML injection Inject 8 Units under the skin Daily.      • ipratropium-albuterol (DUO-NEB) 0.5-2.5 mg/mL nebulizer Take 3 mL by nebulization Every 4 (Four) Hours.      • melatonin 1 MG tablet Take 3 mg by mouth At Night As Needed for Sleep (for sleep).      • metoprolol tartrate (LOPRESSOR) 25 MG tablet Take 12.5 mg by mouth 2 (Two) Times a Day.   9/29/2017 at Unknown time   • Multiple Vitamins-Minerals (MULTIVITAMIN ADULT PO) Take 1 tablet by mouth Daily.      • ondansetron (ZOFRAN) 4 MG tablet Take 4 mg by mouth Every 6 (Six) Hours As Needed for Nausea or Vomiting.      • pantoprazole (PROTONIX) 40 MG EC tablet Take 40 mg by mouth Daily.      • Sennosides-Docusate Sodium 8.6-50 MG capsule Take 2 capsules by mouth 2 (Two) Times a Day.      • traZODone (DESYREL) 50 MG tablet Take 50 mg by mouth Every Night.          amLODIPine 10 mg Oral Q24H   apixaban 5 mg Oral BID   arformoterol 15 mcg Nebulization BID - RT   atorvastatin 20 mg Oral Nightly   budesonide 0.5 mg Nebulization BID - RT   famotidine 20 mg Oral BID   furosemide 40 mg Oral Daily   guaiFENesin 1,200 mg Oral Q12H   insulin aspart 0-24 Units Subcutaneous 4x Daily AC & at Bedtime   insulin detemir 10 Units  Subcutaneous Nightly   insulin detemir 10 Units Subcutaneous QAM   ipratropium-albuterol 3 mL Nebulization Q4H - RT   lidocaine 4 mL Nebulization Once   metoprolol tartrate 25 mg Oral BID   metroNIDAZOLE 500 mg Oral Q6H   miconazole  Topical Q12H   sennosides-docusate sodium 2 tablet Oral Nightly   tamsulosin 0.4 mg Oral BID   traZODone 50 mg Oral Nightly       lactated ringers 1,000 mL Last Rate: 1,000 mL (09/27/17 1323)   lactated ringers 9 mL/hr Last Rate: 9 mL/hr (09/29/17 0919)   sodium chloride 9 mL/hr Last Rate: 9 mL/hr (09/06/17 0406)     •  acetaminophen **OR** acetaminophen  •  dextrose  •  dextrose  •  glucagon (human recombinant)  •  HYDROcodone-homatropine  •  ipratropium-albuterol  •  ipratropium-albuterol  •  lactated ringers  •  magnesium sulfate **OR** magnesium sulfate in D5W 1g/100mL (PREMIX)  •  magnesium hydroxide  •  ondansetron **OR** ondansetron ODT **OR** ondansetron  •  potassium chloride **OR** potassium chloride **OR** potassium chloride  •  sodium chloride  •  sodium chloride  •  zolpidem      Objective     Physical Exam   Constitutional: He appears well-developed and well-nourished.   HENT:   Head: Normocephalic. Head is without abrasion, without contusion, without laceration, without right periorbital erythema and without left periorbital erythema.       Eyes: Conjunctivae, EOM and lids are normal. Pupils are equal, round, and reactive to light.   Neck: Normal range of motion. Normal carotid pulses, no hepatojugular reflux and no JVD present. No tracheal tenderness present. Carotid bruit is not present. No tracheal deviation, no edema and no erythema present. No thyroid mass and no thyromegaly present.       Cardiovascular: Normal rate and normal pulses.   Occasional extrasystoles are present.   Pulmonary/Chest: No stridor. No apnea and no tachypnea. No respiratory distress. He has no wheezes. He has rales.   Abdominal: Normal appearance, normal aorta and bowel sounds are normal. There  is no tenderness.       Musculoskeletal: Normal range of motion.        Right shoulder: He exhibits no tenderness, no bony tenderness, no swelling, no deformity and no pain.   Lymphadenopathy:     He has cervical adenopathy.        Left cervical: Superficial cervical adenopathy present.     He has no axillary adenopathy.        Right: No inguinal adenopathy present.        Left: No inguinal adenopathy present.   Neurological: He is alert. He has normal strength. No cranial nerve deficit or sensory deficit.   Skin: Skin is warm, dry and intact.   Psychiatric: He has a normal mood and affect. His behavior is normal. Judgment and thought content normal. Cognition and memory are normal. He is communicative (speaks Amharic only and communicated with google ). He is attentive.   Vitals reviewed.        Results from last 7 days  Lab Units 10/02/17  0808 10/01/17  0816 09/30/17  0604 09/29/17  0814 09/28/17  0716 09/27/17  0745 09/26/17  0652   WBC 10*3/mm3 7.82 5.92 4.72 10.75* 12.27* 14.01* 15.00*   HEMOGLOBIN g/dL 10.7* 10.7* 11.0* 11.7* 11.2* 12.0* 11.5*   PLATELETS 10*3/mm3 291 285 225 178 163 148 151     Results from last 7 days  Lab Units 10/02/17  0807 10/01/17  0816 09/30/17  0604 09/29/17  0814 09/28/17  0716 09/27/17  0745 09/26/17  0652   SODIUM mmol/L 141 136 136 141 138 135* 137   POTASSIUM mmol/L 3.6 4.0 4.7 4.2 4.2 4.5 4.2   CHLORIDE mmol/L 96* 92* 89* 93* 94* 92* 92*   CO2 mmol/L 35.8* 33.0* 38.9* 35.6* 36.1* 32.8* 37.1*   BUN mg/dL 21 20 20 14 14 15 15   CREATININE mg/dL 0.67* 0.57* 0.67* 0.62* 0.63* 0.71* 0.64*   GLUCOSE mg/dL 163* 241* 191* 76 177* 138* 40*   Estimated Creatinine Clearance: 116.6 mL/min (by C-G formula based on Cr of 0.67).      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 [J95.03] 2017   • CTEPH (chronic thromboembolic pulmonary hypertension) [I27.24] 2017   • COPD exacerbation [J44.1] 2017   • Clostridium difficile enterocolitis [A04.72] 2017      Resolved Hospital Problems    Diagnosis Date Noted Date Resolved   • Pneumonia of left lower lobe due to infectious organism [J18.1] 2017     Problem Points:  3:  Patient has a new problem, with no additional work-up planned (max of 1)  Total problem points:3    Data Points:  1:  I have reviewed or order clinical lab test  1:  I have reviewed or order radiology test (except heart catheterization or echo)  1:  I have reviewed or order medicine test (PFT, EKG, caridac echo or cath)  2:  I have reviewed and summation of old records and/or discussed the patients care with another health care provider  Total data points:4 or more    Risk Points:  High:  Cardiovascular imaging with risk factors    MDM Prob point Data point Risk   SF 1 1 Minimal   Low 2 2 Low   Mod 3 3 Moderate   High 4 4 High     Code MDM History Exam Time   48544 SF/Low Detailed Detailed 30   54389 Mod Comprehensive Comprehensive 50   95158 High Comprehensive Comprehensive 70     Detailed history:  4 elements HPI or status of 3 chronic problems; 2-9 system ROS  Comprehensive:  4 elements HPI or status of 3 chronic problems;  10 system ROS    Detailed Exam:  12 findings from any organ system  Comprehensive Exam:  2 findings from each of 9 systems.     [place]    Assessment/Plan     Active Problems:    Acute on chronic respiratory failure with hypoxia and hypercapnia    Tracheal stenosis due to tracheostomy    CTEPH (chronic thromboembolic pulmonary hypertension)    COPD exacerbation    Clostridium difficile enterocolitis    Superior vena cava stenosis        62 y.o. male with chronic superior vena cava stenosis likely from prior treatments as well as tracheal stenosis.  Patient  with multiple large venous collaterals with an enlarged azygos and hemiazygos veins.  Patient was no upper extremity or head and neck swelling.  Patient on Eliquis.  Recommend continuing medical management said no indication for any intervention of the superior vena cava stenosis which is likely chronic and present for years and prior chronic illness and treatment.  I will see as needed.    I discussed the patients findings and my recommendations with patient and nursing staff.  Please call my office with any question: [phone]    Dane Shelton MD  10/02/17  2:26 PM

## 2017-10-02 NOTE — PROGRESS NOTES
Daniel Hernandez MD                         [phone]      Patient ID:    Name:  Chuckie Valdez    MRN:  [id number]    1955   62 y.o.  male            CC/Reason for visit: Respiratory failure follow-up    Subjective: Patient seen and examined this a.m.  No acute overnight events.  His friends are not with him today.  I had a conversation using his phone .  He says that he does not have any worsening cough, fever, chills, hemoptysis, shortness of breath.  He had questions about the future plan.  Is tolerating a regular diet  LOS: 28    ROS: Review of Systems  As above    Objective     Vital Signs past 24hrs  BP range: BP: ()/() 118/91  Pulse range: Heart Rate:  [] 95  Resp rate range: Resp:  [16-21] 20  Temp range: Temp (24hrs), Av.4 °F (36.3 °C), Min:97 °F (36.1 °C), Max:97.8 °F (36.6 °C)    O2 Device: nasal cannula FiO2 (%): 40 %   158 lb 15.2 oz (72.1 kg); Body mass index is 22.81 kg/(m^2).    Intake/Output Summary (Last 24 hours) at 10/02/17 1215  Last data filed at 10/02/17 1200   Gross per 24 hour   Intake                0 ml   Output             1800 ml   Net            -1800 ml       Exam:  GEN:  No respiratory distress, comfortably lying, does have conversational dyspnea  EYES:   EOM-i, anicteric sclera bilat  ENT:    External ears/nose normal, OP clear  NECK:  Supple, midline trachea, No JVD or cervical LApathy  LUNGS: Bilateral breath sounds heard, bilateral breath sounds heard but decreased in intensity.  No significant stridor heard today  CV:  Regular rate and rhythm, No murmur  ABD:  Non tender, no distended, no palpable liver or masses  EXT:  No cyanosis or clubbing, trace peripheral/sacral edema    Scheduled meds:      amLODIPine 10 mg Oral Q24H   apixaban 5 mg Oral BID   atorvastatin 20 mg Oral Nightly   famotidine 20 mg Oral BID   furosemide 40 mg Oral Daily   guaiFENesin 1,200 mg Oral Q12H   insulin aspart 0-24 Units  Subcutaneous 4x Daily AC & at Bedtime   insulin detemir 10 Units Subcutaneous Nightly   insulin detemir 10 Units Subcutaneous QAM   ipratropium-albuterol 3 mL Nebulization Q4H - RT   lidocaine 4 mL Nebulization Once   metoprolol tartrate 25 mg Oral BID   metroNIDAZOLE 500 mg Oral Q6H   miconazole  Topical Q12H   predniSONE 40 mg Oral Daily With Breakfast   sennosides-docusate sodium 2 tablet Oral Nightly   tamsulosin 0.4 mg Oral BID   traZODone 50 mg Oral Nightly     IV meds:                          lactated ringers 1,000 mL Last Rate: 1,000 mL (09/27/17 1323)   lactated ringers 9 mL/hr Last Rate: 9 mL/hr (09/29/17 0919)   sodium chloride 9 mL/hr Last Rate: 9 mL/hr (09/06/17 0406)       Data Review:        Results from last 7 days  Lab Units 10/02/17  0808 10/02/17  0807 10/01/17  0816 09/30/17  0604   SODIUM mmol/L  --  141 136 136   POTASSIUM mmol/L  --  3.6 4.0 4.7   CHLORIDE mmol/L  --  96* 92* 89*   CO2 mmol/L  --  35.8* 33.0* 38.9*   BUN mg/dL  --  21 20 20   CREATININE mg/dL  --  0.67* 0.57* 0.67*   CALCIUM mg/dL  --  9.0 9.4 9.5   GLUCOSE mg/dL  --  163* 241* 191*   WBC 10*3/mm3 7.82  --  5.92 4.72   HEMOGLOBIN g/dL 10.7*  --  10.7* 11.0*   PLATELETS 10*3/mm3 291  --  285 225   PROCALCITONIN ng/mL  --   --   --  0.08*       Lab Results   Component Value Date    CALCIUM 9.0 10/02/2017    PHOS 3.3 09/22/2017         Results from last 7 days  Lab Units 09/27/17  1422 09/25/17  1545   BODYFLDCX   --  No growth at 3 days   GRAM STAIN RESULT  No WBCs or organisms seen  No epithelial cells seen No WBCs or organisms seen             Results from last 7 days  Lab Units 09/29/17  1336 09/29/17  1159   PH, ARTERIAL pH units 7.257* 7.109*   PCO2, ARTERIAL mm Hg 97.3* 136.2*   PO2 ART mm Hg 136.5* 129.9*   MODALITY  BiPap face tent   O2 SATURATION CALC % 98.3 96.9             Estimated Creatinine Clearance: 116.6 mL/min (by C-G formula based on Cr of 0.67).    Results Review:    I have reviewed the laboratory and  imaging data since the last note by LPC physician.    9/29/17 - Post Stent CXR                       CT neck/ chest 10/1/17  IMPRESSION:  1. Tracheal stenosis again noted with narrowing of the coronal diameter  up to 4 mm at the level of the thoracic inlet just below the  tracheostomy tract.  2. Again, enlarged lymph nodes are seen within the left side of the neck  and supraclavicular fossa. Please correlate with biopsy results.  3. Enlarged main pulmonary artery suggestive of pulmonary arterial  hypertension. Patchy reticular nodular opacities within the left upper  lobe likely represent an atypical infection.  4. Although there is limited evaluation in the absence of IV contrast,  there is again noted a prominent azygous/hemiazygous system suggesting  stenosis of the SVC which has been well demonstrated previously on the  CT angiogram performed with contrast from 09/05/2017.  5. Again partial opacification of the left mastoid air cells is seen  that is suggestive of mastoiditis.    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 difficile infection on antibiotics  - Decompensated CTEPH on revatio (RVSP of 73 on admission)  - Acute on chronic diastolic heart failure  - Left-sided vocal cord immobility  - History DVT  - History hep C  - Left supraclavicular lymph node 2.9 x 1.5 cm status post IR guided biopsy - benign   - IDDM II    - ?Chronic SVC stenosis     PLAN:  SVC stenosis noted on CTA again which has not been worked up before due to ongoing issues. This is likely from chronic CVL from prolonged hospitalization @ [facility] but will consult vasc surgery for their opinion.  Will stop  steroids today. Will use ICS.   LN biopsy is negative for malignancy. Flow cytometry is neg as well.   Will finish 2 weeks of flagyl for c.diff.    I will start the request for home trilogy ventilator given chronic respiratory failure with recurrent exacerbations while on BiPAP in the setting of tracheal stenosis and tracheomalacia.  Rpt Ct shows persistent tracheal stenosis and much improved chest. No effusions noted. PAH/ SVC changes again noted.  If we are able to get the patient a trilogy ventilator, I will discharge the patient and the plan is to follow up with Dr. Morrow at the [facility] for further management of tracheal stenosis/ tracheomalacia.  Repeat stent vs trach reconstruction vs permanent trach are his options.  I explained the same to the pt.     I spent 40 minutes in the care of the patient more than half the time interacting with him about the future plan    Daniel Hernandez MD  10/2/2017

## 2017-10-03 ENCOUNTER — APPOINTMENT (OUTPATIENT)
Dept: GENERAL RADIOLOGY | Facility: HOSPITAL | Age: 62
End: 2017-10-03
Attending: INTERNAL MEDICINE

## 2017-10-03 LAB
ANION GAP SERPL CALCULATED.3IONS-SCNC: 8.5 MMOL/L
ARTERIAL PATENCY WRIST A: ABNORMAL
ATMOSPHERIC PRESS: 752.3 MMHG
BASE EXCESS BLDA CALC-SCNC: 10.9 MMOL/L (ref 0–2)
BDY SITE: ABNORMAL
BUN BLD-MCNC: 18 MG/DL (ref 8–23)
BUN/CREAT SERPL: 31.6 (ref 7–25)
CALCIUM SPEC-SCNC: 9 MG/DL (ref 8.6–10.5)
CHLORIDE SERPL-SCNC: 95 MMOL/L (ref 98–107)
CO2 SERPL-SCNC: 35.5 MMOL/L (ref 22–29)
CREAT BLD-MCNC: 0.57 MG/DL (ref 0.76–1.27)
DEPRECATED RDW RBC AUTO: 54.8 FL (ref 37–54)
ERYTHROCYTE [DISTWIDTH] IN BLOOD BY AUTOMATED COUNT: 19.1 % (ref 11.5–14.5)
GFR SERPL CREATININE-BSD FRML MDRD: 145 ML/MIN/1.73
GFR SERPL CREATININE-BSD FRML MDRD: >150 ML/MIN/1.73
GLUCOSE BLD-MCNC: 167 MG/DL (ref 65–99)
GLUCOSE BLDC GLUCOMTR-MCNC: 161 MG/DL (ref 70–130)
GLUCOSE BLDC GLUCOMTR-MCNC: 294 MG/DL (ref 70–130)
HCO3 BLDA-SCNC: 43.3 MMOL/L (ref 22–28)
HCT VFR BLD AUTO: 35.9 % (ref 40.4–52.2)
HGB BLD-MCNC: 10.7 G/DL (ref 13.7–17.6)
HOROWITZ INDEX BLD+IHG-RTO: 60 %
MCH RBC QN AUTO: 23.6 PG (ref 27–32.7)
MCHC RBC AUTO-ENTMCNC: 29.8 G/DL (ref 32.6–36.4)
MCV RBC AUTO: 79.2 FL (ref 79.8–96.2)
MODALITY: ABNORMAL
O2 A-A PPRESDIFF RESPIRATORY: 0.4 MMHG
PCO2 BLDA: 97.3 MM HG (ref 35–45)
PH BLDA: 7.26 PH UNITS (ref 7.35–7.45)
PLATELET # BLD AUTO: 294 10*3/MM3 (ref 140–500)
PMV BLD AUTO: 9.4 FL (ref 6–12)
PO2 BLDA: 136.5 MM HG (ref 80–100)
POTASSIUM BLD-SCNC: 3.7 MMOL/L (ref 3.5–5.2)
RBC # BLD AUTO: 4.53 10*6/MM3 (ref 4.6–6)
SAO2 % BLDCOA: 98.3 % (ref 92–99)
SET MECH RESP RATE: 28
SODIUM BLD-SCNC: 139 MMOL/L (ref 136–145)
VENTILATOR MODE: ABNORMAL
VT ON VENT VENT: 500 ML
WBC NRBC COR # BLD: 7.58 10*3/MM3 (ref 4.5–10.7)

## 2017-10-03 PROCEDURE — 97110 THERAPEUTIC EXERCISES: CPT

## 2017-10-03 PROCEDURE — 94799 UNLISTED PULMONARY SVC/PX: CPT

## 2017-10-03 PROCEDURE — 80048 BASIC METABOLIC PNL TOTAL CA: CPT | Performed by: INTERNAL MEDICINE

## 2017-10-03 PROCEDURE — 85027 COMPLETE CBC AUTOMATED: CPT | Performed by: INTERNAL MEDICINE

## 2017-10-03 PROCEDURE — 25010000002 FUROSEMIDE PER 20 MG: Performed by: INTERNAL MEDICINE

## 2017-10-03 PROCEDURE — 74230 X-RAY XM SWLNG FUNCJ C+: CPT

## 2017-10-03 PROCEDURE — 82962 GLUCOSE BLOOD TEST: CPT

## 2017-10-03 PROCEDURE — 92611 MOTION FLUOROSCOPY/SWALLOW: CPT

## 2017-10-03 PROCEDURE — 99231 SBSQ HOSP IP/OBS SF/LOW 25: CPT | Performed by: FAMILY MEDICINE

## 2017-10-03 PROCEDURE — 63710000001 INSULIN ASPART PER 5 UNITS: Performed by: INTERNAL MEDICINE

## 2017-10-03 RX ORDER — FUROSEMIDE 10 MG/ML
40 INJECTION INTRAMUSCULAR; INTRAVENOUS ONCE
Status: COMPLETED | OUTPATIENT
Start: 2017-10-03 | End: 2017-10-03

## 2017-10-03 RX ADMIN — BUDESONIDE 0.5 MG: 0.5 INHALANT RESPIRATORY (INHALATION) at 08:37

## 2017-10-03 RX ADMIN — METRONIDAZOLE 500 MG: 500 TABLET, FILM COATED ORAL at 05:25

## 2017-10-03 RX ADMIN — AMLODIPINE BESYLATE 10 MG: 10 TABLET ORAL at 08:42

## 2017-10-03 RX ADMIN — METOPROLOL TARTRATE 25 MG: 25 TABLET ORAL at 08:45

## 2017-10-03 RX ADMIN — INSULIN DETEMIR 10 UNITS: 100 INJECTION, SOLUTION SUBCUTANEOUS at 20:29

## 2017-10-03 RX ADMIN — INSULIN ASPART 4 UNITS: 100 INJECTION, SOLUTION INTRAVENOUS; SUBCUTANEOUS at 08:45

## 2017-10-03 RX ADMIN — MICONAZOLE NITRATE: 2 POWDER TOPICAL at 08:43

## 2017-10-03 RX ADMIN — TAMSULOSIN HYDROCHLORIDE 0.4 MG: 0.4 CAPSULE ORAL at 08:43

## 2017-10-03 RX ADMIN — GUAIFENESIN 1200 MG: 600 TABLET, EXTENDED RELEASE ORAL at 20:30

## 2017-10-03 RX ADMIN — TRAZODONE HYDROCHLORIDE 50 MG: 50 TABLET ORAL at 20:30

## 2017-10-03 RX ADMIN — IPRATROPIUM BROMIDE AND ALBUTEROL SULFATE 3 ML: .5; 3 SOLUTION RESPIRATORY (INHALATION) at 08:37

## 2017-10-03 RX ADMIN — ZOLPIDEM TARTRATE 5 MG: 5 TABLET, FILM COATED ORAL at 21:57

## 2017-10-03 RX ADMIN — BUDESONIDE 0.5 MG: 0.5 INHALANT RESPIRATORY (INHALATION) at 20:20

## 2017-10-03 RX ADMIN — FUROSEMIDE 40 MG: 10 INJECTION, SOLUTION INTRAMUSCULAR; INTRAVENOUS at 12:53

## 2017-10-03 RX ADMIN — METRONIDAZOLE 500 MG: 500 TABLET, FILM COATED ORAL at 17:34

## 2017-10-03 RX ADMIN — FAMOTIDINE 20 MG: 20 TABLET, FILM COATED ORAL at 17:35

## 2017-10-03 RX ADMIN — METOPROLOL TARTRATE 25 MG: 25 TABLET ORAL at 17:35

## 2017-10-03 RX ADMIN — ATORVASTATIN CALCIUM 20 MG: 20 TABLET, FILM COATED ORAL at 20:30

## 2017-10-03 RX ADMIN — FAMOTIDINE 20 MG: 20 TABLET, FILM COATED ORAL at 08:43

## 2017-10-03 RX ADMIN — APIXABAN 5 MG: 5 TABLET, FILM COATED ORAL at 17:34

## 2017-10-03 RX ADMIN — IPRATROPIUM BROMIDE AND ALBUTEROL SULFATE 3 ML: .5; 3 SOLUTION RESPIRATORY (INHALATION) at 20:20

## 2017-10-03 RX ADMIN — MICONAZOLE NITRATE 1 APPLICATION: 2 POWDER TOPICAL at 20:30

## 2017-10-03 RX ADMIN — ARFORMOTEROL TARTRATE 15 MCG: 15 SOLUTION RESPIRATORY (INHALATION) at 22:52

## 2017-10-03 RX ADMIN — INSULIN ASPART 4 UNITS: 100 INJECTION, SOLUTION INTRAVENOUS; SUBCUTANEOUS at 21:57

## 2017-10-03 RX ADMIN — GUAIFENESIN 1200 MG: 600 TABLET, EXTENDED RELEASE ORAL at 08:42

## 2017-10-03 RX ADMIN — TAMSULOSIN HYDROCHLORIDE 0.4 MG: 0.4 CAPSULE ORAL at 17:35

## 2017-10-03 RX ADMIN — INSULIN ASPART 12 UNITS: 100 INJECTION, SOLUTION INTRAVENOUS; SUBCUTANEOUS at 17:35

## 2017-10-03 RX ADMIN — APIXABAN 5 MG: 5 TABLET, FILM COATED ORAL at 08:42

## 2017-10-03 RX ADMIN — METRONIDAZOLE 500 MG: 500 TABLET, FILM COATED ORAL at 12:53

## 2017-10-03 RX ADMIN — FUROSEMIDE 40 MG: 40 TABLET ORAL at 08:42

## 2017-10-03 RX ADMIN — SENNOSIDES AND DOCUSATE SODIUM 2 TABLET: 8.6; 5 TABLET ORAL at 20:30

## 2017-10-03 RX ADMIN — IPRATROPIUM BROMIDE AND ALBUTEROL SULFATE 3 ML: .5; 3 SOLUTION RESPIRATORY (INHALATION) at 03:28

## 2017-10-03 RX ADMIN — IPRATROPIUM BROMIDE AND ALBUTEROL SULFATE 3 ML: .5; 3 SOLUTION RESPIRATORY (INHALATION) at 16:16

## 2017-10-03 RX ADMIN — ARFORMOTEROL TARTRATE 15 MCG: 15 SOLUTION RESPIRATORY (INHALATION) at 12:29

## 2017-10-03 NOTE — DISCHARGE PLACEMENT REQUEST
"Chuckie Bryant (62 y.o. Male)     Date of Birth Social Security Number Address Home Phone MRN    1955  [id number] [facility] [address] [facility] [address] [id number]    Sikh Marital Status          Taoism Single       Admission Date Admission Type Admitting Provider Attending Provider Department, Room/Bed    9/4/17 Emergency Douglas Jimenez MD [place], Eric J, MD [address], 609/1    Discharge Date Discharge Disposition Discharge Destination                      Attending Provider: Douglas Jimenez MD     Allergies:  Nitrates, Organic, Penicillins    Isolation:  Spore   Infection:  C.difficile (09/19/17)   Code Status:  FULL    Ht:  70\" (177.8 cm)   Wt:  143 lb (64.9 kg)    Admission Cmt:  None   Principal Problem:  None                Active Insurance as of 9/4/2017     Primary Coverage     Payor Plan Insurance Group Employer/Plan Group    [place]      Payor Plan Address Payor Plan Phone Number Effective From Effective To    [address] [phone] 9/4/2017     [place] [address]       Subscriber Name Subscriber Birth Date Member ID       CHUCKIE BRYANT 1955 [id number]                 Emergency Contacts      (Rel.) Home Phone Work Phone Mobile Phone    Raymundo Love (Surrogate) -- -- [phone]    Julian Hill (Surrogate) -- -- [phone]              "

## 2017-10-03 NOTE — PLAN OF CARE
Problem: Patient Care Overview (Adult)  Goal: Plan of Care Review  Outcome: Ongoing (interventions implemented as appropriate)    10/03/17 1803   Patient Care Overview   Progress improving   Outcome Evaluation   Outcome Summary/Follow up Plan Patient remains in isolation for cdiff. No episodes of diarrhea this shift. Good appetite, tolerating diet without issues.       10/03/17 1803   Patient Care Overview   Progress improving   Outcome Evaluation   Outcome Summary/Follow up Plan Patient remains in isolation for cdiff. O2 per NC @ 3L during day, bpap @ hs. Bedside glucose checks ac/hs with sliding scale insulin, po antibx, pt up in chair majority of thi shift, no complaints. Plan d/c to [facility], see MD and CCP notes.    Coping/Psychosocial Response Interventions   Plan Of Care Reviewed With patient;durable power of     O2 per NC @ 3L during day, bpap @ hs. Bedside glucose checks ac/hs with sliding scale insulin, po antibx, pt up in chair majority of thi shift, no complaints. Plan d/c to [facility], see MD and CCP notes.    Coping/Psychosocial Response Interventions   Plan Of Care Reviewed With patient;durable power of

## 2017-10-03 NOTE — PLAN OF CARE
Problem: Patient Care Overview (Adult)  Goal: Plan of Care Review  Outcome: Ongoing (interventions implemented as appropriate)    10/03/17 5453   Patient Care Overview   Progress improving   Outcome Evaluation   Outcome Summary/Follow up Plan incr amb dist, but educ on safety w/knee buckling w/o warning   Coping/Psychosocial Response Interventions   Plan Of Care Reviewed With patient;family

## 2017-10-03 NOTE — PLAN OF CARE
Problem: Patient Care Overview (Adult)  Goal: Plan of Care Review  Outcome: Ongoing (interventions implemented as appropriate)    10/03/17 1320   Patient Care Overview   Progress progress towards functional goals is fair   Outcome Evaluation   Outcome Summary/Follow up Plan Pt completed VFSS. Pt w/consistent penetration of thin/mixed, poor bolus prep, and risk of aspiration due to esophageal dysphagia. Pt w/decreased esophageal motility, retrograde movement with all consistencies, and reduced esophageal opening. St to follow for diet tolerance. Rec GI consult ASAP.   Coping/Psychosocial Response Interventions   Plan Of Care Reviewed With patient

## 2017-10-03 NOTE — PROGRESS NOTES
Daniel Hernandez MD                         [phone]      Patient ID:    Name:  Chuckie Valdez    MRN:  [id number]    1955   62 y.o.  male            CC/Reason for visit: Respiratory failure follow-up    Subjective: Patient seen and examined this a.m.  No acute overnight events.  I had a conversation using his phone . Some inc white sputum with cough. He says that he does not have any fever, chills, hemoptysis, shortness of breath.   LOS: 29    ROS: Review of Systems  As above    Objective     Vital Signs past 24hrs  BP range: BP: (118-126)/(63-97) 126/97  Pulse range: Heart Rate:  [] 113  Resp rate range: Resp:  [18-22] 18  Temp range: Temp (24hrs), Av.5 °F (36.4 °C), Min:97.4 °F (36.3 °C), Max:97.6 °F (36.4 °C)    O2 Device: nasal cannula FiO2 (%): 40 %   143 lb (64.9 kg); Body mass index is 20.52 kg/(m^2).    Intake/Output Summary (Last 24 hours) at 10/03/17 194  Last data filed at 10/03/17 1514   Gross per 24 hour   Intake                0 ml   Output             2350 ml   Net            -2350 ml       Exam:  GEN:  No respiratory distress, comfortably lying, does have conversational dyspnea  EYES:   EOM-i, anicteric sclera bilat  ENT:    External ears/nose normal, OP clear  NECK:  Supple, midline trachea, No JVD or cervical LApathy  LUNGS: Bilateral breath sounds heard, bilateral breath sounds heard but decreased in intensity.  No significant stridor heard today  CV:  Regular rate and rhythm, No murmur  ABD:  Non tender, no distended, no palpable liver or masses  EXT:  No cyanosis or clubbing, trace peripheral/sacral edema    Scheduled meds:      amLODIPine 10 mg Oral Q24H   apixaban 5 mg Oral BID   arformoterol 15 mcg Nebulization BID - RT   atorvastatin 20 mg Oral Nightly   budesonide 0.5 mg Nebulization BID - RT   famotidine 20 mg Oral BID   furosemide 40 mg Oral Daily   guaiFENesin 1,200 mg Oral Q12H   insulin aspart 0-24 Units Subcutaneous 4x  Daily AC & at Bedtime   insulin detemir 10 Units Subcutaneous Nightly   insulin detemir 10 Units Subcutaneous QAM   ipratropium-albuterol 3 mL Nebulization Q4H - RT   lidocaine 4 mL Nebulization Once   metoprolol tartrate 25 mg Oral BID   metroNIDAZOLE 500 mg Oral Q6H   miconazole  Topical Q12H   sennosides-docusate sodium 2 tablet Oral Nightly   tamsulosin 0.4 mg Oral BID   traZODone 50 mg Oral Nightly     IV meds:                          lactated ringers 1,000 mL Last Rate: 1,000 mL (09/27/17 1323)   lactated ringers 9 mL/hr Last Rate: 9 mL/hr (09/29/17 0919)   sodium chloride 9 mL/hr Last Rate: 9 mL/hr (09/06/17 0406)       Data Review:        Results from last 7 days  Lab Units 10/03/17  0606 10/02/17  0808 10/02/17  0807 10/01/17  0816 09/30/17  0604   SODIUM mmol/L 139  --  141 136 136   POTASSIUM mmol/L 3.7  --  3.6 4.0 4.7   CHLORIDE mmol/L 95*  --  96* 92* 89*   CO2 mmol/L 35.5*  --  35.8* 33.0* 38.9*   BUN mg/dL 18  --  21 20 20   CREATININE mg/dL 0.57*  --  0.67* 0.57* 0.67*   CALCIUM mg/dL 9.0  --  9.0 9.4 9.5   GLUCOSE mg/dL 167*  --  163* 241* 191*   WBC 10*3/mm3 7.58 7.82  --  5.92 4.72   HEMOGLOBIN g/dL 10.7* 10.7*  --  10.7* 11.0*   PLATELETS 10*3/mm3 294 291  --  285 225   PROCALCITONIN ng/mL  --   --   --   --  0.08*       Lab Results   Component Value Date    CALCIUM 9.0 10/03/2017    PHOS 3.3 09/22/2017         Results from last 7 days  Lab Units 09/27/17  1422   GRAM STAIN RESULT  No WBCs or organisms seen  No epithelial cells seen             Results from last 7 days  Lab Units 09/29/17  1336 09/29/17  1159   PH, ARTERIAL pH units 7.257*  7.257* 7.109*   PCO2, ARTERIAL mm Hg 97.3*  97.3* 136.2*   PO2 ART mm Hg 136.5*  136.5* 129.9*   MODALITY  BiPap  BiPap face tent   O2 SATURATION CALC % 98.3  98.3 96.9             Estimated Creatinine Clearance: 123.3 mL/min (by C-G formula based on Cr of 0.57).    Results Review:    I have reviewed the laboratory and imaging data since the last note  by LPC physician.    9/29/17 - Post Stent CXR                       CT neck/ chest 10/1/17  IMPRESSION:  1. Tracheal stenosis again noted with narrowing of the coronal diameter  up to 4 mm at the level of the thoracic inlet just below the  tracheostomy tract.  2. Again, enlarged lymph nodes are seen within the left side of the neck  and supraclavicular fossa. Please correlate with biopsy results.  3. Enlarged main pulmonary artery suggestive of pulmonary arterial  hypertension. Patchy reticular nodular opacities within the left upper  lobe likely represent an atypical infection.  4. Although there is limited evaluation in the absence of IV contrast,  there is again noted a prominent azygous/hemiazygous system suggesting  stenosis of the SVC which has been well demonstrated previously on the  CT angiogram performed with contrast from 09/05/2017.  5. Again partial opacification of the left mastoid air cells is seen  that is suggestive of mastoiditis.    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   C/w ICS. Extra lasix today  LN biopsy is negative for malignancy. Flow cytometry is neg as well.   Will finish 2 weeks of flagyl for c.diff.    I will start the request for home trilogy ventilator given chronic respiratory failure  with recurrent exacerbations while on BiPAP in the setting of tracheal stenosis and tracheomalacia.  If we are able to get the patient a trilogy ventilator, I will discharge the patient and the plan is to follow up with Dr. Morrow at the [facility] for further management of tracheal stenosis/ tracheomalacia.  Repeat stent vs trach reconstruction vs permanent trach are his options.  I explained the same to the pt.     Daniel Hernandez MD  10/3/2017

## 2017-10-03 NOTE — PLAN OF CARE
Problem: Inpatient SLP  Goal: Dysphagia- Patient will safely consume diet as per recommendation with no signs/symptoms of aspiration  Outcome: Ongoing (interventions implemented as appropriate)    10/03/17 1319   Safely Consume Diet   Safely Consume Diet- SLP, Time to Achieve by discharge   Safely Consume Diet- SLP, Activity Level Patient will improve oral skills for more efficient eating   Safely Consume Diet- SLP, Outcome goal ongoing

## 2017-10-03 NOTE — PROGRESS NOTES
Continued Stay Note  [facility]     Patient Name: Chuckie Valdez  MRN: [id number]  Today's Date: 10/3/2017    Admit Date: 9/4/2017          Discharge Plan       10/03/17 1630    Case Management/Social Work Plan    Plan [facility] to [facility].     Patient/Family In Agreement With Plan yes    Additional Comments Spoke with Dr Jimenez and Dr Hernandez. Patient will be ready for dc and will need a a Trilogy. Dr Hernandez would like [facility] to see if patient can go to [facility].  Redferral called to Rut[place] and she will evlaute.  Patient aware and agreeable.  CCp will follow.       10/03/17 1616    Case Management/Social Work Plan    Additional Comments                 Discharge Codes     None        Expected Discharge Date and Time     Expected Discharge Date Expected Discharge Time    Sep 22, 2017             Jayla Antonio RN

## 2017-10-03 NOTE — MBS/VFSS/FEES
Acute Care - Speech Language Pathology   Swallow Initial Evaluation [facility]     Patient Name: Chuckie Valdez  : 1955  MRN: [id number]  Today's Date: 10/3/2017  Onset of Illness/Injury or Date of Surgery Date: 17            Admit Date: 2017  SPEECH-LANGUAGE PATHOLOGY EVALUTION - VFSS  Subjective: The patient was seen on this date for a VFSS(Videofluoroscopic Swallowing Study).  Patient was alert and cooperative.  Pt used translation radha on phone to communicate.   The patient's history is significant for Acute resp failure with traceomalacia, s/p dilation and stent removal. Pt w/ PMH of PNA, L vocal cord immobility.   Objective: Risks/benefits were reviewed with the patient, and consent was obtained. The study was completed with SLP present and Radiologist review. The patient was seen in lateral view(s). Textures given included thin liquid, puree consistency, mechanical soft consistency and regular consistency.  Assessment: Difficulties were noted with thin liquid, puree consistency, mechanical soft consistency and regular consistency. Piecemeal deglutition noted as result of prolonged and weak mastication and poor bolus prep. Mildly reduced base of tongue control resulted in posterior spillage of puree to pyriforms and regular solid to vallecula. There was consistent deep silent penetration of thin liquids and mixed consistency juice during the swallow, but no observed aspiration.   Esophageal screen was performed, which revealed decreased UES opening, narrowing of the upper esophagus, decreased motility in lower esophagus, and retrograde movement with all consistencies from the upper esophagus back into the pharynx. Pooling observed in the pyriforms throughout evaluation from retrograde materials which put the patient at risk of aspiration with all consistencies.     SLP Findings: Patient presents with mild oropharyngeal dysphagia with mild-moderate esophageal dysphagia component.    Comments: Pt reports (through phone interpretation radha) that he was seen by GI approximately 1 year ago for EGD. He reported that he did not follow up w/GI following that procedure.   Recommendations: Diet Textures: thin liquid, regular consistency food. Medications should be taken whole with thin liquids or puree.   Recommended Strategies: Upright for PO, small bites and sips, double swallow with all PO and no straw. Frequent small meals. Upright 30 minutes after PO. Oral care before breakfast, after all meals and PRN.  Other Recommended Evaluations: Referral to GI    Dysphagia therapy is recommended. Rationale: diet tolerance.      Visit Dx:     ICD-10-CM ICD-9-CM   1. Pneumonia of left lower lobe due to infectious organism J18.9 483.8   2. Acute respiratory failure with hypercapnia J96.02 518.81   3. General weakness R53.1 780.79   4. Tracheal stenosis due to tracheostomy J95.09 519.02   5. Oropharyngeal dysphagia R13.12 787.22     Patient Active Problem List   Diagnosis   • Acute on chronic respiratory failure with hypoxia and hypercapnia   • Tracheal stenosis due to tracheostomy   • CTEPH (chronic thromboembolic pulmonary hypertension)   • COPD exacerbation   • Clostridium difficile enterocolitis   • Superior vena cava stenosis     Past Medical History:   Diagnosis Date   • COPD (chronic obstructive pulmonary disease)    • CTEPH (chronic thromboembolic pulmonary hypertension)    • HCV (hepatitis C virus)    • History of tracheostomy    • Pulmonary hypertension    • Type 2 diabetes mellitus      Past Surgical History:   Procedure Laterality Date   • BRONCHOSCOPY N/A 9/27/2017    Procedure: BRONCHOSCOPY WITH WASHINGS RIGHT MIDDLE LOBE AND LINGULAR with balloon dilation 12MM-15MM AND 18MM-19MM;  Surgeon: Herb Anglin MD;  Location: [facility] ENDOSCOPY;  Service:    • BRONCHOSCOPY N/A 9/29/2017    Procedure: BRONCHOSCOPY WITH BALLON DILATION 19MM AND STENT PLACEMENT;  Surgeon: Herb Anglin MD;   Location: [facility] OR;  Service:    • BRONCHOSCOPY N/A 9/29/2017    Procedure: BRONCHOSCOPY WITH STENT REMOVAL;  Surgeon: Herb Anglin MD;  Location: [facility] OR;  Service:           SWALLOW EVALUATION (last 72 hours)      Swallow Evaluation       10/03/17 1300                Rehab Evaluation    Document Type evaluation  -JT        Subjective Information agree to therapy;complains of;pain  -JT        Patient Effort, Rehab Treatment good  -JT        Symptoms Noted During/After Treatment increased pain   pain from sitting upright  -JT        Symptoms Noted Comment back pain  -JT        General Information    Patient Profile Review yes  -JT        Pertinent History Of Current Problem recent trachea dilation; hx pna; L vocal cord immobility  -JT        Current Diet Limitations thin liquids;regular solid  -JT        Precautions/Limitations, Vision WNL  -JT        Precautions/Limitations, Hearing WNL  -JT        Prior Level of Function- Communication functional in all spheres;other (comment)   pt speaks Lithuanian; uses phone  radha  -JT        Prior Level of Function- Swallowing no diet consistency restrictions  -JT        Plans/Goals Discussed With patient;agreed upon  -JT        Barriers to Rehab language barrier  -JT        Clinical Impression    Patient's Goals For Discharge patient did not state  -JT        SLP Swallowing Diagnosis mild dysphagia;moderate dysphagia;esophageal dysfunction  -JT        Rehab Potential/Prognosis, Swallowing good, to achieve stated therapy goals  -JT        Criteria for Skilled Therapeutic Interventions Met demonstrates skilled criteria for intervention  -JT        FCM, Swallowing 6-->Level 6  -JT        Therapy Frequency PRN  -JT        Predicted Duration Therapy Interv (days) until discharge  -JT        Expected Duration Therapy Session (min) 15-30 minutes  -JT        SLP Diet Recommendation regular textures;thin liquids   small, frequent meals  -JT        Recommended  Feeding/Eating Techniques maintain upright posture during/after eating for 30 mins;no straws;small sips/bites  -JT        SLP Rec. for Method of Medication Administration meds whole with thin liquid  -JT        Monitor For Signs Of Aspiration pneumonia;upper respiratory infection;right lower lobe infiltrates;fever  -JT        Anticipated Discharge Disposition other (see comments)   unknown  -JT        Demonstrates Need for Referral to Another Service gastroenterology  -JT        Pain Assessment    Pain Assessment 0-10  -JT        Pain Score 6  -JT        Pain Location Back  -JT        Cognitive Assessment/Intervention    Current Cognitive/Communication Assessment functional  -JT        Orientation Status oriented x 4  -JT        Follows Commands/Answers Questions 100% of the time;able to follow single-step instructions  -JT        Videofluoroscopic Swallowing Exam    Risks/Benefits Reviewed risks/benefits explained;agreed to eval;patient  -JT        Radiologic Views Used for Examination left lateral view  -JT        VFSS    Oral Phase Results cohesive solid:;excess chewing noted;incomplete bolus preparation;increased prep time   pt missing some teeth; req extra time to prep  -JT        Oral Transit Impairment cohesive solid:;premature spillage into pharynx due to reduced back of tongue control;unable to coordinate with piecemeal oral transit of large bolus  -JT        Pharyngeal Phase Physiologic Impairment cohesive solid:;bolus to valleculae before initiation of response;pudding:;bolus to pyriforms before initiation of response;all consistencies:;reduced PES opening   deep silent penetration during swallow with thin/mixed  -JT        Att Compensatory Maneuvers multiple swallows  -JT        Pharyngeal Phase Results impaired pharyngeal phase of swallowing  -JT        VFSS Swallow Recommendations    Oral Care oral care with toothbrush and dentifrice BID and PRN  -JT        Modified Eating Strategies adjust rate of  eating to fit with patient's ability;NO straw use for liquid intake;cue to slow down;reflux precautions;upright positioning 90 degrees   small frequent meals  -JT        Recommended Diet regular textures;thin liquids  -JT        SLP Communication to Radiology    Severity Level of Dysphagia mild dysphagia;esophageal dysfunction;pharyngeal dysfunction;oral dysfunction  -JT        Consistencies Aspirated/Penetrated penetrated:;thin;mixed  -JT        Summary Statement Pt with mild oropharyngeal dysphagia due to reduced bolus preparation, reduced airway protection, and reduced tongue base function. Pt w/consistent deep penetration of thin liquids and mixed consistency during swallow . Pt also with mild-moderate esophageal component characterized by decreased PES opening, decreased motility, and retrograde movement with all consistencies putting patient at risk of aspiration.   -JT        Dysphagia Treatment Objectives and Progress    Dysphagia Treatment Objectives Other 1  -JT        Dysphagia Other 1    Dysphagia Other 1 Objective Pt will tolerate least restrictive diet w/o s/s aspiration and use strategies for safe swallow independently.  -JT        Status: Dysphagia Other 1 New  -JT          User Key  (r) = Recorded By, (t) = Taken By, (c) = Cosigned By    Initials Name Effective Dates    JT Sue Jimenez 12/11/15 -         EDUCATION  The patient has been educated in the following areas:   Dysphagia (Swallowing Impairment).    SLP Recommendation and Plan  SLP Swallowing Diagnosis: mild dysphagia, moderate dysphagia, esophageal dysfunction  SLP Diet Recommendation: regular textures, thin liquids (small, frequent meals)  Recommended Feeding/Eating Techniques: maintain upright posture during/after eating for 30 mins, no straws, small sips/bites  SLP Rec. for Method of Medication Administration: meds whole with thin liquid  Monitor For Signs Of Aspiration: pneumonia, upper respiratory infection, right lower lobe  infiltrates, fever     Criteria for Skilled Therapeutic Interventions Met: demonstrates skilled criteria for intervention  Anticipated Discharge Disposition: other (see comments) (unknown)  Rehab Potential/Prognosis, Swallowing: good, to achieve stated therapy goals  Therapy Frequency: PRN        Demonstrates Need for Referral to Another Service: gastroenterology    Plan of Care Review  Plan Of Care Reviewed With: patient  Progress: progress towards functional goals is fair  Outcome Summary/Follow up Plan: Pt completed VFSS. Pt w/consistent penetration of thin/mixed, poor bolus prep, and risk of aspiration due to esophageal dysphagia. Pt w/decreased esophageal motility, retrograde movement with all consistencies, and reduced esophageal opening. St to follow for diet tolerance. Rec GI consult ASAP.          IP SLP Goals       10/03/17 1319 09/30/17 1113       Safely Consume Diet    Safely Consume Diet- SLP, Date Established  09/30/17  -LS     Safely Consume Diet- SLP, Time to Achieve by discharge  -JT by discharge  -LS     Safely Consume Diet- SLP, Activity Level Patient will improve oral skills for more efficient eating  -JT Patient will improve oral skills for more efficient eating  -LS     Safely Consume Diet- SLP, Outcome goal ongoing  -JT        User Key  (r) = Recorded By, (t) = Taken By, (c) = Cosigned By    Initials Name Provider Type    LS Jose L Xavier MS CCC-SLP Speech and Language Pathologist    AURELIANO Jimenez Speech and Language Pathologist             SLP Outcome Measures (last 72 hours)      SLP Outcome Measures       10/03/17 1300          SLP Outcome Measures    Outcome Measure Used? Adult NOMS  -JT      FCM Scores    FCM Chosen Swallowing  -JT      Swallowing FCM Score 6  -JT        User Key  (r) = Recorded By, (t) = Taken By, (c) = Cosigned By    Initials Name Effective Dates    AURELIANO Jimenez 12/11/15 -            Time Calculation:         Time Calculation- SLP       10/03/17  1323          Time Calculation- SLP    SLP Start Time 1100  -JT      SLP Stop Time 1200  -JT      SLP Time Calculation (min) 60 min  -JT      SLP Received On 10/03/17  -        User Key  (r) = Recorded By, (t) = Taken By, (c) = Cosigned By    Initials Name Provider Type    JT Sue Jimenez Speech and Language Pathologist          Therapy Charges for Today     Code Description Service Date Service Provider Modifiers Qty    70951452708 HC ST MOTION FLUORO EVAL SWALLOW 4 10/3/2017 Sue Jimenez GN 1               Sue Jimenez  10/3/2017

## 2017-10-03 NOTE — PROGRESS NOTES
Chuckie Valdez  62 y.o.   LOS: 29 days   Patient Care Team:  Douglas Jimenez MD as PCP - General (Family Medicine)    Chief Complaint:  Copd,chf,tracheal stenosis    I have reviewed the patient's medical history in detail and updated the computerized patient record.    Subjective     Patient is a 62 y.o. male presents with     History taken from: patient chart    History  Past Medical History:   Diagnosis Date   • COPD (chronic obstructive pulmonary disease)    • CTEPH (chronic thromboembolic pulmonary hypertension)    • HCV (hepatitis C virus)    • History of tracheostomy    • Pulmonary hypertension    • Type 2 diabetes mellitus      Past Surgical History:   Procedure Laterality Date   • BRONCHOSCOPY N/A 9/27/2017    Procedure: BRONCHOSCOPY WITH WASHINGS RIGHT MIDDLE LOBE AND LINGULAR with balloon dilation 12MM-15MM AND 18MM-19MM;  Surgeon: Herb Anglin MD;  Location: [facility] ENDOSCOPY;  Service:    • BRONCHOSCOPY N/A 9/29/2017    Procedure: BRONCHOSCOPY WITH BALLON DILATION 19MM AND STENT PLACEMENT;  Surgeon: Herb Anglin MD;  Location: [facility] OR;  Service:    • BRONCHOSCOPY N/A 9/29/2017    Procedure: BRONCHOSCOPY WITH STENT REMOVAL;  Surgeon: Herb Anglin MD;  Location: [facility] OR;  Service:      History reviewed. No pertinent family history.  Social History     Social History   • Marital status: Single     Spouse name: N/A   • Number of children: N/A   • Years of education: N/A     Social History Main Topics   • Smoking status: Former Smoker     Packs/day: 1.00     Years: 15.00     Types: Cigarettes     Quit date: 2008   • Smokeless tobacco: None   • Alcohol use None   • Drug use: None   • Sexual activity: Not Asked     Other Topics Concern   • None     Social History Narrative         Review of Systems  {All systems were reviewed and negative except for:  Behavioral/Psych: positive for  anxiety    Objective     Lab Results (last 24 hours)     Procedure Component Value Units  Date/Time    POC Glucose Fingerstick [[id number]]  (Abnormal) Collected:  10/02/17 2139    Specimen:  Blood Updated:  10/02/17 2141     Glucose 240 (H) mg/dL     Narrative:       Meter: [id number] : [id number] Rommel HARDWICK    CBC (No Diff) [[id number]]  (Abnormal) Collected:  10/03/17 0606    Specimen:  Blood Updated:  10/03/17 0646     WBC 7.58 10*3/mm3      RBC 4.53 (L) 10*6/mm3      Hemoglobin 10.7 (L) g/dL      Hematocrit 35.9 (L) %      MCV 79.2 (L) fL      MCH 23.6 (L) pg      MCHC 29.8 (L) g/dL      RDW 19.1 (H) %      RDW-SD 54.8 (H) fl      MPV 9.4 fL      Platelets 294 10*3/mm3     Basic Metabolic Panel [[id number]]  (Abnormal) Collected:  10/03/17 0606    Specimen:  Blood Updated:  10/03/17 0706     Glucose 167 (H) mg/dL      BUN 18 mg/dL      Creatinine 0.57 (L) mg/dL      Sodium 139 mmol/L      Potassium 3.7 mmol/L      Chloride 95 (L) mmol/L      CO2 35.5 (H) mmol/L      Calcium 9.0 mg/dL      eGFR  African Amer >150 mL/min/1.73      eGFR Non African Amer 145 mL/min/1.73      BUN/Creatinine Ratio 31.6 (H)     Anion Gap 8.5 mmol/L     Narrative:       GFR Normal >60  Chronic Kidney Disease <60  Kidney Failure <15    Blood Gas, Arterial [[id number]]  (Abnormal) Collected:  09/29/17 1336    Specimen:  Arterial Blood Updated:  10/03/17 1302     Site Arterial: right brachial     Maurice's Test N/A     pH, Arterial 7.257 (C) pH units       Critical:Notify Dr PANTOJA (29-Sep-17 13:38:27)Read back ok        pCO2, Arterial 97.3 (C) mm Hg       Critical:Notify Dr PANTOJA (29-Sep-17 13:38:27)Read back ok        pO2, Arterial 136.5 (H) mm Hg      HCO3, Arterial 43.3 (H) mmol/L      Base Excess, Arterial 10.9 (H) mmol/L      O2 Saturation Calculated 98.3 %      A-a Gradiant 0.4 mmHg      Barometric Pressure for Blood Gas 752.3 mmHg      Modality BiPap     FIO2 60 %      Ventilator Mode NIV     Set Tidal Volume 500     Set Mech Resp Rate 28    Narrative:       BIPAP 16/8 Meter: [id number] : [id number]  "Graf Nagy          Tests reviewed: yes, Labs for last 24 hrs  Tests of interest are: ok    Vital Signs  Temp:  [96 °F (35.6 °C)-97.6 °F (36.4 °C)] 97.4 °F (36.3 °C)  Heart Rate:  [] 113  Resp:  [18-22] 18  BP: (106-126)/(63-97) 126/97    Flowsheet Rows         First Filed Value    Admission Height  70\" (177.8 cm) Documented at 09/04/2017 0728    Admission Weight  180 lb (81.6 kg) Documented at 09/04/2017 0728            Intake/Output Summary (Last 24 hours) at 10/03/17 1541  Last data filed at 10/03/17 1514   Gross per 24 hour   Intake                0 ml   Output             2350 ml   Net            -2350 ml       Physical Exam:  General Appearance: alert, appears stated age and cooperative  Lungs: clear to auscultation, respirations regular, respirations even and respirations unlabored  Heart: regular rhythm & normal rate, normal S1, S2, no murmur, no phoebe, no rub and no click  Abdomen: normal bowel sounds, no masses, no hepatomegaly, no splenomegaly, soft non-tender, no guarding and no rebound tenderness  Extremities: moves extremities well, no edema, no cyanosis and no redness  Neurologic: Mental Status orientated to person, place, time and situation       Medication Review:   Nitrates, organic and Penicillins  Current Facility-Administered Medications   Medication Dose Route Frequency Provider Last Rate Last Dose   • acetaminophen (TYLENOL) tablet 650 mg  650 mg Oral Q4H PRN Shane Yao MD   650 mg at 09/27/17 2121    Or   • acetaminophen (TYLENOL) suppository 650 mg  650 mg Rectal Q4H PRN Shane Yao MD       • amLODIPine (NORVASC) tablet 10 mg  10 mg Oral Q24H Daniel Hernandez MD   10 mg at 10/03/17 0842   • apixaban (ELIQUIS) tablet 5 mg  5 mg Oral BID Yovany Hernandes MD   5 mg at 10/03/17 0842   • arformoterol (BROVANA) nebulizer solution 15 mcg  15 mcg Nebulization BID - RT Daniel Hernandez MD   15 mcg at 10/03/17 1229   • atorvastatin (LIPITOR) tablet 20 mg  20 mg Oral " Nightly Shane Yao MD   20 mg at 10/02/17 2203   • budesonide (PULMICORT) nebulizer solution 0.5 mg  0.5 mg Nebulization BID - RT Daniel Hernandez MD   0.5 mg at 10/03/17 0837   • dextrose (D50W) solution 25 g  25 g Intravenous Q15 Min PRN Shane Yao MD   25 g at 09/29/17 1845   • dextrose (GLUTOSE) oral gel 15 g  15 g Oral Q15 Min PRN Shane Yao MD       • famotidine (PEPCID) tablet 20 mg  20 mg Oral BID Shane Yao MD   20 mg at 10/03/17 0843   • furosemide (LASIX) tablet 40 mg  40 mg Oral Daily Daniel Hernandez MD   40 mg at 10/03/17 0842   • glucagon (human recombinant) (GLUCAGEN DIAGNOSTIC) injection 1 mg  1 mg Subcutaneous Q15 Min PRN Douglas Jimenez MD       • guaiFENesin (MUCINEX) 12 hr tablet 1,200 mg  1,200 mg Oral Q12H Shane Yao MD   1,200 mg at 10/03/17 0842   • HYDROcodone-homatropine (HYCODAN) 5-1.5 MG/5ML syrup 5 mL  5 mL Oral Q4H PRN Herb Anglin MD       • insulin aspart (novoLOG) injection 0-24 Units  0-24 Units Subcutaneous 4x Daily AC & at Bedtime Daniel Hernandez MD   4 Units at 10/03/17 0845   • insulin detemir (LEVEMIR) injection 10 Units  10 Units Subcutaneous Nightly Lj Marvin MD   10 Units at 10/02/17 2200   • insulin detemir (LEVEMIR) injection 10 Units  10 Units Subcutaneous QAM Daniel Hernandez MD   10 Units at 10/03/17 0849   • ipratropium-albuterol (DUO-NEB) nebulizer solution 3 mL  3 mL Nebulization Q4H PRN Herb Anglin MD   3 mL at 09/29/17 0458   • ipratropium-albuterol (DUO-NEB) nebulizer solution 3 mL  3 mL Nebulization Q4H - RT Herb Anglin MD   3 mL at 10/03/17 0837   • ipratropium-albuterol (DUO-NEB) nebulizer solution 3 mL  3 mL Nebulization Q4H PRN Herb Anglin MD   3 mL at 10/01/17 1544   • lactated ringers infusion 1,000 mL  1,000 mL Intravenous Continuous PRN Herb Anglin MD 25 mL/hr at 09/27/17 1323     • lactated ringers infusion  9 mL/hr Intravenous Continuous Noe MAJOR  MD Lucien 9 mL/hr at 09/29/17 0919 9 mL/hr at 09/29/17 0919   • lidocaine (XYLOCAINE) 4 % nebulizer solution 4 mL  4 mL Nebulization Once Yovany Hernandes MD       • Magnesium Sulfate 2 gram infusion - Mg less than or equal to 1.5 mg/dL  2 g Intravenous PRN Shane Yao MD        Or   • Magesium Sulfate 1 gram infusion - Mg 1.6-1.9 mg/dL  1 g Intravenous PRN Shane Yao MD       • magnesium hydroxide (MILK OF MAGNESIA) suspension 2400 mg/10mL 10 mL  10 mL Oral Daily PRN Lj Marvin MD   10 mL at 09/23/17 2255   • metoprolol tartrate (LOPRESSOR) tablet 25 mg  25 mg Oral BID Eduar Tucker MD   25 mg at 10/03/17 0845   • metroNIDAZOLE (FLAGYL) tablet 500 mg  500 mg Oral Q6H Ricki Coyle Jr., MD   500 mg at 10/03/17 1253   • miconazole (MICOTIN) 2 % powder   Topical Q12H Diamond Ann MD       • ondansetron (ZOFRAN) tablet 4 mg  4 mg Oral Q6H PRN Shane Yao MD        Or   • ondansetron ODT (ZOFRAN-ODT) disintegrating tablet 4 mg  4 mg Oral Q6H PRN Shane Yao MD        Or   • ondansetron (ZOFRAN) injection 4 mg  4 mg Intravenous Q6H PRN Shane Yao MD   4 mg at 09/20/17 1842   • potassium chloride (MICRO-K) CR capsule 40 mEq  40 mEq Oral PRN Shane Yao MD        Or   • potassium chloride (KLOR-CON) packet 40 mEq  40 mEq Oral PRN Shane Yao MD        Or   • potassium chloride 10 mEq in 100 mL IVPB  10 mEq Intravenous Q1H PRN Shane Yao MD       • sennosides-docusate sodium (SENOKOT-S) 8.6-50 MG tablet 2 tablet  2 tablet Oral Nightly Shane Yao MD   2 tablet at 10/02/17 2202   • sodium chloride 0.9 % flush 1-10 mL  1-10 mL Intravenous PRN Shane Yao MD       • sodium chloride 0.9 % flush 10 mL  10 mL Intravenous PRN Josh Valentin MD       • sodium chloride 0.9 % infusion  9 mL/hr Intravenous Continuous Jeevan MD Aneesh 9 mL/hr at 09/06/17 0406 9 mL/hr at 09/06/17 0406   • tamsulosin (FLOMAX) 24 hr capsule 0.4 mg  0.4 mg Oral BID Douglas Jimenez MD   0.4 mg  at 10/03/17 0843   • traZODone (DESYREL) tablet 50 mg  50 mg Oral Nightly Lj Marvin MD   50 mg at 10/02/17 2204   • zolpidem (AMBIEN) tablet 5 mg  5 mg Oral Nightly PRN Douglas Jimenez MD   5 mg at 10/02/17 2204     Prescriptions Prior to Admission   Medication Sig Dispense Refill Last Dose   • acetaminophen (TYLENOL) 325 MG tablet Take 325 mg by mouth Every 4 (Four) Hours As Needed for Mild Pain .      • albuterol (PROVENTIL) (2.5 MG/3ML) 0.083% nebulizer solution Take 2.5 mg by nebulization Every 4 (Four) Hours As Needed for Wheezing (every 2 hours as needed for soa related to copd).      • apixaban (ELIQUIS) 5 MG tablet tablet Take 5 mg by mouth 2 (Two) Times a Day.      • atorvastatin (LIPITOR) 20 MG tablet Take 20 mg by mouth Every Night.      • bisacodyl (DULCOLAX) 10 MG suppository Insert 10 mg into the rectum Daily As Needed for Constipation.      • budesonide-formoterol (SYMBICORT) 160-4.5 MCG/ACT inhaler Inhale 2 puffs 2 (Two) Times a Day.      • escitalopram (LEXAPRO) 20 MG tablet Take 20 mg by mouth Every Night.      • ferrous sulfate 325 (65 FE) MG tablet Take 325 mg by mouth 2 (Two) Times a Day.      • fluticasone (FLOVENT HFA) 220 MCG/ACT inhaler Inhale 1 puff 2 (Two) Times a Day.      • furosemide (LASIX) 40 MG tablet Take 40 mg by mouth Daily.      • insulin detemir (LEVEMIR) 100 UNIT/ML injection Inject 8 Units under the skin Daily.      • ipratropium-albuterol (DUO-NEB) 0.5-2.5 mg/mL nebulizer Take 3 mL by nebulization Every 4 (Four) Hours.      • melatonin 1 MG tablet Take 3 mg by mouth At Night As Needed for Sleep (for sleep).      • metoprolol tartrate (LOPRESSOR) 25 MG tablet Take 12.5 mg by mouth 2 (Two) Times a Day.   9/29/2017 at Unknown time   • Multiple Vitamins-Minerals (MULTIVITAMIN ADULT PO) Take 1 tablet by mouth Daily.      • ondansetron (ZOFRAN) 4 MG tablet Take 4 mg by mouth Every 6 (Six) Hours As Needed for Nausea or Vomiting.      • pantoprazole (PROTONIX) 40 MG EC tablet  Take 40 mg by mouth Daily.      • Sennosides-Docusate Sodium 8.6-50 MG capsule Take 2 capsules by mouth 2 (Two) Times a Day.      • traZODone (DESYREL) 50 MG tablet Take 50 mg by mouth Every Night.          Medications reviewed: yes, and changes made are: none    Assessment:  1) COPD  2)diastolic chf  3) tracheal stenosis and malacea      Plan:Continue current medications.     Treatment Plans:  1.    Consultations: no new  2.    Tests: bmp,bnp,cbc      Douglas Jimenez MD  10/03/17  3:41 PM

## 2017-10-03 NOTE — SIGNIFICANT NOTE
10/03/17 1109   Rehab Treatment   Discipline physical therapist   Treatment Not Performed patient unavailable for treatment  (Transport present to take pt to swallow study. PT will check back in PM.)   Recommendation   PT - Next Appointment 10/03/17

## 2017-10-03 NOTE — PLAN OF CARE
Problem: COPD, Chronic Bronchitis/Emphysema (Adult)  Goal: Signs and Symptoms of Listed Potential Problems Will be Absent or Manageable (COPD, Chronic Bronchitis/Emphysema)  Outcome: Ongoing (interventions implemented as appropriate)    Problem: Patient Care Overview (Adult)  Goal: Plan of Care Review  Outcome: Ongoing (interventions implemented as appropriate)    10/02/17 1847 10/02/17 2145 10/03/17 0313   Patient Care Overview   Progress progress toward functional goals as expected --  --    Outcome Evaluation   Outcome Summary/Follow up Plan --  --  ESL, c.s. isolation, bpap @ hs, ac/hs, po antibx, iv lasix, plan d/c [place]   Coping/Psychosocial Response Interventions   Plan Of Care Reviewed With --  patient --        Goal: Adult Individualization and Mutuality  Outcome: Ongoing (interventions implemented as appropriate)  Goal: Discharge Needs Assessment  Outcome: Ongoing (interventions implemented as appropriate)    Problem: Pneumonia (Adult)  Goal: Signs and Symptoms of Listed Potential Problems Will be Absent or Manageable (Pneumonia)  Outcome: Ongoing (interventions implemented as appropriate)    Problem: Cardiac: Heart Failure (Adult)  Goal: Signs and Symptoms of Listed Potential Problems Will be Absent or Manageable (Cardiac: Heart Failure)  Outcome: Ongoing (interventions implemented as appropriate)    Problem: GI Endoscopy (Adult)  Goal: Signs and Symptoms of Listed Potential Problems Will be Absent or Manageable (GI Endoscopy)  Outcome: Ongoing (interventions implemented as appropriate)    Problem: Perioperative Period (Adult)  Goal: Signs and Symptoms of Listed Potential Problems Will be Absent or Manageable (Perioperative Period)  Outcome: Ongoing (interventions implemented as appropriate)    Problem: Communication Impaired, Verbal (Adult,Obstetrics,Pediatric)  Goal: Identify Related Risk Factors and Signs and Symptoms  Outcome: Outcome(s) achieved Date Met:  10/03/17  Goal: Improved/Effective  Communication  Outcome: Ongoing (interventions implemented as appropriate)

## 2017-10-03 NOTE — THERAPY TREATMENT NOTE
Acute Care - Physical Therapy Treatment Note  [facility]     Patient Name: Chuckie Valdez  : 1955  MRN: [id number]  Today's Date: 10/3/2017  Onset of Illness/Injury or Date of Surgery Date: 17  Date of Referral to PT: 17  Referring Physician: Tony    Admit Date: 2017    Visit Dx:    ICD-10-CM ICD-9-CM   1. Pneumonia of left lower lobe due to infectious organism J18.9 483.8   2. Acute respiratory failure with hypercapnia J96.02 518.81   3. General weakness R53.1 780.79   4. Tracheal stenosis due to tracheostomy J95.09 519.02   5. Oropharyngeal dysphagia R13.12 787.22     Patient Active Problem List   Diagnosis   • Acute on chronic respiratory failure with hypoxia and hypercapnia   • Tracheal stenosis due to tracheostomy   • CTEPH (chronic thromboembolic pulmonary hypertension)   • COPD exacerbation   • Clostridium difficile enterocolitis   • Superior vena cava stenosis               Adult Rehabilitation Note       10/03/17 1700 10/03/17 1542 10/02/17 1548    Rehab Assessment/Intervention    Discipline  physical therapy assistant  -JM physical therapist  -PC    Document Type  therapy note (daily note)  - therapy note (daily note)  -PC    Subjective Information  agree to therapy;complains of  -JM agree to therapy  -PC    Patient Effort, Rehab Treatment   good  -PC    Symptoms Noted During/After Treatment   shortness of breath  -PC    Symptoms Noted Comment   low endurance, SOA with minimal activity  -PC    Precautions/Limitations  fall precautions  -     Recorded by  [JM] Tona Oviedo, PTA [PC] Christen Demarco, PT    Vital Signs    Pre SpO2 (%)   91  -PC    O2 Delivery Pre Treatment   supplemental O2   2l  -PC    Intra SpO2 (%)   88  -PC    O2 Delivery Intra Treatment   supplemental O2  -PC    Post SpO2 (%)   91  -PC    O2 Delivery Post Treatment   supplemental O2  -PC    Recorded by   [PC] Christen Demarco, PT    Pain Assessment    Pain Assessment No/denies pain  -  No/denies  pain  -PC    Recorded by [JM] Tona Oviedo PTA  [PC] Christen Demarco, PT    Cognitive Assessment/Intervention    Current Cognitive/Communication Assessment   functional   speaks Marshallese and uses phone   -PC    Recorded by   [PC] Christen Demarco, PT    Bed Mobility, Assessment/Treatment    Bed Mob, Supine to Sit, [place]  not tested  - not tested  -    Bed Mobility, Comment  in chair  - in chair  -PC    Recorded by  [JM] Tona Oviedo PTA [PC] Christen Demarco, PT    Transfer Assessment/Treatment    Transfers, Sit-Stand [place]  moderate assist (50% patient effort)  - minimum assist (75% patient effort)  -PC    Transfers, Stand-Sit [place]  contact guard assist;verbal cues required  - minimum assist (75% patient effort)  -PC    Transfers, Sit-Stand-Sit, Assist Device   rolling walker  -    Transfer, Comment  2 attempts req  -JM     Recorded by  [JM] Tona Oviedo PTA [PC] Christen Demarco, PT    Gait Assessment/Treatment    Gait, [place] Level  minimum assist (75% patient effort);contact guard assist;2 person assist required  - minimum assist (75% patient effort)  -    Gait, Assistive Device  rolling walker  - rolling walker  -    Gait, Distance (Feet)  50  -JM 30   in room due to lines, no telepak available  -    Gait, Gait Deviations  forward flexed posture;step length decreased;ileana decreased;knee buckling  - ataxia;decreased heel strike;right:  -    Gait, Safety Issues  balance decreased during turns;step length decreased;weight-shifting ability decreased;supplemental O2  -     Gait, Impairments  strength decreased;impaired balance  - impaired balance;strength decreased  -    Gait, Comment  incr amb dist, but knee buckling at end of amb   1 standing rest due to SOA  - low tolerance to activity due to SOA, pt very shaky today  -PC    Recorded by  [JM] Tona Oviedo PTA [PC] Christen Demarco, PT    Therapy Exercises    Bilateral Lower  Extremities   5 reps;ankle pumps/circles;LAQ  -PC    Recorded by   [PC] Christen Demarco PT    Positioning and Restraints    Pre-Treatment Position  sitting in chair/recliner  -JM sitting in chair/recliner  -PC    Post Treatment Position   chair  -PC    In Chair  sitting;call light within reach;encouraged to call for assist;with family/caregiver;notified nsg   no alarm upon entry  - sitting;call light within reach;encouraged to call for assist  -PC    Recorded by  [JM] Tona Oviedo PTA [PC] Christen Demarco PT      User Key  (r) = Recorded By, (t) = Taken By, (c) = Cosigned By    Initials Name Effective Dates     Christen Demarco, PT 12/01/15 -     CRISSY Oviedo PTA 02/18/16 -                 IP PT Goals       10/02/17 1556 10/02/17 1555 09/20/17 1540    Bed Mobility PT LTG    Bed Mobility PT LTG, Time to Achieve   1 wk  -EF    Bed Mobility PT LTG, Activity Type   all bed mobility  -EF    Bed Mobility PT LTG, [place] Level   independent  -EF    Bed Mobility PT LTG, Date Goal Reviewed  10/02/17  -PC 09/20/17  -EF    Bed Mobility PT LTG, Outcome   goal ongoing  -EF    Transfer Training PT LTG    Transfer Training PT LTG, Time to Achieve   1 wk  -EF    Transfer Training PT LTG, Activity Type   all transfers  -EF    Transfer Training PT LTG, [place] Level   supervision required  -EF    Transfer Training PT  LTG, Date Goal Reviewed 10/02/17  -PC      Transfer Training PT LTG, Outcome   goal ongoing  -EF    Gait Training PT LTG    Gait Training Goal PT LTG, Date Established 10/02/17  -PC      Gait Training Goal PT LTG, Time to Achieve   1 wk  -EF    Gait Training Goal PT LTG, [place] Level   supervision required  -EF    Gait Training Goal PT LTG, Assist Device   walker, rolling  -EF    Gait Training Goal PT LTG, Distance to Achieve   80  -EF    Gait Training Goal PT LTG, Date Goal Reviewed 10/02/17  -PC  09/20/17  -EF    Gait Training Goal PT LTG, Outcome goal ongoing  -PC  goal revised  -EF       User Key  (r) = Recorded By, (t) = Taken By, (c) = Cosigned By    Initials Name Provider Type    EF Shanice Ash, PT Physical Therapist    PC Christen Demarco, PT Physical Therapist          Physical Therapy Education     Title: PT OT SLP Therapies (Done)     Topic: Physical Therapy (Done)     Point: Mobility training (Done)    Learning Progress Summary    Learner Readiness Method Response Comment Documented by Status   Patient Acceptance E,TB VU,NR fam translated, educ offered on benefits of pacing himself to avoid knee buckling and SOA-suggested standing rest and pt agreed  10/03/17 1641 Done    Acceptance E,D NR  PC 10/02/17 1553 Active    Acceptance E,TB VU,DU  CW 09/28/17 1621 Done    Acceptance E,D VU,NR pt used  on phone to communicate-reported incr wkness and incr SOA  09/26/17 1053 Done    Acceptance E,D VU,DU  PC 09/22/17 1522 Done    Acceptance E,D DU,VU  PC 09/22/17 1521 Done    Acceptance E,D VU,DU  PC 09/21/17 1444 Done    Acceptance E NR  EF 09/20/17 1539 Active    Acceptance E NR  ESME 09/19/17 1521 Active    Acceptance E VU,NR  ESME 09/18/17 1443 Done    Acceptance E VU  KH 09/17/17 1629 Done    Acceptance E VU,NR  KH 09/16/17 1539 Done    Acceptance E,TB,D VU,NR  RW 09/15/17 1504 Done    Acceptance E,D VU,NR  RW 09/14/17 1653 Done    Acceptance E VU,NR  ESME 09/13/17 1259 Done    Acceptance E NR  AR 09/12/17 1452 Active    Acceptance E NR  ESME 09/11/17 1434 Active    Acceptance E NR  EM 09/08/17 1342 Active    Acceptance E,D NR  PC 09/07/17 1407 Active    Acceptance E,D NR  PC 09/05/17 1421 Active   Family Acceptance E,TB VU,NR fam translated, educ offered on benefits of pacing himself to avoid knee buckling and SOA-suggested standing rest and pt agreed  10/03/17 1641 Done    Acceptance E NR  AR 09/12/17 1452 Active               Point: Home exercise program (Done)    Learning Progress Summary    Learner Readiness Method Response Comment Documented by Status   Patient Acceptance E,TB  VU,NR fam translated, educ offered on benefits of pacing himself to avoid knee buckling and SOA-suggested standing rest and pt agreed  10/03/17 1641 Done    Acceptance E,D NR  PC 10/02/17 1553 Active    Acceptance E,TB VU,DU  CW 09/28/17 1621 Done    Acceptance E,D VU,NR pt used  on phone to communicate-reported incr wkness and incr SOA  09/26/17 1053 Done    Acceptance E,D VU,DU  PC 09/21/17 1444 Done    Acceptance E NR  ESME 09/19/17 1521 Active    Acceptance E VU,NR  ESME 09/18/17 1443 Done    Acceptance E VU,NR   09/16/17 1539 Done    Acceptance E,TB,D VU,NR  RW 09/15/17 1504 Done    Acceptance E VU,NR  ESME 09/13/17 1259 Done    Acceptance E NR  AR 09/12/17 1452 Active    Acceptance E NR  ESME 09/11/17 1434 Active    Acceptance E,D NR  PC 09/07/17 1407 Active   Family Acceptance E,TB DEMETRICE,NR fam translated, educ offered on benefits of pacing himself to avoid knee buckling and SOA-suggested standing rest and pt agreed  10/03/17 1641 Done    Acceptance E NR  AR 09/12/17 1452 Active               Point: Body mechanics (Done)    Learning Progress Summary    Learner Readiness Method Response Comment Documented by Status   Patient Acceptance E,TB VU,NR fam translated, educ offered on benefits of pacing himself to avoid knee buckling and SOA-suggested standing rest and pt agreed  10/03/17 1641 Done    Acceptance E,D NR   10/02/17 1553 Active    Acceptance E,TB VUMASON   09/28/17 1621 Done    Acceptance E,D VU,NR pt used  on phone to communicate-reported incr wkness and incr SOA  09/26/17 1053 Done    Acceptance E,D VU,DU  PC 09/22/17 1522 Done    Acceptance E,D DU,VU  PC 09/22/17 1521 Done    Acceptance E,D VU,DU  PC 09/21/17 1444 Done    Acceptance E NR  ESME 09/19/17 1521 Active    Acceptance E VU,NR  ESME 09/18/17 1443 Done    Acceptance E,TB,D VU,NR  RW 09/15/17 1504 Done    Acceptance E,D VU,NR  RW 09/14/17 1653 Done    Acceptance E VU,NR  ESME 09/13/17 1259 Done    Acceptance E NR  AR 09/12/17  1452 Active    Acceptance E NR  ESME 09/11/17 1434 Active    Acceptance E,D NR  PC 09/07/17 1407 Active   Family Acceptance E,TB VU,NR fam translated, educ offered on benefits of pacing himself to avoid knee buckling and SOA-suggested standing rest and pt agreed  10/03/17 1641 Done    Acceptance E NR  AR 09/12/17 1452 Active               Point: Precautions (Done)    Learning Progress Summary    Learner Readiness Method Response Comment Documented by Status   Patient Acceptance E,TB VU,NR fam translated, educ offered on benefits of pacing himself to avoid knee buckling and SOA-suggested standing rest and pt agreed  10/03/17 1641 Done    Acceptance E,D NR  PC 10/02/17 1553 Active    Acceptance E,TB VU,DU  CW 09/28/17 1621 Done    Acceptance E,D VU,NR pt used  on phone to communicate-reported incr wkness and incr SOA  09/26/17 1053 Done    Acceptance E,D VU,DU  PC 09/22/17 1522 Done    Acceptance E,D DU,VU  PC 09/22/17 1521 Done    Acceptance E,D VU,DU  PC 09/21/17 1444 Done    Acceptance E NR  EF 09/20/17 1539 Active    Acceptance E NR  ESME 09/19/17 1521 Active    Acceptance E VU,NR  ESME 09/18/17 1443 Done    Acceptance E VU   09/17/17 1629 Done    Acceptance E VU,NR   09/16/17 1539 Done    Acceptance E,TB,D VU,NR  RW 09/15/17 1504 Done    Acceptance E,D VU,NR  RW 09/14/17 1653 Done    Acceptance E VU,NR  ESME 09/13/17 1259 Done    Acceptance E NR  AR 09/12/17 1452 Active    Acceptance E NR  ESME 09/11/17 1434 Active    Acceptance E,D NR  PC 09/07/17 1407 Active   Family Acceptance E,TB VU,NR fam translated, educ offered on benefits of pacing himself to avoid knee buckling and SOA-suggested standing rest and pt agreed  10/03/17 1641 Done    Acceptance E NR  AR 09/12/17 1452 Active                      User Key     Initials Effective Dates Name Provider Type Discipline     05/18/15 -  Diana Pedraza, PT Physical Therapist PT     04/24/15 -  Shanice Ash, PT Physical Therapist PT    PC  12/01/15 -  Christen Demarco, PT Physical Therapist PT    EM 12/01/15 -  Shanice Arango, PT Physical Therapist PT    JM 02/18/16 -  Tona Oviedo, PTA Physical Therapy Assistant PT    AR 06/27/16 -  Beatriz Anna, PT Physical Therapist PT    RW 04/06/16 -  Ruthy Berg, PTA Physical Therapy Assistant PT    ESME 04/24/15 -  Branden Beaz, PTA Physical Therapy Assistant PT    CW 12/13/16 -  Timur Pereira Physical Therapy Assistant PT                    PT Recommendation and Plan  Anticipated Discharge Disposition: extended care facility  Planned Therapy Interventions: gait training, bed mobility training, balance training, strengthening, transfer training  PT Frequency: 5 times/wk  Plan of Care Review  Plan Of Care Reviewed With: patient, family  Progress: improving  Outcome Summary/Follow up Plan: incr amb dist, but educ on safety w/knee buckling w/o warning          Outcome Measures       10/03/17 1600 10/02/17 1500       How much help from another person do you currently need...    Turning from your back to your side while in flat bed without using bedrails? 4  -JM 4  -PC     Moving from lying on back to sitting on the side of a flat bed without bedrails? 3  -JM 3  -PC     Moving to and from a bed to a chair (including a wheelchair)? 3  -JM 3  -PC     Standing up from a chair using your arms (e.g., wheelchair, bedside chair)? 3  - 3  -PC     Climbing 3-5 steps with a railing? 2  -JM 2  -PC     To walk in hospital room? 3  - 3  -PC     AM-PAC 6 Clicks Score 18  -JM 18  -PC       User Key  (r) = Recorded By, (t) = Taken By, (c) = Cosigned By    Initials Name Provider Type    PC Christen Demarco, PT Physical Therapist    JM Tona Oviedo, PTA Physical Therapy Assistant           Time Calculation:         PT Charges       10/03/17 1535 10/03/17 1109       Time Calculation    Start Time 1445  -      Stop Time 1505  -      Time Calculation (min) 20 min  -      PT Received On 10/03/17  -      PT -  Next Appointment 10/04/17  -CRISSY 10/03/17  -VELVET       User Key  (r) = Recorded By, (t) = Taken By, (c) = Cosigned By    Initials Name Provider Type    VELVET Rosado, PT Physical Therapist    CRISSY Oviedo PTA Physical Therapy Assistant          Therapy Charges for Today     Code Description Service Date Service Provider Modifiers Qty    67494678162 HC PT THER PROC EA 15 MIN 10/3/2017 Tona Oviedo PTA GP 1    96477223669 HC PT THER SUPP EA 15 MIN 10/3/2017 Tona Oviedo PTA GP 1          PT G-Codes  Outcome Measure Options: AM-PAC 6 Clicks Basic Mobility (PT)    Tona Oviedo PTA  10/3/2017

## 2017-10-04 LAB
ANION GAP SERPL CALCULATED.3IONS-SCNC: 10.1 MMOL/L
BUN BLD-MCNC: 16 MG/DL (ref 8–23)
BUN/CREAT SERPL: 25.4 (ref 7–25)
CALCIUM SPEC-SCNC: 8.6 MG/DL (ref 8.6–10.5)
CHLORIDE SERPL-SCNC: 91 MMOL/L (ref 98–107)
CO2 SERPL-SCNC: 36.9 MMOL/L (ref 22–29)
CREAT BLD-MCNC: 0.63 MG/DL (ref 0.76–1.27)
DEPRECATED RDW RBC AUTO: 54.9 FL (ref 37–54)
ERYTHROCYTE [DISTWIDTH] IN BLOOD BY AUTOMATED COUNT: 19.1 % (ref 11.5–14.5)
GFR SERPL CREATININE-BSD FRML MDRD: 129 ML/MIN/1.73
GFR SERPL CREATININE-BSD FRML MDRD: >150 ML/MIN/1.73
GLUCOSE BLD-MCNC: 184 MG/DL (ref 65–99)
GLUCOSE BLDC GLUCOMTR-MCNC: 127 MG/DL (ref 70–130)
GLUCOSE BLDC GLUCOMTR-MCNC: 190 MG/DL (ref 70–130)
GLUCOSE BLDC GLUCOMTR-MCNC: 195 MG/DL (ref 70–130)
GLUCOSE BLDC GLUCOMTR-MCNC: 220 MG/DL (ref 70–130)
GLUCOSE BLDC GLUCOMTR-MCNC: 223 MG/DL (ref 70–130)
GLUCOSE BLDC GLUCOMTR-MCNC: 322 MG/DL (ref 70–130)
GLUCOSE BLDC GLUCOMTR-MCNC: 33 MG/DL (ref 70–130)
GLUCOSE BLDC GLUCOMTR-MCNC: 35 MG/DL (ref 70–130)
GLUCOSE BLDC GLUCOMTR-MCNC: 49 MG/DL (ref 70–130)
GLUCOSE BLDC GLUCOMTR-MCNC: 67 MG/DL (ref 70–130)
HCT VFR BLD AUTO: 35.6 % (ref 40.4–52.2)
HGB BLD-MCNC: 10.5 G/DL (ref 13.7–17.6)
LAB AP CASE REPORT: NORMAL
Lab: NORMAL
MCH RBC QN AUTO: 23.3 PG (ref 27–32.7)
MCHC RBC AUTO-ENTMCNC: 29.5 G/DL (ref 32.6–36.4)
MCV RBC AUTO: 78.9 FL (ref 79.8–96.2)
PATH REPORT.FINAL DX SPEC: NORMAL
PATH REPORT.GROSS SPEC: NORMAL
PLATELET # BLD AUTO: 324 10*3/MM3 (ref 140–500)
PMV BLD AUTO: 9.5 FL (ref 6–12)
POTASSIUM BLD-SCNC: 3.5 MMOL/L (ref 3.5–5.2)
POTASSIUM BLD-SCNC: 4.7 MMOL/L (ref 3.5–5.2)
RBC # BLD AUTO: 4.51 10*6/MM3 (ref 4.6–6)
SODIUM BLD-SCNC: 138 MMOL/L (ref 136–145)
WBC NRBC COR # BLD: 13.32 10*3/MM3 (ref 4.5–10.7)

## 2017-10-04 PROCEDURE — 80048 BASIC METABOLIC PNL TOTAL CA: CPT | Performed by: INTERNAL MEDICINE

## 2017-10-04 PROCEDURE — 97110 THERAPEUTIC EXERCISES: CPT

## 2017-10-04 PROCEDURE — 63710000001 INSULIN ASPART PER 5 UNITS: Performed by: FAMILY MEDICINE

## 2017-10-04 PROCEDURE — 63710000001 INSULIN ASPART PER 5 UNITS: Performed by: INTERNAL MEDICINE

## 2017-10-04 PROCEDURE — 94799 UNLISTED PULMONARY SVC/PX: CPT

## 2017-10-04 PROCEDURE — 25010000002 GLUCAGON (HUMAN RECOMBINANT) 1 MG RECONSTITUTED SOLUTION: Performed by: FAMILY MEDICINE

## 2017-10-04 PROCEDURE — 99231 SBSQ HOSP IP/OBS SF/LOW 25: CPT | Performed by: FAMILY MEDICINE

## 2017-10-04 PROCEDURE — 82962 GLUCOSE BLOOD TEST: CPT

## 2017-10-04 PROCEDURE — 85027 COMPLETE CBC AUTOMATED: CPT | Performed by: INTERNAL MEDICINE

## 2017-10-04 PROCEDURE — 84132 ASSAY OF SERUM POTASSIUM: CPT | Performed by: FAMILY MEDICINE

## 2017-10-04 RX ORDER — NICOTINE POLACRILEX 4 MG
15 LOZENGE BUCCAL
Status: DISCONTINUED | OUTPATIENT
Start: 2017-10-04 | End: 2017-10-06 | Stop reason: HOSPADM

## 2017-10-04 RX ORDER — DEXTROSE MONOHYDRATE 25 G/50ML
25 INJECTION, SOLUTION INTRAVENOUS
Status: DISCONTINUED | OUTPATIENT
Start: 2017-10-04 | End: 2017-10-06 | Stop reason: HOSPADM

## 2017-10-04 RX ADMIN — MICONAZOLE NITRATE: 2 POWDER TOPICAL at 09:04

## 2017-10-04 RX ADMIN — GUAIFENESIN 1200 MG: 600 TABLET, EXTENDED RELEASE ORAL at 21:17

## 2017-10-04 RX ADMIN — METRONIDAZOLE 500 MG: 500 TABLET, FILM COATED ORAL at 12:38

## 2017-10-04 RX ADMIN — TRAZODONE HYDROCHLORIDE 50 MG: 50 TABLET ORAL at 21:17

## 2017-10-04 RX ADMIN — POTASSIUM CHLORIDE 40 MEQ: 750 CAPSULE, EXTENDED RELEASE ORAL at 16:08

## 2017-10-04 RX ADMIN — APIXABAN 5 MG: 5 TABLET, FILM COATED ORAL at 17:21

## 2017-10-04 RX ADMIN — FAMOTIDINE 20 MG: 20 TABLET, FILM COATED ORAL at 17:21

## 2017-10-04 RX ADMIN — GLUCAGON HYDROCHLORIDE 1 MG: 1 INJECTION, POWDER, FOR SOLUTION INTRAMUSCULAR; INTRAVENOUS; SUBCUTANEOUS at 02:18

## 2017-10-04 RX ADMIN — METOPROLOL TARTRATE 25 MG: 25 TABLET ORAL at 17:21

## 2017-10-04 RX ADMIN — IPRATROPIUM BROMIDE AND ALBUTEROL SULFATE 3 ML: .5; 3 SOLUTION RESPIRATORY (INHALATION) at 15:32

## 2017-10-04 RX ADMIN — INSULIN ASPART 5 UNITS: 100 INJECTION, SOLUTION INTRAVENOUS; SUBCUTANEOUS at 17:21

## 2017-10-04 RX ADMIN — ARFORMOTEROL TARTRATE 15 MCG: 15 SOLUTION RESPIRATORY (INHALATION) at 09:22

## 2017-10-04 RX ADMIN — BUDESONIDE 0.5 MG: 0.5 INHALANT RESPIRATORY (INHALATION) at 20:23

## 2017-10-04 RX ADMIN — METOPROLOL TARTRATE 25 MG: 25 TABLET ORAL at 09:04

## 2017-10-04 RX ADMIN — IPRATROPIUM BROMIDE AND ALBUTEROL SULFATE 3 ML: .5; 3 SOLUTION RESPIRATORY (INHALATION) at 03:16

## 2017-10-04 RX ADMIN — METRONIDAZOLE 500 MG: 500 TABLET, FILM COATED ORAL at 00:38

## 2017-10-04 RX ADMIN — FUROSEMIDE 40 MG: 40 TABLET ORAL at 09:04

## 2017-10-04 RX ADMIN — FAMOTIDINE 20 MG: 20 TABLET, FILM COATED ORAL at 09:04

## 2017-10-04 RX ADMIN — TAMSULOSIN HYDROCHLORIDE 0.4 MG: 0.4 CAPSULE ORAL at 17:21

## 2017-10-04 RX ADMIN — BUDESONIDE 0.5 MG: 0.5 INHALANT RESPIRATORY (INHALATION) at 09:22

## 2017-10-04 RX ADMIN — IPRATROPIUM BROMIDE AND ALBUTEROL SULFATE 3 ML: .5; 3 SOLUTION RESPIRATORY (INHALATION) at 06:59

## 2017-10-04 RX ADMIN — IPRATROPIUM BROMIDE AND ALBUTEROL SULFATE 3 ML: .5; 3 SOLUTION RESPIRATORY (INHALATION) at 23:50

## 2017-10-04 RX ADMIN — POTASSIUM CHLORIDE 40 MEQ: 750 CAPSULE, EXTENDED RELEASE ORAL at 12:38

## 2017-10-04 RX ADMIN — GUAIFENESIN 1200 MG: 600 TABLET, EXTENDED RELEASE ORAL at 09:04

## 2017-10-04 RX ADMIN — IPRATROPIUM BROMIDE AND ALBUTEROL SULFATE 3 ML: .5; 3 SOLUTION RESPIRATORY (INHALATION) at 20:23

## 2017-10-04 RX ADMIN — METRONIDAZOLE 500 MG: 500 TABLET, FILM COATED ORAL at 23:25

## 2017-10-04 RX ADMIN — TAMSULOSIN HYDROCHLORIDE 0.4 MG: 0.4 CAPSULE ORAL at 09:04

## 2017-10-04 RX ADMIN — APIXABAN 5 MG: 5 TABLET, FILM COATED ORAL at 09:04

## 2017-10-04 RX ADMIN — ZOLPIDEM TARTRATE 5 MG: 5 TABLET, FILM COATED ORAL at 22:28

## 2017-10-04 RX ADMIN — METRONIDAZOLE 500 MG: 500 TABLET, FILM COATED ORAL at 17:21

## 2017-10-04 RX ADMIN — IPRATROPIUM BROMIDE AND ALBUTEROL SULFATE 3 ML: .5; 3 SOLUTION RESPIRATORY (INHALATION) at 11:14

## 2017-10-04 RX ADMIN — AMLODIPINE BESYLATE 10 MG: 10 TABLET ORAL at 09:04

## 2017-10-04 RX ADMIN — ATORVASTATIN CALCIUM 20 MG: 20 TABLET, FILM COATED ORAL at 21:17

## 2017-10-04 RX ADMIN — INSULIN ASPART 4 UNITS: 100 INJECTION, SOLUTION INTRAVENOUS; SUBCUTANEOUS at 09:03

## 2017-10-04 NOTE — PLAN OF CARE
Problem: Patient Care Overview (Adult)  Goal: Plan of Care Review  Outcome: Ongoing (interventions implemented as appropriate)    10/03/17 1803 10/03/17 2030 10/04/17 0637   Patient Care Overview   Progress improving --  --    Outcome Evaluation   Outcome Summary/Follow up Plan --  --  ESL, contact isolation, ac/hs, po antibx, > lasix, plan d/c abran   Coping/Psychosocial Response Interventions   Plan Of Care Reviewed With --  patient --        Goal: Adult Individualization and Mutuality  Outcome: Ongoing (interventions implemented as appropriate)  Goal: Discharge Needs Assessment  Outcome: Ongoing (interventions implemented as appropriate)

## 2017-10-04 NOTE — PLAN OF CARE
Problem: Patient Care Overview (Adult)  Goal: Plan of Care Review  Outcome: Ongoing (interventions implemented as appropriate)    10/03/17 1803 10/03/17 2030 10/04/17 0632   Patient Care Overview   Progress improving --  --    Outcome Evaluation   Outcome Summary/Follow up Plan --  --  pod 7, incision samantha, + pain, ac/hs, < anxiety, po steroid aned antibx, plan d/c home   Coping/Psychosocial Response Interventions   Plan Of Care Reviewed With --  patient --        Goal: Adult Individualization and Mutuality  Outcome: Ongoing (interventions implemented as appropriate)  Goal: Discharge Needs Assessment  Outcome: Ongoing (interventions implemented as appropriate)

## 2017-10-04 NOTE — PROGRESS NOTES
Chuckie Valdez  62 y.o.   LOS: 30 days   Patient Care Team:  Douglas Jimenez MD as PCP - General (Family Medicine)    Chief Complaint:  SOB    I have reviewed the patient's medical history in detail and updated the computerized patient record.    Subjective     Patient is a 62 y.o. male presents with     History taken from: patient    History  Past Medical History:   Diagnosis Date   • COPD (chronic obstructive pulmonary disease)    • CTEPH (chronic thromboembolic pulmonary hypertension)    • HCV (hepatitis C virus)    • History of tracheostomy    • Pulmonary hypertension    • Type 2 diabetes mellitus      Past Surgical History:   Procedure Laterality Date   • BRONCHOSCOPY N/A 9/27/2017    Procedure: BRONCHOSCOPY WITH WASHINGS RIGHT MIDDLE LOBE AND LINGULAR with balloon dilation 12MM-15MM AND 18MM-19MM;  Surgeon: Herb Anglin MD;  Location: [facility] ENDOSCOPY;  Service:    • BRONCHOSCOPY N/A 9/29/2017    Procedure: BRONCHOSCOPY WITH BALLON DILATION 19MM AND STENT PLACEMENT;  Surgeon: Herb Anglin MD;  Location: [facility] MAIN OR;  Service:    • BRONCHOSCOPY N/A 9/29/2017    Procedure: BRONCHOSCOPY WITH STENT REMOVAL;  Surgeon: Herb Anglin MD;  Location: [facility] OR;  Service:      History reviewed. No pertinent family history.  Social History     Social History   • Marital status: Single     Spouse name: N/A   • Number of children: N/A   • Years of education: N/A     Social History Main Topics   • Smoking status: Former Smoker     Packs/day: 1.00     Years: 15.00     Types: Cigarettes     Quit date: 2008   • Smokeless tobacco: None   • Alcohol use None   • Drug use: None   • Sexual activity: Not Asked     Other Topics Concern   • None     Social History Narrative         Review of Systems  {All systems were reviewed and negative except for:  Constitution:  positive for malaise    Objective     Lab Results (last 24 hours)     Procedure Component Value Units Date/Time    Blood Gas, Arterial  [[id number]]  (Abnormal) Collected:  09/29/17 1336    Specimen:  Arterial Blood Updated:  10/03/17 1302     Site Arterial: right brachial     Maurice's Test N/A     pH, Arterial 7.257 (C) pH units       Critical:Notify Dr PANTOJA (29-Sep-17 13:38:27)Read back ok        pCO2, Arterial 97.3 (C) mm Hg       Critical:Notify Dr PANTOJA (29-Sep-17 13:38:27)Read back ok        pO2, Arterial 136.5 (H) mm Hg      HCO3, Arterial 43.3 (H) mmol/L      Base Excess, Arterial 10.9 (H) mmol/L      O2 Saturation Calculated 98.3 %      A-a Gradiant 0.4 mmHg      Barometric Pressure for Blood Gas 752.3 mmHg      Modality BiPap     FIO2 60 %      Ventilator Mode NIV     Set Tidal Volume 500     Set Mech Resp Rate 28    Narrative:       BIPAP 16/8 Meter: [id number] : [id number] Graf Nagy    POC Glucose Fingerstick [[id number]]  (Abnormal) Collected:  10/03/17 1720    Specimen:  Blood Updated:  10/03/17 1722     Glucose 294 (H) mg/dL     Narrative:       Meter: [id number] : [id number] Tatum DIALLO    POC Glucose Fingerstick [[id number]]  (Abnormal) Collected:  10/03/17 2123    Specimen:  Blood Updated:  10/03/17 2124     Glucose 161 (H) mg/dL     Narrative:       Meter: [id number] : [id number] Mario Alberto Canseco    POC Glucose Fingerstick [[id number]]  (Abnormal) Collected:  10/04/17 0210    Specimen:  Blood Updated:  10/04/17 0213     Glucose 33 (C) mg/dL     Narrative:       Repeat Test Meter: [id number] : [id number] Caryn Gutierrez    POC Glucose Fingerstick [[id number]]  (Abnormal) Collected:  10/04/17 0211    Specimen:  Blood Updated:  10/04/17 0213     Glucose 35 (C) mg/dL     Narrative:       Confirmed by Repeat Meter: [id number] : [id number] Walden Matt    POC Glucose Fingerstick [[id number]]  (Abnormal) Collected:  10/04/17 0229    Specimen:  Blood Updated:  10/04/17 0240     Glucose 49 (C) mg/dL     Narrative:       Confirmed by Repeat Meter: [id number] : [id number] Caryn Gutierrez    POC Glucose Fingerstick  [[id number]]  (Abnormal) Collected:  10/04/17 0250    Specimen:  Blood Updated:  10/04/17 0251     Glucose 67 (L) mg/dL     Narrative:       Meter: [id number] : [id number] Valera Matt    POC Glucose Fingerstick [[id number]]  (Normal) Collected:  10/04/17 0306    Specimen:  Blood Updated:  10/04/17 0307     Glucose 127 mg/dL     Narrative:       Meter: [id number] : [id number] Valera Matt    POC Glucose Fingerstick [[id number]]  (Abnormal) Collected:  10/04/17 0529    Specimen:  Blood Updated:  10/04/17 0531     Glucose 223 (H) mg/dL     Narrative:       Meter: [id number] : [id number] Mario Alberto Murciaialys    POC Glucose Fingerstick [[id number]]  (Abnormal) Collected:  10/04/17 0830    Specimen:  Blood Updated:  10/04/17 0832     Glucose 195 (H) mg/dL     Narrative:       Meter: [id number] : [id number] Rommel HARDWICK    CBC (No Diff) [[id number]]  (Abnormal) Collected:  10/04/17 0745    Specimen:  Blood Updated:  10/04/17 0838     WBC 13.32 (H) 10*3/mm3      RBC 4.51 (L) 10*6/mm3      Hemoglobin 10.5 (L) g/dL      Hematocrit 35.6 (L) %      MCV 78.9 (L) fL      MCH 23.3 (L) pg      MCHC 29.5 (L) g/dL      RDW 19.1 (H) %      RDW-SD 54.9 (H) fl      MPV 9.5 fL      Platelets 324 10*3/mm3     Basic Metabolic Panel [[id number]]  (Abnormal) Collected:  10/04/17 0745    Specimen:  Blood Updated:  10/04/17 0845     Glucose 184 (H) mg/dL      BUN 16 mg/dL      Creatinine 0.63 (L) mg/dL      Sodium 138 mmol/L      Potassium 3.5 mmol/L      Chloride 91 (L) mmol/L      CO2 36.9 (H) mmol/L      Calcium 8.6 mg/dL      eGFR  African Amer >150 mL/min/1.73      eGFR Non African Amer 129 mL/min/1.73      BUN/Creatinine Ratio 25.4 (H)     Anion Gap 10.1 mmol/L     Narrative:       GFR Normal >60  Chronic Kidney Disease <60  Kidney Failure <15    Tissue Pathology Exam [[id number]] Collected:  09/25/17 1500    Specimen:  Tissue from Clavicle, Left Updated:  10/04/17 0920     Case Report --     Surgical Pathology Report             "             Case: [id number]                                  Authorizing Provider:  Douglas Jimneez MD       Collected:           09/25/2017 03:00 PM          Ordering Location:     [facility]  Received:            09/25/2017 03:33 PM                                 [address]                                                                       Pathologist:           Keith Bingham MD                                                       Specimens:   1) - Clavicle, Left, lt supraclavicle bx                                                            2) - Clavicle, Left, lt supraclavicle bx                                                    Final Diagnosis --       1. \"LEFT SUPRACLAVICLE\", NEEDLE BIOPSY:          LYMPHOID TISSUE COMPOSED OF SMALL LYMPHOCYTES, PLASMA CELLS - CONSISTENT WITH                     ORIGIN FROM LYMPH NODE.          SPECIAL STUDIES are supportive of REACTIVE HYPERPLASIA WITH VASCULAR TRANSFORMATION OF                      SINUSES.             See reports from [facility].    2. \"LEFT SUPRACLAVICLE\", NEEDLE BIOPSY:          SUBMITTED FOR FLOW CYTOMETRY.         IHC/a/CMK         Gross Description --     1.  Received in formalin labeled \"left supraclavicular\" are multiple tan friable needle core biopsies up to 1.3 cm long and less than 0.1 cm across.  The specimens are submitted all a single block labeled 1A.      2.  Received in transport media labeled \"left supraclavicular biopsy\" and consists of a 1 cm long and less than 0.1 cm across tan needle core biopsy.  The specimen is submitted is submitted for gross examination only and forwarded for flow cytometry studies.         Embedded Images --    Narrative:       The previously reported component Preliminary Diagnosis is no longer being reported.          Tests reviewed: yes, Labs for last 24 hrs  Tests of interest are: HYPOKALEMIA    Vital Signs  Temp:  [97.4 °F (36.3 °C)-98.6 °F (37 °C)] 97.8 °F (36.6 °C)  Heart " "Rate:  [] 89  Resp:  [18-20] 20  BP: ()/(44-97) 120/63    Flowsheet Rows         First Filed Value    Admission Height  70\" (177.8 cm) Documented at 09/04/2017 0728    Admission Weight  180 lb (81.6 kg) Documented at 09/04/2017 0728            Intake/Output Summary (Last 24 hours) at 10/04/17 0950  Last data filed at 10/04/17 0918   Gross per 24 hour   Intake              360 ml   Output             2700 ml   Net            -2340 ml       Physical Exam:  General Appearance alert, appears stated age and cooperative  Lungs clear to auscultation, respirations regular, respirations even and respirations unlabored  Heart regular rhythm & normal rate, normal S1, S2, no murmur, no phoebe, no rub and no click  Abdomen normal bowel sounds, no masses, no hepatomegaly, no splenomegaly, soft non-tender, no guarding and no rebound tenderness  Neurologic Mental Status orientated to person, place, time and situation       Medication Review:   Nitrates, organic and Penicillins  Current Facility-Administered Medications   Medication Dose Route Frequency Provider Last Rate Last Dose   • acetaminophen (TYLENOL) tablet 650 mg  650 mg Oral Q4H PRN Shane Yao MD   650 mg at 09/27/17 2121    Or   • acetaminophen (TYLENOL) suppository 650 mg  650 mg Rectal Q4H PRN Shane Yao MD       • amLODIPine (NORVASC) tablet 10 mg  10 mg Oral Q24H Daniel Hernandez MD   10 mg at 10/04/17 0904   • apixaban (ELIQUIS) tablet 5 mg  5 mg Oral BID Yovany Hernandes MD   5 mg at 10/04/17 0904   • arformoterol (BROVANA) nebulizer solution 15 mcg  15 mcg Nebulization BID - RT Daniel Hernandez MD   15 mcg at 10/04/17 0922   • atorvastatin (LIPITOR) tablet 20 mg  20 mg Oral Nightly Shane Yao MD   20 mg at 10/03/17 2030   • budesonide (PULMICORT) nebulizer solution 0.5 mg  0.5 mg Nebulization BID - RT Daniel Hernandez MD   0.5 mg at 10/04/17 0922   • dextrose (D50W) solution 25 g  25 g Intravenous Q15 Min NASN Shane " CHRISTIE Yao MD   25 g at 09/29/17 1845   • dextrose (GLUTOSE) oral gel 15 g  15 g Oral Q15 Min PRN Shane Yao MD       • famotidine (PEPCID) tablet 20 mg  20 mg Oral BID Shane Yao MD   20 mg at 10/04/17 0904   • furosemide (LASIX) tablet 40 mg  40 mg Oral Daily Daniel Hernandez MD   40 mg at 10/04/17 0904   • glucagon (human recombinant) (GLUCAGEN DIAGNOSTIC) injection 1 mg  1 mg Subcutaneous Q15 Min PRN Douglas Jimenez MD   1 mg at 10/04/17 0218   • guaiFENesin (MUCINEX) 12 hr tablet 1,200 mg  1,200 mg Oral Q12H Shane Yao MD   1,200 mg at 10/04/17 0904   • HYDROcodone-homatropine (HYCODAN) 5-1.5 MG/5ML syrup 5 mL  5 mL Oral Q4H PRN Herb Anglin MD       • insulin aspart (novoLOG) injection 0-24 Units  0-24 Units Subcutaneous 4x Daily AC & at Bedtime Daniel Hernandez MD   4 Units at 10/04/17 0903   • insulin detemir (LEVEMIR) injection 10 Units  10 Units Subcutaneous Nightly Lj Marvin MD   10 Units at 10/03/17 2029   • insulin detemir (LEVEMIR) injection 10 Units  10 Units Subcutaneous QAM Daniel Hernandez MD   10 Units at 10/04/17 0903   • ipratropium-albuterol (DUO-NEB) nebulizer solution 3 mL  3 mL Nebulization Q4H PRN Herb Anglin MD   3 mL at 09/29/17 0458   • ipratropium-albuterol (DUO-NEB) nebulizer solution 3 mL  3 mL Nebulization Q4H - RT Herb Anglin MD   3 mL at 10/04/17 0659   • ipratropium-albuterol (DUO-NEB) nebulizer solution 3 mL  3 mL Nebulization Q4H PRN Herb Anglin MD   3 mL at 10/01/17 1544   • lactated ringers infusion 1,000 mL  1,000 mL Intravenous Continuous PRN Herb Anglin MD 25 mL/hr at 09/27/17 1323     • lactated ringers infusion  9 mL/hr Intravenous Continuous Noe Mcgraw MD 9 mL/hr at 09/29/17 0919 9 mL/hr at 09/29/17 0919   • lidocaine (XYLOCAINE) 4 % nebulizer solution 4 mL  4 mL Nebulization Once Yovany Hernandes MD       • Magnesium Sulfate 2 gram infusion - Mg less than or equal to 1.5 mg/dL   2 g Intravenous PRN Shane Yao MD        Or   • Magesium Sulfate 1 gram infusion - Mg 1.6-1.9 mg/dL  1 g Intravenous PRN Shane Yao MD       • magnesium hydroxide (MILK OF MAGNESIA) suspension 2400 mg/10mL 10 mL  10 mL Oral Daily PRN Lj Marvin MD   10 mL at 09/23/17 2255   • metoprolol tartrate (LOPRESSOR) tablet 25 mg  25 mg Oral BID Eduar Tucker MD   25 mg at 10/04/17 0904   • metroNIDAZOLE (FLAGYL) tablet 500 mg  500 mg Oral Q6H Ricki Coyle Jr., MD   500 mg at 10/04/17 0038   • miconazole (MICOTIN) 2 % powder   Topical Q12H Diamond Ann MD       • ondansetron (ZOFRAN) tablet 4 mg  4 mg Oral Q6H PRN Shane Yao MD        Or   • ondansetron ODT (ZOFRAN-ODT) disintegrating tablet 4 mg  4 mg Oral Q6H PRN Shane Yao MD        Or   • ondansetron (ZOFRAN) injection 4 mg  4 mg Intravenous Q6H PRN Shane Yao MD   4 mg at 09/20/17 1842   • potassium chloride (MICRO-K) CR capsule 40 mEq  40 mEq Oral PRN Shane Yao MD        Or   • potassium chloride (KLOR-CON) packet 40 mEq  40 mEq Oral PRN Shane Yao MD        Or   • potassium chloride 10 mEq in 100 mL IVPB  10 mEq Intravenous Q1H PRN Shane Yao MD       • sennosides-docusate sodium (SENOKOT-S) 8.6-50 MG tablet 2 tablet  2 tablet Oral Nightly Shane Yao MD   2 tablet at 10/03/17 2030   • sodium chloride 0.9 % flush 1-10 mL  1-10 mL Intravenous PRN Shane Yao MD       • sodium chloride 0.9 % flush 10 mL  10 mL Intravenous PRN Josh Valentin MD       • sodium chloride 0.9 % infusion  9 mL/hr Intravenous Continuous Jeevan Melendrez MD 9 mL/hr at 09/06/17 0406 9 mL/hr at 09/06/17 0406   • tamsulosin (FLOMAX) 24 hr capsule 0.4 mg  0.4 mg Oral BID Douglas Jimenez MD   0.4 mg at 10/03/17 1735   • traZODone (DESYREL) tablet 50 mg  50 mg Oral Nightly Lj Marvin MD   50 mg at 10/03/17 2030   • zolpidem (AMBIEN) tablet 5 mg  5 mg Oral Nightly PRN Douglas Jimneez MD   5 mg at 10/03/17 2137     Prescriptions Prior  to Admission   Medication Sig Dispense Refill Last Dose   • acetaminophen (TYLENOL) 325 MG tablet Take 325 mg by mouth Every 4 (Four) Hours As Needed for Mild Pain .      • albuterol (PROVENTIL) (2.5 MG/3ML) 0.083% nebulizer solution Take 2.5 mg by nebulization Every 4 (Four) Hours As Needed for Wheezing (every 2 hours as needed for soa related to copd).      • apixaban (ELIQUIS) 5 MG tablet tablet Take 5 mg by mouth 2 (Two) Times a Day.      • atorvastatin (LIPITOR) 20 MG tablet Take 20 mg by mouth Every Night.      • bisacodyl (DULCOLAX) 10 MG suppository Insert 10 mg into the rectum Daily As Needed for Constipation.      • budesonide-formoterol (SYMBICORT) 160-4.5 MCG/ACT inhaler Inhale 2 puffs 2 (Two) Times a Day.      • escitalopram (LEXAPRO) 20 MG tablet Take 20 mg by mouth Every Night.      • ferrous sulfate 325 (65 FE) MG tablet Take 325 mg by mouth 2 (Two) Times a Day.      • fluticasone (FLOVENT HFA) 220 MCG/ACT inhaler Inhale 1 puff 2 (Two) Times a Day.      • furosemide (LASIX) 40 MG tablet Take 40 mg by mouth Daily.      • insulin detemir (LEVEMIR) 100 UNIT/ML injection Inject 8 Units under the skin Daily.      • ipratropium-albuterol (DUO-NEB) 0.5-2.5 mg/mL nebulizer Take 3 mL by nebulization Every 4 (Four) Hours.      • melatonin 1 MG tablet Take 3 mg by mouth At Night As Needed for Sleep (for sleep).      • metoprolol tartrate (LOPRESSOR) 25 MG tablet Take 12.5 mg by mouth 2 (Two) Times a Day.   9/29/2017 at Unknown time   • Multiple Vitamins-Minerals (MULTIVITAMIN ADULT PO) Take 1 tablet by mouth Daily.      • ondansetron (ZOFRAN) 4 MG tablet Take 4 mg by mouth Every 6 (Six) Hours As Needed for Nausea or Vomiting.      • pantoprazole (PROTONIX) 40 MG EC tablet Take 40 mg by mouth Daily.      • Sennosides-Docusate Sodium 8.6-50 MG capsule Take 2 capsules by mouth 2 (Two) Times a Day.      • traZODone (DESYREL) 50 MG tablet Take 50 mg by mouth Every Night.          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     Treatment Plans:  1.    Consultations: no new  2.    Tests:BMP,CBC in am      Douglas Jimenez MD  10/04/17  9:50 AM

## 2017-10-04 NOTE — THERAPY TREATMENT NOTE
Acute Care - Physical Therapy Treatment Note  [facility]     Patient Name: Chuckie Valdez  : 1955  MRN: [id number]  Today's Date: 10/4/2017  Onset of Illness/Injury or Date of Surgery Date: 17  Date of Referral to PT: 17  Referring Physician: Tony    Admit Date: 2017    Visit Dx:    ICD-10-CM ICD-9-CM   1. Pneumonia of left lower lobe due to infectious organism J18.9 483.8   2. Acute respiratory failure with hypercapnia J96.02 518.81   3. General weakness R53.1 780.79   4. Tracheal stenosis due to tracheostomy J95.09 519.02   5. Oropharyngeal dysphagia R13.12 787.22     Patient Active Problem List   Diagnosis   • Acute on chronic respiratory failure with hypoxia and hypercapnia   • Tracheal stenosis due to tracheostomy   • CTEPH (chronic thromboembolic pulmonary hypertension)   • COPD exacerbation   • Clostridium difficile enterocolitis   • Superior vena cava stenosis               Adult Rehabilitation Note       10/04/17 1153 10/03/17 1700 10/03/17 1542    Rehab Assessment/Intervention    Discipline physical therapy assistant  -  physical therapy assistant  -    Document Type therapy note (daily note)  -  therapy note (daily note)  -    Subjective Information agree to therapy;complains of;weakness;fatigue  -  agree to therapy;complains of  -    Precautions/Limitations fall precautions  -  fall precautions  -    Recorded by [CRISSY] Tona Oviedo PTA  [JM] Tona Oviedo PTA    Vital Signs    Pre SpO2 (%) 92  -      O2 Delivery Pre Treatment supplemental O2  -      Intra SpO2 (%) 85  -JM      O2 Delivery Intra Treatment supplemental O2  -      Post SpO2 (%) 90  -      O2 Delivery Post Treatment supplemental O2  -      Rest Breaks  2  -JM      Recorded by [CRISSY] Tona Oviedo PTA      Pain Assessment    Pain Assessment No/denies pain  - No/denies pain  -     Recorded by [CRISSY] Tona Oviedo PTA [JM] Tona Oviedo PTA     Bed Mobility,  Assessment/Treatment    Bed Mobility, Scoot/Bridge, [place] minimum assist (75% patient effort)  -      Bed Mob, Supine to Sit, [place] contact guard assist  -  not tested  -    Bed Mobility, Comment   in chair  -    Recorded by [CRISSY] Tona Oviedo PTA  [JM] Tona Oviedo PTA    Transfer Assessment/Treatment    Transfers, Sit-Stand [place] minimum assist (75% patient effort)   2 attempts req  -  moderate assist (50% patient effort)  -    Transfers, Stand-Sit [place] contact guard assist;verbal cues required  -  contact guard assist;verbal cues required  -    Transfer, Comment less assist from EOB, but still req 2 attempts  -JM  2 attempts req  -JM    Recorded by [CRISSY] Tona Oviedo PTA  [JM] Tona Oviedo PTA    Gait Assessment/Treatment    Gait, [place] Level minimum assist (75% patient effort);contact guard assist;2 person assist required  -  minimum assist (75% patient effort);contact guard assist;2 person assist required  -    Gait, Assistive Device rolling walker  -  rolling walker  -    Gait, Distance (Feet) 60  -JM  50  -JM    Gait, Gait Deviations ileana decreased;forward flexed posture;knee buckling;right:;decreased heel strike   noted foot drop today rt   -  forward flexed posture;step length decreased;ileana decreased;knee buckling  -    Gait, Safety Issues balance decreased during turns;step length decreased;weight-shifting ability decreased;supplemental O2   3L  -  balance decreased during turns;step length decreased;weight-shifting ability decreased;supplemental O2  -JM    Gait, Impairments strength decreased;impaired balance  -  strength decreased;impaired balance  -    Gait, Comment 2 brief standing rests  -  incr amb dist, but knee buckling at end of amb   1 standing rest due to SOA  -    Recorded by [CRISSY] Tona Oviedo PTA  [JM] Tona Oviedo PTA    Positioning and Restraints    Pre-Treatment Position in bed  -   sitting in chair/recliner  -JM    In Chair call light within reach;encouraged to call for assist;notified nsg  -CRISSY  sitting;call light within reach;encouraged to call for assist;with family/caregiver;notified nsg   no alarm upon entry  -CRISSY    Recorded by [JM] Tona Oviedo PTA  [JM] Tona Oviedo PTA      10/02/17 1548          Rehab Assessment/Intervention    Discipline physical therapist  -PC      Document Type therapy note (daily note)  -PC      Subjective Information agree to therapy  -PC      Patient Effort, Rehab Treatment good  -PC      Symptoms Noted During/After Treatment shortness of breath  -PC      Symptoms Noted Comment low endurance, SOA with minimal activity  -PC      Recorded by [PC] Chrsiten Demarco, PT      Vital Signs    Pre SpO2 (%) 91  -PC      O2 Delivery Pre Treatment supplemental O2   2l  -PC      Intra SpO2 (%) 88  -PC      O2 Delivery Intra Treatment supplemental O2  -PC      Post SpO2 (%) 91  -PC      O2 Delivery Post Treatment supplemental O2  -PC      Recorded by [PC] Christen Demarco, PT      Pain Assessment    Pain Assessment No/denies pain  -PC      Recorded by [PC] Christen Demarco, PT      Cognitive Assessment/Intervention    Current Cognitive/Communication Assessment functional   speaks Palestinian and uses phone   -PC      Recorded by [PC] Christen Demraco, PT      Bed Mobility, Assessment/Treatment    Bed Mob, Supine to Sit, [place] not tested  -PC      Bed Mobility, Comment in chair  -PC      Recorded by [PC] Christen Demarco, PT      Transfer Assessment/Treatment    Transfers, Sit-Stand [place] minimum assist (75% patient effort)  -PC      Transfers, Stand-Sit [place] minimum assist (75% patient effort)  -PC      Transfers, Sit-Stand-Sit, Assist Device rolling walker  -PC      Recorded by [PC] Christen Demarco PT      Gait Assessment/Treatment    Gait, [place] Level minimum assist (75% patient effort)  -PC      Gait, Assistive Device rolling walker  -PC       Gait, Distance (Feet) 30   in room due to lines, no telepak available  -PC      Gait, Gait Deviations ataxia;decreased heel strike;right:  -PC      Gait, Impairments impaired balance;strength decreased  -PC      Gait, Comment low tolerance to activity due to SOA, pt very shaky today  -PC      Recorded by [PC] Christen Demarco, PT      Therapy Exercises    Bilateral Lower Extremities 5 reps;ankle pumps/circles;LAQ  -PC      Recorded by [PC] Christen Demarco PT      Positioning and Restraints    Pre-Treatment Position sitting in chair/recliner  -PC      Post Treatment Position chair  -PC      In Chair sitting;call light within reach;encouraged to call for assist  -PC      Recorded by [PC] Christen Demarco, PT        User Key  (r) = Recorded By, (t) = Taken By, (c) = Cosigned By    Initials Name Effective Dates    PC Christen Demarco, PT 12/01/15 -     CRISSY Oviedo, PTA 02/18/16 -                 IP PT Goals       10/02/17 1556 10/02/17 1555       Bed Mobility PT LTG    Bed Mobility PT LTG, Date Goal Reviewed  10/02/17  -PC     Transfer Training PT LTG    Transfer Training PT  LTG, Date Goal Reviewed 10/02/17  -PC      Gait Training PT LTG    Gait Training Goal PT LTG, Date Established 10/02/17  -PC      Gait Training Goal PT LTG, Date Goal Reviewed 10/02/17  -PC      Gait Training Goal PT LTG, Outcome goal ongoing  -PC        User Key  (r) = Recorded By, (t) = Taken By, (c) = Cosigned By    Initials Name Provider Type    PC Christen Demarco, PT Physical Therapist          Physical Therapy Education     Title: PT OT SLP Therapies (Done)     Topic: Physical Therapy (Done)     Point: Mobility training (Done)    Learning Progress Summary    Learner Readiness Method Response Comment Documented by Status   Patient Acceptance E VU pt uses translation on phone to communicate-educ on rt le buckling w/amb unsafe-takes assist of 2 for safety CRISSY 10/04/17 1204 Done    Acceptance E,TB VU,NR fam translated, educ offered on benefits  of pacing himself to avoid knee buckling and SOA-suggested standing rest and pt agreed  10/03/17 1641 Done    Acceptance E,D NR  PC 10/02/17 1553 Active    Acceptance E,TB VU,DU  CW 09/28/17 1621 Done    Acceptance E,D VU,NR pt used  on phone to communicate-reported incr wkness and incr SOA  09/26/17 1053 Done    Acceptance E,D VU,DU  PC 09/22/17 1522 Done    Acceptance E,D DU,VU  PC 09/22/17 1521 Done    Acceptance E,D VU,DU  PC 09/21/17 1444 Done    Acceptance E NR  EF 09/20/17 1539 Active    Acceptance E NR  ESME 09/19/17 1521 Active    Acceptance E VU,NR  ESME 09/18/17 1443 Done    Acceptance E VU  KH 09/17/17 1629 Done    Acceptance E VU,NR  KH 09/16/17 1539 Done    Acceptance E,TB,D VU,NR  RW 09/15/17 1504 Done    Acceptance E,D VU,NR  RW 09/14/17 1653 Done    Acceptance E VU,NR  ESME 09/13/17 1259 Done    Acceptance E NR  AR 09/12/17 1452 Active    Acceptance E NR  ESME 09/11/17 1434 Active    Acceptance E NR  EM 09/08/17 1342 Active    Acceptance E,D NR  PC 09/07/17 1407 Active    Acceptance E,D NR  PC 09/05/17 1421 Active   Family Acceptance E,TB VU,NR fam translated, educ offered on benefits of pacing himself to avoid knee buckling and SOA-suggested standing rest and pt agreed  10/03/17 1641 Done    Acceptance E NR  AR 09/12/17 1452 Active               Point: Home exercise program (Done)    Learning Progress Summary    Learner Readiness Method Response Comment Documented by Status   Patient Acceptance E VU pt uses translation on phone to communicate-educ on rt le buckling w/amb unsafe-takes assist of 2 for safety  10/04/17 1204 Done    Acceptance E,TB VU,NR fam translated, educ offered on benefits of pacing himself to avoid knee buckling and SOA-suggested standing rest and pt agreed  10/03/17 1641 Done    Acceptance E,D NR  PC 10/02/17 1553 Active    Acceptance E,TB VU,DU  CW 09/28/17 1621 Done    Acceptance E,D DEMETRICE,NR pt used  on phone to communicate-reported incr wkness and incr SOA   09/26/17 1053 Done    Acceptance E,D VU,DU  PC 09/21/17 1444 Done    Acceptance E NR  ESME 09/19/17 1521 Active    Acceptance E VU,NR  ESME 09/18/17 1443 Done    Acceptance E VU,NR  KH 09/16/17 1539 Done    Acceptance E,TB,D VU,NR  RW 09/15/17 1504 Done    Acceptance E VU,NR  ESME 09/13/17 1259 Done    Acceptance E NR  AR 09/12/17 1452 Active    Acceptance E NR  ESME 09/11/17 1434 Active    Acceptance E,D NR  PC 09/07/17 1407 Active   Family Acceptance E,TB VU,NR fam translated, educ offered on benefits of pacing himself to avoid knee buckling and SOA-suggested standing rest and pt agreed  10/03/17 1641 Done    Acceptance E NR  AR 09/12/17 1452 Active               Point: Body mechanics (Done)    Learning Progress Summary    Learner Readiness Method Response Comment Documented by Status   Patient Acceptance E VU pt uses translation on phone to communicate-educ on rt le buckling w/amb unsafe-takes assist of 2 for safety  10/04/17 1204 Done    Acceptance E,TB VU,NR fam translated, educ offered on benefits of pacing himself to avoid knee buckling and SOA-suggested standing rest and pt agreed  10/03/17 1641 Done    Acceptance E,D NR  PC 10/02/17 1553 Active    Acceptance E,TB VU,DU  CW 09/28/17 1621 Done    Acceptance E,D VU,NR pt used  on phone to communicate-reported incr wkness and incr SOA  09/26/17 1053 Done    Acceptance E,D VU,DU  PC 09/22/17 1522 Done    Acceptance E,D DU,VU  PC 09/22/17 1521 Done    Acceptance E,D VU,DU  PC 09/21/17 1444 Done    Acceptance E NR  ESME 09/19/17 1521 Active    Acceptance E VU,NR  ESME 09/18/17 1443 Done    Acceptance E,TB,D VU,NR  RW 09/15/17 1504 Done    Acceptance E,D VU,NR  RW 09/14/17 1653 Done    Acceptance E VU,NR  ESME 09/13/17 1259 Done    Acceptance E NR  AR 09/12/17 1452 Active    Acceptance E NR  ESME 09/11/17 1434 Active    Acceptance E,D NR  PC 09/07/17 1407 Active   Family Acceptance E,TB VU,NR [facility] translated, educ offered on benefits of pacing himself to avoid  knee buckling and SOA-suggested standing rest and pt agreed  10/03/17 1641 Done    Acceptance E NR  AR 09/12/17 1452 Active               Point: Precautions (Done)    Learning Progress Summary    Learner Readiness Method Response Comment Documented by Status   Patient Acceptance E VU pt uses translation on phone to communicate-educ on rt le buckling w/amb unsafe-takes assist of 2 for safety  10/04/17 1204 Done    Acceptance E,TB VU,NR fam translated, educ offered on benefits of pacing himself to avoid knee buckling and SOA-suggested standing rest and pt agreed  10/03/17 1641 Done    Acceptance E,D NR  PC 10/02/17 1553 Active    Acceptance E,TB VU,DU  CW 09/28/17 1621 Done    Acceptance E,D VU,NR pt used  on phone to communicate-reported incr wkness and incr SOA  09/26/17 1053 Done    Acceptance E,D VU,DU  PC 09/22/17 1522 Done    Acceptance E,D DU,VU  PC 09/22/17 1521 Done    Acceptance E,D VU,DU  PC 09/21/17 1444 Done    Acceptance E NR  EF 09/20/17 1539 Active    Acceptance E NR  ESME 09/19/17 1521 Active    Acceptance E VU,NR  ESME 09/18/17 1443 Done    Acceptance E VU   09/17/17 1629 Done    Acceptance E VU,NR   09/16/17 1539 Done    Acceptance E,TB,D VU,NR  RW 09/15/17 1504 Done    Acceptance E,D VU,NR  RW 09/14/17 1653 Done    Acceptance E VU,NR  ESME 09/13/17 1259 Done    Acceptance E NR  AR 09/12/17 1452 Active    Acceptance E NR  ESME 09/11/17 1434 Active    Acceptance E,D NR  PC 09/07/17 1407 Active   Family Acceptance E,TB VU,NR fam translated, educ offered on benefits of pacing himself to avoid knee buckling and SOA-suggested standing rest and pt agreed  10/03/17 1641 Done    Acceptance E NR  AR 09/12/17 1452 Active                      User Key     Initials Effective Dates Name Provider Type Discipline     05/18/15 -  Diana Pedraza, PT Physical Therapist PT     04/24/15 -  Shanice Ash, PT Physical Therapist PT    PC 12/01/15 -  Christen Demarco, PT Physical Therapist PT     EM 12/01/15 -  Shanice Arango, PT Physical Therapist PT    JM 02/18/16 -  Tona Oviedo, PTA Physical Therapy Assistant PT    AR 06/27/16 -  Beatriz Anna, PT Physical Therapist PT    RW 04/06/16 -  Ruthy Berg, PTA Physical Therapy Assistant PT    ESME 04/24/15 -  Branden Baez, PTA Physical Therapy Assistant PT    CW 12/13/16 -  Timur Pereira Physical Therapy Assistant PT                    PT Recommendation and Plan  Anticipated Discharge Disposition: extended care facility  Planned Therapy Interventions: gait training, bed mobility training, balance training, strengthening, transfer training  PT Frequency: 5 times/wk  Plan of Care Review  Plan Of Care Reviewed With: patient  Progress: progress toward functional goals as expected  Outcome Summary/Follow up Plan: incr amb dist, but incr rt foot drop today-reported to RN;incr SOA after amb          Outcome Measures       10/04/17 1200 10/03/17 1600 10/02/17 1500    How much help from another person do you currently need...    Turning from your back to your side while in flat bed without using bedrails? 4  -JM 4  -JM 4  -PC    Moving from lying on back to sitting on the side of a flat bed without bedrails? 3  -JM 3  -JM 3  -PC    Moving to and from a bed to a chair (including a wheelchair)? 3  -JM 3  -JM 3  -PC    Standing up from a chair using your arms (e.g., wheelchair, bedside chair)? 3  -JM 3  -JM 3  -PC    Climbing 3-5 steps with a railing? 1  -JM 2  -JM 2  -PC    To walk in hospital room? 3  -JM 3  -JM 3  -PC    AM-PAC 6 Clicks Score 17  -JM 18  -JM 18  -PC      User Key  (r) = Recorded By, (t) = Taken By, (c) = Cosigned By    Initials Name Provider Type    PC Christen Demarco, PT Physical Therapist    JM Tona Oviedo, PTA Physical Therapy Assistant           Time Calculation:         PT Charges       10/04/17 1207          Time Calculation    Start Time 1015  -JM      Stop Time 1030  -      Time Calculation (min) 15 min  -      PT  Received On 10/04/17  -CRISSY      PT - Next Appointment 10/05/17  -CRISSY        User Key  (r) = Recorded By, (t) = Taken By, (c) = Cosigned By    Initials Name Provider Type    CRISSY Oviedo PTA Physical Therapy Assistant          Therapy Charges for Today     Code Description Service Date Service Provider Modifiers Qty    74144355457 HC PT THER PROC EA 15 MIN 10/3/2017 Tona Oviedo PTA GP 1    96579611799 HC PT THER SUPP EA 15 MIN 10/3/2017 Tona Oviedo PTA GP 1    44154468384 HC PT THER PROC EA 15 MIN 10/4/2017 Tona Oviedo PTA GP 1    14411232277 HC PT THER SUPP EA 15 MIN 10/4/2017 Tona Oviedo PTA GP 1          PT G-Codes  Outcome Measure Options: AM-PAC 6 Clicks Basic Mobility (PT)    Tona Oviedo PTA  10/4/2017

## 2017-10-04 NOTE — PROGRESS NOTES
Adult Nutrition  Assessment/PES    Patient Name:  Chuckie Valdez  YOB: 1955  MRN: [id number]  Admit Date:  9/4/2017    Assessment Date:  10/4/2017    Comments:  LOS f/u. eating well (100% most meals). Weight down 28lb - has been diuresed. DC planning noted.          Reason for Assessment       10/04/17 1400    Reason for Assessment    Reason For Assessment/Visit follow up protocol              Anthropometrics       10/04/17 1400    Anthropometrics    RD Documented Current Weight  64.9 kg (143 lb)    Usual Body Weight (UBW)    Weight Loss 12.7 kg (28 lb)    Weight Loss Time Frame 30 days   on diuretic            Labs/Tests/Procedures/Meds       10/04/17 1400    Labs/Tests/Procedures/Meds    Diagnostic Test/Procedure Review reviewed, pertinent    Labs/Tests Review Reviewed;Glucose;C-Diff    Procedure Review SLP    Swallow eval status Done    Type of SLP Evaluation VFSS    Medication Review Reviewed, pertinent;Antacid;Diuretic;Insulin;Antibiotic    Significant Vitals reviewed            Physical Findings       10/04/17 1401    Physical Appearance    Overall Physical Appearance on oxygen therapy    Skin --   intact              Nutrition Prescription Ordered       10/04/17 1402    Nutrition Prescription PO    Supplement Other (comment)    Supplement Frequency 3 times a day;Snack time    Snack Times 10AM;2PM;Bedtime    Common Modifiers Consistent Carbohydrate            Evaluation of Received Nutrient/Fluid Intake       10/04/17 1402    PO Evaluation    Number of Meals 4    % PO Intake 100%      Problem/Interventions:        Intervention Goal       10/04/17 1402    Intervention Goal    General Maintain nutrition;Reduce/improve symptoms;Meet nutritional needs for age/condition;Disease management/therapy    PO Maintain intake;PO intake (%)    PO Intake % 100 %    Weight Maintain weight            Nutrition Intervention       10/04/17 1403    Nutrition Intervention    RD/Tech Action Follow Tx  progress;Encourage intake;Care plan reviewd;Interview for preference              Education/Evaluation       10/04/17 3292    Education    Education Will Instruct as appropriate    Monitor/Evaluation    Monitor Per protocol;Symptoms;I&O;PO intake;Pertinent labs;Weight;Skin status        Electronically signed by:  Martita Cardenas RD  10/04/17 2:06 PM

## 2017-10-04 NOTE — PROGRESS NOTES
Continued Stay Note  [facility]     Patient Name: Chuckie Valdez  MRN: [id number]  Today's Date: 10/4/2017    Admit Date: 9/4/2017          Discharge Plan       10/04/17 1141    Case Management/Social Work Plan    Plan [facility]    Patient/Family In Agreement With Plan yes    Additional Comments Spoke with Roxanne - [facility] will have a bed available today.  No beds at [facility] and she does not know when one might be available.  Spoke with Dr. Jimenez - he anticipates DC on 10/5.  Spoke with Dr. Hernandez who states he would like to try patient on Trilogy tonight, prior to DC.  CCP will continue to follow.               Discharge Codes     None        Expected Discharge Date and Time     Expected Discharge Date Expected Discharge Time    Sep 22, 2017             Becky S. [place], RN

## 2017-10-04 NOTE — PLAN OF CARE
Problem: COPD, Chronic Bronchitis/Emphysema (Adult)  Goal: Signs and Symptoms of Listed Potential Problems Will be Absent or Manageable (COPD, Chronic Bronchitis/Emphysema)  Outcome: Ongoing (interventions implemented as appropriate)    Problem: Patient Care Overview (Adult)  Goal: Plan of Care Review  Outcome: Ongoing (interventions implemented as appropriate)    10/04/17 1622   Patient Care Overview   Progress no change   Outcome Evaluation   Outcome Summary/Follow up Plan Pt potassium low this AM, treated on K protocol. Pt decided to go with plan of care that includes Triology. VSS, will continue to monitor.    Coping/Psychosocial Response Interventions   Plan Of Care Reviewed With patient         Problem: Pneumonia (Adult)  Goal: Signs and Symptoms of Listed Potential Problems Will be Absent or Manageable (Pneumonia)  Outcome: Ongoing (interventions implemented as appropriate)    Problem: Cardiac: Heart Failure (Adult)  Goal: Signs and Symptoms of Listed Potential Problems Will be Absent or Manageable (Cardiac: Heart Failure)  Outcome: Ongoing (interventions implemented as appropriate)    Problem: GI Endoscopy (Adult)  Goal: Signs and Symptoms of Listed Potential Problems Will be Absent or Manageable (GI Endoscopy)  Outcome: Ongoing (interventions implemented as appropriate)

## 2017-10-04 NOTE — PLAN OF CARE
Problem: Patient Care Overview (Adult)  Goal: Plan of Care Review  Outcome: Ongoing (interventions implemented as appropriate)    10/04/17 7279   Patient Care Overview   Progress progress toward functional goals as expected   Outcome Evaluation   Outcome Summary/Follow up Plan incr amb dist, but incr rt foot drop today-reported to RN;incr SOA after amb   Coping/Psychosocial Response Interventions   Plan Of Care Reviewed With patient

## 2017-10-04 NOTE — PLAN OF CARE
Problem: COPD, Chronic Bronchitis/Emphysema (Adult)  Goal: Signs and Symptoms of Listed Potential Problems Will be Absent or Manageable (COPD, Chronic Bronchitis/Emphysema)  Outcome: Ongoing (interventions implemented as appropriate)    Problem: Pneumonia (Adult)  Goal: Signs and Symptoms of Listed Potential Problems Will be Absent or Manageable (Pneumonia)  Outcome: Ongoing (interventions implemented as appropriate)    Problem: Cardiac: Heart Failure (Adult)  Goal: Signs and Symptoms of Listed Potential Problems Will be Absent or Manageable (Cardiac: Heart Failure)  Outcome: Ongoing (interventions implemented as appropriate)    Problem: GI Endoscopy (Adult)  Goal: Signs and Symptoms of Listed Potential Problems Will be Absent or Manageable (GI Endoscopy)  Outcome: Ongoing (interventions implemented as appropriate)    Problem: Perioperative Period (Adult)  Goal: Signs and Symptoms of Listed Potential Problems Will be Absent or Manageable (Perioperative Period)  Outcome: Outcome(s) achieved Date Met:  10/04/17    Problem: Communication Impaired, Verbal (Adult,Obstetrics,Pediatric)  Goal: Improved/Effective Communication  Outcome: Ongoing (interventions implemented as appropriate)

## 2017-10-05 LAB
ANION GAP SERPL CALCULATED.3IONS-SCNC: 7.9 MMOL/L
BUN BLD-MCNC: 16 MG/DL (ref 8–23)
BUN/CREAT SERPL: 24.2 (ref 7–25)
CALCIUM SPEC-SCNC: 8.9 MG/DL (ref 8.6–10.5)
CHLORIDE SERPL-SCNC: 95 MMOL/L (ref 98–107)
CO2 SERPL-SCNC: 35.1 MMOL/L (ref 22–29)
CREAT BLD-MCNC: 0.66 MG/DL (ref 0.76–1.27)
DEPRECATED RDW RBC AUTO: 54.9 FL (ref 37–54)
ERYTHROCYTE [DISTWIDTH] IN BLOOD BY AUTOMATED COUNT: 19.2 % (ref 11.5–14.5)
GFR SERPL CREATININE-BSD FRML MDRD: 122 ML/MIN/1.73
GFR SERPL CREATININE-BSD FRML MDRD: 148 ML/MIN/1.73
GLUCOSE BLD-MCNC: 174 MG/DL (ref 65–99)
GLUCOSE BLDC GLUCOMTR-MCNC: 152 MG/DL (ref 70–130)
GLUCOSE BLDC GLUCOMTR-MCNC: 267 MG/DL (ref 70–130)
GLUCOSE BLDC GLUCOMTR-MCNC: 270 MG/DL (ref 70–130)
GLUCOSE BLDC GLUCOMTR-MCNC: 271 MG/DL (ref 70–130)
HCT VFR BLD AUTO: 35.4 % (ref 40.4–52.2)
HGB BLD-MCNC: 10.5 G/DL (ref 13.7–17.6)
MCH RBC QN AUTO: 23.6 PG (ref 27–32.7)
MCHC RBC AUTO-ENTMCNC: 29.7 G/DL (ref 32.6–36.4)
MCV RBC AUTO: 79.6 FL (ref 79.8–96.2)
PLATELET # BLD AUTO: 303 10*3/MM3 (ref 140–500)
PMV BLD AUTO: 9.2 FL (ref 6–12)
POTASSIUM BLD-SCNC: 4.3 MMOL/L (ref 3.5–5.2)
RBC # BLD AUTO: 4.45 10*6/MM3 (ref 4.6–6)
SODIUM BLD-SCNC: 138 MMOL/L (ref 136–145)
WBC NRBC COR # BLD: 9.1 10*3/MM3 (ref 4.5–10.7)

## 2017-10-05 PROCEDURE — 85027 COMPLETE CBC AUTOMATED: CPT | Performed by: INTERNAL MEDICINE

## 2017-10-05 PROCEDURE — 80048 BASIC METABOLIC PNL TOTAL CA: CPT | Performed by: INTERNAL MEDICINE

## 2017-10-05 PROCEDURE — 94799 UNLISTED PULMONARY SVC/PX: CPT

## 2017-10-05 PROCEDURE — 82962 GLUCOSE BLOOD TEST: CPT

## 2017-10-05 PROCEDURE — 63710000001 INSULIN ASPART PER 5 UNITS: Performed by: FAMILY MEDICINE

## 2017-10-05 PROCEDURE — 94760 N-INVAS EAR/PLS OXIMETRY 1: CPT

## 2017-10-05 PROCEDURE — 97110 THERAPEUTIC EXERCISES: CPT

## 2017-10-05 PROCEDURE — 99231 SBSQ HOSP IP/OBS SF/LOW 25: CPT | Performed by: FAMILY MEDICINE

## 2017-10-05 RX ADMIN — METRONIDAZOLE 500 MG: 500 TABLET, FILM COATED ORAL at 12:43

## 2017-10-05 RX ADMIN — ATORVASTATIN CALCIUM 20 MG: 20 TABLET, FILM COATED ORAL at 23:03

## 2017-10-05 RX ADMIN — FAMOTIDINE 20 MG: 20 TABLET, FILM COATED ORAL at 09:31

## 2017-10-05 RX ADMIN — FUROSEMIDE 40 MG: 40 TABLET ORAL at 09:30

## 2017-10-05 RX ADMIN — MICONAZOLE NITRATE: 2 POWDER TOPICAL at 09:00

## 2017-10-05 RX ADMIN — APIXABAN 5 MG: 5 TABLET, FILM COATED ORAL at 09:30

## 2017-10-05 RX ADMIN — BUDESONIDE 0.5 MG: 0.5 INHALANT RESPIRATORY (INHALATION) at 10:49

## 2017-10-05 RX ADMIN — MICONAZOLE NITRATE: 2 POWDER TOPICAL at 23:15

## 2017-10-05 RX ADMIN — METOPROLOL TARTRATE 25 MG: 25 TABLET ORAL at 17:37

## 2017-10-05 RX ADMIN — GUAIFENESIN 1200 MG: 600 TABLET, EXTENDED RELEASE ORAL at 09:31

## 2017-10-05 RX ADMIN — FAMOTIDINE 20 MG: 20 TABLET, FILM COATED ORAL at 17:37

## 2017-10-05 RX ADMIN — INSULIN ASPART 4 UNITS: 100 INJECTION, SOLUTION INTRAVENOUS; SUBCUTANEOUS at 23:03

## 2017-10-05 RX ADMIN — ACETAMINOPHEN 650 MG: 325 TABLET ORAL at 14:44

## 2017-10-05 RX ADMIN — IPRATROPIUM BROMIDE AND ALBUTEROL SULFATE 3 ML: .5; 3 SOLUTION RESPIRATORY (INHALATION) at 19:23

## 2017-10-05 RX ADMIN — INSULIN ASPART 4 UNITS: 100 INJECTION, SOLUTION INTRAVENOUS; SUBCUTANEOUS at 17:37

## 2017-10-05 RX ADMIN — IPRATROPIUM BROMIDE AND ALBUTEROL SULFATE 3 ML: .5; 3 SOLUTION RESPIRATORY (INHALATION) at 03:16

## 2017-10-05 RX ADMIN — AMLODIPINE BESYLATE 10 MG: 10 TABLET ORAL at 09:31

## 2017-10-05 RX ADMIN — IPRATROPIUM BROMIDE AND ALBUTEROL SULFATE 3 ML: .5; 3 SOLUTION RESPIRATORY (INHALATION) at 15:43

## 2017-10-05 RX ADMIN — ARFORMOTEROL TARTRATE 15 MCG: 15 SOLUTION RESPIRATORY (INHALATION) at 10:49

## 2017-10-05 RX ADMIN — ARFORMOTEROL TARTRATE 15 MCG: 15 SOLUTION RESPIRATORY (INHALATION) at 23:26

## 2017-10-05 RX ADMIN — IPRATROPIUM BROMIDE AND ALBUTEROL SULFATE 3 ML: .5; 3 SOLUTION RESPIRATORY (INHALATION) at 07:51

## 2017-10-05 RX ADMIN — INSULIN DETEMIR 10 UNITS: 100 INJECTION, SOLUTION SUBCUTANEOUS at 23:04

## 2017-10-05 RX ADMIN — TAMSULOSIN HYDROCHLORIDE 0.4 MG: 0.4 CAPSULE ORAL at 17:37

## 2017-10-05 RX ADMIN — TAMSULOSIN HYDROCHLORIDE 0.4 MG: 0.4 CAPSULE ORAL at 09:30

## 2017-10-05 RX ADMIN — METOPROLOL TARTRATE 25 MG: 25 TABLET ORAL at 09:31

## 2017-10-05 RX ADMIN — GUAIFENESIN 1200 MG: 600 TABLET, EXTENDED RELEASE ORAL at 23:03

## 2017-10-05 RX ADMIN — SENNOSIDES AND DOCUSATE SODIUM 2 TABLET: 8.6; 5 TABLET ORAL at 23:03

## 2017-10-05 RX ADMIN — TRAZODONE HYDROCHLORIDE 50 MG: 50 TABLET ORAL at 23:03

## 2017-10-05 RX ADMIN — BUDESONIDE 0.5 MG: 0.5 INHALANT RESPIRATORY (INHALATION) at 23:27

## 2017-10-05 RX ADMIN — ZOLPIDEM TARTRATE 5 MG: 5 TABLET, FILM COATED ORAL at 23:03

## 2017-10-05 RX ADMIN — APIXABAN 5 MG: 5 TABLET, FILM COATED ORAL at 17:37

## 2017-10-05 RX ADMIN — INSULIN ASPART 4 UNITS: 100 INJECTION, SOLUTION INTRAVENOUS; SUBCUTANEOUS at 12:43

## 2017-10-05 NOTE — THERAPY TREATMENT NOTE
Acute Care - Physical Therapy Treatment Note  [facility]     Patient Name: Chuckie Valdez  : 1955  MRN: [id number]  Today's Date: 10/5/2017  Onset of Illness/Injury or Date of Surgery Date: 17  Date of Referral to PT: 17  Referring Physician: Tony    Admit Date: 2017    Visit Dx:    ICD-10-CM ICD-9-CM   1. Pneumonia of left lower lobe due to infectious organism J18.9 483.8   2. Acute respiratory failure with hypercapnia J96.02 518.81   3. General weakness R53.1 780.79   4. Tracheal stenosis due to tracheostomy J95.09 519.02   5. Oropharyngeal dysphagia R13.12 787.22     Patient Active Problem List   Diagnosis   • Acute on chronic respiratory failure with hypoxia and hypercapnia   • Tracheal stenosis due to tracheostomy   • CTEPH (chronic thromboembolic pulmonary hypertension)   • COPD exacerbation   • Clostridium difficile enterocolitis   • Superior vena cava stenosis               Adult Rehabilitation Note       10/05/17 1504 10/04/17 1153 10/03/17 1700    Rehab Assessment/Intervention    Discipline (P)  physical therapist  - physical therapy assistant  -     Document Type (P)  therapy note (daily note)  - therapy note (daily note)  -     Subjective Information (P)  agree to therapy;complains of;fatigue  - agree to therapy;complains of;weakness;fatigue  -     Patient Effort, Rehab Treatment (P)  good  -      Symptoms Noted During/After Treatment (P)  shortness of breath;fatigue  -      Precautions/Limitations (P)  fall precautions  - fall precautions  -     Specific Treatment Considerations (P)  --   Icelandic speaking  -      Recorded by [] Zach Samayoa, PT Student [] Tona Oviedo PTA     Vital Signs    Pretreatment Heart Rate (beats/min) (P)  74  -      Posttreatment Heart Rate (beats/min) (P)  100  -      Pre SpO2 (%) (P)  95  - 92  -     O2 Delivery Pre Treatment (P)  supplemental O2  - supplemental O2  -     Intra SpO2 (%)  85  -JM      O2 Delivery Intra Treatment  supplemental O2  -     Post SpO2 (%) (P)  97  - 90  -JM     O2 Delivery Post Treatment (P)  supplemental O2  - supplemental O2  -     Rest Breaks  (P)  4  -MF 2  -JM     Recorded by [MF] Zach Samayoa, PT Student [JM] Tona Oviedo PTA     Pain Assessment    Pain Assessment (P)  No/denies pain  - No/denies pain  - No/denies pain  -    Recorded by [] Zach Samayoa, PT Student [] Tona Oviedo PTA [] Tona Oviedo PTA    Bed Mobility, Assessment/Treatment    Bed Mobility, Scoot/Bridge, [place]  minimum assist (75% patient effort)  -     Bed Mob, Supine to Sit, [place]  contact guard assist  -     Bed Mobility, Comment (P)  in chair   -      Recorded by [MF] Zach Samayoa, PT Student [JM] Tona Oviedo PTA     Transfer Assessment/Treatment    Transfers, Sit-Stand [place] (P)  contact guard assist  - minimum assist (75% patient effort)   2 attempts req  -     Transfers, Stand-Sit [place] (P)  contact guard assist;verbal cues required  - contact guard assist;verbal cues required  -     Transfers, Sit-Stand-Sit, Assist Device (P)  rolling walker  -      Transfer, Comment (P)  --   noted dizziness upon standing   - less assist from EOB, but still req 2 attempts  -     Recorded by [] Zach Samayoa, PT Student [] Tona Oviedo PTA     Gait Assessment/Treatment    Gait, [place] Level (P)  minimum assist (75% patient effort)  - minimum assist (75% patient effort);contact guard assist;2 person assist required  -     Gait, Assistive Device (P)  rolling walker  - rolling walker  -     Gait, Distance (Feet) (P)  80  - 60  -JM     Gait, Gait Deviations (P)  decreased heel strike;forward flexed posture;step length decreased   foot drop R  - ileana decreased;forward flexed posture;knee buckling;right:;decreased heel strike   noted foot drop today rt   -     Gait, Safety Issues (P)  balance decreased  during turns;step length decreased;supplemental O2  - balance decreased during turns;step length decreased;weight-shifting ability decreased;supplemental O2   3L  -     Gait, Impairments (P)  strength decreased;impaired balance  - strength decreased;impaired balance  -     Gait, Comment (P)  4 brief standing rest breaks   - 2 brief standing rests  -     Recorded by [] Zach Samayoa, PT Student [] Tona Oviedo PTA     Positioning and Restraints    Pre-Treatment Position (P)  sitting in chair/recliner  - in bed  -     Post Treatment Position (P)  chair  -      In Chair (P)  call light within reach;encouraged to call for assist;with family/caregiver  - call light within reach;encouraged to call for assist;notified nsg  -     Recorded by [] Zach Samayoa, PT Student [] Tona Oviedo PTA       10/03/17 1542 10/02/17 1548       Rehab Assessment/Intervention    Discipline physical therapy assistant  -JM physical therapist  -PC     Document Type therapy note (daily note)  - therapy note (daily note)  -PC     Subjective Information agree to therapy;complains of  - agree to therapy  -PC     Patient Effort, Rehab Treatment  good  -PC     Symptoms Noted During/After Treatment  shortness of breath  -PC     Symptoms Noted Comment  low endurance, SOA with minimal activity  -PC     Precautions/Limitations fall precautions  -      Recorded by [] Tona Oviedo PTA [PC] Christen Demarco, PT     Vital Signs    Pre SpO2 (%)  91  -PC     O2 Delivery Pre Treatment  supplemental O2   2l  -PC     Intra SpO2 (%)  88  -PC     O2 Delivery Intra Treatment  supplemental O2  -PC     Post SpO2 (%)  91  -PC     O2 Delivery Post Treatment  supplemental O2  -PC     Recorded by  [PC] Christen Demarco, PT     Pain Assessment    Pain Assessment  No/denies pain  -PC     Recorded by  [PC] Christen Demarco PT     Cognitive Assessment/Intervention    Current Cognitive/Communication Assessment  functional   speaks  Welsh and uses phone   -PC     Recorded by  [PC] Christen Demarco, PT     Bed Mobility, Assessment/Treatment    Bed Mob, Supine to Sit, [place] not tested  - not tested  -     Bed Mobility, Comment in chair  - in chair  -PC     Recorded by [JM] Tona Oviedo PTA [PC] Christen Demarco, PT     Transfer Assessment/Treatment    Transfers, Sit-Stand [place] moderate assist (50% patient effort)  - minimum assist (75% patient effort)  -PC     Transfers, Stand-Sit [place] contact guard assist;verbal cues required  - minimum assist (75% patient effort)  -PC     Transfers, Sit-Stand-Sit, Assist Device  rolling walker  -     Transfer, Comment 2 attempts req  -JM      Recorded by [JM] Tona Ovieod PTA [PC] Christen Demarco, PT     Gait Assessment/Treatment    Gait, [place] Level minimum assist (75% patient effort);contact guard assist;2 person assist required  - minimum assist (75% patient effort)  -     Gait, Assistive Device rolling walker  - rolling walker  -     Gait, Distance (Feet) 50  -JM 30   in room due to lines, no telepak available  -     Gait, Gait Deviations forward flexed posture;step length decreased;ileana decreased;knee buckling  - ataxia;decreased heel strike;right:  -     Gait, Safety Issues balance decreased during turns;step length decreased;weight-shifting ability decreased;supplemental O2  -      Gait, Impairments strength decreased;impaired balance  - impaired balance;strength decreased  -     Gait, Comment incr amb dist, but knee buckling at end of amb   1 standing rest due to SOA  - low tolerance to activity due to SOA, pt very shaky today  -PC     Recorded by [JM] Tona Oviedo PTA [PC] Christen Demarco, PT     Therapy Exercises    Bilateral Lower Extremities  5 reps;ankle pumps/circles;LAQ  -PC     Recorded by  [PC] Christen Demarco, PT     Positioning and Restraints    Pre-Treatment Position sitting in chair/recliner  - sitting in  chair/recliner  -PC     Post Treatment Position  chair  -PC     In Chair sitting;call light within reach;encouraged to call for assist;with family/caregiver;notified nsg   no alarm upon entry  - sitting;call light within reach;encouraged to call for assist  -PC     Recorded by [JM] Tona Oviedo, ROME [PC] Christen Demarco, PT       User Key  (r) = Recorded By, (t) = Taken By, (c) = Cosigned By    Initials Name Effective Dates    PC Christen Demarco, PT 12/01/15 -     CRISSY Oviedo, PTA 02/18/16 -      Zach Samayoa, PT Student 09/18/17 -                 IP PT Goals       10/02/17 1556 10/02/17 1555       Bed Mobility PT LTG    Bed Mobility PT LTG, Date Goal Reviewed  10/02/17  -PC     Transfer Training PT LTG    Transfer Training PT  LTG, Date Goal Reviewed 10/02/17  -PC      Gait Training PT LTG    Gait Training Goal PT LTG, Date Established 10/02/17  -PC      Gait Training Goal PT LTG, Date Goal Reviewed 10/02/17  -PC      Gait Training Goal PT LTG, Outcome goal ongoing  -PC        User Key  (r) = Recorded By, (t) = Taken By, (c) = Cosigned By    Initials Name Provider Type    PC Christen Demarco, PT Physical Therapist          Physical Therapy Education     Title: PT OT SLP Therapies (Done)     Topic: Physical Therapy (Done)     Point: Mobility training (Done)    Learning Progress Summary    Learner Readiness Method Response Comment Documented by Status   Patient Acceptance E VU,NR uses phone   10/05/17 1509 Done    Acceptance E DEMETRICE pt uses translation on phone to communicate-educ on rt le buckling w/amb unsafe-takes assist of 2 for safety  10/04/17 1204 Done    Acceptance E,TB VU,NR fam translated, educ offered on benefits of pacing himself to avoid knee buckling and SOA-suggested standing rest and pt agreed  10/03/17 1641 Done    Acceptance E,D NR  PC 10/02/17 1553 Active    Acceptance E,TB MASON SURESH  CW 09/28/17 1621 Done    Acceptance E,D VU,RUBEN pt used  on phone to  communicate-reported incr wkness and incr SOA  09/26/17 1053 Done    Acceptance E,D VU,DU  PC 09/22/17 1522 Done    Acceptance E,D DU,VU  PC 09/22/17 1521 Done    Acceptance E,D VU,DU  PC 09/21/17 1444 Done    Acceptance E NR  EF 09/20/17 1539 Active    Acceptance E NR  ESME 09/19/17 1521 Active    Acceptance E VU,NR  ESME 09/18/17 1443 Done    Acceptance E VU  KH 09/17/17 1629 Done    Acceptance E VU,NR  KH 09/16/17 1539 Done    Acceptance E,TB,D VU,NR  RW 09/15/17 1504 Done    Acceptance E,D VU,NR  RW 09/14/17 1653 Done    Acceptance E VU,NR  ESME 09/13/17 1259 Done    Acceptance E NR  AR 09/12/17 1452 Active    Acceptance E NR  ESME 09/11/17 1434 Active    Acceptance E NR  EM 09/08/17 1342 Active    Acceptance E,D NR  PC 09/07/17 1407 Active    Acceptance E,D NR  PC 09/05/17 1421 Active   Family Acceptance E,TB VU,NR fam translated, educ offered on benefits of pacing himself to avoid knee buckling and SOA-suggested standing rest and pt agreed  10/03/17 1641 Done    Acceptance E NR  AR 09/12/17 1452 Active               Point: Home exercise program (Done)    Learning Progress Summary    Learner Readiness Method Response Comment Documented by Status   Patient Acceptance E VU,NR uses phone   10/05/17 1509 Done    Acceptance E VU pt uses translation on phone to communicate-educ on rt le buckling w/amb unsafe-takes assist of 2 for safety  10/04/17 1204 Done    Acceptance E,TB VU,NR fam translated, educ offered on benefits of pacing himself to avoid knee buckling and SOA-suggested standing rest and pt agreed  10/03/17 1641 Done    Acceptance E,D NR  PC 10/02/17 1553 Active    Acceptance E,TB VU,DU   09/28/17 1621 Done    Acceptance E,D VU,NR pt used  on phone to communicate-reported incr wkness and incr SOA  09/26/17 1053 Done    Acceptance E,D VU,DU  PC 09/21/17 1444 Done    Acceptance E NR  ESME 09/19/17 1521 Active    Acceptance E VU,NR  ESME 09/18/17 1443 Done    Acceptance E VU,NR  KH  09/16/17 1539 Done    Acceptance E,TB,D VU,NR  RW 09/15/17 1504 Done    Acceptance E VU,NR  ESME 09/13/17 1259 Done    Acceptance E NR  AR 09/12/17 1452 Active    Acceptance E NR  ESME 09/11/17 1434 Active    Acceptance E,D NR  PC 09/07/17 1407 Active   Family Acceptance E,TB VU,NR fam translated, educ offered on benefits of pacing himself to avoid knee buckling and SOA-suggested standing rest and pt agreed  10/03/17 1641 Done    Acceptance E NR  AR 09/12/17 1452 Active               Point: Body mechanics (Done)    Learning Progress Summary    Learner Readiness Method Response Comment Documented by Status   Patient Acceptance E VU,NR uses phone   10/05/17 1509 Done    Acceptance E VU pt uses translation on phone to communicate-educ on rt le buckling w/amb unsafe-takes assist of 2 for safety  10/04/17 1204 Done    Acceptance E,TB VU,NR fam translated, educ offered on benefits of pacing himself to avoid knee buckling and SOA-suggested standing rest and pt agreed  10/03/17 1641 Done    Acceptance E,D NR  PC 10/02/17 1553 Active    Acceptance E,TB VU,DU   09/28/17 1621 Done    Acceptance E,D VU,NR pt used  on phone to communicate-reported incr wkness and incr SOA  09/26/17 1053 Done    Acceptance E,D VU,DU  PC 09/22/17 1522 Done    Acceptance E,D DU,VU  PC 09/22/17 1521 Done    Acceptance E,D VU,DU  PC 09/21/17 1444 Done    Acceptance E NR  ESME 09/19/17 1521 Active    Acceptance E VU,NR  ESME 09/18/17 1443 Done    Acceptance E,TB,D VU,NR  RW 09/15/17 1504 Done    Acceptance E,D VU,NR  RW 09/14/17 1653 Done    Acceptance E VU,NR  ESME 09/13/17 1259 Done    Acceptance E NR  AR 09/12/17 1452 Active    Acceptance E NR  ESME 09/11/17 1434 Active    Acceptance E,D NR  PC 09/07/17 1407 Active   Family Acceptance E,TB VU,NR fam translated, educ offered on benefits of pacing himself to avoid knee buckling and SOA-suggested standing rest and pt agreed JM 10/03/17 1641 Done    Acceptance E NR  AR 09/12/17 1933  Active               Point: Precautions (Done)    Learning Progress Summary    Learner Readiness Method Response Comment Documented by Status   Patient Acceptance E VU,NR uses phone   10/05/17 1509 Done    Acceptance E VU pt uses translation on phone to communicate-educ on rt le buckling w/amb unsafe-takes assist of 2 for safety  10/04/17 1204 Done    Acceptance E,TB VU,NR fam translated, educ offered on benefits of pacing himself to avoid knee buckling and SOA-suggested standing rest and pt agreed  10/03/17 1641 Done    Acceptance E,D NR  PC 10/02/17 1553 Active    Acceptance E,TB VU,DU  CW 09/28/17 1621 Done    Acceptance E,D VU,NR pt used  on phone to communicate-reported incr wkness and incr SOA  09/26/17 1053 Done    Acceptance E,D VU,DU  PC 09/22/17 1522 Done    Acceptance E,D DU,VU   09/22/17 1521 Done    Acceptance E,D VU,DU  PC 09/21/17 1444 Done    Acceptance E NR  EF 09/20/17 1539 Active    Acceptance E NR  ESME 09/19/17 1521 Active    Acceptance E VU,NR  ESME 09/18/17 1443 Done    Acceptance E VU   09/17/17 1629 Done    Acceptance E VU,NR   09/16/17 1539 Done    Acceptance E,TB,D VU,NR  RW 09/15/17 1504 Done    Acceptance E,D VU,NR  RW 09/14/17 1653 Done    Acceptance E VU,NR  ESME 09/13/17 1259 Done    Acceptance E NR  AR 09/12/17 1452 Active    Acceptance E NR  ESME 09/11/17 1434 Active    Acceptance E,D NR  PC 09/07/17 1407 Active   Family Acceptance E,TB VU,NR fam translated, educ offered on benefits of pacing himself to avoid knee buckling and SOA-suggested standing rest and pt agreed  10/03/17 1641 Done    Acceptance E NR  AR 09/12/17 1452 Active                      User Key     Initials Effective Dates Name Provider Type Discipline     05/18/15 -  Diana Pedraza, PT Physical Therapist PT    EF 04/24/15 -  Shanice Ash, PT Physical Therapist PT    PC 12/01/15 -  Christen Demarco, PT Physical Therapist PT    EM 12/01/15 -  Shanice Arango, PT Physical  Therapist PT    CRISSY 02/18/16 -  Tona Oviedo, PTA Physical Therapy Assistant PT    AR 06/27/16 -  Beatriz Anna, PT Physical Therapist PT    RW 04/06/16 -  Ruthy Berg, PTA Physical Therapy Assistant PT    ESME 04/24/15 -  Branden Baez, PTA Physical Therapy Assistant PT    CW 12/13/16 -  Timur Pereira Physical Therapy Assistant PT     09/18/17 -  Zach Samayoa, PT Student PT Student PT                    PT Recommendation and Plan  Anticipated Discharge Disposition: extended care facility  Planned Therapy Interventions: gait training, bed mobility training, balance training, strengthening, transfer training  PT Frequency: 5 times/wk  Plan of Care Review  Plan Of Care Reviewed With: (P) patient  Progress: (P) progress toward functional goals is gradual  Outcome Summary/Follow up Plan: (P) Pt showed signs of SOA and fatigue during ambulation, was able to ambulate further but required frequent standing rest breaks. Continue to progress as tolerated.           Outcome Measures       10/05/17 1500 10/04/17 1200 10/03/17 1600    How much help from another person do you currently need...    Turning from your back to your side while in flat bed without using bedrails? (P)  4  -MF 4  -JM 4  -JM    Moving from lying on back to sitting on the side of a flat bed without bedrails? (P)  4  -MF 3  -JM 3  -JM    Moving to and from a bed to a chair (including a wheelchair)? (P)  3  -MF 3  -JM 3  -JM    Standing up from a chair using your arms (e.g., wheelchair, bedside chair)? (P)  4  -MF 3  -JM 3  -JM    Climbing 3-5 steps with a railing? (P)  2  -MF 1  -JM 2  -JM    To walk in hospital room? (P)  3  -MF 3  -JM 3  -JM    AM-PAC 6 Clicks Score (P)  20  -MF 17  -JM 18  -JM    Functional Assessment    Outcome Measure Options (P)  AM-PAC 6 Clicks Basic Mobility (PT)  -MF        User Key  (r) = Recorded By, (t) = Taken By, (c) = Cosigned By    Initials Name Provider Type    CRISSY Oviedo PTA Physical Therapy Assistant      Zach Samayoa, PT Student PT Student           Time Calculation:         PT Charges       10/05/17 1511          Time Calculation    Start Time (P)  1455  -      Stop Time (P)  1510  -      Time Calculation (min) (P)  15 min  -      PT Received On (P)  10/05/17  -      PT - Next Appointment (P)  10/06/17  -        User Key  (r) = Recorded By, (t) = Taken By, (c) = Cosigned By    Initials Name Provider Type     Zach Samayoa, PT Student PT Student          Therapy Charges for Today     Code Description Service Date Service Provider Modifiers Qty    82456939140 HC PT THER PROC EA 15 MIN 10/5/2017 Zach Samayoa, PT Student GP 1          PT G-Codes  Outcome Measure Options: (P) AM-PAC 6 Clicks Basic Mobility (PT)    Zach Samayoa PT Student  10/5/2017

## 2017-10-05 NOTE — THERAPY TREATMENT NOTE
Per Dr. Hernandez request here is his trilogy AVAPS settings.     AVAPS rate   5  Tidal volume 500  IPAP max      20  IPAP min       10  EPAP            5  Breathing rate 14  Inspiratory time 0.8 seconds  Fio2 30%  Flow trig 2.0  Rise time 1 second  Ramp start pressure 5    These are the settings he has been sleeping on while on trilogy since in hospital  Thanks,   Lindsay Boudreaux RRT

## 2017-10-05 NOTE — PLAN OF CARE
Problem: COPD, Chronic Bronchitis/Emphysema (Adult)  Goal: Signs and Symptoms of Listed Potential Problems Will be Absent or Manageable (COPD, Chronic Bronchitis/Emphysema)  Outcome: Ongoing (interventions implemented as appropriate)    Problem: Patient Care Overview (Adult)  Goal: Plan of Care Review  Outcome: Ongoing (interventions implemented as appropriate)    Problem: Pneumonia (Adult)  Goal: Signs and Symptoms of Listed Potential Problems Will be Absent or Manageable (Pneumonia)  Outcome: Ongoing (interventions implemented as appropriate)    Problem: Cardiac: Heart Failure (Adult)  Goal: Signs and Symptoms of Listed Potential Problems Will be Absent or Manageable (Cardiac: Heart Failure)  Outcome: Ongoing (interventions implemented as appropriate)    Problem: Communication Impaired, Verbal (Adult,Obstetrics,Pediatric)  Goal: Improved/Effective Communication  Outcome: Ongoing (interventions implemented as appropriate)

## 2017-10-05 NOTE — PROGRESS NOTES
Chuckie Valdez  62 y.o.   LOS: 31 days   Patient Care Team:  Douglas Jimenez MD as PCP - General (Family Medicine)    Chief Complaint:  sobI have reviewed the patient's medical history in detail and updated the computerized patient record.    Subjective     Patient is a 62 y.o. male presents with sob, now more stable. SOB due to CHF,COPD and tyracheal stenosais and malacea    History taken from: patient chart RN    History  Past Medical History:   Diagnosis Date   • COPD (chronic obstructive pulmonary disease)    • CTEPH (chronic thromboembolic pulmonary hypertension)    • HCV (hepatitis C virus)    • History of tracheostomy    • Pulmonary hypertension    • Type 2 diabetes mellitus      Past Surgical History:   Procedure Laterality Date   • BRONCHOSCOPY N/A 9/27/2017    Procedure: BRONCHOSCOPY WITH WASHINGS RIGHT MIDDLE LOBE AND LINGULAR with balloon dilation 12MM-15MM AND 18MM-19MM;  Surgeon: Herb Agnlin MD;  Location: [facility] ENDOSCOPY;  Service:    • BRONCHOSCOPY N/A 9/29/2017    Procedure: BRONCHOSCOPY WITH BALLON DILATION 19MM AND STENT PLACEMENT;  Surgeon: Herb Anglin MD;  Location: [facility] MAIN OR;  Service:    • BRONCHOSCOPY N/A 9/29/2017    Procedure: BRONCHOSCOPY WITH STENT REMOVAL;  Surgeon: Herb Anglin MD;  Location: [facility] OR;  Service:      History reviewed. No pertinent family history.  Social History     Social History   • Marital status: Single     Spouse name: N/A   • Number of children: N/A   • Years of education: N/A     Social History Main Topics   • Smoking status: Former Smoker     Packs/day: 1.00     Years: 15.00     Types: Cigarettes     Quit date: 2008   • Smokeless tobacco: None   • Alcohol use None   • Drug use: None   • Sexual activity: Not Asked     Other Topics Concern   • None     Social History Narrative         Review of Systems  {All systems were reviewed and negative except for:  Behavioral/Psych: positive for  anxiety    Objective     Lab Results  (last 24 hours)     Procedure Component Value Units Date/Time    POC Glucose Fingerstick [[id number]]  (Abnormal) Collected:  10/04/17 1152    Specimen:  Blood Updated:  10/04/17 1153     Glucose 190 (H) mg/dL     Narrative:       Meter: [id number] : [id number] Rommel HARDWICK    AFB Culture [[id number]]  (Normal) Collected:  09/27/17 1422    Specimen:  Lavage from Lung, Lingula Updated:  10/04/17 1446     AFB Culture No AFB isolated at 1 week     AFB Stain No acid fast bacilli seen on concentrated smear    POC Glucose Fingerstick [[id number]]  (Abnormal) Collected:  10/04/17 1628    Specimen:  Blood Updated:  10/04/17 1636     Glucose 322 (H) mg/dL     Narrative:       Meter: [id number] : [id number] Rommel HARDWICK    POC Glucose Fingerstick [[id number]]  (Abnormal) Collected:  10/04/17 2054    Specimen:  Blood Updated:  10/04/17 2056     Glucose 220 (H) mg/dL     Narrative:       Meter: [id number] : [id number] Getachew HARDWICK    Potassium [[id number]]  (Normal) Collected:  10/04/17 2028    Specimen:  Blood Updated:  10/04/17 2118     Potassium 4.7 mmol/L     CBC (No Diff) [[id number]]  (Abnormal) Collected:  10/05/17 0611    Specimen:  Blood Updated:  10/05/17 0638     WBC 9.10 10*3/mm3      RBC 4.45 (L) 10*6/mm3      Hemoglobin 10.5 (L) g/dL      Hematocrit 35.4 (L) %      MCV 79.6 (L) fL      MCH 23.6 (L) pg      MCHC 29.7 (L) g/dL      RDW 19.2 (H) %      RDW-SD 54.9 (H) fl      MPV 9.2 fL      Platelets 303 10*3/mm3     Basic Metabolic Panel [[id number]]  (Abnormal) Collected:  10/05/17 0611    Specimen:  Blood Updated:  10/05/17 0706     Glucose 174 (H) mg/dL      BUN 16 mg/dL      Creatinine 0.66 (L) mg/dL      Sodium 138 mmol/L      Potassium 4.3 mmol/L      Chloride 95 (L) mmol/L      CO2 35.1 (H) mmol/L      Calcium 8.9 mg/dL      eGFR  African Amer 148 mL/min/1.73      eGFR Non African Amer 122 mL/min/1.73      BUN/Creatinine Ratio 24.2     Anion Gap 7.9 mmol/L     Narrative:       GFR Normal  ">60  Chronic Kidney Disease <60  Kidney Failure <15    POC Glucose Fingerstick [[id number]]  (Abnormal) Collected:  10/05/17 0812    Specimen:  Blood Updated:  10/05/17 0813     Glucose 152 (H) mg/dL     Narrative:       Meter: [id number] : [id number] Tatum DIALLO          Tests reviewed: yes, Labs for last 24 hrs  Tests of interest are: glucose better    Vital Signs  Temp:  [97.7 °F (36.5 °C)-98.1 °F (36.7 °C)] 98.1 °F (36.7 °C)  Heart Rate:  [] 96  Resp:  [16-34] 16  BP: (115-139)/(62-76) 139/76    Flowsheet Rows         First Filed Value    Admission Height  70\" (177.8 cm) Documented at 09/04/2017 0728    Admission Weight  180 lb (81.6 kg) Documented at 09/04/2017 0728            Intake/Output Summary (Last 24 hours) at 10/05/17 1115  Last data filed at 10/05/17 0500   Gross per 24 hour   Intake              600 ml   Output             1730 ml   Net            -1130 ml       Physical Exam:  General Appearance: alert, appears stated age and cooperative  Lungs: clear to auscultation, respirations regular, respirations even and respirations unlabored  Heart: regular rhythm & normal rate, normal S1, S2, no murmur, no phoebe, no rub and no click  Abdomen: normal bowel sounds, no masses, no hepatomegaly, no splenomegaly, soft non-tender, no guarding and no rebound tenderness  Extremities: moves extremities well, no edema, no cyanosis and no redness  Neurologic: Mental Status orientated to person, place, time and situation       Medication Review:   Nitrates, organic and Penicillins  Current Facility-Administered Medications   Medication Dose Route Frequency Provider Last Rate Last Dose   • acetaminophen (TYLENOL) tablet 650 mg  650 mg Oral Q4H PRN Shane Yao MD   650 mg at 09/27/17 2121    Or   • acetaminophen (TYLENOL) suppository 650 mg  650 mg Rectal Q4H PRN Shane Yao MD       • amLODIPine (NORVASC) tablet 10 mg  10 mg Oral Q24H Daniel Hernandez MD   10 mg at 10/05/17 0931   • " apixaban (ELIQUIS) tablet 5 mg  5 mg Oral BID Yovany Hernandes MD   5 mg at 10/05/17 0930   • arformoterol (BROVANA) nebulizer solution 15 mcg  15 mcg Nebulization BID - RT Daniel Hernandez MD   15 mcg at 10/05/17 1049   • atorvastatin (LIPITOR) tablet 20 mg  20 mg Oral Nightly Shane Yao MD   20 mg at 10/04/17 2117   • budesonide (PULMICORT) nebulizer solution 0.5 mg  0.5 mg Nebulization BID - RT Daniel Hernandez MD   0.5 mg at 10/05/17 1049   • dextrose (D50W) solution 25 g  25 g Intravenous Q15 Min PRN Douglas Jimenez MD       • dextrose (GLUTOSE) oral gel 15 g  15 g Oral Q15 Min PRN Douglas Jimenez MD       • famotidine (PEPCID) tablet 20 mg  20 mg Oral BID Shane Yao MD   20 mg at 10/05/17 0931   • furosemide (LASIX) tablet 40 mg  40 mg Oral Daily Daniel Hernandez MD   40 mg at 10/05/17 0930   • glucagon (human recombinant) (GLUCAGEN DIAGNOSTIC) injection 1 mg  1 mg Subcutaneous Q15 Min PRN Douglas Jimenez MD       • guaiFENesin (MUCINEX) 12 hr tablet 1,200 mg  1,200 mg Oral Q12H Shane Yao MD   1,200 mg at 10/05/17 0931   • HYDROcodone-homatropine (HYCODAN) 5-1.5 MG/5ML syrup 5 mL  5 mL Oral Q4H PRN Herb Anglin MD       • insulin aspart (novoLOG) injection 0-7 Units  0-7 Units Subcutaneous 4x Daily With Meals & Nightly Douglas Jimenez MD   5 Units at 10/04/17 1721   • insulin detemir (LEVEMIR) injection 10 Units  10 Units Subcutaneous Nightly Lj Marvin MD   10 Units at 10/03/17 2029   • insulin detemir (LEVEMIR) injection 10 Units  10 Units Subcutaneous QAM Daniel Hernandez MD   10 Units at 10/05/17 0931   • ipratropium-albuterol (DUO-NEB) nebulizer solution 3 mL  3 mL Nebulization Q4H - RT Herb Anglin MD   3 mL at 10/05/17 0751   • ipratropium-albuterol (DUO-NEB) nebulizer solution 3 mL  3 mL Nebulization Q4H PRN Herb Anglin MD   3 mL at 10/01/17 1544   • lactated ringers infusion 1,000 mL  1,000 mL Intravenous Continuous PRN  Herb Anglin MD 25 mL/hr at 09/27/17 1323     • lactated ringers infusion  9 mL/hr Intravenous Continuous Noe Mcgraw MD 9 mL/hr at 09/29/17 0919 9 mL/hr at 09/29/17 0919   • lidocaine (XYLOCAINE) 4 % nebulizer solution 4 mL  4 mL Nebulization Once Yovany Hernandes MD       • Magnesium Sulfate 2 gram infusion - Mg less than or equal to 1.5 mg/dL  2 g Intravenous PRN Shane Yao MD        Or   • Magesium Sulfate 1 gram infusion - Mg 1.6-1.9 mg/dL  1 g Intravenous PRN Shane Yao MD       • magnesium hydroxide (MILK OF MAGNESIA) suspension 2400 mg/10mL 10 mL  10 mL Oral Daily PRN Lj Marvin MD   10 mL at 09/23/17 2255   • metoprolol tartrate (LOPRESSOR) tablet 25 mg  25 mg Oral BID Eduar Tucker MD   25 mg at 10/05/17 0931   • metroNIDAZOLE (FLAGYL) tablet 500 mg  500 mg Oral Q6H Ricki Coyle Jr., MD   500 mg at 10/04/17 2325   • miconazole (MICOTIN) 2 % powder   Topical Q12H Diamond Ann MD       • ondansetron (ZOFRAN) tablet 4 mg  4 mg Oral Q6H PRN Shane Yao MD        Or   • ondansetron ODT (ZOFRAN-ODT) disintegrating tablet 4 mg  4 mg Oral Q6H PRN Shane Yao MD        Or   • ondansetron (ZOFRAN) injection 4 mg  4 mg Intravenous Q6H PRN Shane Yao MD   4 mg at 09/20/17 1842   • potassium chloride (MICRO-K) CR capsule 40 mEq  40 mEq Oral PRN Shane Yao MD   40 mEq at 10/04/17 1608    Or   • potassium chloride (KLOR-CON) packet 40 mEq  40 mEq Oral PRN Shane Yao MD        Or   • potassium chloride 10 mEq in 100 mL IVPB  10 mEq Intravenous Q1H PRN Shane Yao MD       • sennosides-docusate sodium (SENOKOT-S) 8.6-50 MG tablet 2 tablet  2 tablet Oral Nightly Shane Yao MD   2 tablet at 10/03/17 2030   • sodium chloride 0.9 % flush 1-10 mL  1-10 mL Intravenous PRN Shane Yao MD       • sodium chloride 0.9 % flush 10 mL  10 mL Intravenous PRN Josh Valentin MD       • sodium chloride 0.9 % infusion  9 mL/hr Intravenous Continuous Jeevan  MD Aneesh 9 mL/hr at 09/06/17 0406 9 mL/hr at 09/06/17 0406   • tamsulosin (FLOMAX) 24 hr capsule 0.4 mg  0.4 mg Oral BID Douglas Jimneez MD   0.4 mg at 10/05/17 0930   • traZODone (DESYREL) tablet 50 mg  50 mg Oral Nightly Lj Marvin MD   50 mg at 10/04/17 2117   • zolpidem (AMBIEN) tablet 5 mg  5 mg Oral Nightly PRN Douglas Jimenez MD   5 mg at 10/04/17 2228     Prescriptions Prior to Admission   Medication Sig Dispense Refill Last Dose   • acetaminophen (TYLENOL) 325 MG tablet Take 325 mg by mouth Every 4 (Four) Hours As Needed for Mild Pain .      • albuterol (PROVENTIL) (2.5 MG/3ML) 0.083% nebulizer solution Take 2.5 mg by nebulization Every 4 (Four) Hours As Needed for Wheezing (every 2 hours as needed for soa related to copd).      • apixaban (ELIQUIS) 5 MG tablet tablet Take 5 mg by mouth 2 (Two) Times a Day.      • atorvastatin (LIPITOR) 20 MG tablet Take 20 mg by mouth Every Night.      • bisacodyl (DULCOLAX) 10 MG suppository Insert 10 mg into the rectum Daily As Needed for Constipation.      • budesonide-formoterol (SYMBICORT) 160-4.5 MCG/ACT inhaler Inhale 2 puffs 2 (Two) Times a Day.      • escitalopram (LEXAPRO) 20 MG tablet Take 20 mg by mouth Every Night.      • ferrous sulfate 325 (65 FE) MG tablet Take 325 mg by mouth 2 (Two) Times a Day.      • fluticasone (FLOVENT HFA) 220 MCG/ACT inhaler Inhale 1 puff 2 (Two) Times a Day.      • furosemide (LASIX) 40 MG tablet Take 40 mg by mouth Daily.      • insulin detemir (LEVEMIR) 100 UNIT/ML injection Inject 8 Units under the skin Daily.      • ipratropium-albuterol (DUO-NEB) 0.5-2.5 mg/mL nebulizer Take 3 mL by nebulization Every 4 (Four) Hours.      • melatonin 1 MG tablet Take 3 mg by mouth At Night As Needed for Sleep (for sleep).      • metoprolol tartrate (LOPRESSOR) 25 MG tablet Take 12.5 mg by mouth 2 (Two) Times a Day.   9/29/2017 at Unknown time   • Multiple Vitamins-Minerals (MULTIVITAMIN ADULT PO) Take 1 tablet by mouth Daily.      •  ondansetron (ZOFRAN) 4 MG tablet Take 4 mg by mouth Every 6 (Six) Hours As Needed for Nausea or Vomiting.      • pantoprazole (PROTONIX) 40 MG EC tablet Take 40 mg by mouth Daily.      • Sennosides-Docusate Sodium 8.6-50 MG capsule Take 2 capsules by mouth 2 (Two) Times a Day.      • traZODone (DESYREL) 50 MG tablet Take 50 mg by mouth Every Night.          Medications reviewed: yes, and changes made are: no change    Assessment:  1)copd  2)diastolic chf  3) tracheal stenosis and malacea  4) DM      Plan:Continue current medications.     Treatment Plans:  1.    Consultations: no nebmp,bnp,cbc  3.    Disposition: Long Term Care Facility    4.    Other:    Douglas Jimenez MD  10/05/17  11:15 AM

## 2017-10-05 NOTE — PLAN OF CARE
Problem: Patient Care Overview (Adult)  Goal: Plan of Care Review    10/05/17 1533   Patient Care Overview   Progress progress toward functional goals is gradual   Outcome Evaluation   Outcome Summary/Follow up Plan Pt showed signs of SOA and fatigue during ambulation, was able to ambulate further but required frequent standing rest breaks. Continue to progress as tolerated.    Coping/Psychosocial Response Interventions   Plan Of Care Reviewed With patient

## 2017-10-05 NOTE — PROGRESS NOTES
Continued Stay Note  [facility]     Patient Name: Chuckie Valdez  MRN: [id number]  Today's Date: 10/5/2017    Admit Date: 9/4/2017          Discharge Plan       10/05/17 1419    Case Management/Social Work Plan    Plan [facility]    Patient/Family In Agreement With Plan yes    Additional Comments Spoke with Roxanne - [facility] should have a bed available on Friday.  Call placed to SNF - unable to find a facility that accept s Trilogy and has a medicaid bed available.  Dr. Hernandez spoke with patient per  phone and he is agreeable.  Packet started and in CCP office.  CCP will continue to follow.                 Discharge Codes     None        Expected Discharge Date and Time     Expected Discharge Date Expected Discharge Time    Oct 5, 2017             Becky S. [place], RN

## 2017-10-05 NOTE — PLAN OF CARE
Problem: COPD, Chronic Bronchitis/Emphysema (Adult)  Goal: Signs and Symptoms of Listed Potential Problems Will be Absent or Manageable (COPD, Chronic Bronchitis/Emphysema)  Outcome: Ongoing (interventions implemented as appropriate)    Problem: Patient Care Overview (Adult)  Goal: Plan of Care Review  Outcome: Ongoing (interventions implemented as appropriate)    10/05/17 0103   Patient Care Overview   Progress improving   Outcome Evaluation   Outcome Summary/Follow up Plan Pt states he slept much better last night with the Trilogy, has not refused any insulin today. VSS, will continue to monitor. D/c planned for tomorrow.   Coping/Psychosocial Response Interventions   Plan Of Care Reviewed With patient         Problem: Pneumonia (Adult)  Goal: Signs and Symptoms of Listed Potential Problems Will be Absent or Manageable (Pneumonia)  Outcome: Ongoing (interventions implemented as appropriate)    Problem: Cardiac: Heart Failure (Adult)  Goal: Signs and Symptoms of Listed Potential Problems Will be Absent or Manageable (Cardiac: Heart Failure)  Outcome: Ongoing (interventions implemented as appropriate)    Problem: GI Endoscopy (Adult)  Goal: Signs and Symptoms of Listed Potential Problems Will be Absent or Manageable (GI Endoscopy)  Outcome: Ongoing (interventions implemented as appropriate)

## 2017-10-05 NOTE — PLAN OF CARE
Problem: Patient Care Overview (Adult)  Goal: Plan of Care Review  Outcome: Ongoing (interventions implemented as appropriate)    10/05/17 8352   Patient Care Overview   Progress progress toward functional goals is gradual   Outcome Evaluation   Outcome Summary/Follow up Plan Pt showed signs of SOB and fatigue during ambulation, was able to ambulate further but required frequent standing rest breaks. Continue to progress as tolerated.    Coping/Psychosocial Response Interventions   Plan Of Care Reviewed With patient

## 2017-10-06 VITALS
WEIGHT: 134.6 LBS | DIASTOLIC BLOOD PRESSURE: 63 MMHG | HEART RATE: 92 BPM | RESPIRATION RATE: 16 BRPM | OXYGEN SATURATION: 93 % | HEIGHT: 70 IN | SYSTOLIC BLOOD PRESSURE: 123 MMHG | TEMPERATURE: 98.2 F | BODY MASS INDEX: 19.27 KG/M2

## 2017-10-06 PROBLEM — I50.33 ACUTE ON CHRONIC DIASTOLIC CHF (CONGESTIVE HEART FAILURE) (HCC): Status: ACTIVE | Noted: 2017-10-06

## 2017-10-06 LAB
ANION GAP SERPL CALCULATED.3IONS-SCNC: 8.5 MMOL/L
BUN BLD-MCNC: 13 MG/DL (ref 8–23)
BUN/CREAT SERPL: 22 (ref 7–25)
CALCIUM SPEC-SCNC: 9 MG/DL (ref 8.6–10.5)
CHLORIDE SERPL-SCNC: 93 MMOL/L (ref 98–107)
CO2 SERPL-SCNC: 34.5 MMOL/L (ref 22–29)
CREAT BLD-MCNC: 0.59 MG/DL (ref 0.76–1.27)
DEPRECATED RDW RBC AUTO: 55.9 FL (ref 37–54)
ERYTHROCYTE [DISTWIDTH] IN BLOOD BY AUTOMATED COUNT: 19.4 % (ref 11.5–14.5)
GFR SERPL CREATININE-BSD FRML MDRD: 139 ML/MIN/1.73
GFR SERPL CREATININE-BSD FRML MDRD: >150 ML/MIN/1.73
GLUCOSE BLD-MCNC: 159 MG/DL (ref 65–99)
GLUCOSE BLDC GLUCOMTR-MCNC: 133 MG/DL (ref 70–130)
GLUCOSE BLDC GLUCOMTR-MCNC: 190 MG/DL (ref 70–130)
GLUCOSE BLDC GLUCOMTR-MCNC: 236 MG/DL (ref 70–130)
HCT VFR BLD AUTO: 35.3 % (ref 40.4–52.2)
HGB BLD-MCNC: 10.5 G/DL (ref 13.7–17.6)
MCH RBC QN AUTO: 23.5 PG (ref 27–32.7)
MCHC RBC AUTO-ENTMCNC: 29.7 G/DL (ref 32.6–36.4)
MCV RBC AUTO: 79 FL (ref 79.8–96.2)
PLATELET # BLD AUTO: 370 10*3/MM3 (ref 140–500)
PMV BLD AUTO: 9.3 FL (ref 6–12)
POTASSIUM BLD-SCNC: 4.5 MMOL/L (ref 3.5–5.2)
RBC # BLD AUTO: 4.47 10*6/MM3 (ref 4.6–6)
SODIUM BLD-SCNC: 136 MMOL/L (ref 136–145)
WBC NRBC COR # BLD: 8.63 10*3/MM3 (ref 4.5–10.7)

## 2017-10-06 PROCEDURE — 99238 HOSP IP/OBS DSCHRG MGMT 30/<: CPT | Performed by: FAMILY MEDICINE

## 2017-10-06 PROCEDURE — 94799 UNLISTED PULMONARY SVC/PX: CPT

## 2017-10-06 PROCEDURE — 63710000001 INSULIN ASPART PER 5 UNITS: Performed by: FAMILY MEDICINE

## 2017-10-06 PROCEDURE — 80048 BASIC METABOLIC PNL TOTAL CA: CPT | Performed by: INTERNAL MEDICINE

## 2017-10-06 PROCEDURE — 82962 GLUCOSE BLOOD TEST: CPT

## 2017-10-06 PROCEDURE — 85027 COMPLETE CBC AUTOMATED: CPT | Performed by: INTERNAL MEDICINE

## 2017-10-06 RX ADMIN — BUDESONIDE 0.5 MG: 0.5 INHALANT RESPIRATORY (INHALATION) at 08:35

## 2017-10-06 RX ADMIN — FUROSEMIDE 40 MG: 40 TABLET ORAL at 09:01

## 2017-10-06 RX ADMIN — FAMOTIDINE 20 MG: 20 TABLET, FILM COATED ORAL at 09:01

## 2017-10-06 RX ADMIN — IPRATROPIUM BROMIDE AND ALBUTEROL SULFATE 3 ML: .5; 3 SOLUTION RESPIRATORY (INHALATION) at 06:44

## 2017-10-06 RX ADMIN — APIXABAN 5 MG: 5 TABLET, FILM COATED ORAL at 17:02

## 2017-10-06 RX ADMIN — INSULIN ASPART 3 UNITS: 100 INJECTION, SOLUTION INTRAVENOUS; SUBCUTANEOUS at 12:15

## 2017-10-06 RX ADMIN — METOPROLOL TARTRATE 25 MG: 25 TABLET ORAL at 09:08

## 2017-10-06 RX ADMIN — MICONAZOLE NITRATE 1 APPLICATION: 2 POWDER TOPICAL at 09:01

## 2017-10-06 RX ADMIN — FAMOTIDINE 20 MG: 20 TABLET, FILM COATED ORAL at 17:02

## 2017-10-06 RX ADMIN — IPRATROPIUM BROMIDE AND ALBUTEROL SULFATE 3 ML: .5; 3 SOLUTION RESPIRATORY (INHALATION) at 10:52

## 2017-10-06 RX ADMIN — METOPROLOL TARTRATE 25 MG: 25 TABLET ORAL at 17:02

## 2017-10-06 RX ADMIN — IPRATROPIUM BROMIDE AND ALBUTEROL SULFATE 3 ML: .5; 3 SOLUTION RESPIRATORY (INHALATION) at 15:33

## 2017-10-06 RX ADMIN — APIXABAN 5 MG: 5 TABLET, FILM COATED ORAL at 09:01

## 2017-10-06 RX ADMIN — TAMSULOSIN HYDROCHLORIDE 0.4 MG: 0.4 CAPSULE ORAL at 17:02

## 2017-10-06 RX ADMIN — GUAIFENESIN 1200 MG: 600 TABLET, EXTENDED RELEASE ORAL at 09:01

## 2017-10-06 RX ADMIN — TAMSULOSIN HYDROCHLORIDE 0.4 MG: 0.4 CAPSULE ORAL at 09:01

## 2017-10-06 RX ADMIN — AMLODIPINE BESYLATE 10 MG: 10 TABLET ORAL at 09:01

## 2017-10-06 RX ADMIN — ARFORMOTEROL TARTRATE 15 MCG: 15 SOLUTION RESPIRATORY (INHALATION) at 08:35

## 2017-10-06 RX ADMIN — ACETAMINOPHEN 650 MG: 325 TABLET ORAL at 12:15

## 2017-10-06 NOTE — PROGRESS NOTES
Continued Stay Note  [facility]     Patient Name: Chuckie Valdez  MRN: [id number]  Today's Date: 10/6/2017    Admit Date: 9/4/2017          Discharge Plan       10/06/17 1528    Case Management/Social Work Plan    Plan [facility]    Patient/Family In Agreement With Plan yes    Additional Comments DC orders in EPIC.  Spoke with Roxanne - [facility] has a bed available and can accept patient.  Spoke with Dr. Hernandez and put a copy of his last note with F/U recommendations in DC packet - also spoke with Roxanne and she is aware but states that since pulmonologist will be seeing patient at [place], they probably will not feel it is necessary for patient to leave facility for consult with another pulmonologist - Dr. Hernandez is aware.  [facility] scheduled for 5:30 p.m.  Packet to RN.              Discharge Codes     None        Expected Discharge Date and Time     Expected Discharge Date Expected Discharge Time    Oct 6, 2017             Becky S. [place], RN

## 2017-10-06 NOTE — PROGRESS NOTES
Daniel Hernandez MD                         [phone]      Patient ID:    Name:  Chuckie Valdez    MRN:  [id number]    1955   62 y.o.  male            CC/Reason for visit: Respiratory failure follow-up    Subjective:   Patient seen and examined this a.m. No acute overnight events. Tolerated trilogy well at the current settings. He will be dced to [facility] as that is th eonly place that can do home vents @ night with his insurance. He otherwise is stable all day on a NC. He will be set up with outpatient appointment with his pulmonologist  and Dr. Morrow who will take over care as his pulmonologists from now. I was told that [place] pul team will take over but I would request him to be taken for his appointment with  without fail. Date/time and info in the DC follow up instructions.     LOS: 32    Review of Systems  He says that he does not have any fever, chills, hemoptysis, shortness of breath.     Objective     Vital Signs past 24hrs  BP range: BP: (112-128)/(58-64) 125/63  Pulse range: Heart Rate:  [] 98  Resp rate range: Resp:  [16-18] 18  Temp range: Temp (24hrs), Av °F (37.2 °C), Min:98.2 °F (36.8 °C), Max:100 °F (37.8 °C)    O2 Device: nasal cannula FiO2 (%): 40 %   134 lb 9.6 oz (61.1 kg); Body mass index is 19.31 kg/(m^2).    Intake/Output Summary (Last 24 hours) at 10/06/17 1242  Last data filed at 10/06/17 0907   Gross per 24 hour   Intake              860 ml   Output              600 ml   Net              260 ml       Exam:  GEN:  No respiratory distress, comfortably sitting in the chair  EYES:   EOM-i, anicteric sclera bilat  ENT:    External ears/nose normal, OP clear  NECK:  Supple, midline trachea, No JVD or cervical LApathy  LUNGS: Bilateral breath sounds heard, bilateral breath sounds heard but decreased in intensity.  No significant stridor heard today  CV:  Regular rate and rhythm, No murmur  ABD:  Non tender, no  distended, no palpable liver or masses  EXT:  No cyanosis or clubbing, trace peripheral/sacral edema    Scheduled meds:      amLODIPine 10 mg Oral Q24H   apixaban 5 mg Oral BID   arformoterol 15 mcg Nebulization BID - RT   atorvastatin 20 mg Oral Nightly   budesonide 0.5 mg Nebulization BID - RT   famotidine 20 mg Oral BID   furosemide 40 mg Oral Daily   guaiFENesin 1,200 mg Oral Q12H   insulin aspart 0-7 Units Subcutaneous 4x Daily With Meals & Nightly   insulin detemir 10 Units Subcutaneous Nightly   insulin detemir 10 Units Subcutaneous QAM   ipratropium-albuterol 3 mL Nebulization Q4H - RT   lidocaine 4 mL Nebulization Once   metoprolol tartrate 25 mg Oral BID   miconazole  Topical Q12H   sennosides-docusate sodium 2 tablet Oral Nightly   tamsulosin 0.4 mg Oral BID   traZODone 50 mg Oral Nightly     IV meds:                          lactated ringers 1,000 mL Last Rate: 1,000 mL (09/27/17 1323)   lactated ringers 9 mL/hr Last Rate: 9 mL/hr (09/29/17 0919)   sodium chloride 9 mL/hr Last Rate: 9 mL/hr (09/06/17 0406)       Data Review:        Results from last 7 days  Lab Units 10/06/17  0556 10/05/17  0611 10/04/17  2028 10/04/17  0745  09/30/17  0604   SODIUM mmol/L 136 138  --  138  < > 136   POTASSIUM mmol/L 4.5 4.3 4.7 3.5  < > 4.7   CHLORIDE mmol/L 93* 95*  --  91*  < > 89*   CO2 mmol/L 34.5* 35.1*  --  36.9*  < > 38.9*   BUN mg/dL 13 16  --  16  < > 20   CREATININE mg/dL 0.59* 0.66*  --  0.63*  < > 0.67*   CALCIUM mg/dL 9.0 8.9  --  8.6  < > 9.5   GLUCOSE mg/dL 159* 174*  --  184*  < > 191*   WBC 10*3/mm3 8.63 9.10  --  13.32*  < > 4.72   HEMOGLOBIN g/dL 10.5* 10.5*  --  10.5*  < > 11.0*   PLATELETS 10*3/mm3 370 303  --  324  < > 225   PROCALCITONIN ng/mL  --   --   --   --   --  0.08*   < > = values in this interval not displayed.    Lab Results   Component Value Date    CALCIUM 9.0 10/06/2017    PHOS 3.3 09/22/2017                   Results from last 7 days  Lab Units 09/29/17  1336   PH, ARTERIAL pH units  7.257*  7.257*   PCO2, ARTERIAL mm Hg 97.3*  97.3*   PO2 ART mm Hg 136.5*  136.5*   MODALITY  BiPap  BiPap   O2 SATURATION CALC % 98.3  98.3             Estimated Creatinine Clearance: 112.2 mL/min (by C-G formula based on Cr of 0.59).    Results Review:    I have reviewed the laboratory and imaging data since the last note by LPC physician.    9/29/17 - Post Stent CXR                       CT neck/ chest 10/1/17  IMPRESSION:  1. Tracheal stenosis again noted with narrowing of the coronal diameter  up to 4 mm at the level of the thoracic inlet just below the  tracheostomy tract.  2. Again, enlarged lymph nodes are seen within the left side of the neck  and supraclavicular fossa. Please correlate with biopsy results.  3. Enlarged main pulmonary artery suggestive of pulmonary arterial  hypertension. Patchy reticular nodular opacities within the left upper  lobe likely represent an atypical infection.  4. Although there is limited evaluation in the absence of IV contrast,  there is again noted a prominent azygous/hemiazygous system suggesting  stenosis of the SVC which has been well demonstrated previously on the  CT angiogram performed with contrast from 09/05/2017.  5. Again partial opacification of the left mastoid air cells is seen  that is suggestive of mastoiditis.    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 difficile infection s/p antibiotics  - Decompensated CTEPH on revatio held this admission (RVSP of 73 on admission)  - Acute on chronic diastolic heart failure  - Left-sided vocal cord immobility  - History DVT  - History hep C  - Left supraclavicular lymph node 2.9 x  1.5 cm status post IR guided biopsy - benign   - IDDM II    - Chronic SVC stenosis with diffuse collaterals    PLAN:  Long d/w pt with friend as a  for atleast 45 mins. He asks me about his long term prognosis and future plan which I answered to the best of my knowledge. He has liked the trilogy and will be on it or a simillar vent once transferred to [place]. AVAPS mode - Tv 500cc PEEP 5 PS 5-15   Pt will need to make it to 's appt (10/10 2:20 PM) without fail to see if he would be a candidate for stent/surgery for trach stenosis.  He will need ENT re-eval for his vocal cord dysfunction.     Will dc on current neb regimen.      Daniel Hernandez MD  10/6/2017

## 2017-10-06 NOTE — DISCHARGE SUMMARY
Date of Discharge:  10/6/2017  Presenting Problem/History of Present Illness  Acute respiratory failure with hypercapnia [J96.02]  Pneumonia of left lower lobe due to infectious organism [J18.1]    Discharge Diagnosis:1) aCUTE RESPIRATORY NFAILURE FROM MULTIPLE CAUSES  2) copd  3) tRACHEAL STENOSIS AND MALACEA  4)EVERE pULMONARY HYPERTENSION  5)Severe R heart failure  6) DM 2  7)hyperlipidemia  8) GERD   9) Resolved C Dificil colitis  10)dialation ytracheal stenosis with failed stent placemant  11) chronic anticoagulation  Hospital Course  Patient is a 62 y.o. male presented with acute respiratory failure. Initial treated wih steroids and antibiotic-slowlly improved Further improved with diuresis of hid R heart failure. /After 2 acute deterioration he was evaluated re vocal coed probles-1 atonic but not a problem. He was found to have tracheal stenosis and malacea. He improved with dilation but failed to tolerate a stent. He now doing well with a Trilogy at night. He had episode of Cdig colitis now off meds.    Procedures Performed  Procedure(s):  BRONCHOSCOPY WITH STENT REMOVAL       Consults:   Consults     Date and Time Order Name Status Description    10/2/2017 1342 Inpatient Consult to Vascular Surgery Completed     9/21/2017 1139 Inpatient Consult to Cardiology Completed     9/18/2017 1804 Inpatient Consult to ENT      9/4/2017 0915 Pulmonology (on-call MD unless specified) Completed     9/4/2017 0854 Family Medicine Consult            Pertinent Test Results:   Lab Results (last 7 days)     Procedure Component Value Units Date/Time    BAL Culture, Quantitative [[id number]] Collected:  09/27/17 1422    Specimen:  Lavage from Lung, Lingula Updated:  09/29/17 1137     BAL Culture --      25,000 CFU/mL Normal Respiratory Kim     Gram Stain Result No WBCs or organisms seen      No epithelial cells seen    Blood Gas, Arterial [[id number]]  (Abnormal) Collected:  09/29/17 1159    Specimen:  Arterial Blood Updated:   09/29/17 1203     Site Arterial: right brachial     Maurice's Test N/A     pH, Arterial 7.109 (C) pH units       Critical:[facility]bay PANTOJA (29-Sep-17 12:01:41)Read back ok        pCO2, Arterial 136.2 (C) mm Hg       Critical:Notify Dr PANTOJA (29-Sep-17 12:01:41)Read back ok        pO2, Arterial 129.9 (H) mm Hg      HCO3, Arterial 43.2 (H) mmol/L      Base Excess, Arterial 8.7 (H) mmol/L      O2 Saturation Calculated 96.9 %      A-a Gradiant 0.2 mmHg      Barometric Pressure for Blood Gas 752.8 mmHg      Modality face tent     FIO2 100 %      Set [facility] Resp Rate 28    Narrative:       Meter: [id number] : [id number] Graf Nagy    Blood Gas, Arterial [[id number]]  (Abnormal) Collected:  09/29/17 1336    Specimen:  Arterial Blood Updated:  09/29/17 1339     Site Arterial: right brachial     Maurice's Test N/A     pH, Arterial 7.257 (C) pH units       Critical:[employer] PANTOJA (29-Sep-17 13:38:27)Read back ok        pCO2, Arterial 97.3 (C) mm Hg       Critical:Notify Dr PANTOJA (29-Sep-17 13:38:27)Read back ok        pO2, Arterial 136.5 (H) mm Hg      HCO3, Arterial 43.3 (H) mmol/L      Base Excess, Arterial 10.9 (H) mmol/L      O2 Saturation Calculated 98.3 %      A-a Gradiant 0.4 mmHg      Barometric Pressure for Blood Gas 752.3 mmHg      Modality BiPap     FIO2 60 %      Ventilator Mode NIV     Set Tidal Volume 500     Set [facility] Resp Rate 28    Narrative:       BIPAP 168 Meter: [id number] : [id number] Graf Nagy    POC Glucose Fingerstick [[id number]]  (Abnormal) Collected:  09/29/17 1331    Specimen:  Blood Updated:  09/29/17 1343     Glucose 44 (C) mg/dL     Narrative:       Repeat Test Meter: [id number] : [id number] Demario Nichole    POC Glucose Fingerstick [[id number]]  (Abnormal) Collected:  09/29/17 1334    Specimen:  Blood Updated:  09/29/17 1343     Glucose 40 (C) mg/dL     Narrative:       Meter: [id number] : [id number] Demario Nichole    POC Glucose Fingerstick [[id number]]  (Normal) Collected:   09/29/17 1358    Specimen:  Blood Updated:  09/29/17 1359     Glucose 101 mg/dL     Narrative:       Meter: [id number] : [id number] Green Dayanara    POC Glucose Fingerstick [[id number]]  (Normal) Collected:  09/29/17 1528    Specimen:  Blood Updated:  09/29/17 1531     Glucose 80 mg/dL     Narrative:       Meter: [id number] : [id number] Green Dayanara    POC Glucose Fingerstick [[id number]]  (Abnormal) Collected:  09/29/17 1837    Specimen:  Blood Updated:  09/29/17 1848     Glucose 26 (C) mg/dL     Narrative:       Meter: [id number] : [id number] Lorie Paula LLaura    POC Glucose Fingerstick [[id number]]  (Normal) Collected:  09/29/17 1858    Specimen:  Blood Updated:  09/29/17 1859     Glucose 91 mg/dL     Narrative:       Meter: [id number] : [id number] Lorie Paula LLaura    POC Glucose Fingerstick [[id number]]  (Normal) Collected:  09/29/17 1925    Specimen:  Blood Updated:  09/1955     Glucose 94 mg/dL     Narrative:       Meter: [id number] : [id number] Tatum RUTHERFORD    POC Glucose Fingerstick [[id number]]  (Normal) Collected:  09/29/17 2309    Specimen:  Blood Updated:  09/29/17 2310     Glucose 101 mg/dL     Narrative:       Meter: [id number] : [id number] Tatum RUTHERFORD    POC Glucose Fingerstick [[id number]]  (Normal) Collected:  09/30/17 0350    Specimen:  Blood Updated:  09/30/17 0351     Glucose 84 mg/dL     Narrative:       Meter: [id number] : [id number] Tatum RUTHERFORD    CBC (No Diff) [[id number]]  (Abnormal) Collected:  09/30/17 0604    Specimen:  Blood Updated:  09/30/17 0708     WBC 4.72 10*3/mm3      RBC 4.73 10*6/mm3      Hemoglobin 11.0 (L) g/dL      Hematocrit 37.8 (L) %      MCV 79.9 fL      MCH 23.3 (L) pg      MCHC 29.1 (L) g/dL      RDW 19.0 (H) %      RDW-SD 54.6 (H) fl      MPV 9.4 fL      Platelets 225 10*3/mm3     Basic Metabolic Panel [[id number]]  (Abnormal) Collected:  09/30/17 0604    Specimen:  Blood Updated:  09/30/17 0710     Glucose 191 (H)  "mg/dL      BUN 20 mg/dL      Creatinine 0.67 (L) mg/dL      Sodium 136 mmol/L      Potassium 4.7 mmol/L      Chloride 89 (L) mmol/L      CO2 38.9 (H) mmol/L      Calcium 9.5 mg/dL      eGFR  African Amer 146 mL/min/1.73      eGFR Non African Amer 120 mL/min/1.73      BUN/Creatinine Ratio 29.9 (H)     Anion Gap 8.1 mmol/L     Narrative:       GFR Normal >60  Chronic Kidney Disease <60  Kidney Failure <15    POC Glucose Fingerstick [[id number]]  (Abnormal) Collected:  09/30/17 0749    Specimen:  Blood Updated:  09/30/17 0750     Glucose 259 (H) mg/dL     Narrative:       Meter: [id number] : [id number] Richy Stevens    Procalcitonin [[id number]]  (Abnormal) Collected:  09/30/17 0604    Specimen:  Blood Updated:  09/30/17 0848     Procalcitonin 0.08 (L) ng/mL     Narrative:       As a Marker for Sepsis (Non-Neonates):   1. <0.5 ng/mL represents a low risk of severe sepsis and/or septic shock.  1. >2 ng/mL represents a high risk of severe sepsis and/or septic shock.    As a Marker for Lower Respiratory Tract Infections that require antibiotic therapy:  PCT on Admission     Antibiotic Therapy             6-12 Hrs later  > 0.5                Strongly Recommended            >0.25 - <0.5         Recommended  0.1 - 0.25           Discouraged                   Remeasure/reassess PCT  <0.1                 Strongly Discouraged          Remeasure/reassess PCT      As 28 day mortality risk marker: \"Change in Procalcitonin Result\" (> 80 % or <=80 %) if Day 0 (or Day 1) and Day 4 values are available. Refer to http://[email or web address]-pct-calculator.com/   Change in PCT <=80 %   A decrease of PCT levels below or equal to 80 % defines a positive change in PCT test result representing a higher risk for 28-day all-cause mortality of patients diagnosed with severe sepsis or septic shock.  Change in PCT > 80 %   A decrease of PCT levels of more than 80 % defines a negative change in PCT result representing a lower risk for 28-day all-cause " mortality of patients diagnosed with severe sepsis or septic shock.                POC Glucose Fingerstick [[id number]]  (Abnormal) Collected:  09/30/17 1109    Specimen:  Blood Updated:  09/30/17 1110     Glucose 386 (H) mg/dL     Narrative:       Meter: [id number] : [id number] Richy Rodney    POC Glucose Fingerstick [[id number]]  (Abnormal) Collected:  09/30/17 1527    Specimen:  Blood Updated:  09/30/17 1531     Glucose 411 (H) mg/dL     Narrative:       Repeat Test Meter: [id number] : [id number] Richy Rodney    POC Glucose Fingerstick [[id number]]  (Abnormal) Collected:  09/30/17 1529    Specimen:  Blood Updated:  09/30/17 1531     Glucose 385 (H) mg/dL     Narrative:       Meter: [id number] : [id number] Richy Rodney    POC Glucose Fingerstick [[id number]]  (Abnormal) Collected:  09/30/17 1924    Specimen:  Blood Updated:  09/30/17 1926     Glucose 289 (H) mg/dL     Narrative:       Meter: [id number] : [id number] TatumUCampus    POC Glucose Fingerstick [[id number]]  (Abnormal) Collected:  09/30/17 2136    Specimen:  Blood Updated:  09/30/17 2138     Glucose 259 (H) mg/dL     Narrative:       Meter: [id number] : [id number] Yolanda Sánchez    POC Glucose Fingerstick [[id number]]  (Abnormal) Collected:  09/30/17 2318    Specimen:  Blood Updated:  09/30/17 2320     Glucose 235 (H) mg/dL     Narrative:       Meter: [id number] : [id number] TatumUCampus    POC Glucose Fingerstick [[id number]]  (Abnormal) Collected:  10/01/17 0355    Specimen:  Blood Updated:  10/01/17 0357     Glucose 243 (H) mg/dL     Narrative:       Meter: [id number] : [id number] [place] Domenica J    POC Glucose Fingerstick [[id number]]  (Abnormal) Collected:  10/01/17 0626    Specimen:  Blood Updated:  10/01/17 0627     Glucose 237 (H) mg/dL     Narrative:       M[facility]: [id number] : [id number] [facility]    POC Glucose Fingerstick [[id number]]  (Abnormal) Collected:  10/01/17 0740    Specimen:  Blood  Updated:  10/01/17 0743     Glucose 225 (H) mg/dL     Narrative:       Meter: [id number] : [id number] Jeff Bradford    CBC (No Diff) [[id number]]  (Abnormal) Collected:  10/01/17 0816    Specimen:  Blood Updated:  10/01/17 0850     WBC 5.92 10*3/mm3      RBC 4.60 10*6/mm3      Hemoglobin 10.7 (L) g/dL      Hematocrit 35.5 (L) %      MCV 77.2 (L) fL      MCH 23.3 (L) pg      MCHC 30.1 (L) g/dL      RDW 18.7 (H) %      RDW-SD 52.1 fl      MPV 9.6 fL      Platelets 285 10*3/mm3     Basic Metabolic Panel [[id number]]  (Abnormal) Collected:  10/01/17 0816    Specimen:  Blood Updated:  10/01/17 0909     Glucose 241 (H) mg/dL      BUN 20 mg/dL      Creatinine 0.57 (L) mg/dL      Sodium 136 mmol/L      Potassium 4.0 mmol/L      Chloride 92 (L) mmol/L      CO2 33.0 (H) mmol/L      Calcium 9.4 mg/dL      eGFR  African Amer >150 mL/min/1.73      eGFR Non African Amer 145 mL/min/1.73      BUN/Creatinine Ratio 35.1 (H)     Anion Gap 11.0 mmol/L     Narrative:       GFR Normal >60  Chronic Kidney Disease <60  Kidney Failure <15    POC Glucose Fingerstick [[id number]]  (Abnormal) Collected:  10/01/17 1212    Specimen:  Blood Updated:  10/01/17 1214     Glucose 400 (H) mg/dL     Narrative:       Repeat Test Meter: [id number] : [id number] Lucina Melissa    POC Glucose Fingerstick [[id number]]  (Abnormal) Collected:  10/01/17 1640    Specimen:  Blood Updated:  10/01/17 1642     Glucose 266 (H) mg/dL     Narrative:       Meter: [id number] : [id number] Lacy Melissa    POC Glucose Fingerstick [[id number]]  (Abnormal) Collected:  10/01/17 2014    Specimen:  Blood Updated:  10/01/17 2022     Glucose 287 (H) mg/dL     Narrative:       Meter: [id number] : [id number] Samuel Bazzi RN    POC Glucose Fingerstick [[id number]]  (Normal) Collected:  10/02/17 0622    Specimen:  Blood Updated:  10/02/17 0623     Glucose 128 mg/dL     Narrative:       Meter: [id number] : [id number] Samuel Bazzi RN    POC Glucose Fingerstick [[id number]]   (Abnormal) Collected:  10/02/17 0740    Specimen:  Blood Updated:  10/02/17 0742     Glucose 154 (H) mg/dL     Narrative:       Meter: [id number] : [id number] Gerardo HARDWICK    CBC (No Diff) [[id number]]  (Abnormal) Collected:  10/02/17 0808    Specimen:  Blood Updated:  10/02/17 0843     WBC 7.82 10*3/mm3      RBC 4.53 (L) 10*6/mm3      Hemoglobin 10.7 (L) g/dL      Hematocrit 35.3 (L) %      MCV 77.9 (L) fL      MCH 23.6 (L) pg      MCHC 30.3 (L) g/dL      RDW 19.0 (H) %      RDW-SD 53.8 fl      MPV 9.6 fL      Platelets 291 10*3/mm3     Basic Metabolic Panel [[id number]]  (Abnormal) Collected:  10/02/17 0807    Specimen:  Blood Updated:  10/02/17 0906     Glucose 163 (H) mg/dL      BUN 21 mg/dL      Creatinine 0.67 (L) mg/dL      Sodium 141 mmol/L      Potassium 3.6 mmol/L      Chloride 96 (L) mmol/L      CO2 35.8 (H) mmol/L      Calcium 9.0 mg/dL      eGFR  African Amer 146 mL/min/1.73      eGFR Non African Amer 120 mL/min/1.73      BUN/Creatinine Ratio 31.3 (H)     Anion Gap 9.2 mmol/L     Narrative:       GFR Normal >60  Chronic Kidney Disease <60  Kidney Failure <15    POC Glucose Fingerstick [[id number]]  (Abnormal) Collected:  10/02/17 1138    Specimen:  Blood Updated:  10/02/17 1141     Glucose 292 (H) mg/dL     Narrative:       Meter: [id number] : [id number] Gerardo HARDWICK    POC Glucose Fingerstick [[id number]]  (Abnormal) Collected:  10/02/17 2139    Specimen:  Blood Updated:  10/02/17 2141     Glucose 240 (H) mg/dL     Narrative:       Meter: [id number] : [id number] Rommel HARDWICK    CBC (No Diff) [[id number]]  (Abnormal) Collected:  10/03/17 0606    Specimen:  Blood Updated:  10/03/17 0646     WBC 7.58 10*3/mm3      RBC 4.53 (L) 10*6/mm3      Hemoglobin 10.7 (L) g/dL      Hematocrit 35.9 (L) %      MCV 79.2 (L) fL      MCH 23.6 (L) pg      MCHC 29.8 (L) g/dL      RDW 19.1 (H) %      RDW-SD 54.8 (H) fl      MPV 9.4 fL      Platelets 294 10*3/mm3     Basic Metabolic Panel [[id number]]   (Abnormal) Collected:  10/03/17 0606    Specimen:  Blood Updated:  10/03/17 0706     Glucose 167 (H) mg/dL      BUN 18 mg/dL      Creatinine 0.57 (L) mg/dL      Sodium 139 mmol/L      Potassium 3.7 mmol/L      Chloride 95 (L) mmol/L      CO2 35.5 (H) mmol/L      Calcium 9.0 mg/dL      eGFR  African Amer >150 mL/min/1.73      eGFR Non African Amer 145 mL/min/1.73      BUN/Creatinine Ratio 31.6 (H)     Anion Gap 8.5 mmol/L     Narrative:       GFR Normal >60  Chronic Kidney Disease <60  Kidney Failure <15    Blood Gas, Arterial [[id number]]  (Abnormal) Collected:  09/29/17 1336    Specimen:  Arterial Blood Updated:  10/03/17 1302     Site Arterial: right brachial     Maurice's Test N/A     pH, Arterial 7.257 (C) pH units       Critical:Notify Dr PANTOJA (29-Sep-17 13:38:27)Read back ok        pCO2, Arterial 97.3 (C) mm Hg       Critical:Notify Dr PANTOJA (29-Sep-17 13:38:27)Read back ok        pO2, Arterial 136.5 (H) mm Hg      HCO3, Arterial 43.3 (H) mmol/L      Base Excess, Arterial 10.9 (H) mmol/L      O2 Saturation Calculated 98.3 %      A-a Gradiant 0.4 mmHg      Barometric Pressure for Blood Gas 752.3 mmHg      Modality BiPap     FIO2 60 %      Ventilator Mode NIV     Set Tidal Volume 500     Set Mech Resp Rate 28    Narrative:       BIPAP 16/8 Meter: [id number] : [id number] Graf Nagy    POC Glucose Fingerstick [[id number]]  (Abnormal) Collected:  10/03/17 1720    Specimen:  Blood Updated:  10/03/17 1722     Glucose 294 (H) mg/dL     Narrative:       Meter: [id number] : [id number] Tatum DIALLO    POC Glucose Fingerstick [[id number]]  (Abnormal) Collected:  10/03/17 2123    Specimen:  Blood Updated:  10/03/17 2124     Glucose 161 (H) mg/dL     Narrative:       Meter: [id number] : [id number] Mario Alberto Canseco    POC Glucose Fingerstick [[id number]]  (Abnormal) Collected:  10/04/17 0210    Specimen:  Blood Updated:  10/04/17 0213     Glucose 33 (C) mg/dL     Narrative:       Repeat Test Meter:  [id number] : [id number] Saltillo Matt    POC Glucose Fingerstick [[id number]]  (Abnormal) Collected:  10/04/17 0211    Specimen:  Blood Updated:  10/04/17 0213     Glucose 35 (C) mg/dL     Narrative:       Confirmed by Repeat Meter: [id number] : [id number] Saltillo Matt    POC Glucose Fingerstick [[id number]]  (Abnormal) Collected:  10/04/17 0229    Specimen:  Blood Updated:  10/04/17 0240     Glucose 49 (C) mg/dL     Narrative:       Confirmed by Repeat Meter: [id number] : [id number] Saltillo Matt    POC Glucose Fingerstick [[id number]]  (Abnormal) Collected:  10/04/17 0250    Specimen:  Blood Updated:  10/04/17 0251     Glucose 67 (L) mg/dL     Narrative:       Meter: [id number] : [id number] Saltillo Matt    POC Glucose Fingerstick [[id number]]  (Normal) Collected:  10/04/17 0306    Specimen:  Blood Updated:  10/04/17 0307     Glucose 127 mg/dL     Narrative:       Meter: [id number] : [id number] Saltillo Matt    POC Glucose Fingerstick [[id number]]  (Abnormal) Collected:  10/04/17 0529    Specimen:  Blood Updated:  10/04/17 0531     Glucose 223 (H) mg/dL     Narrative:       Meter: [id number] : [id number] Harilal Adialys    POC Glucose Fingerstick [[id number]]  (Abnormal) Collected:  10/04/17 0830    Specimen:  Blood Updated:  10/04/17 0832     Glucose 195 (H) mg/dL     Narrative:       Meter: [id number] : [id number] Rommel HARDWICK    CBC (No Diff) [[id number]]  (Abnormal) Collected:  10/04/17 0745    Specimen:  Blood Updated:  10/04/17 0838     WBC 13.32 (H) 10*3/mm3      RBC 4.51 (L) 10*6/mm3      Hemoglobin 10.5 (L) g/dL      Hematocrit 35.6 (L) %      MCV 78.9 (L) fL      MCH 23.3 (L) pg      MCHC 29.5 (L) g/dL      RDW 19.1 (H) %      RDW-SD 54.9 (H) fl      MPV 9.5 fL      Platelets 324 10*3/mm3     Basic Metabolic Panel [[id number]]  (Abnormal) Collected:  10/04/17 0745    Specimen:  Blood Updated:  10/04/17 0845     Glucose 184 (H) mg/dL      BUN 16 mg/dL      Creatinine 0.63 (L) mg/dL       "Sodium 138 mmol/L      Potassium 3.5 mmol/L      Chloride 91 (L) mmol/L      CO2 36.9 (H) mmol/L      Calcium 8.6 mg/dL      eGFR  African Amer >150 mL/min/1.73      eGFR Non African Amer 129 mL/min/1.73      BUN/Creatinine Ratio 25.4 (H)     Anion Gap 10.1 mmol/L     Narrative:       GFR Normal >60  Chronic Kidney Disease <60  Kidney Failure <15    Tissue Pathology Exam [[id number]] Collected:  09/25/17 1500    Specimen:  Tissue from Clavicle, Left Updated:  10/04/17 0920     Case Report --     Surgical Pathology Report                         Case: [id number]                                  Authorizing Provider:  Douglas Jimenez MD       Collected:           09/25/2017 03:00 PM          Ordering Location:     [facility]  Received:            09/25/2017 03:33 PM                                 [address]                                                                       Pathologist:           Keith Bingham MD                                                       Specimens:   1) - Clavicle, Left, lt supraclavicle bx                                                            2) - Clavicle, Left, lt supraclavicle bx                                                    Final Diagnosis --       1. \"LEFT SUPRACLAVICLE\", NEEDLE BIOPSY:          LYMPHOID TISSUE COMPOSED OF SMALL LYMPHOCYTES, PLASMA CELLS - CONSISTENT WITH                     ORIGIN FROM LYMPH NODE.          SPECIAL STUDIES are supportive of REACTIVE HYPERPLASIA WITH VASCULAR TRANSFORMATION OF                      SINUSES.             See reports from [facility].    2. \"LEFT SUPRACLAVICLE\", NEEDLE BIOPSY:          SUBMITTED FOR FLOW CYTOMETRY.         IHC/a/CMK         Gross Description --     1.  Received in formalin labeled \"left supraclavicular\" are multiple tan friable needle core biopsies up to 1.3 cm long and less than 0.1 cm across.  The specimens are submitted all a single block labeled 1A.      2.  Received in transport " "media labeled \"left supraclavicular biopsy\" and consists of a 1 cm long and less than 0.1 cm across tan needle core biopsy.  The specimen is submitted is submitted for gross examination only and forwarded for flow cytometry studies.         Embedded Images --    Narrative:       The previously reported component Preliminary Diagnosis is no longer being reported.    POC Glucose Fingerstick [[id number]]  (Abnormal) Collected:  10/04/17 1152    Specimen:  Blood Updated:  10/04/17 1153     Glucose 190 (H) mg/dL     Narrative:       Meter: [id number] : [id number] [facility]    AFB Culture [[id number]]  (Normal) Collected:  09/27/17 1422    Specimen:  Lavage from Lung, [place] Updated:  10/04/17 1446     AFB Culture No AFB isolated at 1 week     AFB Stain No acid fast bacilli seen on concentrated smear    POC Glucose Fingerstick [[id number]]  (Abnormal) Collected:  10/04/17 1628    Specimen:  Blood Updated:  10/04/17 1636     Glucose 322 (H) mg/dL     Narrative:       Meter: [id number] : [id number] [facility]    POC Glucose Fingerstick [[id number]]  (Abnormal) Collected:  10/04/17 2054    Specimen:  Blood Updated:  10/04/17 2056     Glucose 220 (H) mg/dL     Narrative:       Meter: [id number] : [id number] [facility]    Potassium [[id number]]  (Normal) Collected:  10/04/17 2028    Specimen:  Blood Updated:  10/04/17 2118     Potassium 4.7 mmol/L     CBC (No Diff) [[id number]]  (Abnormal) Collected:  10/05/17 0611    Specimen:  Blood Updated:  10/05/17 0638     WBC 9.10 10*3/mm3      RBC 4.45 (L) 10*6/mm3      Hemoglobin 10.5 (L) g/dL      Hematocrit 35.4 (L) %      MCV 79.6 (L) fL      MCH 23.6 (L) pg      MCHC 29.7 (L) g/dL      RDW 19.2 (H) %      RDW-SD 54.9 (H) fl      MPV 9.2 fL      Platelets 303 10*3/mm3     Basic Metabolic Panel [[id number]]  (Abnormal) Collected:  10/05/17 0611    Specimen:  Blood Updated:  10/05/17 0706     Glucose 174 (H) mg/dL      BUN 16 mg/dL      Creatinine 0.66 (L) " mg/dL      Sodium 138 mmol/L      Potassium 4.3 mmol/L      Chloride 95 (L) mmol/L      CO2 35.1 (H) mmol/L      Calcium 8.9 mg/dL      eGFR  African Amer 148 mL/min/1.73      eGFR Non African Amer 122 mL/min/1.73      BUN/Creatinine Ratio 24.2     Anion Gap 7.9 mmol/L     Narrative:       GFR Normal >60  Chronic Kidney Disease <60  Kidney Failure <15    POC Glucose Fingerstick [[id number]]  (Abnormal) Collected:  10/05/17 0812    Specimen:  Blood Updated:  10/05/17 0813     Glucose 152 (H) mg/dL     Narrative:       Meter: [id number] : [id number] Tatum  S    POC Glucose Fingerstick [[id number]]  (Abnormal) Collected:  10/05/17 1224    Specimen:  Blood Updated:  10/05/17 1227     Glucose 267 (H) mg/dL     Narrative:       Meter: [id number] : [id number] Tatum  S    POC Glucose Fingerstick [[id number]]  (Abnormal) Collected:  10/05/17 1640    Specimen:  Blood Updated:  10/05/17 1642     Glucose 270 (H) mg/dL     Narrative:       Meter: [id number] : [id number] Tatum  S    POC Glucose Fingerstick [[id number]]  (Abnormal) Collected:  10/05/17 2051    Specimen:  Blood Updated:  10/05/17 2104     Glucose 271 (H) mg/dL     Narrative:       Meter: [id number] : [id number] Carlson Gladys HARDWICK    CBC (No Diff) [[id number]]  (Abnormal) Collected:  10/06/17 0556    Specimen:  Blood Updated:  10/06/17 0642     WBC 8.63 10*3/mm3      RBC 4.47 (L) 10*6/mm3      Hemoglobin 10.5 (L) g/dL      Hematocrit 35.3 (L) %      MCV 79.0 (L) fL      MCH 23.5 (L) pg      MCHC 29.7 (L) g/dL      RDW 19.4 (H) %      RDW-SD 55.9 (H) fl      MPV 9.3 fL      Platelets 370 10*3/mm3     Basic Metabolic Panel [[id number]]  (Abnormal) Collected:  10/06/17 0556    Specimen:  Blood Updated:  10/06/17 0704     Glucose 159 (H) mg/dL      BUN 13 mg/dL      Creatinine 0.59 (L) mg/dL      Sodium 136 mmol/L      Potassium 4.5 mmol/L      Chloride 93 (L) mmol/L      CO2 34.5 (H) mmol/L      Calcium 9.0 mg/dL      eGFR  African Amer  ">150 mL/min/1.73      eGFR Non African Amer 139 mL/min/1.73      BUN/Creatinine Ratio 22.0     Anion Gap 8.5 mmol/L     Narrative:       GFR Normal >60  Chronic Kidney Disease <60  Kidney Failure <15    POC Glucose Fingerstick [[id number]]  (Abnormal) Collected:  10/06/17 0737    Specimen:  Blood Updated:  10/06/17 0738     Glucose 133 (H) mg/dL     Narrative:       Meter: [id number] : [id number] [facility]    Fungus Culture [[id number]]  (Abnormal) Collected:  09/27/17 1422    Specimen:  Lavage from Lung, [place] Updated:  10/06/17 0740     Fungus Culture --      Candida dubliniensis (A)              Condition on Discharge:  stable    Physical Exam at Discharge  General Appearance:  awake, alert, oriented, in no acute distress  Lungs:  Normal expansion.  Clear to auscultation.  No rales, rhonchi, or wheezing.  Heart:  Heart sounds are normal.  Regular rate and rhythm without murmur, gallop or rub.  Extremities: Extremities warm to touch, pink, with no edema.  Neurologic:  Alert and oriented x 3, gait normal., reflexes normal and symmetric, strength and  sensation grossly normal    Vital Signs  Temp:  [98.2 °F (36.8 °C)-100 °F (37.8 °C)] 98.2 °F (36.8 °C)  Heart Rate:  [] 98  Resp:  [16-18] 16  BP: (112-128)/(58-64) 125/63    Flowsheet Rows         First Filed Value    Admission Height  70\" (177.8 cm) Documented at 09/04/2017 0728    Admission Weight  180 lb (81.6 kg) Documented at 09/04/2017 0728              Discharge Disposition  [facility].    Discharge Medications   Chuckie Valdez   Home Medication Instructions SHEA:[id number]    Printed on:10/06/17 1007   Medication Information                      acetaminophen (TYLENOL) 325 MG tablet  Take 325 mg by mouth Every 4 (Four) Hours As Needed for Mild Pain .             albuterol (PROVENTIL) (2.5 MG/3ML) 0.083% nebulizer solution  Take 2.5 mg by nebulization Every 4 (Four) Hours As Needed for Wheezing (every 2 hours as needed for soa " related to copd).             amLODIPine (NORVASC) 10 MG tablet  Take 1 tablet by mouth Daily.             apixaban (ELIQUIS) 5 MG tablet tablet  Take 5 mg by mouth 2 (Two) Times a Day.             atorvastatin (LIPITOR) 20 MG tablet  Take 20 mg by mouth Every Night.             budesonide-formoterol (SYMBICORT) 160-4.5 MCG/ACT inhaler  Inhale 2 puffs 2 (Two) Times a Day.             escitalopram (LEXAPRO) 20 MG tablet  Take 20 mg by mouth Every Night.             ferrous sulfate 325 (65 FE) MG tablet  Take 325 mg by mouth 2 (Two) Times a Day.             furosemide (LASIX) 40 MG tablet  Take 40 mg by mouth Daily.             insulin aspart (novoLOG) 100 UNIT/ML injection  Inject 6 Units under the skin 3 (Three) Times a Day Before Meals.             insulin aspart (novoLOG) 100 UNIT/ML injection  Inject 0-7 Units under the skin 4 (Four) Times a Day With Meals & at Bedtime.             insulin detemir (LEVEMIR) 100 UNIT/ML injection  Inject 8 Units under the skin Daily.             ipratropium-albuterol (DUO-NEB) 0.5-2.5 mg/mL nebulizer  Take 3 mL by nebulization Every 4 (Four) Hours.             melatonin 1 MG tablet  Take 3 mg by mouth At Night As Needed for Sleep (for sleep).             metoprolol tartrate (LOPRESSOR) 25 MG tablet  Take 12.5 mg by mouth 2 (Two) Times a Day.             Multiple Vitamins-Minerals (MULTIVITAMIN ADULT PO)  Take 1 tablet by mouth Daily.             ondansetron (ZOFRAN) 4 MG tablet  Take 4 mg by mouth Every 6 (Six) Hours As Needed for Nausea or Vomiting.             pantoprazole (PROTONIX) 40 MG EC tablet  Take 40 mg by mouth Daily.             sennosides-docusate sodium (SENOKOT-S) 8.6-50 MG tablet  Take 2 tablets by mouth Every Night.             tamsulosin (FLOMAX) 0.4 MG capsule 24 hr capsule  Take 1 capsule by mouth Daily.             traZODone (DESYREL) 50 MG tablet  Take 50 mg by mouth Every Night.                 Discharge Diet:   Diet Instructions     Diet: Consistent  Carbohydrate; Thin       Discharge Diet:  Consistent Carbohydrate   Fluid Consistency:  Thin                 Activity at Discharge:     Follow-up Appointments  No future appointments.      Test Results Pending at Discharge   Order Current Status    AFB Culture Preliminary result    Fungus Culture Preliminary result           Douglas Jimenez MD  10/06/17  10:07 AM

## 2017-10-06 NOTE — SIGNIFICANT NOTE
10/06/17 1121   PT Discharge Summary   Anticipated Discharge Disposition skilled nursing facility   Reason for Discharge Discharge from facility

## 2017-10-09 NOTE — PROGRESS NOTES
Case Management Discharge Note    Final Note: Patient was DC'd to [facility] via [facility] 10/6.    Discharge Placement     Facility/Agency Request Status Selected? Address Phone Number Fax Number    [facility] Accepted    Yes [address] [facility] [address] [phone]         Jayla Antonio RN 10/3/2017 11:35    MD would like for patient to go to [facility] Accepted     [address] [address] [phone]        Britta Wagoner RN 9/5/2017 12:38    Spoke with [facility] Accepted     [address] [address] [phone]     [facility] Pending - No Request Sent     [address] [facility] [address] [phone]         [facility]: [facility]    Discharge Codes: 91  R- To long [facility]

## 2017-10-13 LAB
FUNGUS WND CULT: ABNORMAL
FUNGUS WND CULT: ABNORMAL

## 2017-10-27 ENCOUNTER — OUTSIDE FACILITY SERVICE (OUTPATIENT)
Dept: FAMILY MEDICINE CLINIC | Facility: CLINIC | Age: 62
End: 2017-10-27

## 2017-10-27 PROCEDURE — 99309 SBSQ NF CARE MODERATE MDM 30: CPT | Performed by: NURSE PRACTITIONER

## 2017-11-02 ENCOUNTER — OUTSIDE FACILITY SERVICE (OUTPATIENT)
Dept: FAMILY MEDICINE CLINIC | Facility: CLINIC | Age: 62
End: 2017-11-02

## 2017-11-02 PROCEDURE — 99307 SBSQ NF CARE SF MDM 10: CPT | Performed by: FAMILY MEDICINE

## 2017-11-06 ENCOUNTER — OUTSIDE FACILITY SERVICE (OUTPATIENT)
Dept: FAMILY MEDICINE CLINIC | Facility: CLINIC | Age: 62
End: 2017-11-06

## 2017-11-06 PROCEDURE — 99307 SBSQ NF CARE SF MDM 10: CPT | Performed by: NURSE PRACTITIONER

## 2017-11-08 LAB
MYCOBACTERIUM SPEC CULT: NORMAL
NIGHT BLUE STAIN TISS: NORMAL

## 2017-11-08 NOTE — PROGRESS NOTES
Daniel Hernandez MD                         [phone]      Patient ID:    Name:  Chuckie Valdez    MRN:  [id number]    1955   62 y.o.  male            CC/Reason for visit: Respiratory failure follow-up    Subjective:     Patient seen and examined this a.m. No acute overnight events. I spoke to him with his friend translating the last 2 days. Had a long d/w them today too. It looks like chuckie is concerned if the trach stenosis is due to the intubation when he was brought in this time. I mentioned to him that it is most likely r/t his prior long term trach and this reintubation/pna/ resp failure might have made him symptomatic. I told him that on the whole he has many issues which are irreversible and we are working on his trach stenosis as this might be his only reversible issue which could help him avoid recurrent resp failure. I told him that trilogy- AVAPS could be a way to prevent recurrent exacerbations but is not going to reverse his disease process and [facility] is the only place that would take him with this. He can choose to not to go to [facility](he seems to have not like the place last time) if he would like and we can send him to rehab/SNF on nocturnal Bipap and he can meet his pulmonologist -  as an o/p who can plan future care. He tells me that would like to talk to him about his prognosis and options before he considers any further procedures which is reasonable.   After a long discussion patient agreed to try that trilogy set up and he was set up overnight.  He seems to have liked it very much and says that he feels significantly better.  He would like to continue to use the same.  I explained to him that unfortunately no nursing home is available if he would like to use it.  He is agreeable to go to [facility] at this point for this.  He will be set up with outpatient appointment with his pulmonologist  and Dr. Morrow who will take over  Hpi Title: Evaluation of Skin Lesions care as his pulmonologists from now.    He says that he does not have any fever, chills, hemoptysis, shortness of breath.     LOS: 31    ROS: Review of Systems  As above    Objective     Vital Signs past 24hrs  BP range: BP: (120-139)/(58-76) 120/58  Pulse range: Heart Rate:  [] 92  Resp rate range: Resp:  [16-34] 18  Temp range: Temp (24hrs), Av.7 °F (37.1 °C), Min:98 °F (36.7 °C), Max:100 °F (37.8 °C)    O2 Device: nasal cannula FiO2 (%): 40 %   134 lb (60.8 kg); Body mass index is 19.23 kg/(m^2).    Intake/Output Summary (Last 24 hours) at 10/05/17 1452  Last data filed at 10/05/17 1239   Gross per 24 hour   Intake              240 ml   Output             1130 ml   Net             -890 ml       Exam:  GEN:  No respiratory distress, comfortably lying, does have conversational dyspnea  EYES:   EOM-i, anicteric sclera bilat  ENT:    External ears/nose normal, OP clear  NECK:  Supple, midline trachea, No JVD or cervical LApathy  LUNGS: Bilateral breath sounds heard, bilateral breath sounds heard but decreased in intensity.  No significant stridor heard today  CV:  Regular rate and rhythm, No murmur  ABD:  Non tender, no distended, no palpable liver or masses  EXT:  No cyanosis or clubbing, trace peripheral/sacral edema    Scheduled meds:      amLODIPine 10 mg Oral Q24H   apixaban 5 mg Oral BID   arformoterol 15 mcg Nebulization BID - RT   atorvastatin 20 mg Oral Nightly   budesonide 0.5 mg Nebulization BID - RT   famotidine 20 mg Oral BID   furosemide 40 mg Oral Daily   guaiFENesin 1,200 mg Oral Q12H   insulin aspart 0-7 Units Subcutaneous 4x Daily With Meals & Nightly   insulin detemir 10 Units Subcutaneous Nightly   insulin detemir 10 Units Subcutaneous QAM   ipratropium-albuterol 3 mL Nebulization Q4H - RT   lidocaine 4 mL Nebulization Once   metoprolol tartrate 25 mg Oral BID   miconazole  Topical Q12H   sennosides-docusate sodium 2 tablet Oral Nightly   tamsulosin 0.4 mg Oral BID   traZODone 50 mg Oral  Nightly     IV meds:                          lactated ringers 1,000 mL Last Rate: 1,000 mL (09/27/17 1323)   lactated ringers 9 mL/hr Last Rate: 9 mL/hr (09/29/17 0919)   sodium chloride 9 mL/hr Last Rate: 9 mL/hr (09/06/17 0406)       Data Review:        Results from last 7 days  Lab Units 10/05/17  0611 10/04/17  2028 10/04/17  0745 10/03/17  0606  09/30/17  0604   SODIUM mmol/L 138  --  138 139  < > 136   POTASSIUM mmol/L 4.3 4.7 3.5 3.7  < > 4.7   CHLORIDE mmol/L 95*  --  91* 95*  < > 89*   CO2 mmol/L 35.1*  --  36.9* 35.5*  < > 38.9*   BUN mg/dL 16  --  16 18  < > 20   CREATININE mg/dL 0.66*  --  0.63* 0.57*  < > 0.67*   CALCIUM mg/dL 8.9  --  8.6 9.0  < > 9.5   GLUCOSE mg/dL 174*  --  184* 167*  < > 191*   WBC 10*3/mm3 9.10  --  13.32* 7.58  < > 4.72   HEMOGLOBIN g/dL 10.5*  --  10.5* 10.7*  < > 11.0*   PLATELETS 10*3/mm3 303  --  324 294  < > 225   PROCALCITONIN ng/mL  --   --   --   --   --  0.08*   < > = values in this interval not displayed.    Lab Results   Component Value Date    CALCIUM 8.9 10/05/2017    PHOS 3.3 09/22/2017                   Results from last 7 days  Lab Units 09/29/17  1336 09/29/17  1159   PH, ARTERIAL pH units 7.257*  7.257* 7.109*   PCO2, ARTERIAL mm Hg 97.3*  97.3* 136.2*   PO2 ART mm Hg 136.5*  136.5* 129.9*   MODALITY  BiPap  BiPap face tent   O2 SATURATION CALC % 98.3  98.3 96.9             Estimated Creatinine Clearance: 99.8 mL/min (by C-G formula based on Cr of 0.66).    Results Review:    I have reviewed the laboratory and imaging data since the last note by LPC physician.    9/29/17 - Post Stent CXR                       CT neck/ chest 10/1/17  IMPRESSION:  1. Tracheal stenosis again noted with narrowing of the coronal diameter  up to 4 mm at the level of the thoracic inlet just below the  tracheostomy tract.  2. Again, enlarged lymph nodes are seen within the left side of the neck  and supraclavicular fossa. Please correlate with biopsy results.  3. Enlarged main  pulmonary artery suggestive of pulmonary arterial  hypertension. Patchy reticular nodular opacities within the left upper  lobe likely represent an atypical infection.  4. Although there is limited evaluation in the absence of IV contrast,  there is again noted a prominent azygous/hemiazygous system suggesting  stenosis of the SVC which has been well demonstrated previously on the  CT angiogram performed with contrast from 09/05/2017.  5. Again partial opacification of the left mastoid air cells is seen  that is suggestive of mastoiditis.    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 He asks me about his long term prognosis and future plan which I answered to the best of my knowledge. He has like the trilogy and will be on it once transferred to [facility]. AVAPS mode - Tv 500cc PEEP 5 PS 5-15   Pt will speak to his pulmonologist to make a decision about his future course. I agree with him. Will update him.   Appreciate [facility] surgery input.   C/w ICS and home dose lasix.   LN biopsy is negative for malignancy. Flow cytometry is neg as well.   Would finish 2 weeks of flagyl for c.diff.      Daniel Hernandez MD  10/5/2017

## 2018-03-16 RX ORDER — SYRINGE AND NEEDLE,INSULIN,1ML 31 GX5/16"
SYRINGE, EMPTY DISPOSABLE MISCELLANEOUS
OUTPATIENT
Start: 2018-03-16

## 2018-03-19 RX ORDER — SYRINGE AND NEEDLE,INSULIN,1ML 31 GX5/16"
SYRINGE, EMPTY DISPOSABLE MISCELLANEOUS
OUTPATIENT
Start: 2018-03-19

## 2018-05-09 RX ORDER — DOXAZOSIN MESYLATE 4 MG/1
TABLET ORAL
Qty: 60 TABLET | OUTPATIENT
Start: 2018-05-09

## 2018-05-10 RX ORDER — DOXAZOSIN MESYLATE 4 MG/1
TABLET ORAL
Qty: 60 TABLET | OUTPATIENT
Start: 2018-05-10

## 2018-05-14 RX ORDER — DOXAZOSIN MESYLATE 4 MG/1
TABLET ORAL
Qty: 60 TABLET | Refills: 0 | Status: SHIPPED | OUTPATIENT
Start: 2018-05-14 | End: 2018-06-21 | Stop reason: SDUPTHER

## 2018-05-24 RX ORDER — ESCITALOPRAM OXALATE 20 MG/1
TABLET ORAL
Qty: 30 TABLET | OUTPATIENT
Start: 2018-05-24

## 2018-05-24 RX ORDER — ESCITALOPRAM OXALATE 20 MG/1
TABLET ORAL
Qty: 14 TABLET | Refills: 0 | Status: SHIPPED | OUTPATIENT
Start: 2018-05-24

## 2018-05-29 RX ORDER — ESCITALOPRAM OXALATE 20 MG/1
TABLET ORAL
Qty: 14 TABLET | OUTPATIENT
Start: 2018-05-29

## 2018-05-31 RX ORDER — ESCITALOPRAM OXALATE 20 MG/1
TABLET ORAL
Qty: 14 TABLET | OUTPATIENT
Start: 2018-05-31

## 2018-06-07 RX ORDER — DOXAZOSIN MESYLATE 4 MG/1
TABLET ORAL
Qty: 60 TABLET | OUTPATIENT
Start: 2018-06-07

## 2018-06-07 RX ORDER — PEN NEEDLE, DIABETIC 32GX 5/32"
NEEDLE, DISPOSABLE MISCELLANEOUS
OUTPATIENT
Start: 2018-06-07

## 2018-06-14 RX ORDER — DOXAZOSIN MESYLATE 4 MG/1
TABLET ORAL
Qty: 60 TABLET | OUTPATIENT
Start: 2018-06-14

## 2018-06-14 RX ORDER — ESCITALOPRAM OXALATE 20 MG/1
TABLET ORAL
Qty: 14 TABLET | OUTPATIENT
Start: 2018-06-14

## 2018-06-15 RX ORDER — ESCITALOPRAM OXALATE 20 MG/1
TABLET ORAL
Qty: 14 TABLET | OUTPATIENT
Start: 2018-06-15

## 2018-06-21 RX ORDER — DOXAZOSIN MESYLATE 4 MG/1
TABLET ORAL
Qty: 60 TABLET | Refills: 1 | Status: SHIPPED | OUTPATIENT
Start: 2018-06-21

## 2018-07-03 RX ORDER — PEN NEEDLE, DIABETIC 32GX 5/32"
NEEDLE, DISPOSABLE MISCELLANEOUS
OUTPATIENT
Start: 2018-07-03

## 2018-07-03 RX ORDER — DOXAZOSIN MESYLATE 4 MG/1
TABLET ORAL
Qty: 60 TABLET | OUTPATIENT
Start: 2018-07-03

## 2018-07-06 RX ORDER — DOXAZOSIN MESYLATE 4 MG/1
TABLET ORAL
Qty: 60 TABLET | OUTPATIENT
Start: 2018-07-06

## 2018-07-09 RX ORDER — DOXAZOSIN MESYLATE 4 MG/1
TABLET ORAL
Qty: 60 TABLET | OUTPATIENT
Start: 2018-07-09

## 2018-07-10 RX ORDER — DOXAZOSIN MESYLATE 4 MG/1
TABLET ORAL
Qty: 60 TABLET | OUTPATIENT
Start: 2018-07-10

## 2018-07-31 RX ORDER — PEN NEEDLE, DIABETIC 32GX 5/32"
NEEDLE, DISPOSABLE MISCELLANEOUS
OUTPATIENT
Start: 2018-07-31

## 2025-05-17 ENCOUNTER — HOSPITAL ENCOUNTER (INPATIENT)
Facility: HOSPITAL | Age: 70
LOS: 4 days | Discharge: HOME HEALTH | DRG: 177 | End: 2025-05-22
Attending: EMERGENCY MEDICINE | Admitting: INTERNAL MEDICINE
Payer: MEDICAID

## 2025-05-17 ENCOUNTER — APPOINTMENT (OUTPATIENT)
Dept: GENERAL RADIOLOGY | Facility: HOSPITAL | Age: 70
DRG: 177 | End: 2025-05-17
Payer: MEDICAID

## 2025-05-17 DIAGNOSIS — U07.1 COVID-19: ICD-10-CM

## 2025-05-17 DIAGNOSIS — J96.21 ACUTE ON CHRONIC RESPIRATORY FAILURE WITH HYPOXIA: ICD-10-CM

## 2025-05-17 DIAGNOSIS — J44.1 COPD EXACERBATION: Primary | ICD-10-CM

## 2025-05-17 LAB
BASOPHILS # BLD AUTO: 0.03 10*3/MM3 (ref 0–0.2)
BASOPHILS NFR BLD AUTO: 0.3 % (ref 0–1.5)
DEPRECATED RDW RBC AUTO: 40 FL (ref 37–54)
EOSINOPHIL # BLD AUTO: 0.07 10*3/MM3 (ref 0–0.4)
EOSINOPHIL NFR BLD AUTO: 0.7 % (ref 0.3–6.2)
ERYTHROCYTE [DISTWIDTH] IN BLOOD BY AUTOMATED COUNT: 12.6 % (ref 12.3–15.4)
HCT VFR BLD AUTO: 44.1 % (ref 37.5–51)
HGB BLD-MCNC: 14.4 G/DL (ref 13–17.7)
HOLD SPECIMEN: NORMAL
HOLD SPECIMEN: NORMAL
IMM GRANULOCYTES # BLD AUTO: 0.04 10*3/MM3 (ref 0–0.05)
IMM GRANULOCYTES NFR BLD AUTO: 0.4 % (ref 0–0.5)
LYMPHOCYTES # BLD AUTO: 0.89 10*3/MM3 (ref 0.7–3.1)
LYMPHOCYTES NFR BLD AUTO: 8.8 % (ref 19.6–45.3)
MCH RBC QN AUTO: 28.3 PG (ref 26.6–33)
MCHC RBC AUTO-ENTMCNC: 32.7 G/DL (ref 31.5–35.7)
MCV RBC AUTO: 86.6 FL (ref 79–97)
MONOCYTES # BLD AUTO: 0.76 10*3/MM3 (ref 0.1–0.9)
MONOCYTES NFR BLD AUTO: 7.5 % (ref 5–12)
NEUTROPHILS NFR BLD AUTO: 8.28 10*3/MM3 (ref 1.7–7)
NEUTROPHILS NFR BLD AUTO: 82.3 % (ref 42.7–76)
NRBC BLD AUTO-RTO: 0 /100 WBC (ref 0–0.2)
PLATELET # BLD AUTO: 262 10*3/MM3 (ref 140–450)
PMV BLD AUTO: 9.2 FL (ref 6–12)
RBC # BLD AUTO: 5.09 10*6/MM3 (ref 4.14–5.8)
WBC NRBC COR # BLD AUTO: 10.07 10*3/MM3 (ref 3.4–10.8)
WHOLE BLOOD HOLD COAG: NORMAL
WHOLE BLOOD HOLD SPECIMEN: NORMAL

## 2025-05-17 PROCEDURE — 85025 COMPLETE CBC W/AUTO DIFF WBC: CPT | Performed by: EMERGENCY MEDICINE

## 2025-05-17 PROCEDURE — 71045 X-RAY EXAM CHEST 1 VIEW: CPT

## 2025-05-17 PROCEDURE — 83880 ASSAY OF NATRIURETIC PEPTIDE: CPT | Performed by: EMERGENCY MEDICINE

## 2025-05-17 PROCEDURE — 36415 COLL VENOUS BLD VENIPUNCTURE: CPT

## 2025-05-17 PROCEDURE — 84484 ASSAY OF TROPONIN QUANT: CPT | Performed by: EMERGENCY MEDICINE

## 2025-05-17 PROCEDURE — 93005 ELECTROCARDIOGRAM TRACING: CPT | Performed by: EMERGENCY MEDICINE

## 2025-05-17 PROCEDURE — 93010 ELECTROCARDIOGRAM REPORT: CPT | Performed by: INTERNAL MEDICINE

## 2025-05-17 PROCEDURE — 99285 EMERGENCY DEPT VISIT HI MDM: CPT

## 2025-05-17 PROCEDURE — 80053 COMPREHEN METABOLIC PANEL: CPT | Performed by: EMERGENCY MEDICINE

## 2025-05-17 RX ORDER — SODIUM CHLORIDE 0.9 % (FLUSH) 0.9 %
10 SYRINGE (ML) INJECTION AS NEEDED
Status: DISCONTINUED | OUTPATIENT
Start: 2025-05-17 | End: 2025-05-22 | Stop reason: HOSPADM

## 2025-05-17 RX ORDER — METHYLPREDNISOLONE SODIUM SUCCINATE 125 MG/2ML
125 INJECTION, POWDER, LYOPHILIZED, FOR SOLUTION INTRAMUSCULAR; INTRAVENOUS ONCE
Status: COMPLETED | OUTPATIENT
Start: 2025-05-18 | End: 2025-05-18

## 2025-05-17 RX ORDER — IPRATROPIUM BROMIDE AND ALBUTEROL SULFATE 2.5; .5 MG/3ML; MG/3ML
9 SOLUTION RESPIRATORY (INHALATION) ONCE
Status: COMPLETED | OUTPATIENT
Start: 2025-05-18 | End: 2025-05-18

## 2025-05-18 PROBLEM — U07.1 COVID-19: Status: ACTIVE | Noted: 2025-05-18

## 2025-05-18 PROBLEM — G47.33 OSA (OBSTRUCTIVE SLEEP APNEA): Status: ACTIVE | Noted: 2019-10-01

## 2025-05-18 PROBLEM — J96.11 CHRONIC RESPIRATORY FAILURE WITH HYPOXIA: Status: ACTIVE | Noted: 2022-05-18

## 2025-05-18 PROBLEM — N40.0 BPH (BENIGN PROSTATIC HYPERPLASIA): Status: ACTIVE | Noted: 2025-05-18

## 2025-05-18 PROBLEM — J96.01 ACUTE HYPOXEMIC RESPIRATORY FAILURE DUE TO COVID-19: Status: ACTIVE | Noted: 2025-05-18

## 2025-05-18 PROBLEM — J96.01 ACUTE RESPIRATORY FAILURE WITH HYPOXIA: Status: ACTIVE | Noted: 2025-05-18

## 2025-05-18 PROBLEM — Z99.81 DEPENDENCE ON SUPPLEMENTAL OXYGEN: Status: ACTIVE | Noted: 2021-12-10

## 2025-05-18 LAB
ALBUMIN SERPL-MCNC: 4 G/DL (ref 3.5–5.2)
ALBUMIN SERPL-MCNC: 4.2 G/DL (ref 3.5–5.2)
ALBUMIN/GLOB SERPL: 1.2 G/DL
ALBUMIN/GLOB SERPL: 1.3 G/DL
ALP SERPL-CCNC: 106 U/L (ref 39–117)
ALP SERPL-CCNC: 118 U/L (ref 39–117)
ALT SERPL W P-5'-P-CCNC: 29 U/L (ref 1–41)
ALT SERPL W P-5'-P-CCNC: 42 U/L (ref 1–41)
ANION GAP SERPL CALCULATED.3IONS-SCNC: 6.7 MMOL/L (ref 5–15)
ANION GAP SERPL CALCULATED.3IONS-SCNC: 8 MMOL/L (ref 5–15)
AST SERPL-CCNC: 23 U/L (ref 1–40)
AST SERPL-CCNC: 38 U/L (ref 1–40)
B PARAPERT DNA SPEC QL NAA+PROBE: NOT DETECTED
B PERT DNA SPEC QL NAA+PROBE: NOT DETECTED
BILIRUB SERPL-MCNC: 0.3 MG/DL (ref 0–1.2)
BILIRUB SERPL-MCNC: <0.2 MG/DL (ref 0–1.2)
BUN SERPL-MCNC: 20 MG/DL (ref 8–23)
BUN SERPL-MCNC: 24 MG/DL (ref 8–23)
BUN/CREAT SERPL: 20.2 (ref 7–25)
BUN/CREAT SERPL: 26.4 (ref 7–25)
C PNEUM DNA NPH QL NAA+NON-PROBE: NOT DETECTED
CALCIUM SPEC-SCNC: 8.8 MG/DL (ref 8.6–10.5)
CALCIUM SPEC-SCNC: 9.1 MG/DL (ref 8.6–10.5)
CHLORIDE SERPL-SCNC: 101 MMOL/L (ref 98–107)
CHLORIDE SERPL-SCNC: 104 MMOL/L (ref 98–107)
CO2 SERPL-SCNC: 28.3 MMOL/L (ref 22–29)
CO2 SERPL-SCNC: 29 MMOL/L (ref 22–29)
CREAT SERPL-MCNC: 0.91 MG/DL (ref 0.76–1.27)
CREAT SERPL-MCNC: 0.99 MG/DL (ref 0.76–1.27)
D-LACTATE SERPL-SCNC: 0.9 MMOL/L (ref 0.5–2)
DEPRECATED RDW RBC AUTO: 39.3 FL (ref 37–54)
EGFRCR SERPLBLD CKD-EPI 2021: 81.9 ML/MIN/1.73
EGFRCR SERPLBLD CKD-EPI 2021: 90.7 ML/MIN/1.73
ERYTHROCYTE [DISTWIDTH] IN BLOOD BY AUTOMATED COUNT: 12.3 % (ref 12.3–15.4)
FLUAV SUBTYP SPEC NAA+PROBE: NOT DETECTED
FLUBV RNA ISLT QL NAA+PROBE: NOT DETECTED
GEN 5 1HR TROPONIN T REFLEX: 18 NG/L
GLOBULIN UR ELPH-MCNC: 3 GM/DL
GLOBULIN UR ELPH-MCNC: 3.5 GM/DL
GLUCOSE BLDC GLUCOMTR-MCNC: 201 MG/DL (ref 70–130)
GLUCOSE BLDC GLUCOMTR-MCNC: 207 MG/DL (ref 70–130)
GLUCOSE BLDC GLUCOMTR-MCNC: 208 MG/DL (ref 70–130)
GLUCOSE BLDC GLUCOMTR-MCNC: 246 MG/DL (ref 70–130)
GLUCOSE SERPL-MCNC: 188 MG/DL (ref 65–99)
GLUCOSE SERPL-MCNC: 220 MG/DL (ref 65–99)
HADV DNA SPEC NAA+PROBE: NOT DETECTED
HCOV 229E RNA SPEC QL NAA+PROBE: NOT DETECTED
HCOV HKU1 RNA SPEC QL NAA+PROBE: NOT DETECTED
HCOV NL63 RNA SPEC QL NAA+PROBE: NOT DETECTED
HCOV OC43 RNA SPEC QL NAA+PROBE: NOT DETECTED
HCT VFR BLD AUTO: 45.6 % (ref 37.5–51)
HGB BLD-MCNC: 14.6 G/DL (ref 13–17.7)
HMPV RNA NPH QL NAA+NON-PROBE: NOT DETECTED
HPIV1 RNA ISLT QL NAA+PROBE: NOT DETECTED
HPIV2 RNA SPEC QL NAA+PROBE: NOT DETECTED
HPIV3 RNA NPH QL NAA+PROBE: NOT DETECTED
HPIV4 P GENE NPH QL NAA+PROBE: NOT DETECTED
LDH SERPL-CCNC: 195 U/L (ref 135–225)
M PNEUMO IGG SER IA-ACNC: NOT DETECTED
MCH RBC QN AUTO: 27.9 PG (ref 26.6–33)
MCHC RBC AUTO-ENTMCNC: 32 G/DL (ref 31.5–35.7)
MCV RBC AUTO: 87.2 FL (ref 79–97)
NT-PROBNP SERPL-MCNC: 482 PG/ML (ref 0–900)
PLATELET # BLD AUTO: 257 10*3/MM3 (ref 140–450)
PMV BLD AUTO: 9.2 FL (ref 6–12)
POTASSIUM SERPL-SCNC: 3.5 MMOL/L (ref 3.5–5.2)
POTASSIUM SERPL-SCNC: 5 MMOL/L (ref 3.5–5.2)
PROT SERPL-MCNC: 7 G/DL (ref 6–8.5)
PROT SERPL-MCNC: 7.7 G/DL (ref 6–8.5)
QT INTERVAL: 434 MS
QTC INTERVAL: 495 MS
RBC # BLD AUTO: 5.23 10*6/MM3 (ref 4.14–5.8)
RHINOVIRUS RNA SPEC NAA+PROBE: NOT DETECTED
RSV RNA NPH QL NAA+NON-PROBE: NOT DETECTED
SARS-COV-2 RNA NPH QL NAA+NON-PROBE: DETECTED
SODIUM SERPL-SCNC: 138 MMOL/L (ref 136–145)
SODIUM SERPL-SCNC: 139 MMOL/L (ref 136–145)
TROPONIN T NUMERIC DELTA: 1 NG/L
TROPONIN T SERPL HS-MCNC: 17 NG/L
WBC NRBC COR # BLD AUTO: 8.85 10*3/MM3 (ref 3.4–10.8)

## 2025-05-18 PROCEDURE — 94640 AIRWAY INHALATION TREATMENT: CPT

## 2025-05-18 PROCEDURE — 25010000002 METHYLPREDNISOLONE PER 125 MG: Performed by: EMERGENCY MEDICINE

## 2025-05-18 PROCEDURE — 36415 COLL VENOUS BLD VENIPUNCTURE: CPT | Performed by: NURSE PRACTITIONER

## 2025-05-18 PROCEDURE — 63710000001 REVEFENACIN 175 MCG/3ML SOLUTION: Performed by: INTERNAL MEDICINE

## 2025-05-18 PROCEDURE — 94799 UNLISTED PULMONARY SVC/PX: CPT

## 2025-05-18 PROCEDURE — 25010000002 CEFTRIAXONE PER 250 MG: Performed by: EMERGENCY MEDICINE

## 2025-05-18 PROCEDURE — 80053 COMPREHEN METABOLIC PANEL: CPT | Performed by: NURSE PRACTITIONER

## 2025-05-18 PROCEDURE — 25010000002 ENOXAPARIN PER 10 MG: Performed by: NURSE PRACTITIONER

## 2025-05-18 PROCEDURE — 94660 CPAP INITIATION&MGMT: CPT

## 2025-05-18 PROCEDURE — 87040 BLOOD CULTURE FOR BACTERIA: CPT | Performed by: EMERGENCY MEDICINE

## 2025-05-18 PROCEDURE — 83605 ASSAY OF LACTIC ACID: CPT | Performed by: EMERGENCY MEDICINE

## 2025-05-18 PROCEDURE — 25810000003 SODIUM CHLORIDE 0.9 % SOLUTION 250 ML FLEX CONT: Performed by: EMERGENCY MEDICINE

## 2025-05-18 PROCEDURE — 0202U NFCT DS 22 TRGT SARS-COV-2: CPT | Performed by: EMERGENCY MEDICINE

## 2025-05-18 PROCEDURE — 94760 N-INVAS EAR/PLS OXIMETRY 1: CPT

## 2025-05-18 PROCEDURE — 63710000001 INSULIN GLARGINE PER 5 UNITS: Performed by: INTERNAL MEDICINE

## 2025-05-18 PROCEDURE — 63710000001 INSULIN LISPRO (HUMAN) PER 5 UNITS: Performed by: NURSE PRACTITIONER

## 2025-05-18 PROCEDURE — 63710000001 INSULIN LISPRO (HUMAN) PER 5 UNITS: Performed by: INTERNAL MEDICINE

## 2025-05-18 PROCEDURE — 82948 REAGENT STRIP/BLOOD GLUCOSE: CPT

## 2025-05-18 PROCEDURE — 25010000002 ONDANSETRON PER 1 MG: Performed by: EMERGENCY MEDICINE

## 2025-05-18 PROCEDURE — 84484 ASSAY OF TROPONIN QUANT: CPT | Performed by: EMERGENCY MEDICINE

## 2025-05-18 PROCEDURE — 94664 DEMO&/EVAL PT USE INHALER: CPT

## 2025-05-18 PROCEDURE — 85027 COMPLETE CBC AUTOMATED: CPT | Performed by: NURSE PRACTITIONER

## 2025-05-18 PROCEDURE — 25010000002 AZITHROMYCIN PER 500 MG: Performed by: EMERGENCY MEDICINE

## 2025-05-18 PROCEDURE — 83615 LACTATE (LD) (LDH) ENZYME: CPT | Performed by: INTERNAL MEDICINE

## 2025-05-18 RX ORDER — PREDNISONE 5 MG/1
5 TABLET ORAL DAILY
COMMUNITY
Start: 2025-02-07

## 2025-05-18 RX ORDER — ATORVASTATIN CALCIUM 20 MG/1
40 TABLET, FILM COATED ORAL NIGHTLY
Status: DISCONTINUED | OUTPATIENT
Start: 2025-05-18 | End: 2025-05-18

## 2025-05-18 RX ORDER — METOPROLOL TARTRATE 25 MG/1
12.5 TABLET, FILM COATED ORAL EVERY 12 HOURS SCHEDULED
Status: DISCONTINUED | OUTPATIENT
Start: 2025-05-18 | End: 2025-05-18

## 2025-05-18 RX ORDER — MONTELUKAST SODIUM 10 MG/1
10 TABLET ORAL NIGHTLY
COMMUNITY
Start: 2025-03-07

## 2025-05-18 RX ORDER — BUDESONIDE AND FORMOTEROL FUMARATE DIHYDRATE 80; 4.5 UG/1; UG/1
2 AEROSOL RESPIRATORY (INHALATION) 2 TIMES DAILY
COMMUNITY
Start: 2025-02-04 | End: 2025-05-22 | Stop reason: HOSPADM

## 2025-05-18 RX ORDER — ERGOCALCIFEROL 1.25 MG/1
50000 CAPSULE, LIQUID FILLED ORAL
Status: DISCONTINUED | OUTPATIENT
Start: 2025-05-18 | End: 2025-05-22 | Stop reason: HOSPADM

## 2025-05-18 RX ORDER — MOXIFLOXACIN 5 MG/ML
1 SOLUTION/ DROPS OPHTHALMIC 4 TIMES DAILY
Status: DISCONTINUED | OUTPATIENT
Start: 2025-05-18 | End: 2025-05-22 | Stop reason: HOSPADM

## 2025-05-18 RX ORDER — DEXAMETHASONE 6 MG/1
6 TABLET ORAL
Status: DISCONTINUED | OUTPATIENT
Start: 2025-05-18 | End: 2025-05-18

## 2025-05-18 RX ORDER — PREDNISOLONE ACETATE 10 MG/ML
1 SUSPENSION/ DROPS OPHTHALMIC
COMMUNITY

## 2025-05-18 RX ORDER — ERGOCALCIFEROL 1.25 MG/1
50000 CAPSULE ORAL WEEKLY
COMMUNITY

## 2025-05-18 RX ORDER — ASPIRIN 81 MG/1
81 TABLET ORAL DAILY
COMMUNITY
Start: 2025-03-07

## 2025-05-18 RX ORDER — TERAZOSIN 5 MG/1
5 CAPSULE ORAL NIGHTLY
Status: DISCONTINUED | OUTPATIENT
Start: 2025-05-18 | End: 2025-05-18

## 2025-05-18 RX ORDER — ONDANSETRON 4 MG/1
4 TABLET, ORALLY DISINTEGRATING ORAL EVERY 8 HOURS PRN
Status: DISCONTINUED | OUTPATIENT
Start: 2025-05-18 | End: 2025-05-22 | Stop reason: HOSPADM

## 2025-05-18 RX ORDER — INSULIN LISPRO 100 [IU]/ML
6 INJECTION, SOLUTION INTRAVENOUS; SUBCUTANEOUS
Status: DISCONTINUED | OUTPATIENT
Start: 2025-05-18 | End: 2025-05-20

## 2025-05-18 RX ORDER — SODIUM CHLORIDE 0.9 % (FLUSH) 0.9 %
10 SYRINGE (ML) INJECTION AS NEEDED
Status: DISCONTINUED | OUTPATIENT
Start: 2025-05-18 | End: 2025-05-22 | Stop reason: HOSPADM

## 2025-05-18 RX ORDER — ACETAMINOPHEN 325 MG/1
650 TABLET ORAL EVERY 4 HOURS PRN
Status: DISCONTINUED | OUTPATIENT
Start: 2025-05-18 | End: 2025-05-22 | Stop reason: HOSPADM

## 2025-05-18 RX ORDER — BRIMONIDINE TARTRATE 2 MG/ML
1 SOLUTION/ DROPS OPHTHALMIC 3 TIMES DAILY
Status: DISCONTINUED | OUTPATIENT
Start: 2025-05-18 | End: 2025-05-22 | Stop reason: HOSPADM

## 2025-05-18 RX ORDER — FERROUS SULFATE 325(65) MG
325 TABLET ORAL
Status: DISCONTINUED | OUTPATIENT
Start: 2025-05-19 | End: 2025-05-22 | Stop reason: HOSPADM

## 2025-05-18 RX ORDER — FAMOTIDINE 20 MG/1
20 TABLET, FILM COATED ORAL 2 TIMES DAILY PRN
Status: DISCONTINUED | OUTPATIENT
Start: 2025-05-18 | End: 2025-05-22 | Stop reason: HOSPADM

## 2025-05-18 RX ORDER — ERGOCALCIFEROL 1.25 MG/1
50000 CAPSULE, LIQUID FILLED ORAL WEEKLY
Status: DISCONTINUED | OUTPATIENT
Start: 2025-05-18 | End: 2025-05-18 | Stop reason: CLARIF

## 2025-05-18 RX ORDER — SILDENAFIL CITRATE 20 MG/1
20 TABLET ORAL 3 TIMES DAILY
COMMUNITY
Start: 2025-03-07 | End: 2025-09-03

## 2025-05-18 RX ORDER — BUDESONIDE AND FORMOTEROL FUMARATE DIHYDRATE 160; 4.5 UG/1; UG/1
2 AEROSOL RESPIRATORY (INHALATION)
Status: DISCONTINUED | OUTPATIENT
Start: 2025-05-18 | End: 2025-05-22 | Stop reason: HOSPADM

## 2025-05-18 RX ORDER — TAMSULOSIN HYDROCHLORIDE 0.4 MG/1
0.4 CAPSULE ORAL DAILY
Status: DISCONTINUED | OUTPATIENT
Start: 2025-05-18 | End: 2025-05-22 | Stop reason: HOSPADM

## 2025-05-18 RX ORDER — BRIMONIDINE TARTRATE 2 MG/ML
1 SOLUTION/ DROPS OPHTHALMIC 3 TIMES DAILY
COMMUNITY

## 2025-05-18 RX ORDER — ENOXAPARIN SODIUM 100 MG/ML
30 INJECTION SUBCUTANEOUS DAILY
Status: DISCONTINUED | OUTPATIENT
Start: 2025-05-18 | End: 2025-05-18 | Stop reason: DRUGHIGH

## 2025-05-18 RX ORDER — ONDANSETRON 2 MG/ML
4 INJECTION INTRAMUSCULAR; INTRAVENOUS EVERY 6 HOURS PRN
Status: DISCONTINUED | OUTPATIENT
Start: 2025-05-18 | End: 2025-05-22 | Stop reason: HOSPADM

## 2025-05-18 RX ORDER — AMOXICILLIN 250 MG
2 CAPSULE ORAL NIGHTLY
Status: DISCONTINUED | OUTPATIENT
Start: 2025-05-18 | End: 2025-05-22 | Stop reason: HOSPADM

## 2025-05-18 RX ORDER — DILTIAZEM HYDROCHLORIDE 120 MG/1
120 CAPSULE, EXTENDED RELEASE ORAL DAILY
COMMUNITY

## 2025-05-18 RX ORDER — SODIUM CHLORIDE 0.9 % (FLUSH) 0.9 %
10 SYRINGE (ML) INJECTION EVERY 12 HOURS SCHEDULED
Status: DISCONTINUED | OUTPATIENT
Start: 2025-05-18 | End: 2025-05-22 | Stop reason: HOSPADM

## 2025-05-18 RX ORDER — INSULIN LISPRO 100 [IU]/ML
2-7 INJECTION, SOLUTION INTRAVENOUS; SUBCUTANEOUS
Status: DISCONTINUED | OUTPATIENT
Start: 2025-05-18 | End: 2025-05-21

## 2025-05-18 RX ORDER — SPIRONOLACTONE 25 MG/1
25 TABLET ORAL DAILY
Status: DISCONTINUED | OUTPATIENT
Start: 2025-05-18 | End: 2025-05-22 | Stop reason: HOSPADM

## 2025-05-18 RX ORDER — ACETAMINOPHEN 160 MG/5ML
650 SOLUTION ORAL EVERY 4 HOURS PRN
Status: DISCONTINUED | OUTPATIENT
Start: 2025-05-18 | End: 2025-05-22 | Stop reason: HOSPADM

## 2025-05-18 RX ORDER — INSULIN GLARGINE 100 [IU]/ML
22 INJECTION, SOLUTION SUBCUTANEOUS DAILY
COMMUNITY
Start: 2025-03-14

## 2025-05-18 RX ORDER — NICOTINE POLACRILEX 4 MG
15 LOZENGE BUCCAL
Status: DISCONTINUED | OUTPATIENT
Start: 2025-05-18 | End: 2025-05-21

## 2025-05-18 RX ORDER — IBUPROFEN 600 MG/1
1 TABLET ORAL
Status: DISCONTINUED | OUTPATIENT
Start: 2025-05-18 | End: 2025-05-21

## 2025-05-18 RX ORDER — BISACODYL 5 MG/1
5 TABLET, DELAYED RELEASE ORAL DAILY PRN
Status: DISCONTINUED | OUTPATIENT
Start: 2025-05-18 | End: 2025-05-22 | Stop reason: HOSPADM

## 2025-05-18 RX ORDER — PREGABALIN 50 MG/1
50 CAPSULE ORAL 2 TIMES DAILY
Status: DISCONTINUED | OUTPATIENT
Start: 2025-05-18 | End: 2025-05-22 | Stop reason: HOSPADM

## 2025-05-18 RX ORDER — ASPIRIN 81 MG/1
81 TABLET ORAL DAILY
Status: DISCONTINUED | OUTPATIENT
Start: 2025-05-18 | End: 2025-05-22 | Stop reason: HOSPADM

## 2025-05-18 RX ORDER — DEXTROSE MONOHYDRATE 25 G/50ML
25 INJECTION, SOLUTION INTRAVENOUS
Status: DISCONTINUED | OUTPATIENT
Start: 2025-05-18 | End: 2025-05-21

## 2025-05-18 RX ORDER — IPRATROPIUM BROMIDE AND ALBUTEROL SULFATE 2.5; .5 MG/3ML; MG/3ML
3 SOLUTION RESPIRATORY (INHALATION)
Status: DISCONTINUED | OUTPATIENT
Start: 2025-05-18 | End: 2025-05-22 | Stop reason: HOSPADM

## 2025-05-18 RX ORDER — IPRATROPIUM BROMIDE AND ALBUTEROL SULFATE 2.5; .5 MG/3ML; MG/3ML
3 SOLUTION RESPIRATORY (INHALATION) EVERY 4 HOURS PRN
Status: DISCONTINUED | OUTPATIENT
Start: 2025-05-18 | End: 2025-05-22 | Stop reason: HOSPADM

## 2025-05-18 RX ORDER — ACETAMINOPHEN 650 MG/1
650 SUPPOSITORY RECTAL EVERY 4 HOURS PRN
Status: DISCONTINUED | OUTPATIENT
Start: 2025-05-18 | End: 2025-05-22 | Stop reason: HOSPADM

## 2025-05-18 RX ORDER — DILTIAZEM HYDROCHLORIDE 120 MG/1
120 CAPSULE, COATED, EXTENDED RELEASE ORAL
Status: DISCONTINUED | OUTPATIENT
Start: 2025-05-18 | End: 2025-05-18

## 2025-05-18 RX ORDER — MOXIFLOXACIN 5 MG/ML
1 SOLUTION/ DROPS OPHTHALMIC 4 TIMES DAILY
COMMUNITY

## 2025-05-18 RX ORDER — PREGABALIN 50 MG/1
50 CAPSULE ORAL 2 TIMES DAILY
COMMUNITY
Start: 2025-03-07 | End: 2025-09-03

## 2025-05-18 RX ORDER — PREDNISOLONE ACETATE 10 MG/ML
1 SUSPENSION/ DROPS OPHTHALMIC
Status: DISCONTINUED | OUTPATIENT
Start: 2025-05-18 | End: 2025-05-22 | Stop reason: HOSPADM

## 2025-05-18 RX ORDER — BISACODYL 10 MG
10 SUPPOSITORY, RECTAL RECTAL DAILY PRN
Status: DISCONTINUED | OUTPATIENT
Start: 2025-05-18 | End: 2025-05-22 | Stop reason: HOSPADM

## 2025-05-18 RX ORDER — MONTELUKAST SODIUM 10 MG/1
10 TABLET ORAL NIGHTLY
Status: DISCONTINUED | OUTPATIENT
Start: 2025-05-18 | End: 2025-05-22 | Stop reason: HOSPADM

## 2025-05-18 RX ORDER — FLUTICASONE FUROATE, UMECLIDINIUM BROMIDE AND VILANTEROL TRIFENATATE 100; 62.5; 25 UG/1; UG/1; UG/1
POWDER RESPIRATORY (INHALATION)
COMMUNITY
Start: 2025-02-07

## 2025-05-18 RX ORDER — SODIUM CHLORIDE 9 MG/ML
40 INJECTION, SOLUTION INTRAVENOUS AS NEEDED
Status: DISCONTINUED | OUTPATIENT
Start: 2025-05-18 | End: 2025-05-22 | Stop reason: HOSPADM

## 2025-05-18 RX ORDER — IPRATROPIUM BROMIDE AND ALBUTEROL SULFATE 2.5; .5 MG/3ML; MG/3ML
6 SOLUTION RESPIRATORY (INHALATION) ONCE
Status: COMPLETED | OUTPATIENT
Start: 2025-05-18 | End: 2025-05-18

## 2025-05-18 RX ORDER — DILTIAZEM HYDROCHLORIDE 120 MG/1
120 CAPSULE, COATED, EXTENDED RELEASE ORAL DAILY
COMMUNITY
Start: 2024-12-10 | End: 2025-05-22 | Stop reason: HOSPADM

## 2025-05-18 RX ORDER — BUMETANIDE 1 MG/1
1 TABLET ORAL 2 TIMES DAILY
Status: DISCONTINUED | OUTPATIENT
Start: 2025-05-18 | End: 2025-05-22 | Stop reason: HOSPADM

## 2025-05-18 RX ORDER — MULTIPLE VITAMINS W/ MINERALS TAB 9MG-400MCG
1 TAB ORAL DAILY
Status: DISCONTINUED | OUTPATIENT
Start: 2025-05-18 | End: 2025-05-18

## 2025-05-18 RX ORDER — SPIRONOLACTONE 25 MG/1
25 TABLET ORAL DAILY
COMMUNITY
Start: 2025-03-07

## 2025-05-18 RX ORDER — DULOXETIN HYDROCHLORIDE 30 MG/1
30 CAPSULE, DELAYED RELEASE ORAL DAILY
Status: DISCONTINUED | OUTPATIENT
Start: 2025-05-18 | End: 2025-05-22 | Stop reason: HOSPADM

## 2025-05-18 RX ORDER — BUMETANIDE 1 MG/1
1 TABLET ORAL 2 TIMES DAILY
COMMUNITY
Start: 2025-03-07 | End: 2025-09-03

## 2025-05-18 RX ORDER — DEXAMETHASONE SODIUM PHOSPHATE 10 MG/ML
6 INJECTION, SOLUTION INTRA-ARTICULAR; INTRALESIONAL; INTRAMUSCULAR; INTRAVENOUS; SOFT TISSUE DAILY
Status: DISCONTINUED | OUTPATIENT
Start: 2025-05-19 | End: 2025-05-20

## 2025-05-18 RX ORDER — ALBUTEROL SULFATE 1.25 MG/3ML
1.25 SOLUTION RESPIRATORY (INHALATION) EVERY 4 HOURS PRN
Status: DISCONTINUED | OUTPATIENT
Start: 2025-05-18 | End: 2025-05-22 | Stop reason: HOSPADM

## 2025-05-18 RX ORDER — AMOXICILLIN 250 MG
2 CAPSULE ORAL 2 TIMES DAILY PRN
Status: DISCONTINUED | OUTPATIENT
Start: 2025-05-18 | End: 2025-05-22 | Stop reason: HOSPADM

## 2025-05-18 RX ORDER — SILDENAFIL CITRATE 20 MG/1
20 TABLET ORAL 3 TIMES DAILY
Status: DISCONTINUED | OUTPATIENT
Start: 2025-05-18 | End: 2025-05-18

## 2025-05-18 RX ORDER — ESCITALOPRAM OXALATE 10 MG/1
10 TABLET ORAL DAILY
Status: DISCONTINUED | OUTPATIENT
Start: 2025-05-18 | End: 2025-05-22 | Stop reason: HOSPADM

## 2025-05-18 RX ORDER — ONDANSETRON 2 MG/ML
4 INJECTION INTRAMUSCULAR; INTRAVENOUS ONCE
Status: COMPLETED | OUTPATIENT
Start: 2025-05-18 | End: 2025-05-18

## 2025-05-18 RX ORDER — ENOXAPARIN SODIUM 100 MG/ML
40 INJECTION SUBCUTANEOUS DAILY
Status: DISCONTINUED | OUTPATIENT
Start: 2025-05-18 | End: 2025-05-18

## 2025-05-18 RX ORDER — DULOXETIN HYDROCHLORIDE 30 MG/1
30 CAPSULE, DELAYED RELEASE ORAL DAILY
COMMUNITY
Start: 2025-03-07 | End: 2025-09-03

## 2025-05-18 RX ORDER — AMLODIPINE BESYLATE 10 MG/1
10 TABLET ORAL
Status: DISCONTINUED | OUTPATIENT
Start: 2025-05-18 | End: 2025-05-18

## 2025-05-18 RX ORDER — BUDESONIDE AND FORMOTEROL FUMARATE DIHYDRATE 160; 4.5 UG/1; UG/1
2 AEROSOL RESPIRATORY (INHALATION)
Status: DISCONTINUED | OUTPATIENT
Start: 2025-05-18 | End: 2025-05-18

## 2025-05-18 RX ORDER — PANTOPRAZOLE SODIUM 40 MG/1
40 TABLET, DELAYED RELEASE ORAL DAILY
Status: DISCONTINUED | OUTPATIENT
Start: 2025-05-18 | End: 2025-05-22 | Stop reason: HOSPADM

## 2025-05-18 RX ORDER — POLYETHYLENE GLYCOL 3350 17 G/17G
17 POWDER, FOR SOLUTION ORAL DAILY PRN
Status: DISCONTINUED | OUTPATIENT
Start: 2025-05-18 | End: 2025-05-22 | Stop reason: HOSPADM

## 2025-05-18 RX ADMIN — INSULIN LISPRO 3 UNITS: 100 INJECTION, SOLUTION INTRAVENOUS; SUBCUTANEOUS at 16:50

## 2025-05-18 RX ADMIN — ASPIRIN 81 MG: 81 TABLET, COATED ORAL at 16:46

## 2025-05-18 RX ADMIN — BRIMONIDINE TARTRATE 1 DROP: 2 SOLUTION/ DROPS OPHTHALMIC at 20:36

## 2025-05-18 RX ADMIN — BRIMONIDINE TARTRATE 1 DROP: 2 SOLUTION/ DROPS OPHTHALMIC at 16:51

## 2025-05-18 RX ADMIN — IPRATROPIUM BROMIDE AND ALBUTEROL SULFATE 3 ML: .5; 3 SOLUTION RESPIRATORY (INHALATION) at 14:24

## 2025-05-18 RX ADMIN — PREDNISOLONE ACETATE 1 DROP: 10 SUSPENSION/ DROPS OPHTHALMIC at 16:51

## 2025-05-18 RX ADMIN — IPRATROPIUM BROMIDE AND ALBUTEROL SULFATE 3 ML: .5; 3 SOLUTION RESPIRATORY (INHALATION) at 09:40

## 2025-05-18 RX ADMIN — PREGABALIN 50 MG: 50 CAPSULE ORAL at 20:33

## 2025-05-18 RX ADMIN — MONTELUKAST 10 MG: 10 TABLET, FILM COATED ORAL at 20:33

## 2025-05-18 RX ADMIN — METHYLPREDNISOLONE SODIUM SUCCINATE 125 MG: 125 INJECTION, POWDER, FOR SOLUTION INTRAMUSCULAR; INTRAVENOUS at 00:22

## 2025-05-18 RX ADMIN — TRAZODONE HYDROCHLORIDE 150 MG: 100 TABLET ORAL at 20:33

## 2025-05-18 RX ADMIN — IPRATROPIUM BROMIDE AND ALBUTEROL SULFATE 6 ML: .5; 3 SOLUTION RESPIRATORY (INHALATION) at 02:34

## 2025-05-18 RX ADMIN — DEXAMETHASONE 6 MG: 6 TABLET ORAL at 09:48

## 2025-05-18 RX ADMIN — MOXIFLOXACIN OPHTHALMIC 0.05 ML: 5 SOLUTION/ DROPS OPHTHALMIC at 20:36

## 2025-05-18 RX ADMIN — ERGOCALCIFEROL 50000 UNITS: 1.25 CAPSULE ORAL at 16:46

## 2025-05-18 RX ADMIN — NIRMATRELVIR AND RITONAVIR 3 TABLET: KIT at 20:33

## 2025-05-18 RX ADMIN — PREDNISOLONE ACETATE 1 DROP: 10 SUSPENSION/ DROPS OPHTHALMIC at 20:36

## 2025-05-18 RX ADMIN — SPIRONOLACTONE 25 MG: 25 TABLET ORAL at 16:45

## 2025-05-18 RX ADMIN — INSULIN GLARGINE 22 UNITS: 100 INJECTION, SOLUTION SUBCUTANEOUS at 16:51

## 2025-05-18 RX ADMIN — DULOXETINE 30 MG: 30 CAPSULE, DELAYED RELEASE ORAL at 16:45

## 2025-05-18 RX ADMIN — ACETAMINOPHEN 650 MG: 325 TABLET, FILM COATED ORAL at 07:56

## 2025-05-18 RX ADMIN — APIXABAN 2.5 MG: 2.5 TABLET, FILM COATED ORAL at 20:33

## 2025-05-18 RX ADMIN — IPRATROPIUM BROMIDE AND ALBUTEROL SULFATE 9 ML: .5; 3 SOLUTION RESPIRATORY (INHALATION) at 00:52

## 2025-05-18 RX ADMIN — AZITHROMYCIN MONOHYDRATE 500 MG: 500 INJECTION, POWDER, LYOPHILIZED, FOR SOLUTION INTRAVENOUS at 01:44

## 2025-05-18 RX ADMIN — BUDESONIDE AND FORMOTEROL FUMARATE DIHYDRATE 2 PUFF: 160; 4.5 AEROSOL RESPIRATORY (INHALATION) at 19:18

## 2025-05-18 RX ADMIN — ONDANSETRON 4 MG: 2 INJECTION, SOLUTION INTRAMUSCULAR; INTRAVENOUS at 03:39

## 2025-05-18 RX ADMIN — CEFTRIAXONE SODIUM 1000 MG: 1 INJECTION, POWDER, FOR SOLUTION INTRAMUSCULAR; INTRAVENOUS at 01:00

## 2025-05-18 RX ADMIN — REVEFENACIN 175 MCG: 175 SOLUTION RESPIRATORY (INHALATION) at 14:25

## 2025-05-18 RX ADMIN — Medication 10 ML: at 09:00

## 2025-05-18 RX ADMIN — INSULIN LISPRO 3 UNITS: 100 INJECTION, SOLUTION INTRAVENOUS; SUBCUTANEOUS at 12:21

## 2025-05-18 RX ADMIN — TAMSULOSIN HYDROCHLORIDE 0.4 MG: 0.4 CAPSULE ORAL at 16:46

## 2025-05-18 RX ADMIN — INSULIN LISPRO 3 UNITS: 100 INJECTION, SOLUTION INTRAVENOUS; SUBCUTANEOUS at 22:51

## 2025-05-18 RX ADMIN — ESCITALOPRAM 10 MG: 10 TABLET, FILM COATED ORAL at 16:46

## 2025-05-18 RX ADMIN — IPRATROPIUM BROMIDE AND ALBUTEROL SULFATE 3 ML: .5; 3 SOLUTION RESPIRATORY (INHALATION) at 19:14

## 2025-05-18 RX ADMIN — ENOXAPARIN SODIUM 40 MG: 100 INJECTION SUBCUTANEOUS at 09:48

## 2025-05-18 RX ADMIN — INSULIN LISPRO 3 UNITS: 100 INJECTION, SOLUTION INTRAVENOUS; SUBCUTANEOUS at 09:48

## 2025-05-18 NOTE — PLAN OF CARE
Goal Outcome Evaluation:  Plan of Care Reviewed With: patient        Progress: no change  Outcome Evaluation: Pt alert and oriented X4. On 2L NC; baseline O2. Fine crackles/ ronchi heard throughout lungs.

## 2025-05-18 NOTE — CONSULTS
Pulmonary Consultation     Patient Name: Chuckie Vadlez  Age/Sex: 70 y.o. male  : 1955  MRN: [id number]    Date of Admission: 2025  Date of Encounter Visit: 25  Encounter Provider: Diamond Ann MD  Referring Provider: No ref. provider found  Place of Service: [facility]  Patient Care Team:  Provider, No Known as PCP - General      Subjective:     Consulted for: COVID-19 infection, dyspnea and hypoxemia    Chief Complaint: Worsening shortness of breath    History of Present Illness:  Chuckie Valdez is a 70 y.o. male with history of pulmonary hypertension, likely severe given his pulmonary artery pressure on his CT scan however his echocardiogram done lately showed increased RV pressures suspected given the position of the interventricular septum but could not estimate the actual RVSP.  Patient also has underlying advanced COPD  His chest x-ray on this admission looks like he had pneumonia however when compared to his film from several years ago it looks very identical, this increased perihilar fullness is likely the enlarged pulmonary artery which based on the CAT scan it is huge and his pulmonary arteries are significant dilated even distally specially over the lower lobe consistent with probably severe pulmonary hypertension  Patient is on oxygen 24 7, at 2 L/min during the daytime and bled into his BiPAP at night  He cannot sleep without his BiPAP and last night was a miserable night according to him.  He has been feeling sick for the last few days, 4 to 5 days with cough and congestion  His last COVID vaccination was almost 3 years ago  He is not aware of any confirmed sick exposure  He denies any worsening edema  For his pulmonary hypertension is only on sildenafi  He follow-up with a pulmonologist and a pulmonary hypertension specialist  No fever  No hemoptysis  No pleurisy  No GI or  complaints  The interview was done using the translation service provided by  "[employer]    Pulmonary Functions Testing Results:    No results found for: \"FEV1\", \"FVC\", \"QFT8VID\", \"TLC\", \"DLCO\"    Review of Systems:   Review of Systems  As per above    Past Medical History:  Past Medical History:   Diagnosis Date    COPD (chronic obstructive pulmonary disease)     CTEPH (chronic thromboembolic pulmonary hypertension)     HCV (hepatitis C virus)     History of tracheostomy     Pulmonary hypertension     Type 2 diabetes mellitus        Past Surgical History:   Procedure Laterality Date    BRONCHOSCOPY N/A 9/27/2017    Procedure: BRONCHOSCOPY WITH WASHINGS RIGHT MIDDLE LOBE AND LINGULAR with balloon dilation 12MM-15MM AND 18MM-19MM;  Surgeon: Herb Anglin MD;  Location: [facility] ENDOSCOPY;  Service:     BRONCHOSCOPY N/A 9/29/2017    Procedure: BRONCHOSCOPY WITH BALLON DILATION 19MM AND STENT PLACEMENT;  Surgeon: Herb Anglin MD;  Location: [facility] OR;  Service:     BRONCHOSCOPY N/A 9/29/2017    Procedure: BRONCHOSCOPY WITH STENT REMOVAL;  Surgeon: Herb Anglin MD;  Location: [facility] OR;  Service:        Home Medications:   Medications Prior to Admission   Medication Sig Dispense Refill Last Dose/Taking    acetaminophen (TYLENOL) 325 MG tablet Take 1 tablet by mouth Every 4 (Four) Hours As Needed for Mild Pain.   Taking As Needed    albuterol (PROVENTIL) (2.5 MG/3ML) 0.083% nebulizer solution Take 2.5 mg by nebulization Every 4 (Four) Hours As Needed for Wheezing (every 2 hours as needed for soa related to copd).   Taking As Needed    apixaban (ELIQUIS) 5 MG tablet tablet Take 1 tablet by mouth 2 (Two) Times a Day.   Taking    aspirin 81 MG EC tablet Take 1 tablet by mouth Daily.   Taking    atorvastatin (LIPITOR) 20 MG tablet Take 2 tablets by mouth Every Night.   Taking    brimonidine (ALPHAGAN) 0.2 % ophthalmic solution Administer 1 drop into the left eye 3 (Three) Times a Day.   Taking    budesonide-formoterol (SYMBICORT) 80-4.5 MCG/ACT inhaler Inhale 2 puffs 2 (Two) " Times a Day.   Taking    bumetanide (BUMEX) 1 MG tablet Take 1 tablet by mouth 2 (Two) Times a Day.   Taking    DULoxetine (CYMBALTA) 30 MG capsule Take 1 capsule by mouth Daily.   Taking    ergocalciferol (ERGOCALCIFEROL) 1.25 MG (12409 UT) capsule Take 1 capsule by mouth 1 (One) Time Per Week.   Taking    escitalopram (LEXAPRO) 20 MG tablet TOME 1 TABLETAS TODAS LAS NOCHES (Patient taking differently: Take 0.5 tablets by mouth Daily.) 14 tablet 0 Taking Differently    ferrous sulfate 325 (65 FE) MG tablet Take 1 tablet by mouth 2 (Two) Times a Day.   Taking    Fluticasone-Umeclidin-Vilant (Trelegy Ellipta) 100-62.5-25 MCG/ACT inhaler inhale 1 erin por via oral cada peng   Taking    insulin aspart (novoLOG) 100 UNIT/ML injection Inject 6 Units under the skin 3 (Three) Times a Day Before Meals. 1 each 0 Taking    Insulin Glargine (Lantus SoloStar) 100 UNIT/ML injection pen Inject 22 Units under the skin into the appropriate area as directed Daily.   Taking    metFORMIN (GLUCOPHAGE) 500 MG tablet JORGE 1 TABLETA POR VIA ORAL 2 VECES AL PENG CON COMIDA   Taking    montelukast (SINGULAIR) 10 MG tablet Take 1 tablet by mouth Every Night.   Taking    moxifloxacin (VIGAMOX) 0.5 % ophthalmic solution Administer 0.05 mL into the left eye 4 (Four) Times a Day.   Taking    pantoprazole (PROTONIX) 40 MG EC tablet Take 1 tablet by mouth Daily.   Taking    prednisoLONE acetate (PRED FORTE) 1 % ophthalmic suspension Administer 1 drop into the left eye 6 (Six) Times a Day.   Taking    predniSONE (DELTASONE) 5 MG tablet Take 1 tablet by mouth Daily.   Taking    pregabalin (LYRICA) 50 MG capsule Take 1 capsule by mouth 2 (Two) Times a Day.   Taking    sildenafil (REVATIO) 20 MG tablet Take 1 tablet by mouth 3 (Three) Times a Day.   Taking    spironolactone (ALDACTONE) 25 MG tablet Take 1 tablet by mouth Daily.   Taking    tamsulosin (FLOMAX) 0.4 MG capsule 24 hr capsule Take 1 capsule by mouth Daily. 30 capsule 0 Taking    traZODone  (DESYREL) 50 MG tablet Take 3 tablets by mouth Every Night.   Taking    amLODIPine (NORVASC) 10 MG tablet Take 1 tablet by mouth Daily. 30 tablet 0     budesonide-formoterol (SYMBICORT) 160-4.5 MCG/ACT inhaler Inhale 2 puffs 2 (Two) Times a Day. (Patient not taking: Reported on 5/18/2025)   Not Taking    dilTIAZem CD (CARDIZEM CD) 120 MG 24 hr capsule Take 1 capsule by mouth Daily. (Patient not taking: Reported on 5/18/2025)   Not Taking    dilTIAZem XR (DILACOR XR) 120 MG 24 hr capsule Take 1 capsule by mouth Daily.       doxazosin (CARDURA) 4 MG tablet TOME 2 TABLETAS AL PENG 60 tablet 1     furosemide (LASIX) 40 MG tablet Take 40 mg by mouth Daily. (Patient not taking: Reported on 5/18/2025)   Not Taking    insulin aspart (novoLOG) 100 UNIT/ML injection Inject 0-7 Units under the skin 4 (Four) Times a Day With Meals & at Bedtime.  12     insulin detemir (LEVEMIR) 100 UNIT/ML injection Inject 8 Units under the skin Daily.       ipratropium-albuterol (DUO-NEB) 0.5-2.5 mg/mL nebulizer Take 3 mL by nebulization Every 4 (Four) Hours.       melatonin 1 MG tablet Take 3 mg by mouth At Night As Needed for Sleep (for sleep).       metoprolol tartrate (LOPRESSOR) 25 MG tablet Take 12.5 mg by mouth 2 (Two) Times a Day.       Multiple Vitamins-Minerals (MULTIVITAMIN ADULT PO) Take 1 tablet by mouth Daily.       ondansetron (ZOFRAN) 4 MG tablet Take 4 mg by mouth Every 6 (Six) Hours As Needed for Nausea or Vomiting.       sennosides-docusate sodium (SENOKOT-S) 8.6-50 MG tablet Take 2 tablets by mouth Every Night. 30 tablet 0        Inpatient Medications:  Scheduled Meds:amLODIPine, 10 mg, Oral, Q24H  apixaban, 5 mg, Oral, Q12H  aspirin, 81 mg, Oral, Daily  atorvastatin, 40 mg, Oral, Nightly  brimonidine, 1 drop, Left Eye, TID  budesonide-formoterol, 2 puff, Inhalation, BID - RT   And  revefenacin, 175 mcg, Nebulization, Daily - RT  bumetanide, 1 mg, Oral, BID  dexAMETHasone, 6 mg, Intravenous, Daily  dilTIAZem CD, 120 mg,  Oral, Q24H  DULoxetine, 30 mg, Oral, Daily  enoxaparin sodium, 40 mg, Subcutaneous, Daily  ergocalciferol, 50,000 Units, Oral, Weekly  escitalopram, 10 mg, Oral, Daily  [START ON 2025] ferrous sulfate, 325 mg, Oral, Daily With Breakfast  insulin glargine, 22 Units, Subcutaneous, Daily  insulin lispro, 2-7 Units, Subcutaneous, 4x Daily AC & at Bedtime  insulin lispro, 6 Units, Subcutaneous, TID With Meals  ipratropium-albuterol, 3 mL, Nebulization, Q6H While Awake - RT  metoprolol tartrate, 12.5 mg, Oral, Q12H  montelukast, 10 mg, Oral, Nightly  moxifloxacin, 1 drop, Left Eye, 4x Daily  multivitamin with minerals, 1 tablet, Oral, Daily  pantoprazole, 40 mg, Oral, Daily  prednisoLONE acetate, 1 drop, Left Eye, 6x Daily  pregabalin, 50 mg, Oral, BID  sennosides-docusate, 2 tablet, Oral, Nightly  sildenafil, 20 mg, Oral, TID  sodium chloride, 10 mL, Intravenous, Q12H  spironolactone, 25 mg, Oral, Daily  tamsulosin, 0.4 mg, Oral, Daily  terazosin, 5 mg, Oral, Nightly  traZODone, 150 mg, Oral, Nightly      Continuous Infusions:Pharmacy Consult,       PRN Meds:.  acetaminophen **OR** acetaminophen **OR** acetaminophen    albuterol    senna-docusate sodium **AND** polyethylene glycol **AND** bisacodyl **AND** bisacodyl    dextrose    dextrose    famotidine    glucagon (human recombinant)    ipratropium-albuterol    melatonin    ondansetron    ondansetron ODT    Pharmacy Consult    sodium chloride    sodium chloride    sodium chloride    Allergies:  Allergies   Allergen Reactions    Nitrates, Organic     Penicillins        Past Social History:  Social History     Socioeconomic History    Marital status: Single   Tobacco Use    Smoking status: Former     Current packs/day: 0.00     Average packs/day: 1 pack/day for 15.0 years (15.0 ttl pk-yrs)     Types: Cigarettes     Start date:      Quit date:      Years since quittin.3   Vaping Use    Vaping status: Never Used   Substance and Sexual Activity    Alcohol  use: Never    Drug use: Never    Sexual activity: Never       Past Family History:  History reviewed. No pertinent family history.        Objective:   Temp:  [97.1 °F (36.2 °C)-98.9 °F (37.2 °C)] 97.1 °F (36.2 °C)  Heart Rate:  [69-98] 69  Resp:  [20-22] 20  BP: (147-176)/(62-96) 147/69  SpO2:  [91 %-98 %] 98 %  on  Flow (L/min) (Oxygen Therapy):  [2-8] 6 Device (Oxygen Therapy): nasal cannula     Intake/Output Summary (Last 24 hours) at 5/18/2025 1328  Last data filed at 5/18/2025 1320  Gross per 24 hour   Intake 830 ml   Output 400 ml   Net 430 ml     Body mass index is 23.69 kg/m².      05/18/25  0621   Weight: 74.9 kg (165 lb 2 oz)     Weight change:     Physical Exam:   Physical Exam   General:    No acute distress, alert and oriented x4, pleasant                   Head:    Normocephalic, atraumatic. External ears and nose are normal   Eyes:          Conjunctivae and sclerae normal, no icterus, PERRLA, no  discharge   Throat:   No oral lesions, no thrush, oral mucosa moist.    Neck:   Supple, trachea midline. No JVD, no cervical or supraclavicular lymphadenopathy    Lungs:     Normal chest on inspection, CTAB, no wheezes. No rhonchi. No crackles. Respirations regular, even and unlabored.      Heart:    Regular rhythm and normal rate.  No murmurs, gallops, or rubs noted.   Abdomen:     Soft, nontender, nondistended, positive bowel sounds. No hepatosplenomegaly    Extremities:   No clubbing, cyanosis, or edema.     Pulses:   Pulses palpable and equal bilaterally.    Skin:   No bleeding or rash. No bumps, good turgor pressure    Neuro:   Nonfocal.  Moves all extremities well. Strength 5/5 and symmetrical, no sensory deficit    Psychiatric:   Normal mood and affect.     Lab Review:   Results from last 7 days   Lab Units 05/18/25  0748 05/17/25  2338   SODIUM mmol/L 138 139   POTASSIUM mmol/L 5.0 3.5   CHLORIDE mmol/L 101 104   CO2 mmol/L 29.0 28.3   BUN mg/dL 20 24*   CREATININE mg/dL 0.99 0.91   GLUCOSE mg/dL 220*  "188*   CALCIUM mg/dL 9.1 8.8   AST (SGOT) U/L 38 23   ALT (SGPT) U/L 42* 29   ALBUMIN g/dL 4.2 4.0     Results from last 7 days   Lab Units 05/18/25  0050 05/17/25  2338   HSTROP T ng/L 18 17     Results from last 7 days   Lab Units 05/18/25  0749 05/17/25  2338   WBC 10*3/mm3 8.85 10.07   HEMOGLOBIN g/dL 14.6 14.4   HEMATOCRIT % 45.6 44.1   PLATELETS 10*3/mm3 257 262   MCV fL 87.2 86.6   MCH pg 27.9 28.3   MCHC g/dL 32.0 32.7   RDW % 12.3 12.6   RDW-SD fl 39.3 40.0   MPV fL 9.2 9.2   NEUTROPHIL % %  --  82.3*   LYMPHOCYTE % %  --  8.8*   MONOCYTES % %  --  7.5   EOSINOPHIL % %  --  0.7   BASOPHIL % %  --  0.3   IMM GRAN % %  --  0.4   NEUTROS ABS 10*3/mm3  --  8.28*   LYMPHS ABS 10*3/mm3  --  0.89   MONOS ABS 10*3/mm3  --  0.76   EOS ABS 10*3/mm3  --  0.07   BASOS ABS 10*3/mm3  --  0.03   IMMATURE GRANS (ABS) 10*3/mm3  --  0.04   NRBC /100 WBC  --  0.0                   Invalid input(s): \"LDLCALC\"  Results from last 7 days   Lab Units 05/17/25  2338   PROBNP pg/mL 482.0             Results from last 7 days   Lab Units 05/18/25  0031   LACTATE mmol/L 0.9                     Results from last 7 days   Lab Units 05/18/25  0012   COVID19  Detected*   ADENOVIRUS DETECTION BY PCR  Not Detected   CORONAVIRUS 229E  Not Detected   CORONAVIRUS HKU1  Not Detected   CORONAVIRUS NL63  Not Detected   CORONAVIRUS OC43  Not Detected   HUMAN METAPNEUMOVIRUS  Not Detected   HUMAN RHINOVIRUS/ENTEROVIRUS  Not Detected   INFLUENZA B PCR  Not Detected   PARAINFLUENZA 1  Not Detected   PARAINFLUENZA VIRUS 2  Not Detected   PARAINFLUENZA VIRUS 3  Not Detected   PARAINFLUENZA VIRUS 4  Not Detected   BORDETELLA PERTUSSIS PCR  Not Detected   BORDETELLA PARAPERTUSSIS PCR  Not Detected   CHLAMYDOPHILA PNEUMONIAE PCR  Not Detected   MYCOPLAMA PNEUMO PCR  Not Detected   RSV, PCR  Not Detected             Echocardiogram 6/24/2023:  The left ventricle is normal in size and wall thickness. Left ventricular   systolic function is mildly reduced. " Calculated left ventricular ejection   fraction of 44 % (Biplane Benjamin's method).   Moderate right ventricle enlargement. Moderate global right ventricular   hypokinesis. TAPSE is 11 mm.   Flattened septum is consistent with RV pressure/volume overload.   Normal left atrium, moderately dilated right atrium.   Insufficient TR jet to estimate PASP.   No hemodynamically significant valvular abnormalities.   No pericardal effusion.   Normal sized IVC and respirophasic variation.     Imaging:  Imaging Results (Most Recent)       Procedure Component Value Units Date/Time    XR Chest 1 View [[id number]] Collected: 05/18/25 0000     Updated: 05/18/25 0005    Narrative:      CXR ONE VIEW      HISTORY: CHF/COPD Protocol     COMPARISON: 10/1/2017     TECHNIQUE: single portable AP       Impression:         Redemonstrated mild cardiomegaly.     Redemonstrated bilateral hilar fullness.     There is no consolidation, effusion or pneumothorax.     This report was finalized on 5/18/2025 12:02 AM by Dr. Dane Guevara M.D on Workstation: [id number]                 I personally viewed and interpreted the patient's imaging studies.    Assessment:   COVID-19 infection  COPD with acute exacerbation  Pulmonary hypertension, CTEPH, likely severe  Acute on chronic hypoxemic respiratory failure secondary to the above  Atrial fibrillation  Diabetes mellitus type 2  Essential hypertension  BPH  Obstructive sleep apnea    Plan:     His oxygen requirements are at 5 L, which is acute on chronic since he uses 2 L/min at rest  He needs to have orders for a BiPAP at night because he could not sleep without it  Patient has confirmed COVID infection, I recommend to add oral Paxlovid  While on Paxlovid Eliquis dose will be reduced  While on Paxlovid the sildenafil would be on hold  He is already on the Decadron  Continue with the current bronchodilator nebulizer regimen  Will order his BiPAP for tonight with the instruction to the patient to  notify us if the pressure is far from his home setting, we can always modify  I do not think he has pneumonia, even though the chest x-ray is suggestive but when compared to his previous from several years ago it looks almost identical, we will review the CAT scan for final reassurance, will consider stopping the antibiotic if pneumonia is very unlikely.  Otherwise treatment is going to continue with the supportive measures  Continue his home regimen otherwise    Records reviewed, will follow-up    Thank you for allowing me to participate in the care of Chuckie Valdez. Feel free to contact me directly with any further questions or concerns.    Diamond Ann MD  [facility]   05/18/25  13:28 EDT    Dictated utilizing Dragon dictation

## 2025-05-18 NOTE — ED PROVIDER NOTES
EMERGENCY DEPARTMENT ENCOUNTER    History  Chief Complaint   Patient presents with    Shortness of Breath       History provided by: Patient    HPI:  Context: Chuckie Valdez is a 70 y.o. male with a medical history of chronic respiratory failure, pulmonary hypertension, COPD, CHF who presents to the ED c/o acute shortness of breath.  Symptoms started a few days ago.  He has had a productive cough, worsening shortness of breath and wheezing.  He does wear 2 L of oxygen at baseline, which not had to be increased.  No real lower extremity swelling today.  No fever, has had some congestion.      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    Acute on chronic diastolic CHF (congestive heart failure) 10/06/2017     Resolved Ambulatory Problems     Diagnosis Date Noted    Pneumonia of left lower lobe due to infectious organism 09/04/2017     Past Medical History:   Diagnosis Date    COPD (chronic obstructive pulmonary disease)     HCV (hepatitis C virus)     History of tracheostomy     Pulmonary hypertension     Type 2 diabetes mellitus        Past Surgical History:  Past Surgical History:   Procedure Laterality Date    BRONCHOSCOPY N/A 9/27/2017    Procedure: BRONCHOSCOPY WITH WASHINGS RIGHT MIDDLE LOBE AND LINGULAR with balloon dilation 12MM-15MM AND 18MM-19MM;  Surgeon: Herb Anglin MD;  Location: [facility] ENDOSCOPY;  Service:     BRONCHOSCOPY N/A 9/29/2017    Procedure: BRONCHOSCOPY WITH BALLON DILATION 19MM AND STENT PLACEMENT;  Surgeon: Herb Anglin MD;  Location: [facility] OR;  Service:     BRONCHOSCOPY N/A 9/29/2017    Procedure: BRONCHOSCOPY WITH  STENT REMOVAL;  Surgeon: Herb Anglin MD;  Location: [facility] OR;  Service:          Family History:  No family history on file.      Social History:  Social History     Socioeconomic History    Marital status: Single   Tobacco Use    Smoking status: Former     Current packs/day: 0.00     Average packs/day: 1 pack/day for 15.0 years (15.0 ttl pk-yrs)     Types: Cigarettes     Start date:      Quit date:      Years since quittin.3         Allergies:  Nitrates, organic and Penicillins        Physical Exam  ED Triage Vitals [25 2328]   Temp Heart Rate Resp BP SpO2   98.9 °F (37.2 °C) 98 20 168/96 96 %      Temp src Heart Rate Source Patient Position BP Location FiO2 (%)   Tympanic -- -- -- --     Physical Exam  Constitutional:       Appearance: Normal appearance.   HENT:      Head: Atraumatic.   Eyes:      Conjunctiva/sclera: Conjunctivae normal.   Cardiovascular:      Rate and Rhythm: Normal rate and regular rhythm.      Heart sounds: No murmur heard.  Pulmonary:      Effort: Pulmonary effort is normal. Tachypnea present.      Breath sounds: Decreased breath sounds and wheezing present.   Abdominal:      Tenderness: There is no abdominal tenderness. There is no guarding or rebound.   Skin:     Capillary Refill: Capillary refill takes less than 2 seconds.   Neurological:      Mental Status: He is alert and oriented to person, place, and time.         Medications Given in ER:   Medications   sodium chloride 0.9 % flush 10 mL (has no administration in time range)   ondansetron (ZOFRAN) injection 4 mg (has no administration in time range)   ipratropium-albuterol (DUO-NEB) nebulizer solution 9 mL (9 mL Nebulization Given 25 0052)   methylPREDNISolone sodium succinate (SOLU-Medrol) injection 125 mg (125 mg Intravenous Given 25 0022)   cefTRIAXone (ROCEPHIN) 1,000 mg in sodium chloride 0.9 % 100 mL MBP (0 mg Intravenous Stopped 25 0138)   azithromycin (ZITHROMAX) 500 mg in sodium  chloride 0.9 % 250 mL IVPB-VTB (500 mg Intravenous New Bag 5/18/25 0144)   ipratropium-albuterol (DUO-NEB) nebulizer solution 6 mL (6 mL Nebulization Given 5/18/25 0234)         Orders Placed:  Orders Placed This Encounter   Procedures    Respiratory Panel PCR w/COVID-19(SARS-CoV-2) AMADEO/XAVI/GERMÁN/PAD/COR/SAMANTHA In-House, NP Swab in UTM/VTM, 2 HR TAT - Swab, Nasopharynx    Blood Culture - Blood,    Blood Culture - Blood,    XR Chest 1 View    [place] Draw    Comprehensive Metabolic Panel    BNP    High Sensitivity Troponin T    CBC Auto Differential    Lactic Acid, Plasma    High Sensitivity Troponin T 1Hr    Continuous Pulse Oximetry    Vital Signs    LHA (on-call MD unless specified) Details    Oxygen Therapy- Nasal Cannula; Titrate 1-6 LPM Per SpO2; 90 - 95%    ECG 12 Lead Dyspnea    Insert Peripheral IV    Inpatient Admission    CBC & Differential    Green Top (Gel)    Lavender Top    Gold Top - SST    Light Blue Top         Outpatient Medication Management:   Current Facility-Administered Medications Ordered in Epic   Medication Dose Route Frequency Provider Last Rate Last Admin    ondansetron (ZOFRAN) injection 4 mg  4 mg Intravenous Once Sue Andrew MD        sodium chloride 0.9 % flush 10 mL  10 mL Intravenous PRN Sue Andrew MD         Current Outpatient Medications Ordered in Epic   Medication Sig Dispense Refill    acetaminophen (TYLENOL) 325 MG tablet Take 325 mg by mouth Every 4 (Four) Hours As Needed for Mild Pain .      albuterol (PROVENTIL) (2.5 MG/3ML) 0.083% nebulizer solution Take 2.5 mg by nebulization Every 4 (Four) Hours As Needed for Wheezing (every 2 hours as needed for soa related to copd).      amLODIPine (NORVASC) 10 MG tablet Take 1 tablet by mouth Daily. 30 tablet 0    apixaban (ELIQUIS) 5 MG tablet tablet Take 5 mg by mouth 2 (Two) Times a Day.      atorvastatin (LIPITOR) 20 MG tablet Take 20 mg by mouth Every Night.      budesonide-formoterol (SYMBICORT) 160-4.5 MCG/ACT inhaler  Inhale 2 puffs 2 (Two) Times a Day.      doxazosin (CARDURA) 4 MG tablet TOME 2 TABLETAS AL PENG 60 tablet 1    escitalopram (LEXAPRO) 20 MG tablet TOME 1 TABLETAS TODAS LAS NOCHES 14 tablet 0    ferrous sulfate 325 (65 FE) MG tablet Take 325 mg by mouth 2 (Two) Times a Day.      furosemide (LASIX) 40 MG tablet Take 40 mg by mouth Daily.      insulin aspart (novoLOG) 100 UNIT/ML injection Inject 6 Units under the skin 3 (Three) Times a Day Before Meals. 1 each 0    insulin aspart (novoLOG) 100 UNIT/ML injection Inject 0-7 Units under the skin 4 (Four) Times a Day With Meals & at Bedtime.  12    insulin detemir (LEVEMIR) 100 UNIT/ML injection Inject 8 Units under the skin Daily.      ipratropium-albuterol (DUO-NEB) 0.5-2.5 mg/mL nebulizer Take 3 mL by nebulization Every 4 (Four) Hours.      melatonin 1 MG tablet Take 3 mg by mouth At Night As Needed for Sleep (for sleep).      metoprolol tartrate (LOPRESSOR) 25 MG tablet Take 12.5 mg by mouth 2 (Two) Times a Day.      Multiple Vitamins-Minerals (MULTIVITAMIN ADULT PO) Take 1 tablet by mouth Daily.      ondansetron (ZOFRAN) 4 MG tablet Take 4 mg by mouth Every 6 (Six) Hours As Needed for Nausea or Vomiting.      pantoprazole (PROTONIX) 40 MG EC tablet Take 40 mg by mouth Daily.      sennosides-docusate sodium (SENOKOT-S) 8.6-50 MG tablet Take 2 tablets by mouth Every Night. 30 tablet 0    tamsulosin (FLOMAX) 0.4 MG capsule 24 hr capsule Take 1 capsule by mouth Daily. 30 capsule 0    traZODone (DESYREL) 50 MG tablet Take 50 mg by mouth Every Night.             Medical Decision Making:  All labs have been independently interpreted by me.  All radiology studies have been reviewed by me. All EKG's have been independently viewed and interpreted by me.  Discussion below represents my analysis of pertinent findings related to patient's condition, differential diagnosis, treatment plan and final disposition.    Differential Diagnosis includes but not limited to: COPD  exacerbation, pneumonia, viral illness, pulmonary edema, pleural effusion, pneumothorax    Review of prior external notes (non-ED) -and- Review of prior external test results outside of this encounter:  I reviewed the pulmonary office visit note from 5/6/2025.  Patient does have a history of A-fib, maintained on Eliquis.  He is on sildenafil for pulmonary hypertension, BiPAP for CHRISSY.  He is on oral prednisone 5 mg for COPD, Trelegy    Labs Results:  Recent Results (from the past 24 hours)   Comprehensive Metabolic Panel    Collection Time: 05/17/25 11:38 PM    Specimen: Blood   Result Value Ref Range    Glucose 188 (H) 65 - 99 mg/dL    BUN 24 (H) 8 - 23 mg/dL    Creatinine 0.91 0.76 - 1.27 mg/dL    Sodium 139 136 - 145 mmol/L    Potassium 3.5 3.5 - 5.2 mmol/L    Chloride 104 98 - 107 mmol/L    CO2 28.3 22.0 - 29.0 mmol/L    Calcium 8.8 8.6 - 10.5 mg/dL    Total Protein 7.0 6.0 - 8.5 g/dL    Albumin 4.0 3.5 - 5.2 g/dL    ALT (SGPT) 29 1 - 41 U/L    AST (SGOT) 23 1 - 40 U/L    Alkaline Phosphatase 106 39 - 117 U/L    Total Bilirubin <0.2 0.0 - 1.2 mg/dL    Globulin 3.0 gm/dL    A/G Ratio 1.3 g/dL    BUN/Creatinine Ratio 26.4 (H) 7.0 - 25.0    Anion Gap 6.7 5.0 - 15.0 mmol/L    eGFR 90.7 >60.0 mL/min/1.73   BNP    Collection Time: 05/17/25 11:38 PM    Specimen: Blood   Result Value Ref Range    proBNP 482.0 0.0 - 900.0 pg/mL   High Sensitivity Troponin T    Collection Time: 05/17/25 11:38 PM    Specimen: Blood   Result Value Ref Range    HS Troponin T 17 <22 ng/L   Green Top (Gel)    Collection Time: 05/17/25 11:38 PM   Result Value Ref Range    Extra Tube Hold for add-ons.    Lavender Top    Collection Time: 05/17/25 11:38 PM   Result Value Ref Range    Extra Tube hold for add-on    Gold Top - SST    Collection Time: 05/17/25 11:38 PM   Result Value Ref Range    Extra Tube Hold for add-ons.    Light Blue Top    Collection Time: 05/17/25 11:38 PM   Result Value Ref Range    Extra Tube Hold for add-ons.    CBC Auto  Differential    Collection Time: 05/17/25 11:38 PM    Specimen: Blood   Result Value Ref Range    WBC 10.07 3.40 - 10.80 10*3/mm3    RBC 5.09 4.14 - 5.80 10*6/mm3    Hemoglobin 14.4 13.0 - 17.7 g/dL    Hematocrit 44.1 37.5 - 51.0 %    MCV 86.6 79.0 - 97.0 fL    MCH 28.3 26.6 - 33.0 pg    MCHC 32.7 31.5 - 35.7 g/dL    RDW 12.6 12.3 - 15.4 %    RDW-SD 40.0 37.0 - 54.0 fl    MPV 9.2 6.0 - 12.0 fL    Platelets 262 140 - 450 10*3/mm3    Neutrophil % 82.3 (H) 42.7 - 76.0 %    Lymphocyte % 8.8 (L) 19.6 - 45.3 %    Monocyte % 7.5 5.0 - 12.0 %    Eosinophil % 0.7 0.3 - 6.2 %    Basophil % 0.3 0.0 - 1.5 %    Immature Grans % 0.4 0.0 - 0.5 %    Neutrophils, Absolute 8.28 (H) 1.70 - 7.00 10*3/mm3    Lymphocytes, Absolute 0.89 0.70 - 3.10 10*3/mm3    Monocytes, Absolute 0.76 0.10 - 0.90 10*3/mm3    Eosinophils, Absolute 0.07 0.00 - 0.40 10*3/mm3    Basophils, Absolute 0.03 0.00 - 0.20 10*3/mm3    Immature Grans, Absolute 0.04 0.00 - 0.05 10*3/mm3    nRBC 0.0 0.0 - 0.2 /100 WBC   ECG 12 Lead Dyspnea    Collection Time: 05/17/25 11:47 PM   Result Value Ref Range    QT Interval 434 ms    QTC Interval 495 ms   Respiratory Panel PCR w/COVID-19(SARS-CoV-2) AMADEO/XAVI/GERMÁN/PAD/COR/SAMANTHA In-House, NP Swab in [facility], 2 HR TAT - Swab, Nasopharynx    Collection Time: 05/18/25 12:12 AM    Specimen: Nasopharynx; Swab   Result Value Ref Range    ADENOVIRUS, PCR Not Detected Not Detected    Coronavirus 229E Not Detected Not Detected    Coronavirus HKU1 Not Detected Not Detected    Coronavirus NL63 Not Detected Not Detected    Coronavirus OC43 Not Detected Not Detected    COVID19 Detected (C) Not Detected - Ref. Range    Human Metapneumovirus Not Detected Not Detected    Human Rhinovirus/Enterovirus Not Detected Not Detected    Influenza A PCR Not Detected Not Detected    Influenza B PCR Not Detected Not Detected    Parainfluenza Virus 1 Not Detected Not Detected    Parainfluenza Virus 2 Not Detected Not Detected    Parainfluenza Virus 3 Not  Detected Not Detected    Parainfluenza Virus 4 Not Detected Not Detected    RSV, PCR Not Detected Not Detected    Bordetella pertussis pcr Not Detected Not Detected    Bordetella parapertussis PCR Not Detected Not Detected    Chlamydophila pneumoniae PCR Not Detected Not Detected    Mycoplasma pneumo by PCR Not Detected Not Detected   Lactic Acid, Plasma    Collection Time: 05/18/25 12:31 AM    Specimen: Arm, Left; Blood   Result Value Ref Range    Lactate 0.9 0.5 - 2.0 mmol/L   High Sensitivity Troponin T 1Hr    Collection Time: 05/18/25 12:50 AM    Specimen: Arm, Right; Blood   Result Value Ref Range    HS Troponin T 18 <22 ng/L    Troponin T Numeric Delta 1 Abnormal if >/=3 ng/L     Lab Comments:  My independent interpretation of the above labs: Positive for COVID-19      Radiology:  XR Chest 1 View  Result Date: 5/18/2025  CXR ONE VIEW  HISTORY: CHF/COPD Protocol  COMPARISON: 10/1/2017  TECHNIQUE: single portable AP       Redemonstrated mild cardiomegaly.  Redemonstrated bilateral hilar fullness.  There is no consolidation, effusion or pneumothorax.  This report was finalized on 5/18/2025 12:02 AM by Dr. Dane Guevara M.D on Workstation: [id number]      Radiology Comments:  I ordered the above imaging and reviewed the results.    My independent interpretation of the cxr: Predominantly right sided pulmonary opacities    EKG Interpreted by me: Sinus rhythm, right bundle branch block, varying morphology of P waves    (Social Determinants of Health): None and Health Care System (Health Coverage, Provider Availability, Provider Linguistic and Cultural Competency, Quality of Care)    Rationale:  This is a chronically ill-appearing 70-year-old male who is presenting today secondary to complaints of increased shortness of breath.  He presents on his baseline oxygen.  He presents afebrile with fairly benign vital signs.  He is wheezing diffusely on exam.      Patient was evaluated with an EKG, which did not  demonstrate any new evidence of ischemia.  This is coupled with 2 times negative troponin levels in the setting of low clinical suspicion for acute coronary syndrome as the cause of his presentation today.    I considered pulmonary embolism.  However, the patient is really not tachycardic, does not have any unilateral leg swelling and is therapeutically anticoagulated given his history of atrial fibrillation.  I suspect his symptoms are far more likely to be related to his underlying COPD rather than PE, and would not further evaluate with imaging at this time unless he clinically changes or fails to improve with standard therapy.     Chest x-ray did not demonstrate any other acute cardiopulmonary process as emergent cause of symptoms including pneumomediastinum, widened mediastinum, or pneumothorax.     Patient is noted to meet SIRS criteria secondary to heart rate and respiratory rate.  Blood cultures and lactic acid was drawn.  Patient has a chronically abnormal x-ray and it is hard to ascertain superimposed infiltrate.  Therefore, given his history, we did empirically treat with ceftriaxone and azithromycin.  However, the patient was later positive for COVID-19.  He has already received steroids.    Will plan on some bronchodilators and will reevaluate.  Clinical Scores:       CURB-65 Score: 2                             Progress Notes:  1:59 AM EDT: I saw and reevaluated the patient.  Still with some mild increased work of breathing, but overall improved since initial evaluation.  I spoke with the patient about his ED work up and diagnosis. I informed the patient that he will be admitted for further evaluation/treatment. All questions answered.      Consult:  2:41 AM EDT: Discussed case with PATTI Rosales with A.  Reviewed history, exam, results and treatments.  Will admit to Dr. Bryant              Complexity of Care:  The patient requires admission.    Diagnosis:  Final diagnoses:   COPD exacerbation    COVID-19   Acute on chronic respiratory failure with hypoxia         Parts of this note may be an electronic transcription/translation of spoken language to printed text using the Dragon dictation system        Provider Note Signed by:     Sue Andrew MD  05/18/25 0327

## 2025-05-18 NOTE — H&P
Patient Name:  Chuckie Valdez  YOB: 1955  MRN:  [id number]  Admit Date:  5/17/2025  Patient Care Team:  Provider, No Known as PCP - General      Subjective   History Present Illness     Chief Complaint   Patient presents with    Shortness of Breath     Patient is a 70-year-old male with known history of diabetes mellitus, paroxysmal atrial fibrillation, COPD, chronic systolic dysfunction with ejection fraction of 44%, pulmonary hypertension and multiple other medical problems presented to the hospital with 1 day history of shortness of breath that has been progressively getting worse with associated cough, mild wheeze has been no fevers or chills.  Denies any runny nose or sore throat.  In the ER he was requiring 7 to 8 L of oxygen and at baseline uses 2 L.  Chest x-ray with no significant findings and underwent respiratory viral panel tested positive for COVID-19.  Given the above is being hospitalized.  Denies any chest pain, palpitations, dizziness.  At the time of my evaluation is sitting up on the chair and does not appear to be in any obvious distress.  Able to complete sentences without any difficulty and not using any accessory muscles.       Review of Systems   A 12 system review has been performed and they are negative other than mentioned in the H&P      Personal History     Past Medical History:   Diagnosis Date    COPD (chronic obstructive pulmonary disease)     CTEPH (chronic thromboembolic pulmonary hypertension)     HCV (hepatitis C virus)     History of tracheostomy     Pulmonary hypertension     Type 2 diabetes mellitus      Past Surgical History:   Procedure Laterality Date    BRONCHOSCOPY N/A 9/27/2017    Procedure: BRONCHOSCOPY WITH WASHINGS RIGHT MIDDLE LOBE AND LINGULAR with balloon dilation 12MM-15MM AND 18MM-19MM;  Surgeon: Herb Anglin MD;  Location: [facility] ENDOSCOPY;  Service:     BRONCHOSCOPY N/A 9/29/2017    Procedure: BRONCHOSCOPY WITH BALLON DILATION 19MM  AND STENT PLACEMENT;  Surgeon: Herb Anglin MD;  Location: [facility] OR;  Service:     BRONCHOSCOPY N/A 2017    Procedure: BRONCHOSCOPY WITH STENT REMOVAL;  Surgeon: Herb Anglin MD;  Location: [facility] OR;  Service:      History reviewed. No pertinent family history.  Social History     Tobacco Use    Smoking status: Former     Current packs/day: 0.00     Average packs/day: 1 pack/day for 15.0 years (15.0 ttl pk-yrs)     Types: Cigarettes     Start date:      Quit date:      Years since quittin.3   Vaping Use    Vaping status: Never Used   Substance Use Topics    Alcohol use: Never    Drug use: Never     No current facility-administered medications on file prior to encounter.     Current Outpatient Medications on File Prior to Encounter   Medication Sig Dispense Refill    acetaminophen (TYLENOL) 325 MG tablet Take 1 tablet by mouth Every 4 (Four) Hours As Needed for Mild Pain.      albuterol (PROVENTIL) (2.5 MG/3ML) 0.083% nebulizer solution Take 2.5 mg by nebulization Every 4 (Four) Hours As Needed for Wheezing (every 2 hours as needed for soa related to copd).      apixaban (ELIQUIS) 5 MG tablet tablet Take 1 tablet by mouth 2 (Two) Times a Day.      aspirin 81 MG EC tablet Take 1 tablet by mouth Daily.      atorvastatin (LIPITOR) 20 MG tablet Take 2 tablets by mouth Every Night.      brimonidine (ALPHAGAN) 0.2 % ophthalmic solution Administer 1 drop into the left eye 3 (Three) Times a Day.      budesonide-formoterol (SYMBICORT) 80-4.5 MCG/ACT inhaler Inhale 2 puffs 2 (Two) Times a Day.      bumetanide (BUMEX) 1 MG tablet Take 1 tablet by mouth 2 (Two) Times a Day.      DULoxetine (CYMBALTA) 30 MG capsule Take 1 capsule by mouth Daily.      ergocalciferol (ERGOCALCIFEROL) 1.25 MG (71759 UT) capsule Take 1 capsule by mouth 1 (One) Time Per Week.      escitalopram (LEXAPRO) 20 MG tablet TOME 1 TABLETAS TODAS LAS NOCHES (Patient taking differently: Take 0.5 tablets by mouth  Daily.) 14 tablet 0    ferrous sulfate 325 (65 FE) MG tablet Take 1 tablet by mouth 2 (Two) Times a Day.      Fluticasone-Umeclidin-Vilant (Trelegy Ellipta) 100-62.5-25 MCG/ACT inhaler inhale 1 erin por via oral cada peng      insulin aspart (novoLOG) 100 UNIT/ML injection Inject 6 Units under the skin 3 (Three) Times a Day Before Meals. 1 each 0    Insulin Glargine (Lantus SoloStar) 100 UNIT/ML injection pen Inject 22 Units under the skin into the appropriate area as directed Daily.      metFORMIN (GLUCOPHAGE) 500 MG tablet JORGE 1 TABLETA POR VIA ORAL 2 VECES AL PENG CON COMIDA      montelukast (SINGULAIR) 10 MG tablet Take 1 tablet by mouth Every Night.      moxifloxacin (VIGAMOX) 0.5 % ophthalmic solution Administer 0.05 mL into the left eye 4 (Four) Times a Day.      pantoprazole (PROTONIX) 40 MG EC tablet Take 1 tablet by mouth Daily.      prednisoLONE acetate (PRED FORTE) 1 % ophthalmic suspension Administer 1 drop into the left eye 6 (Six) Times a Day.      predniSONE (DELTASONE) 5 MG tablet Take 1 tablet by mouth Daily.      pregabalin (LYRICA) 50 MG capsule Take 1 capsule by mouth 2 (Two) Times a Day.      sildenafil (REVATIO) 20 MG tablet Take 1 tablet by mouth 3 (Three) Times a Day.      spironolactone (ALDACTONE) 25 MG tablet Take 1 tablet by mouth Daily.      tamsulosin (FLOMAX) 0.4 MG capsule 24 hr capsule Take 1 capsule by mouth Daily. 30 capsule 0    traZODone (DESYREL) 50 MG tablet Take 3 tablets by mouth Every Night.      amLODIPine (NORVASC) 10 MG tablet Take 1 tablet by mouth Daily. 30 tablet 0    budesonide-formoterol (SYMBICORT) 160-4.5 MCG/ACT inhaler Inhale 2 puffs 2 (Two) Times a Day. (Patient not taking: Reported on 5/18/2025)      dilTIAZem CD (CARDIZEM CD) 120 MG 24 hr capsule Take 1 capsule by mouth Daily. (Patient not taking: Reported on 5/18/2025)      dilTIAZem XR (DILACOR XR) 120 MG 24 hr capsule Take 1 capsule by mouth Daily.      doxazosin (CARDURA) 4 MG tablet TOME 2 TABLETAS AL  PENG 60 tablet 1    furosemide (LASIX) 40 MG tablet Take 40 mg by mouth Daily. (Patient not taking: Reported on 5/18/2025)      insulin aspart (novoLOG) 100 UNIT/ML injection Inject 0-7 Units under the skin 4 (Four) Times a Day With Meals & at Bedtime.  12    insulin detemir (LEVEMIR) 100 UNIT/ML injection Inject 8 Units under the skin Daily.      ipratropium-albuterol (DUO-NEB) 0.5-2.5 mg/mL nebulizer Take 3 mL by nebulization Every 4 (Four) Hours.      melatonin 1 MG tablet Take 3 mg by mouth At Night As Needed for Sleep (for sleep).      metoprolol tartrate (LOPRESSOR) 25 MG tablet Take 12.5 mg by mouth 2 (Two) Times a Day.      Multiple Vitamins-Minerals (MULTIVITAMIN ADULT PO) Take 1 tablet by mouth Daily.      ondansetron (ZOFRAN) 4 MG tablet Take 4 mg by mouth Every 6 (Six) Hours As Needed for Nausea or Vomiting.      sennosides-docusate sodium (SENOKOT-S) 8.6-50 MG tablet Take 2 tablets by mouth Every Night. 30 tablet 0     Allergies   Allergen Reactions    Nitrates, Organic     Penicillins        Objective    Objective     Vital Signs  Temp:  [97.1 °F (36.2 °C)-98.9 °F (37.2 °C)] 97.1 °F (36.2 °C)  Heart Rate:  [69-98] 69  Resp:  [20-22] 20  BP: (147-176)/(62-96) 147/69  SpO2:  [91 %-98 %] 98 %  on  Flow (L/min) (Oxygen Therapy):  [2-8] 6;   Device (Oxygen Therapy): nasal cannula  Body mass index is 23.69 kg/m².    Physical Exam  HEENT: PERRLA, extraocular movements intact, Scleras no icterus  Neck: Supple, no JVD  Cardiovascular: Regular rate and rhythm with normal S1 and S2  Respiratory: Diminished breath sounds with faint wheezes  GI: Soft, nontender, bowel sounds are present, obese  Extremities: No edema, palpable pulses  Neurologic: Grossly nonfocal, no facial asymmetry    Results Review:  I reviewed the patient's new clinical results.  I reviewed the patient's new imaging results and agree with the interpretation.  I reviewed the patient's other test results and agree with the interpretation  I  personally viewed and interpreted the patient's EKG/Telemetry data  Discussed with ED provider.    Lab Results (last 24 hours)       Procedure Component Value Units Date/Time    CBC & Differential [[id number]]  (Abnormal) Collected: 05/17/25 2338    Specimen: Blood Updated: 05/17/25 2349    Narrative:      The following orders were created for panel order CBC & Differential.  Procedure                               Abnormality         Status                     ---------                               -----------         ------                     CBC Auto Differential[[id number]]        Abnormal            Final result                 Please view results for these tests on the individual orders.    Comprehensive Metabolic Panel [[id number]]  (Abnormal) Collected: 05/17/25 2338    Specimen: Blood Updated: 05/18/25 0022     Glucose 188 mg/dL      BUN 24 mg/dL      Creatinine 0.91 mg/dL      Sodium 139 mmol/L      Potassium 3.5 mmol/L      Comment: Slight hemolysis detected by analyzer. Result may be falsely elevated.        Chloride 104 mmol/L      CO2 28.3 mmol/L      Calcium 8.8 mg/dL      Total Protein 7.0 g/dL      Albumin 4.0 g/dL      ALT (SGPT) 29 U/L      AST (SGOT) 23 U/L      Alkaline Phosphatase 106 U/L      Total Bilirubin <0.2 mg/dL      Globulin 3.0 gm/dL      A/G Ratio 1.3 g/dL      BUN/Creatinine Ratio 26.4     Anion Gap 6.7 mmol/L      eGFR 90.7 mL/min/1.73     Narrative:      GFR Categories in Chronic Kidney Disease (CKD)              GFR Category          GFR (mL/min/1.73)    Interpretation  G1                    90 or greater        Normal or high (1)  G2                    60-89                Mild decrease (1)  G3a                   45-59                Mild to moderate decrease  G3b                   30-44                Moderate to severe decrease  G4                    15-29                Severe decrease  G5                    14 or less           Kidney failure    (1)In the absence of evidence  of kidney disease, neither GFR category G1 or G2 fulfill the criteria for CKD.    eGFR calculation 2021 CKD-EPI creatinine equation, which does not include race as a factor    BNP [[id number]]  (Normal) Collected: 05/17/25 2338    Specimen: Blood Updated: 05/18/25 0011     proBNP 482.0 pg/mL     Narrative:      This assay is used as an aid in the diagnosis of individuals suspected of having heart failure. It can be used as an aid in the diagnosis of acute decompensated heart failure (ADHF) in patients presenting with signs and symptoms of ADHF to the emergency department (ED). In addition, NT-proBNP of <300 pg/mL indicates ADHF is not likely.    Age Range Result Interpretation  NT-proBNP Concentration (pg/mL:      <50             Positive            >450                   Gray                 300-450                    Negative             <300    50-75           Positive            >900                  Gray                300-900                  Negative            <300      >75             Positive            >1800                  Gray                300-1800                  Negative            <300    High Sensitivity Troponin T [[id number]]  (Normal) Collected: 05/17/25 2338    Specimen: Blood Updated: 05/18/25 0011     HS Troponin T 17 ng/L     Narrative:      High Sensitive Troponin T Reference Range:  <14.0 ng/L- Negative Female for AMI  <22.0 ng/L- Negative Male for AMI  >=14 - Abnormal Female indicating possible myocardial injury.  >=22 - Abnormal Male indicating possible myocardial injury.   Clinicians would have to utilize clinical acumen, EKG, Troponin, and serial changes to determine if it is an Acute Myocardial Infarction or myocardial injury due to an underlying chronic condition.         CBC Auto Differential [[id number]]  (Abnormal) Collected: 05/17/25 2338    Specimen: Blood Updated: 05/17/25 2349     WBC 10.07 10*3/mm3      RBC 5.09 10*6/mm3      Hemoglobin 14.4 g/dL      Hematocrit 44.1 %       MCV 86.6 fL      MCH 28.3 pg      MCHC 32.7 g/dL      RDW 12.6 %      RDW-SD 40.0 fl      MPV 9.2 fL      Platelets 262 10*3/mm3      Neutrophil % 82.3 %      Lymphocyte % 8.8 %      Monocyte % 7.5 %      Eosinophil % 0.7 %      Basophil % 0.3 %      Immature Grans % 0.4 %      Neutrophils, Absolute 8.28 10*3/mm3      Lymphocytes, Absolute 0.89 10*3/mm3      Monocytes, Absolute 0.76 10*3/mm3      Eosinophils, Absolute 0.07 10*3/mm3      Basophils, Absolute 0.03 10*3/mm3      Immature Grans, Absolute 0.04 10*3/mm3      nRBC 0.0 /100 WBC     Respiratory Panel PCR w/COVID-19(SARS-CoV-2) AMADEO/XAVI/GERMÁN/PAD/COR/SAMANTHA In-House, NP Swab in UTM/VTM, 2 HR TAT - Swab, Nasopharynx [[id number]]  (Abnormal) Collected: 05/18/25 0012    Specimen: Swab from Nasopharynx Updated: 05/18/25 0111     ADENOVIRUS, PCR Not Detected     Coronavirus 229E Not Detected     Coronavirus HKU1 Not Detected     Coronavirus NL63 Not Detected     Coronavirus OC43 Not Detected     COVID19 Detected     Human Metapneumovirus Not Detected     Human Rhinovirus/Enterovirus Not Detected     Influenza A PCR Not Detected     Influenza B PCR Not Detected     Parainfluenza Virus 1 Not Detected     Parainfluenza Virus 2 Not Detected     Parainfluenza Virus 3 Not Detected     Parainfluenza Virus 4 Not Detected     RSV, PCR Not Detected     Bordetella pertussis pcr Not Detected     Bordetella parapertussis PCR Not Detected     Chlamydophila pneumoniae PCR Not Detected     Mycoplasma pneumo by PCR Not Detected    Narrative:      In the setting of a positive respiratory panel with a viral infection PLUS a negative procalcitonin without other underlying concern for bacterial infection, consider observing off antibiotics or discontinuation of antibiotics and continue supportive care. If the respiratory panel is positive for atypical bacterial infection (Bordetella pertussis, Chlamydophila pneumoniae, or Mycoplasma pneumoniae), consider antibiotic de-escalation to target  atypical bacterial infection.    Blood Culture - Blood, Arm, Left [[id number]] Collected: 05/18/25 0031    Specimen: Blood from Arm, Left Updated: 05/18/25 0036    Lactic Acid, Plasma [[id number]]  (Normal) Collected: 05/18/25 0031    Specimen: Blood from Arm, Left Updated: 05/18/25 0058     Lactate 0.9 mmol/L     Blood Culture - Blood, Arm, Right [[id number]] Collected: 05/18/25 0050    Specimen: Blood from Arm, Right Updated: 05/18/25 0054    High Sensitivity Troponin T 1Hr [[id number]]  (Normal) Collected: 05/18/25 0050    Specimen: Blood from Arm, Right Updated: 05/18/25 0118     HS Troponin T 18 ng/L      Troponin T Numeric Delta 1 ng/L     Narrative:      High Sensitive Troponin T Reference Range:  <14.0 ng/L- Negative Female for AMI  <22.0 ng/L- Negative Male for AMI  >=14 - Abnormal Female indicating possible myocardial injury.  >=22 - Abnormal Male indicating possible myocardial injury.   Clinicians would have to utilize clinical acumen, EKG, Troponin, and serial changes to determine if it is an Acute Myocardial Infarction or myocardial injury due to an underlying chronic condition.         POC Glucose Once [[id number]]  (Abnormal) Collected: 05/18/25 0622    Specimen: Blood Updated: 05/18/25 0623     Glucose 208 mg/dL     Comprehensive Metabolic Panel [[id number]]  (Abnormal) Collected: 05/18/25 0748    Specimen: Blood Updated: 05/18/25 0841     Glucose 220 mg/dL      BUN 20 mg/dL      Creatinine 0.99 mg/dL      Sodium 138 mmol/L      Potassium 5.0 mmol/L      Comment: Specimen hemolyzed.  Result may be falsely elevated.        Chloride 101 mmol/L      CO2 29.0 mmol/L      Calcium 9.1 mg/dL      Total Protein 7.7 g/dL      Albumin 4.2 g/dL      ALT (SGPT) 42 U/L      Comment: Specimen hemolyzed.  Result may  be falsely elevated.        AST (SGOT) 38 U/L      Comment: Specimen hemolyzed.  Result may be falsely elevated.        Alkaline Phosphatase 118 U/L      Total Bilirubin 0.3 mg/dL      Globulin 3.5 gm/dL       A/G Ratio 1.2 g/dL      BUN/Creatinine Ratio 20.2     Anion Gap 8.0 mmol/L      eGFR 81.9 mL/min/1.73     Narrative:      GFR Categories in Chronic Kidney Disease (CKD)              GFR Category          GFR (mL/min/1.73)    Interpretation  G1                    90 or greater        Normal or high (1)  G2                    60-89                Mild decrease (1)  G3a                   45-59                Mild to moderate decrease  G3b                   30-44                Moderate to severe decrease  G4                    15-29                Severe decrease  G5                    14 or less           Kidney failure    (1)In the absence of evidence of kidney disease, neither GFR category G1 or G2 fulfill the criteria for CKD.    eGFR calculation 2021 CKD-EPI creatinine equation, which does not include race as a factor    CBC (No Diff) [[id number]]  (Normal) Collected: 05/18/25 0749    Specimen: Blood Updated: 05/18/25 0815     WBC 8.85 10*3/mm3      RBC 5.23 10*6/mm3      Hemoglobin 14.6 g/dL      Hematocrit 45.6 %      MCV 87.2 fL      MCH 27.9 pg      MCHC 32.0 g/dL      RDW 12.3 %      RDW-SD 39.3 fl      MPV 9.2 fL      Platelets 257 10*3/mm3     POC Glucose Once [[id number]]  (Abnormal) Collected: 05/18/25 1143    Specimen: Blood Updated: 05/18/25 1145     Glucose 201 mg/dL             Imaging Results (Last 24 Hours)       Procedure Component Value Units Date/Time    XR Chest 1 View [[id number]] Collected: 05/18/25 0000     Updated: 05/18/25 0005    Narrative:      CXR ONE VIEW      HISTORY: CHF/COPD Protocol     COMPARISON: 10/1/2017     TECHNIQUE: single portable AP       Impression:         Redemonstrated mild cardiomegaly.     Redemonstrated bilateral hilar fullness.     There is no consolidation, effusion or pneumothorax.     This report was finalized on 5/18/2025 12:02 AM by Dr. Dane Guevara M.D on Workstation: [id number]               Results for orders placed during the hospital encounter of  09/04/17    Adult Transthoracic Echo Complete W/ Cont if Necessary Per Protocol    Interpretation Summary  · Left ventricular wall thickness is consistent with mild concentric hypertrophy.  · Left ventricular systolic function is normal. Estimated EF = 63%.  · Right ventricular cavity is moderately dilated.  · Moderately reduced right ventricular systolic function noted.  · Mild mitral valve regurgitation is present  · Mild to moderate tricuspid valve regurgitation is present.  · Calculated right ventricular systolic pressure from tricuspid regurgitation is 52.8 mmHg.  · Mild pulmonic valve regurgitation is present.      ECG 12 Lead Dyspnea   Preliminary Result   HEART RATE=78  bpm   RR Tkawrzfn=906  ms   ID Kjrcelbh=763  ms   P Horizontal Axis=-65  deg   P Front Axis=18  deg   QRSD Dffnymgh=687  ms   QT Qdnlhyth=937  ms   VVaP=652  ms   QRS Axis=10  deg   T Wave Axis=79  deg   - ABNORMAL ECG -   Sinus rhythm   Prolonged ID interval   Right bundle branch block   Date and Time of Study:2025-05-17 23:47:43           Assessment/Plan     Active Hospital Problems    Diagnosis  POA    **Acute hypoxemic respiratory failure due to COVID-19 [U07.1, J96.01]  Yes    BPH (benign prostatic hyperplasia) [N40.0]  Unknown    Chronic respiratory failure with hypoxia [J96.11]  Yes    Dependence on supplemental oxygen [Z99.81]  Not Applicable    CHRISSY (obstructive sleep apnea) [G47.33]  Yes    COPD exacerbation [J44.1]  Yes    Atrial fibrillation [I48.91]  Yes    Diabetes mellitus type 2 without retinopathy [E11.9]  Yes    Essential hypertension [I10]  Yes      Resolved Hospital Problems   No resolved problems to display.     1. Acute hypoxic respiratory failure secondary to COVID-19/COPD exacerbation, patient will be placed on dexamethasone, DuoNebs and bronchodilators will also be initiated.  Continue with 8 L of supplemental oxygen and normally uses 2 L.  CT chest will also be obtained and remdesivir is being initiated.  Pulmonary  consultation will also be obtained.  2.  Diabetes mellitus, home metformin is being held and will continue with long-acting insulin, Premeal as well as corrective dose insulin.  Anticipate blood sugars to be on the rise secondary steroids.  Hemoglobin A1c level will be checked.  3.  Paroxysmal atrial fibrillation, continue with Eliquis, aspirin, Cardizem and metoprolol.  Currently normal sinus rhythm.  4.  Hypertension, on amlodipine, metoprolol, Cardizem, doxazosin and Aldactone which will be continued.  Monitor blood pressure closely.  5.  Neuropathy, on Lyrica.  6.  History of BPH, on Flomax and doxazosin and currently denies any voiding issues.  7.  GERD, nausea suppressive therapy.  8.  Hyperlipidemia, on statins.  9.  Chronic systolic dysfunction with ejection fraction of 44%, currently compensated and is on Bumex, Aldactone and metoprolol possibly continued.  10.  Pulmonary hypertension, on Revatio which has been resumed.  11.  Obstructive sleep apnea, on BiPAP at home which will be continued if home equipment is available.  12.  DVT prophylaxis, on Eliquis.  13.  CODE STATUS is full code.  Estimated discharge date, 5/22/2025             Chaparro Cage MD  [facility]  05/18/25  13:27 EDT

## 2025-05-18 NOTE — PLAN OF CARE
Problem: Adult Inpatient Plan of Care  Goal: Plan of Care Review  Outcome: Progressing  Goal: Patient-Specific Goal (Individualized)  Outcome: Progressing  Goal: Absence of Hospital-Acquired Illness or Injury  Outcome: Progressing  Intervention: Identify and Manage Fall Risk  Description: Perform standard risk assessment on admission using a validated tool or comprehensive approach appropriate to the patient; reassess fall risk frequently, with change in status or transfer to another level of care.Communicate risk to interprofessional healthcare team; ensure fall risk visible cue.Determine need for increased observation, equipment and environmental modification, as well as use of supportive, nonskid footwear.Adjust safety measures to individual needs and identified risk factors.Reinforce the importance of active participation with fall risk prevention, safety, and physical activity with the patient and family.Perform regular intentional rounding to assess need for position change, pain assessment and personal needs, including assistance with toileting.  Recent Flowsheet Documentation  Taken 5/18/2025 1800 by Tona Irizarry RN  Safety Promotion/Fall Prevention: safety round/check completed  Taken 5/18/2025 1600 by Tona Irizarry RN  Safety Promotion/Fall Prevention: safety round/check completed  Taken 5/18/2025 1400 by Tona Irizarry RN  Safety Promotion/Fall Prevention: safety round/check completed  Taken 5/18/2025 1200 by oTna Irizarry RN  Safety Promotion/Fall Prevention: safety round/check completed  Taken 5/18/2025 1000 by Tona Irizarry RN  Safety Promotion/Fall Prevention: safety round/check completed  Taken 5/18/2025 0800 by Tona Irizarry RN  Safety Promotion/Fall Prevention: safety round/check completed  Intervention: Prevent Infection  Description: Maintain skin and mucous membrane integrity; promote hand, oral and pulmonary hygiene.Optimize fluid balance, nutrition, sleep and  glycemic control to maximize infection resistance.Identify potential sources of infection early to prevent or mitigate progression of infection (e.g., wound, lines, devices).Evaluate ongoing need for invasive devices; remove promptly when no longer indicated.Review vaccination status.  Recent Flowsheet Documentation  Taken 5/18/2025 1800 by Tona Irizarry RN  Infection Prevention: environmental surveillance performed  Taken 5/18/2025 1600 by Tona Irizarry RN  Infection Prevention: environmental surveillance performed  Taken 5/18/2025 1400 by Tona Irizarry RN  Infection Prevention: environmental surveillance performed  Taken 5/18/2025 1200 by Tona Irizarry RN  Infection Prevention: environmental surveillance performed  Taken 5/18/2025 1000 by Tona Irizarry RN  Infection Prevention: environmental surveillance performed  Taken 5/18/2025 0800 by Tona Irizarry RN  Infection Prevention: environmental surveillance performed  Goal: Optimal Comfort and Wellbeing  Outcome: Progressing  Intervention: Monitor Pain and Promote Comfort  Description: Assess pain level, treatment efficacy and patient response at regular intervals using a consistent pain scale.Consider the presence and impact of preexisting chronic pain.Encourage patient and caregiver involvement in pain assessment, interventions and safety measures.Promote activity; balance with sleep and rest to enhance healing.  Recent Flowsheet Documentation  Taken 5/18/2025 1800 by Tona Irizarry RN  Pain Management Interventions: care clustered  Taken 5/18/2025 1600 by Tona Irizarry RN  Pain Management Interventions: care clustered  Taken 5/18/2025 1400 by Tona Irizarry RN  Pain Management Interventions: care clustered  Taken 5/18/2025 1200 by Tona Irizarry RN  Pain Management Interventions: care clustered  Taken 5/18/2025 1000 by Tona Irizarry RN  Pain Management Interventions: care clustered  Taken 5/18/2025 0800 by  Tona Irizarry, RN  Pain Management Interventions: care clustered  Goal: Readiness for Transition of Care  Outcome: Progressing     Problem: Comorbidity Management  Goal: Maintenance of COPD Symptom Control  Outcome: Progressing

## 2025-05-18 NOTE — ED NOTES
..Nursing report ED to floor  Chuckie Valdez  70 y.o.  male    HPI :  HPI  Stated Reason for Visit: patient presents to ED from a friends party where he reports he began having difficulty breathing around 2200. Patient has history of COPD and CHF. Coughing frequently in triage, reports sometimes cough is productive.    Chief Complaint  Chief Complaint   Patient presents with    Shortness of Breath       Admitting doctor:   Byron Bryant MD    Admitting diagnosis:   The primary encounter diagnosis was COPD exacerbation. Diagnoses of COVID-19 and Acute on chronic respiratory failure with hypoxia were also pertinent to this visit.    Code status:   Current Code Status       Date Active Code Status Order ID Comments User Context       Prior            Allergies:   Nitrates, organic and Penicillins    Isolation:   No active isolations    Intake and Output    Intake/Output Summary (Last 24 hours) at 5/18/2025 0244  Last data filed at 5/18/2025 0138  Gross per 24 hour   Intake 100 ml   Output --   Net 100 ml       Weight:   There were no vitals filed for this visit.    Most recent vitals:   Vitals:    05/18/25 0052 05/18/25 0131 05/18/25 0159 05/18/25 0234   BP:  164/79 176/62    BP Location:   Left arm    Patient Position:   Sitting    Pulse: 77 75 75 73   Resp: 22 20 20 20   Temp:       TempSrc:       SpO2: 92% 92% 93% 91%   Height:           Active LDAs/IV Access:   Lines, Drains & Airways       Active LDAs       Name Placement date Placement time Site Days    Peripheral IV 05/17/25 20 G Anterior;Proximal;Right Forearm 05/17/25  --  Forearm  1                    Labs (abnormal labs have a star):   Labs Reviewed   RESPIRATORY PANEL PCR W/ COVID-19 (SARS-COV-2), NP SWAB IN UTM/VTP, 2 HR TAT - Abnormal; Notable for the following components:       Result Value    COVID19 Detected (*)     All other components within normal limits    Narrative:     In the setting of a positive respiratory panel with a viral  infection PLUS a negative procalcitonin without other underlying concern for bacterial infection, consider observing off antibiotics or discontinuation of antibiotics and continue supportive care. If the respiratory panel is positive for atypical bacterial infection (Bordetella pertussis, Chlamydophila pneumoniae, or Mycoplasma pneumoniae), consider antibiotic de-escalation to target atypical bacterial infection.   COMPREHENSIVE METABOLIC PANEL - Abnormal; Notable for the following components:    Glucose 188 (*)     BUN 24 (*)     BUN/Creatinine Ratio 26.4 (*)     All other components within normal limits    Narrative:     GFR Categories in Chronic Kidney Disease (CKD)              GFR Category          GFR (mL/min/1.73)    Interpretation  G1                    90 or greater        Normal or high (1)  G2                    60-89                Mild decrease (1)  G3a                   45-59                Mild to moderate decrease  G3b                   30-44                Moderate to severe decrease  G4                    15-29                Severe decrease  G5                    14 or less           Kidney failure    (1)In the absence of evidence of kidney disease, neither GFR category G1 or G2 fulfill the criteria for CKD.    eGFR calculation 2021 CKD-EPI creatinine equation, which does not include race as a factor   CBC WITH AUTO DIFFERENTIAL - Abnormal; Notable for the following components:    Neutrophil % 82.3 (*)     Lymphocyte % 8.8 (*)     Neutrophils, Absolute 8.28 (*)     All other components within normal limits   BNP (IN-HOUSE) - Normal    Narrative:     This assay is used as an aid in the diagnosis of individuals suspected of having heart failure. It can be used as an aid in the diagnosis of acute decompensated heart failure (ADHF) in patients presenting with signs and symptoms of ADHF to the emergency department (ED). In addition, NT-proBNP of <300 pg/mL indicates ADHF is not likely.    Age  Range Result Interpretation  NT-proBNP Concentration (pg/mL:      <50             Positive            >450                   Gray                 300-450                    Negative             <300    50-75           Positive            >900                  Gray                300-900                  Negative            <300      >75             Positive            >1800                  Gray                300-1800                  Negative            <300   TROPONIN - Normal    Narrative:     High Sensitive Troponin T Reference Range:  <14.0 ng/L- Negative Female for AMI  <22.0 ng/L- Negative Male for AMI  >=14 - Abnormal Female indicating possible myocardial injury.  >=22 - Abnormal Male indicating possible myocardial injury.   Clinicians would have to utilize clinical acumen, EKG, Troponin, and serial changes to determine if it is an Acute Myocardial Infarction or myocardial injury due to an underlying chronic condition.        LACTIC ACID, PLASMA - Normal   HIGH SENSITIVITIY TROPONIN T 1HR - Normal    Narrative:     High Sensitive Troponin T Reference Range:  <14.0 ng/L- Negative Female for AMI  <22.0 ng/L- Negative Male for AMI  >=14 - Abnormal Female indicating possible myocardial injury.  >=22 - Abnormal Male indicating possible myocardial injury.   Clinicians would have to utilize clinical acumen, EKG, Troponin, and serial changes to determine if it is an Acute Myocardial Infarction or myocardial injury due to an underlying chronic condition.        BLOOD CULTURE   BLOOD CULTURE   RAINBOW DRAW    Narrative:     The following orders were created for panel order [facility] Draw.  Procedure                               Abnormality         Status                     ---------                               -----------         ------                     Green Top (Gel)[[id number]]                                  Final result               Lavender Top[[id number]]                                     Final result                Gold Top - SST[[id number]]                                   Final result               Light Blue Top[[id number]]                                   Final result                 Please view results for these tests on the individual orders.   CBC AND DIFFERENTIAL    Narrative:     The following orders were created for panel order CBC & Differential.  Procedure                               Abnormality         Status                     ---------                               -----------         ------                     CBC Auto Differential[[id number]]        Abnormal            Final result                 Please view results for these tests on the individual orders.   GREEN TOP   LAVENDER TOP   GOLD TOP - SST   LIGHT BLUE TOP       EKG:   ECG 12 Lead Dyspnea   Preliminary Result   HEART RATE=78  bpm   RR Sdbxsgba=695  ms   WA Ffcodgyp=289  ms   P Horizontal Axis=-65  deg   P Front Axis=18  deg   QRSD Sfrcsuba=022  ms   QT Cwuztrxx=209  ms   RXqG=261  ms   QRS Axis=10  deg   T Wave Axis=79  deg   - ABNORMAL ECG -   Sinus rhythm   Prolonged WA interval   Right bundle branch block   Date and Time of Study:2025-05-17 23:47:43          Meds given in ED:   Medications   sodium chloride 0.9 % flush 10 mL (has no administration in time range)   ipratropium-albuterol (DUO-NEB) nebulizer solution 9 mL (9 mL Nebulization Given 5/18/25 0052)   methylPREDNISolone sodium succinate (SOLU-Medrol) injection 125 mg (125 mg Intravenous Given 5/18/25 0022)   cefTRIAXone (ROCEPHIN) 1,000 mg in sodium chloride 0.9 % 100 mL MBP (0 mg Intravenous Stopped 5/18/25 0138)   azithromycin (ZITHROMAX) 500 mg in sodium chloride 0.9 % 250 mL IVPB-VTB (500 mg Intravenous New Bag 5/18/25 0144)   ipratropium-albuterol (DUO-NEB) nebulizer solution 6 mL (6 mL Nebulization Given 5/18/25 0234)       Imaging results:  XR Chest 1 View  Result Date: 5/18/2025   Redemonstrated mild cardiomegaly.  Redemonstrated bilateral hilar fullness.  There is  no consolidation, effusion or pneumothorax.  This report was finalized on 2025 12:02 AM by Dr. Dane Guevara M.D on Workstation: [id number]        Ambulatory status:   - standby assist     Social issues:   Social History     Socioeconomic History    Marital status: Single   Tobacco Use    Smoking status: Former     Current packs/day: 0.00     Average packs/day: 1 pack/day for 15.0 years (15.0 ttl pk-yrs)     Types: Cigarettes     Start date:      Quit date:      Years since quittin.3       Peripheral Neurovascular  Peripheral Neurovascular (Adult)  Peripheral Neurovascular WDL: WDL    Neuro Cognitive  Neuro Cognitive (Adult)  Cognitive/Neuro/Behavioral WDL: WDL, orientation  Orientation: oriented x 4  Additional Documentation: Nohemi Coma Scale (Group)  Hoffmeister Coma Scale  Best Eye Response: 4-->(E4) spontaneous  Best Motor Response: 6-->(M6) obeys commands  Best Verbal Response: 5-->(V5) oriented  Hoffmeister Coma Scale Score: 15    Learning  Learning Assessment  Learning Readiness and Ability: cultural barrier identified  Individualized Teaching Method: used interpertor [id number]  Education Provided  Person Taught: patient  Teaching Method: verbal instruction  Teaching Focus: symptom/problem overview, diagnostic test  Education Outcome Evaluation: acceptance expressed, verbalizes understanding    Respiratory  Respiratory  Airway WDL: .WDL except  Additional Documentation: Breath Sounds (Group)  Respiratory WDL  Respiratory WDL: .WDL except, cough, all  Rhythm/Pattern, Respiratory: shortness of breath  Cough Frequency: frequent  Cough Type: loose, congested  Breath Sounds  All Lung Fields Breath Sounds: All Fields  All Lung Fields Breath Sounds: clear, diminished  LLL Breath Sounds: crackles  Breath Sounds Post-Respiratory Treatment  Breath Sounds Posttreatment All Fields: no change    Abdominal Pain       Pain Assessments  Pain (Adult)  (0-10) Pain Rating: Rest: 7  Pain Location: other (see  comments) (ribs)  Pain Side/Orientation: bilateral    NIH Stroke Scale       Nahid Bear RN  05/18/25 02:44 EDT

## 2025-05-19 ENCOUNTER — APPOINTMENT (OUTPATIENT)
Dept: CT IMAGING | Facility: HOSPITAL | Age: 70
DRG: 177 | End: 2025-05-19
Payer: MEDICAID

## 2025-05-19 LAB
ALBUMIN SERPL-MCNC: 3.6 G/DL (ref 3.5–5.2)
ALBUMIN/GLOB SERPL: 1.2 G/DL
ALP SERPL-CCNC: 101 U/L (ref 39–117)
ALT SERPL W P-5'-P-CCNC: 42 U/L (ref 1–41)
ANION GAP SERPL CALCULATED.3IONS-SCNC: 7 MMOL/L (ref 5–15)
AST SERPL-CCNC: 26 U/L (ref 1–40)
BILIRUB SERPL-MCNC: 0.2 MG/DL (ref 0–1.2)
BUN SERPL-MCNC: 22 MG/DL (ref 8–23)
BUN/CREAT SERPL: 23.9 (ref 7–25)
CALCIUM SPEC-SCNC: 9.1 MG/DL (ref 8.6–10.5)
CHLORIDE SERPL-SCNC: 100 MMOL/L (ref 98–107)
CK SERPL-CCNC: 91 U/L (ref 20–200)
CO2 SERPL-SCNC: 30 MMOL/L (ref 22–29)
CREAT SERPL-MCNC: 0.92 MG/DL (ref 0.76–1.27)
CRP SERPL-MCNC: 2.87 MG/DL (ref 0–0.5)
D DIMER PPP FEU-MCNC: <0.27 MCGFEU/ML (ref 0–0.7)
EGFRCR SERPLBLD CKD-EPI 2021: 89.5 ML/MIN/1.73
FERRITIN SERPL-MCNC: 252 NG/ML (ref 30–400)
GLOBULIN UR ELPH-MCNC: 2.9 GM/DL
GLUCOSE BLDC GLUCOMTR-MCNC: 154 MG/DL (ref 70–130)
GLUCOSE BLDC GLUCOMTR-MCNC: 220 MG/DL (ref 70–130)
GLUCOSE BLDC GLUCOMTR-MCNC: 251 MG/DL (ref 70–130)
GLUCOSE BLDC GLUCOMTR-MCNC: 289 MG/DL (ref 70–130)
GLUCOSE SERPL-MCNC: 276 MG/DL (ref 65–99)
MAGNESIUM SERPL-MCNC: 2.4 MG/DL (ref 1.6–2.4)
PHOSPHATE SERPL-MCNC: 2.9 MG/DL (ref 2.5–4.5)
POTASSIUM SERPL-SCNC: 4.6 MMOL/L (ref 3.5–5.2)
PROT SERPL-MCNC: 6.5 G/DL (ref 6–8.5)
SODIUM SERPL-SCNC: 137 MMOL/L (ref 136–145)

## 2025-05-19 PROCEDURE — 84100 ASSAY OF PHOSPHORUS: CPT | Performed by: INTERNAL MEDICINE

## 2025-05-19 PROCEDURE — 94799 UNLISTED PULMONARY SVC/PX: CPT

## 2025-05-19 PROCEDURE — 71275 CT ANGIOGRAPHY CHEST: CPT

## 2025-05-19 PROCEDURE — 36415 COLL VENOUS BLD VENIPUNCTURE: CPT | Performed by: INTERNAL MEDICINE

## 2025-05-19 PROCEDURE — 94761 N-INVAS EAR/PLS OXIMETRY MLT: CPT

## 2025-05-19 PROCEDURE — 80053 COMPREHEN METABOLIC PANEL: CPT | Performed by: INTERNAL MEDICINE

## 2025-05-19 PROCEDURE — 86140 C-REACTIVE PROTEIN: CPT | Performed by: INTERNAL MEDICINE

## 2025-05-19 PROCEDURE — 63710000001 INSULIN LISPRO (HUMAN) PER 5 UNITS: Performed by: INTERNAL MEDICINE

## 2025-05-19 PROCEDURE — 82948 REAGENT STRIP/BLOOD GLUCOSE: CPT

## 2025-05-19 PROCEDURE — 25510000001 IOPAMIDOL PER 1 ML: Performed by: INTERNAL MEDICINE

## 2025-05-19 PROCEDURE — 82550 ASSAY OF CK (CPK): CPT | Performed by: INTERNAL MEDICINE

## 2025-05-19 PROCEDURE — 63710000001 REVEFENACIN 175 MCG/3ML SOLUTION: Performed by: INTERNAL MEDICINE

## 2025-05-19 PROCEDURE — 94664 DEMO&/EVAL PT USE INHALER: CPT

## 2025-05-19 PROCEDURE — 85379 FIBRIN DEGRADATION QUANT: CPT | Performed by: INTERNAL MEDICINE

## 2025-05-19 PROCEDURE — 94660 CPAP INITIATION&MGMT: CPT

## 2025-05-19 PROCEDURE — 25010000002 DEXAMETHASONE PER 1 MG: Performed by: INTERNAL MEDICINE

## 2025-05-19 PROCEDURE — 82728 ASSAY OF FERRITIN: CPT | Performed by: INTERNAL MEDICINE

## 2025-05-19 PROCEDURE — 83735 ASSAY OF MAGNESIUM: CPT | Performed by: INTERNAL MEDICINE

## 2025-05-19 PROCEDURE — 63710000001 INSULIN GLARGINE PER 5 UNITS: Performed by: INTERNAL MEDICINE

## 2025-05-19 RX ORDER — SILDENAFIL CITRATE 20 MG/1
20 TABLET ORAL 3 TIMES DAILY
Status: DISCONTINUED | OUTPATIENT
Start: 2025-05-19 | End: 2025-05-22 | Stop reason: HOSPADM

## 2025-05-19 RX ORDER — IOPAMIDOL 755 MG/ML
100 INJECTION, SOLUTION INTRAVASCULAR
Status: COMPLETED | OUTPATIENT
Start: 2025-05-19 | End: 2025-05-19

## 2025-05-19 RX ADMIN — PREDNISOLONE ACETATE 1 DROP: 10 SUSPENSION/ DROPS OPHTHALMIC at 12:36

## 2025-05-19 RX ADMIN — Medication 10 ML: at 09:14

## 2025-05-19 RX ADMIN — MOXIFLOXACIN OPHTHALMIC 0.05 ML: 5 SOLUTION/ DROPS OPHTHALMIC at 17:13

## 2025-05-19 RX ADMIN — APIXABAN 2.5 MG: 2.5 TABLET, FILM COATED ORAL at 21:22

## 2025-05-19 RX ADMIN — NIRMATRELVIR AND RITONAVIR 3 TABLET: KIT at 09:09

## 2025-05-19 RX ADMIN — ASPIRIN 81 MG: 81 TABLET, COATED ORAL at 09:09

## 2025-05-19 RX ADMIN — INSULIN LISPRO 3 UNITS: 100 INJECTION, SOLUTION INTRAVENOUS; SUBCUTANEOUS at 17:13

## 2025-05-19 RX ADMIN — INSULIN LISPRO 4 UNITS: 100 INJECTION, SOLUTION INTRAVENOUS; SUBCUTANEOUS at 21:40

## 2025-05-19 RX ADMIN — INSULIN LISPRO 6 UNITS: 100 INJECTION, SOLUTION INTRAVENOUS; SUBCUTANEOUS at 09:08

## 2025-05-19 RX ADMIN — IPRATROPIUM BROMIDE AND ALBUTEROL SULFATE 3 ML: .5; 3 SOLUTION RESPIRATORY (INHALATION) at 08:07

## 2025-05-19 RX ADMIN — BUDESONIDE AND FORMOTEROL FUMARATE DIHYDRATE 2 PUFF: 160; 4.5 AEROSOL RESPIRATORY (INHALATION) at 08:07

## 2025-05-19 RX ADMIN — TAMSULOSIN HYDROCHLORIDE 0.4 MG: 0.4 CAPSULE ORAL at 09:09

## 2025-05-19 RX ADMIN — BUDESONIDE AND FORMOTEROL FUMARATE DIHYDRATE 2 PUFF: 160; 4.5 AEROSOL RESPIRATORY (INHALATION) at 20:20

## 2025-05-19 RX ADMIN — BUMETANIDE 1 MG: 1 TABLET ORAL at 09:08

## 2025-05-19 RX ADMIN — BUMETANIDE 1 MG: 1 TABLET ORAL at 21:31

## 2025-05-19 RX ADMIN — PREDNISOLONE ACETATE 1 DROP: 10 SUSPENSION/ DROPS OPHTHALMIC at 09:13

## 2025-05-19 RX ADMIN — INSULIN LISPRO 6 UNITS: 100 INJECTION, SOLUTION INTRAVENOUS; SUBCUTANEOUS at 12:36

## 2025-05-19 RX ADMIN — REVEFENACIN 175 MCG: 175 SOLUTION RESPIRATORY (INHALATION) at 08:09

## 2025-05-19 RX ADMIN — MONTELUKAST 10 MG: 10 TABLET, FILM COATED ORAL at 21:22

## 2025-05-19 RX ADMIN — PANTOPRAZOLE SODIUM 40 MG: 40 TABLET, DELAYED RELEASE ORAL at 09:09

## 2025-05-19 RX ADMIN — Medication 10 ML: at 21:31

## 2025-05-19 RX ADMIN — ESCITALOPRAM 10 MG: 10 TABLET, FILM COATED ORAL at 09:08

## 2025-05-19 RX ADMIN — SPIRONOLACTONE 25 MG: 25 TABLET ORAL at 09:08

## 2025-05-19 RX ADMIN — MOXIFLOXACIN OPHTHALMIC 0.05 ML: 5 SOLUTION/ DROPS OPHTHALMIC at 09:13

## 2025-05-19 RX ADMIN — PREDNISOLONE ACETATE 1 DROP: 10 SUSPENSION/ DROPS OPHTHALMIC at 16:00

## 2025-05-19 RX ADMIN — FERROUS SULFATE TAB 325 MG (65 MG ELEMENTAL FE) 325 MG: 325 (65 FE) TAB at 09:08

## 2025-05-19 RX ADMIN — PREGABALIN 50 MG: 50 CAPSULE ORAL at 09:09

## 2025-05-19 RX ADMIN — IPRATROPIUM BROMIDE AND ALBUTEROL SULFATE 3 ML: .5; 3 SOLUTION RESPIRATORY (INHALATION) at 12:56

## 2025-05-19 RX ADMIN — DULOXETINE 30 MG: 30 CAPSULE, DELAYED RELEASE ORAL at 09:09

## 2025-05-19 RX ADMIN — INSULIN GLARGINE 22 UNITS: 100 INJECTION, SOLUTION SUBCUTANEOUS at 09:08

## 2025-05-19 RX ADMIN — APIXABAN 2.5 MG: 2.5 TABLET, FILM COATED ORAL at 09:09

## 2025-05-19 RX ADMIN — DEXAMETHASONE SODIUM PHOSPHATE 6 MG: 10 INJECTION INTRAMUSCULAR; INTRAVENOUS at 09:08

## 2025-05-19 RX ADMIN — INSULIN LISPRO 2 UNITS: 100 INJECTION, SOLUTION INTRAVENOUS; SUBCUTANEOUS at 12:35

## 2025-05-19 RX ADMIN — INSULIN LISPRO 4 UNITS: 100 INJECTION, SOLUTION INTRAVENOUS; SUBCUTANEOUS at 09:09

## 2025-05-19 RX ADMIN — BRIMONIDINE TARTRATE 1 DROP: 2 SOLUTION/ DROPS OPHTHALMIC at 16:00

## 2025-05-19 RX ADMIN — SILDENAFIL 20 MG: 20 TABLET, FILM COATED ORAL at 21:31

## 2025-05-19 RX ADMIN — IPRATROPIUM BROMIDE AND ALBUTEROL SULFATE 3 ML: .5; 3 SOLUTION RESPIRATORY (INHALATION) at 20:20

## 2025-05-19 RX ADMIN — TRAZODONE HYDROCHLORIDE 150 MG: 100 TABLET ORAL at 21:24

## 2025-05-19 RX ADMIN — MOXIFLOXACIN OPHTHALMIC 0.05 ML: 5 SOLUTION/ DROPS OPHTHALMIC at 12:36

## 2025-05-19 RX ADMIN — MOXIFLOXACIN OPHTHALMIC 0.05 ML: 5 SOLUTION/ DROPS OPHTHALMIC at 21:26

## 2025-05-19 RX ADMIN — PREGABALIN 50 MG: 50 CAPSULE ORAL at 21:24

## 2025-05-19 RX ADMIN — PREDNISOLONE ACETATE 1 DROP: 10 SUSPENSION/ DROPS OPHTHALMIC at 21:25

## 2025-05-19 RX ADMIN — SENNOSIDES AND DOCUSATE SODIUM 2 TABLET: 50; 8.6 TABLET ORAL at 21:24

## 2025-05-19 RX ADMIN — INSULIN LISPRO 6 UNITS: 100 INJECTION, SOLUTION INTRAVENOUS; SUBCUTANEOUS at 17:13

## 2025-05-19 RX ADMIN — IOPAMIDOL 95 ML: 755 INJECTION, SOLUTION INTRAVENOUS at 21:07

## 2025-05-19 RX ADMIN — BRIMONIDINE TARTRATE 1 DROP: 2 SOLUTION/ DROPS OPHTHALMIC at 09:14

## 2025-05-19 RX ADMIN — BRIMONIDINE TARTRATE 1 DROP: 2 SOLUTION/ DROPS OPHTHALMIC at 21:26

## 2025-05-19 NOTE — PLAN OF CARE
Goal Outcome Evaluation:  Plan of Care Reviewed With: patient        Progress: no change  Outcome Evaluation: Pt alert and oriented X4. On 6L NC and AVAPS while asleep. Fine crackles noted throughout. No acute overnight events.

## 2025-05-19 NOTE — PAYOR COMM NOTE
"Chuckie Bryant (70 y.o. Male)    PLEASE SEE ATTACHED FOR INPT AUTH  REF#[id number]    THANK YOU   CINDI CANTOR RN/ DEPT   [facility]   PH: [phone]   FAX:  DEPT [phone]     Date of Birth   1955    Social Security Number       Address   [address] [place] [id number]    Home Phone   [phone]    MRN   [id number]       Hoahaoism   Orthodox    Marital Status   Single                            Admission Date   5/17/2025    Admission Type   Emergency    Admitting Provider   Chaparro Cage MD    Attending Provider   Chaparro Cage MD    Department, Room/Bed   [facility] [address] [place], P885/1       Discharge Date       Discharge Disposition       Discharge Destination                                 Attending Provider: Chaparro Cage MD    Allergies: Nitrates, Organic, Penicillins    Isolation: Enh Drop/Con   Infection: COVID (confirmed) (05/18/25)   Code Status: CPR    Ht: 177.8 cm (70\")   Wt: 74.9 kg (165 lb 2 oz)    Admission Cmt: None   Principal Problem: Acute hypoxemic respiratory failure due to COVID-19 [U07.1,J96.01]                   Active Insurance as of 5/17/2025       Primary Coverage       Payor Plan Insurance Group Employer/Plan Group    [place]        Payor Plan Address Payor Plan Phone Number Payor Plan Fax Number Effective Dates    [address] [phone]  9/4/2017 - None Entered    [facility] [id number]         Subscriber Name Subscriber Birth Date Member ID       CHUCKIE BRYANT 1955 [id number]                     Emergency Contacts        (Rel.) Home Phone Work Phone Mobile Phone    Raymundo Love (Surrogate) -- -- [phone]    Julian Hill (Surrogate) -- -- [phone]              [place]: [facility] [id number]  Tax ID [id number]     History & Physical        Chaparro Cage MD at 05/18/25 1327              Patient Name:  Chuckie Bryant  Date of " Birth:  1955  MRN:  [id number]  Admit Date:  5/17/2025  Patient Care Team:  Provider, No Known as PCP - General      Subjective  History Present Illness     Chief Complaint   Patient presents with    Shortness of Breath     Patient is a 70-year-old male with known history of diabetes mellitus, paroxysmal atrial fibrillation, COPD, chronic systolic dysfunction with ejection fraction of 44%, pulmonary hypertension and multiple other medical problems presented to the hospital with 1 day history of shortness of breath that has been progressively getting worse with associated cough, mild wheeze has been no fevers or chills.  Denies any runny nose or sore throat.  In the ER he was requiring 7 to 8 L of oxygen and at baseline uses 2 L.  Chest x-ray with no significant findings and underwent respiratory viral panel tested positive for COVID-19.  Given the above is being hospitalized.  Denies any chest pain, palpitations, dizziness.  At the time of my evaluation is sitting up on the chair and does not appear to be in any obvious distress.  Able to complete sentences without any difficulty and not using any accessory muscles.       Review of Systems   A 12 system review has been performed and they are negative other than mentioned in the H&P      Personal History     Past Medical History:   Diagnosis Date    COPD (chronic obstructive pulmonary disease)     CTEPH (chronic thromboembolic pulmonary hypertension)     HCV (hepatitis C virus)     History of tracheostomy     Pulmonary hypertension     Type 2 diabetes mellitus      Past Surgical History:   Procedure Laterality Date    BRONCHOSCOPY N/A 9/27/2017    Procedure: BRONCHOSCOPY WITH WASHINGS RIGHT MIDDLE LOBE AND LINGULAR with balloon dilation 12MM-15MM AND 18MM-19MM;  Surgeon: Herb Anglin MD;  Location: [facility];  Service:     BRONCHOSCOPY N/A 9/29/2017    Procedure: BRONCHOSCOPY WITH BALLON DILATION 19MM AND STENT PLACEMENT;  Surgeon: Herb Jenninsg  MD [place];  Location: [facility] OR;  Service:     BRONCHOSCOPY N/A 2017    Procedure: BRONCHOSCOPY WITH STENT REMOVAL;  Surgeon: Herb Anglin MD;  Location: [facility] OR;  Service:      History reviewed. No pertinent family history.  Social History     Tobacco Use    Smoking status: Former     Current packs/day: 0.00     Average packs/day: 1 pack/day for 15.0 years (15.0 ttl pk-yrs)     Types: Cigarettes     Start date:      Quit date:      Years since quittin.3   Vaping Use    Vaping status: Never Used   Substance Use Topics    Alcohol use: Never    Drug use: Never     No current facility-administered medications on file prior to encounter.     Current Outpatient Medications on File Prior to Encounter   Medication Sig Dispense Refill    acetaminophen (TYLENOL) 325 MG tablet Take 1 tablet by mouth Every 4 (Four) Hours As Needed for Mild Pain.      albuterol (PROVENTIL) (2.5 MG/3ML) 0.083% nebulizer solution Take 2.5 mg by nebulization Every 4 (Four) Hours As Needed for Wheezing (every 2 hours as needed for soa related to copd).      apixaban (ELIQUIS) 5 MG tablet tablet Take 1 tablet by mouth 2 (Two) Times a Day.      aspirin 81 MG EC tablet Take 1 tablet by mouth Daily.      atorvastatin (LIPITOR) 20 MG tablet Take 2 tablets by mouth Every Night.      brimonidine (ALPHAGAN) 0.2 % ophthalmic solution Administer 1 drop into the left eye 3 (Three) Times a Day.      budesonide-formoterol (SYMBICORT) 80-4.5 MCG/ACT inhaler Inhale 2 puffs 2 (Two) Times a Day.      bumetanide (BUMEX) 1 MG tablet Take 1 tablet by mouth 2 (Two) Times a Day.      DULoxetine (CYMBALTA) 30 MG capsule Take 1 capsule by mouth Daily.      ergocalciferol (ERGOCALCIFEROL) 1.25 MG (12020 UT) capsule Take 1 capsule by mouth 1 (One) Time Per Week.      escitalopram (LEXAPRO) 20 MG tablet TOME 1 TABLETAS TODAS LAS NOCHES (Patient taking differently: Take 0.5 tablets by mouth Daily.) 14 tablet 0    ferrous sulfate 325 (65  FE) MG tablet Take 1 tablet by mouth 2 (Two) Times a Day.      Fluticasone-Umeclidin-Vilant (Trelegy Ellipta) 100-62.5-25 MCG/ACT inhaler inhale 1 erin por via oral cada peng      insulin aspart (novoLOG) 100 UNIT/ML injection Inject 6 Units under the skin 3 (Three) Times a Day Before Meals. 1 each 0    Insulin Glargine (Lantus SoloStar) 100 UNIT/ML injection pen Inject 22 Units under the skin into the appropriate area as directed Daily.      metFORMIN (GLUCOPHAGE) 500 MG tablet JORGE 1 TABLETA POR VIA ORAL 2 VECES AL PENG CON COMIDA      montelukast (SINGULAIR) 10 MG tablet Take 1 tablet by mouth Every Night.      moxifloxacin (VIGAMOX) 0.5 % ophthalmic solution Administer 0.05 mL into the left eye 4 (Four) Times a Day.      pantoprazole (PROTONIX) 40 MG EC tablet Take 1 tablet by mouth Daily.      prednisoLONE acetate (PRED FORTE) 1 % ophthalmic suspension Administer 1 drop into the left eye 6 (Six) Times a Day.      predniSONE (DELTASONE) 5 MG tablet Take 1 tablet by mouth Daily.      pregabalin (LYRICA) 50 MG capsule Take 1 capsule by mouth 2 (Two) Times a Day.      sildenafil (REVATIO) 20 MG tablet Take 1 tablet by mouth 3 (Three) Times a Day.      spironolactone (ALDACTONE) 25 MG tablet Take 1 tablet by mouth Daily.      tamsulosin (FLOMAX) 0.4 MG capsule 24 hr capsule Take 1 capsule by mouth Daily. 30 capsule 0    traZODone (DESYREL) 50 MG tablet Take 3 tablets by mouth Every Night.      amLODIPine (NORVASC) 10 MG tablet Take 1 tablet by mouth Daily. 30 tablet 0    budesonide-formoterol (SYMBICORT) 160-4.5 MCG/ACT inhaler Inhale 2 puffs 2 (Two) Times a Day. (Patient not taking: Reported on 5/18/2025)      dilTIAZem CD (CARDIZEM CD) 120 MG 24 hr capsule Take 1 capsule by mouth Daily. (Patient not taking: Reported on 5/18/2025)      dilTIAZem XR (DILACOR XR) 120 MG 24 hr capsule Take 1 capsule by mouth Daily.      doxazosin (CARDURA) 4 MG tablet TOME 2 TABLETAS AL PENG 60 tablet 1    furosemide (LASIX) 40 MG  tablet Take 40 mg by mouth Daily. (Patient not taking: Reported on 5/18/2025)      insulin aspart (novoLOG) 100 UNIT/ML injection Inject 0-7 Units under the skin 4 (Four) Times a Day With Meals & at Bedtime.  12    insulin detemir (LEVEMIR) 100 UNIT/ML injection Inject 8 Units under the skin Daily.      ipratropium-albuterol (DUO-NEB) 0.5-2.5 mg/mL nebulizer Take 3 mL by nebulization Every 4 (Four) Hours.      melatonin 1 MG tablet Take 3 mg by mouth At Night As Needed for Sleep (for sleep).      metoprolol tartrate (LOPRESSOR) 25 MG tablet Take 12.5 mg by mouth 2 (Two) Times a Day.      Multiple Vitamins-Minerals (MULTIVITAMIN ADULT PO) Take 1 tablet by mouth Daily.      ondansetron (ZOFRAN) 4 MG tablet Take 4 mg by mouth Every 6 (Six) Hours As Needed for Nausea or Vomiting.      sennosides-docusate sodium (SENOKOT-S) 8.6-50 MG tablet Take 2 tablets by mouth Every Night. 30 tablet 0     Allergies   Allergen Reactions    Nitrates, Organic     Penicillins        Objective   Objective     Vital Signs  Temp:  [97.1 °F (36.2 °C)-98.9 °F (37.2 °C)] 97.1 °F (36.2 °C)  Heart Rate:  [69-98] 69  Resp:  [20-22] 20  BP: (147-176)/(62-96) 147/69  SpO2:  [91 %-98 %] 98 %  on  Flow (L/min) (Oxygen Therapy):  [2-8] 6;   Device (Oxygen Therapy): nasal cannula  Body mass index is 23.69 kg/m².    Physical Exam  HEENT: PERRLA, extraocular movements intact, Scleras no icterus  Neck: Supple, no JVD  Cardiovascular: Regular rate and rhythm with normal S1 and S2  Respiratory: Diminished breath sounds with faint wheezes  GI: Soft, nontender, bowel sounds are present, obese  Extremities: No edema, palpable pulses  Neurologic: Grossly nonfocal, no facial asymmetry    Results Review:  I reviewed the patient's new clinical results.  I reviewed the patient's new imaging results and agree with the interpretation.  I reviewed the patient's other test results and agree with the interpretation  I personally viewed and interpreted the patient's  EKG/Telemetry data  Discussed with ED provider.    Lab Results (last 24 hours)       Procedure Component Value Units Date/Time    CBC & Differential [[id number]]  (Abnormal) Collected: 05/17/25 2338    Specimen: Blood Updated: 05/17/25 2349    Narrative:      The following orders were created for panel order CBC & Differential.  Procedure                               Abnormality         Status                     ---------                               -----------         ------                     CBC Auto Differential[[id number]]        Abnormal            Final result                 Please view results for these tests on the individual orders.    Comprehensive Metabolic Panel [[id number]]  (Abnormal) Collected: 05/17/25 2338    Specimen: Blood Updated: 05/18/25 0022     Glucose 188 mg/dL      BUN 24 mg/dL      Creatinine 0.91 mg/dL      Sodium 139 mmol/L      Potassium 3.5 mmol/L      Comment: Slight hemolysis detected by analyzer. Result may be falsely elevated.        Chloride 104 mmol/L      CO2 28.3 mmol/L      Calcium 8.8 mg/dL      Total Protein 7.0 g/dL      Albumin 4.0 g/dL      ALT (SGPT) 29 U/L      AST (SGOT) 23 U/L      Alkaline Phosphatase 106 U/L      Total Bilirubin <0.2 mg/dL      Globulin 3.0 gm/dL      A/G Ratio 1.3 g/dL      BUN/Creatinine Ratio 26.4     Anion Gap 6.7 mmol/L      eGFR 90.7 mL/min/1.73     Narrative:      GFR Categories in Chronic Kidney Disease (CKD)              GFR Category          GFR (mL/min/1.73)    Interpretation  G1                    90 or greater        Normal or high (1)  G2                    60-89                Mild decrease (1)  G3a                   45-59                Mild to moderate decrease  G3b                   30-44                Moderate to severe decrease  G4                    15-29                Severe decrease  G5                    14 or less           Kidney failure    (1)In the absence of evidence of kidney disease, neither GFR category G1 or G2  fulfill the criteria for CKD.    eGFR calculation 2021 CKD-EPI creatinine equation, which does not include race as a factor    BNP [[id number]]  (Normal) Collected: 05/17/25 2338    Specimen: Blood Updated: 05/18/25 0011     proBNP 482.0 pg/mL     Narrative:      This assay is used as an aid in the diagnosis of individuals suspected of having heart failure. It can be used as an aid in the diagnosis of acute decompensated heart failure (ADHF) in patients presenting with signs and symptoms of ADHF to the emergency department (ED). In addition, NT-proBNP of <300 pg/mL indicates ADHF is not likely.    Age Range Result Interpretation  NT-proBNP Concentration (pg/mL:      <50             Positive            >450                   Gray                 300-450                    Negative             <300    50-75           Positive            >900                  Gray                300-900                  Negative            <300      >75             Positive            >1800                  Gray                300-1800                  Negative            <300    High Sensitivity Troponin T [[id number]]  (Normal) Collected: 05/17/25 2338    Specimen: Blood Updated: 05/18/25 0011     HS Troponin T 17 ng/L     Narrative:      High Sensitive Troponin T Reference Range:  <14.0 ng/L- Negative Female for AMI  <22.0 ng/L- Negative Male for AMI  >=14 - Abnormal Female indicating possible myocardial injury.  >=22 - Abnormal Male indicating possible myocardial injury.   Clinicians would have to utilize clinical acumen, EKG, Troponin, and serial changes to determine if it is an Acute Myocardial Infarction or myocardial injury due to an underlying chronic condition.         CBC Auto Differential [[id number]]  (Abnormal) Collected: 05/17/25 2338    Specimen: Blood Updated: 05/17/25 2349     WBC 10.07 10*3/mm3      RBC 5.09 10*6/mm3      Hemoglobin 14.4 g/dL      Hematocrit 44.1 %      MCV 86.6 fL      MCH 28.3 pg      MCHC 32.7 g/dL       RDW 12.6 %      RDW-SD 40.0 fl      MPV 9.2 fL      Platelets 262 10*3/mm3      Neutrophil % 82.3 %      Lymphocyte % 8.8 %      Monocyte % 7.5 %      Eosinophil % 0.7 %      Basophil % 0.3 %      Immature Grans % 0.4 %      Neutrophils, Absolute 8.28 10*3/mm3      Lymphocytes, Absolute 0.89 10*3/mm3      Monocytes, Absolute 0.76 10*3/mm3      Eosinophils, Absolute 0.07 10*3/mm3      Basophils, Absolute 0.03 10*3/mm3      Immature Grans, Absolute 0.04 10*3/mm3      nRBC 0.0 /100 WBC     Respiratory Panel PCR w/COVID-19(SARS-CoV-2) AMADEO/XAVI/GERMÁN/PAD/COR/SAMANTHA In-House, NP Swab in UTM/VTM, 2 HR TAT - Swab, Nasopharynx [[id number]]  (Abnormal) Collected: 05/18/25 0012    Specimen: Swab from Nasopharynx Updated: 05/18/25 0111     ADENOVIRUS, PCR Not Detected     Coronavirus 229E Not Detected     Coronavirus HKU1 Not Detected     Coronavirus NL63 Not Detected     Coronavirus OC43 Not Detected     COVID19 Detected     Human Metapneumovirus Not Detected     Human Rhinovirus/Enterovirus Not Detected     Influenza A PCR Not Detected     Influenza B PCR Not Detected     Parainfluenza Virus 1 Not Detected     Parainfluenza Virus 2 Not Detected     Parainfluenza Virus 3 Not Detected     Parainfluenza Virus 4 Not Detected     RSV, PCR Not Detected     Bordetella pertussis pcr Not Detected     Bordetella parapertussis PCR Not Detected     Chlamydophila pneumoniae PCR Not Detected     Mycoplasma pneumo by PCR Not Detected    Narrative:      In the setting of a positive respiratory panel with a viral infection PLUS a negative procalcitonin without other underlying concern for bacterial infection, consider observing off antibiotics or discontinuation of antibiotics and continue supportive care. If the respiratory panel is positive for atypical bacterial infection (Bordetella pertussis, Chlamydophila pneumoniae, or Mycoplasma pneumoniae), consider antibiotic de-escalation to target atypical bacterial infection.    Blood Culture -  Blood, Arm, Left [[id number]] Collected: 05/18/25 0031    Specimen: Blood from Arm, Left Updated: 05/18/25 0036    Lactic Acid, Plasma [[id number]]  (Normal) Collected: 05/18/25 0031    Specimen: Blood from Arm, Left Updated: 05/18/25 0058     Lactate 0.9 mmol/L     Blood Culture - Blood, Arm, Right [[id number]] Collected: 05/18/25 0050    Specimen: Blood from Arm, Right Updated: 05/18/25 0054    High Sensitivity Troponin T 1Hr [[id number]]  (Normal) Collected: 05/18/25 0050    Specimen: Blood from Arm, Right Updated: 05/18/25 0118     HS Troponin T 18 ng/L      Troponin T Numeric Delta 1 ng/L     Narrative:      High Sensitive Troponin T Reference Range:  <14.0 ng/L- Negative Female for AMI  <22.0 ng/L- Negative Male for AMI  >=14 - Abnormal Female indicating possible myocardial injury.  >=22 - Abnormal Male indicating possible myocardial injury.   Clinicians would have to utilize clinical acumen, EKG, Troponin, and serial changes to determine if it is an Acute Myocardial Infarction or myocardial injury due to an underlying chronic condition.         POC Glucose Once [[id number]]  (Abnormal) Collected: 05/18/25 0622    Specimen: Blood Updated: 05/18/25 0623     Glucose 208 mg/dL     Comprehensive Metabolic Panel [[id number]]  (Abnormal) Collected: 05/18/25 0748    Specimen: Blood Updated: 05/18/25 0841     Glucose 220 mg/dL      BUN 20 mg/dL      Creatinine 0.99 mg/dL      Sodium 138 mmol/L      Potassium 5.0 mmol/L      Comment: Specimen hemolyzed.  Result may be falsely elevated.        Chloride 101 mmol/L      CO2 29.0 mmol/L      Calcium 9.1 mg/dL      Total Protein 7.7 g/dL      Albumin 4.2 g/dL      ALT (SGPT) 42 U/L      Comment: Specimen hemolyzed.  Result may  be falsely elevated.        AST (SGOT) 38 U/L      Comment: Specimen hemolyzed.  Result may be falsely elevated.        Alkaline Phosphatase 118 U/L      Total Bilirubin 0.3 mg/dL      Globulin 3.5 gm/dL      A/G Ratio 1.2 g/dL      BUN/Creatinine Ratio  20.2     Anion Gap 8.0 mmol/L      eGFR 81.9 mL/min/1.73     Narrative:      GFR Categories in Chronic Kidney Disease (CKD)              GFR Category          GFR (mL/min/1.73)    Interpretation  G1                    90 or greater        Normal or high (1)  G2                    60-89                Mild decrease (1)  G3a                   45-59                Mild to moderate decrease  G3b                   30-44                Moderate to severe decrease  G4                    15-29                Severe decrease  G5                    14 or less           Kidney failure    (1)In the absence of evidence of kidney disease, neither GFR category G1 or G2 fulfill the criteria for CKD.    eGFR calculation 2021 CKD-EPI creatinine equation, which does not include race as a factor    CBC (No Diff) [[id number]]  (Normal) Collected: 05/18/25 0749    Specimen: Blood Updated: 05/18/25 0815     WBC 8.85 10*3/mm3      RBC 5.23 10*6/mm3      Hemoglobin 14.6 g/dL      Hematocrit 45.6 %      MCV 87.2 fL      MCH 27.9 pg      MCHC 32.0 g/dL      RDW 12.3 %      RDW-SD 39.3 fl      MPV 9.2 fL      Platelets 257 10*3/mm3     POC Glucose Once [[id number]]  (Abnormal) Collected: 05/18/25 1143    Specimen: Blood Updated: 05/18/25 1145     Glucose 201 mg/dL             Imaging Results (Last 24 Hours)       Procedure Component Value Units Date/Time    XR Chest 1 View [[id number]] Collected: 05/18/25 0000     Updated: 05/18/25 0005    Narrative:      CXR ONE VIEW      HISTORY: CHF/COPD Protocol     COMPARISON: 10/1/2017     TECHNIQUE: single portable AP       Impression:         Redemonstrated mild cardiomegaly.     Redemonstrated bilateral hilar fullness.     There is no consolidation, effusion or pneumothorax.     This report was finalized on 5/18/2025 12:02 AM by Dr. Dane Guevara M.D on Workstation: [id number]               Results for orders placed during the hospital encounter of 09/04/17    Adult Transthoracic Echo Complete W/  Cont if Necessary Per Protocol    Interpretation Summary  · Left ventricular wall thickness is consistent with mild concentric hypertrophy.  · Left ventricular systolic function is normal. Estimated EF = 63%.  · Right ventricular cavity is moderately dilated.  · Moderately reduced right ventricular systolic function noted.  · Mild mitral valve regurgitation is present  · Mild to moderate tricuspid valve regurgitation is present.  · Calculated right ventricular systolic pressure from tricuspid regurgitation is 52.8 mmHg.  · Mild pulmonic valve regurgitation is present.      ECG 12 Lead Dyspnea   Preliminary Result   HEART RATE=78  bpm   RR Lzpgdzlo=188  ms   TN Cmcqsgoc=804  ms   P Horizontal Axis=-65  deg   P Front Axis=18  deg   QRSD Ldllllwy=165  ms   QT Fdslzefh=820  ms   RFcJ=261  ms   QRS Axis=10  deg   T Wave Axis=79  deg   - ABNORMAL ECG -   Sinus rhythm   Prolonged TN interval   Right bundle branch block   Date and Time of Study:2025-05-17 23:47:43           Assessment/Plan     Active Hospital Problems    Diagnosis  POA    **Acute hypoxemic respiratory failure due to COVID-19 [U07.1, J96.01]  Yes    BPH (benign prostatic hyperplasia) [N40.0]  Unknown    Chronic respiratory failure with hypoxia [J96.11]  Yes    Dependence on supplemental oxygen [Z99.81]  Not Applicable    CHRISSY (obstructive sleep apnea) [G47.33]  Yes    COPD exacerbation [J44.1]  Yes    Atrial fibrillation [I48.91]  Yes    Diabetes mellitus type 2 without retinopathy [E11.9]  Yes    Essential hypertension [I10]  Yes      Resolved Hospital Problems   No resolved problems to display.     1. Acute hypoxic respiratory failure secondary to COVID-19/COPD exacerbation, patient will be placed on dexamethasone, DuoNebs and bronchodilators will also be initiated.  Continue with 8 L of supplemental oxygen and normally uses 2 L.  CT chest will also be obtained and remdesivir is being initiated.  Pulmonary consultation will also be obtained.  2.  Diabetes  mellitus, home metformin is being held and will continue with long-acting insulin, Premeal as well as corrective dose insulin.  Anticipate blood sugars to be on the rise secondary steroids.  Hemoglobin A1c level will be checked.  3.  Paroxysmal atrial fibrillation, continue with Eliquis, aspirin, Cardizem and metoprolol.  Currently normal sinus rhythm.  4.  Hypertension, on amlodipine, metoprolol, Cardizem, doxazosin and Aldactone which will be continued.  Monitor blood pressure closely.  5.  Neuropathy, on Lyrica.  6.  History of BPH, on Flomax and doxazosin and currently denies any voiding issues.  7.  GERD, nausea suppressive therapy.  8.  Hyperlipidemia, on statins.  9.  Chronic systolic dysfunction with ejection fraction of 44%, currently compensated and is on Bumex, Aldactone and metoprolol possibly continued.  10.  Pulmonary hypertension, on Revatio which has been resumed.  11.  Obstructive sleep apnea, on BiPAP at home which will be continued if home equipment is available.  12.  DVT prophylaxis, on Eliquis.  13.  CODE STATUS is full code.  Estimated discharge date, 5/22/2025             Chaparro Cage MD  [facility]  05/18/25  13:27 EDT      Electronically signed by Chaparro Cage MD at 05/18/25 1332          Emergency Department Notes        Nahid Bear RN at 05/18/25 0244          ..Nursing report ED to floor  Chuckie Valdez  70 y.o.  male    HPI :  HPI  Stated Reason for Visit: patient presents to ED from a friends party where he reports he began having difficulty breathing around 2200. Patient has history of COPD and CHF. Coughing frequently in triage, reports sometimes cough is productive.    Chief Complaint  Chief Complaint   Patient presents with    Shortness of Breath       Admitting doctor:   Byron Bryant MD    Admitting diagnosis:   The primary encounter diagnosis was COPD exacerbation. Diagnoses of COVID-19 and Acute on chronic respiratory  failure with hypoxia were also pertinent to this visit.    Code status:   Current Code Status       Date Active Code Status Order ID Comments User Context       Prior            Allergies:   Nitrates, organic and Penicillins    Isolation:   No active isolations    Intake and Output    Intake/Output Summary (Last 24 hours) at 5/18/2025 0244  Last data filed at 5/18/2025 0138  Gross per 24 hour   Intake 100 ml   Output --   Net 100 ml       Weight:   There were no vitals filed for this visit.    Most recent vitals:   Vitals:    05/18/25 0052 05/18/25 0131 05/18/25 0159 05/18/25 0234   BP:  164/79 176/62    BP Location:   Left arm    Patient Position:   Sitting    Pulse: 77 75 75 73   Resp: 22 20 20 20   Temp:       TempSrc:       SpO2: 92% 92% 93% 91%   Height:           Active LDAs/IV Access:   Lines, Drains & Airways       Active LDAs       Name Placement date Placement time Site Days    Peripheral IV 05/17/25 20 G Anterior;Proximal;Right Forearm 05/17/25  --  Forearm  1                    Labs (abnormal labs have a star):   Labs Reviewed   RESPIRATORY PANEL PCR W/ COVID-19 (SARS-COV-2), NP SWAB IN UTM/VTP, 2 HR TAT - Abnormal; Notable for the following components:       Result Value    COVID19 Detected (*)     All other components within normal limits    Narrative:     In the setting of a positive respiratory panel with a viral infection PLUS a negative procalcitonin without other underlying concern for bacterial infection, consider observing off antibiotics or discontinuation of antibiotics and continue supportive care. If the respiratory panel is positive for atypical bacterial infection (Bordetella pertussis, Chlamydophila pneumoniae, or Mycoplasma pneumoniae), consider antibiotic de-escalation to target atypical bacterial infection.   COMPREHENSIVE METABOLIC PANEL - Abnormal; Notable for the following components:    Glucose 188 (*)     BUN 24 (*)     BUN/Creatinine Ratio 26.4 (*)     All other components within  normal limits    Narrative:     GFR Categories in Chronic Kidney Disease (CKD)              GFR Category          GFR (mL/min/1.73)    Interpretation  G1                    90 or greater        Normal or high (1)  G2                    60-89                Mild decrease (1)  G3a                   45-59                Mild to moderate decrease  G3b                   30-44                Moderate to severe decrease  G4                    15-29                Severe decrease  G5                    14 or less           Kidney failure    (1)In the absence of evidence of kidney disease, neither GFR category G1 or G2 fulfill the criteria for CKD.    eGFR calculation 2021 CKD-EPI creatinine equation, which does not include race as a factor   CBC WITH AUTO DIFFERENTIAL - Abnormal; Notable for the following components:    Neutrophil % 82.3 (*)     Lymphocyte % 8.8 (*)     Neutrophils, Absolute 8.28 (*)     All other components within normal limits   BNP (IN-HOUSE) - Normal    Narrative:     This assay is used as an aid in the diagnosis of individuals suspected of having heart failure. It can be used as an aid in the diagnosis of acute decompensated heart failure (ADHF) in patients presenting with signs and symptoms of ADHF to the emergency department (ED). In addition, NT-proBNP of <300 pg/mL indicates ADHF is not likely.    Age Range Result Interpretation  NT-proBNP Concentration (pg/mL:      <50             Positive            >450                   Gray                 300-450                    Negative             <300    50-75           Positive            >900                  Gray                300-900                  Negative            <300      >75             Positive            >1800                  Gray                300-1800                  Negative            <300   TROPONIN - Normal    Narrative:     High Sensitive Troponin T Reference Range:  <14.0 ng/L- Negative Female for AMI  <22.0 ng/L- Negative  Male for AMI  >=14 - Abnormal Female indicating possible myocardial injury.  >=22 - Abnormal Male indicating possible myocardial injury.   Clinicians would have to utilize clinical acumen, EKG, Troponin, and serial changes to determine if it is an Acute Myocardial Infarction or myocardial injury due to an underlying chronic condition.        LACTIC ACID, PLASMA - Normal   HIGH SENSITIVITIY TROPONIN T 1HR - Normal    Narrative:     High Sensitive Troponin T Reference Range:  <14.0 ng/L- Negative Female for AMI  <22.0 ng/L- Negative Male for AMI  >=14 - Abnormal Female indicating possible myocardial injury.  >=22 - Abnormal Male indicating possible myocardial injury.   Clinicians would have to utilize clinical acumen, EKG, Troponin, and serial changes to determine if it is an Acute Myocardial Infarction or myocardial injury due to an underlying chronic condition.        BLOOD CULTURE   BLOOD CULTURE   RAINBOW DRAW    Narrative:     The following orders were created for panel order [place] Draw.  Procedure                               Abnormality         Status                     ---------                               -----------         ------                     Green Top (Gel)[[id number]]                                  Final result               Lavender Top[[id number]]                                     Final result               Gold Top - SST[[id number]]                                   Final result               Light Blue Top[[id number]]                                   Final result                 Please view results for these tests on the individual orders.   CBC AND DIFFERENTIAL    Narrative:     The following orders were created for panel order CBC & Differential.  Procedure                               Abnormality         Status                     ---------                               -----------         ------                     CBC Auto Differential[[id number]]        Abnormal            Final  result                 Please view results for these tests on the individual orders.   GREEN TOP   LAVENDER TOP   GOLD TOP - SST   LIGHT BLUE TOP       EKG:   ECG 12 Lead Dyspnea   Preliminary Result   HEART RATE=78  bpm   RR Lggplfzv=436  ms   CA Eyipctik=503  ms   P Horizontal Axis=-65  deg   P Front Axis=18  deg   QRSD Byxabtnk=021  ms   QT Xputdgqx=058  ms   COcZ=723  ms   QRS Axis=10  deg   T Wave Axis=79  deg   - ABNORMAL ECG -   Sinus rhythm   Prolonged CA interval   Right bundle branch block   Date and Time of Study:2025 23:47:43          Meds given in ED:   Medications   sodium chloride 0.9 % flush 10 mL (has no administration in time range)   ipratropium-albuterol (DUO-NEB) nebulizer solution 9 mL (9 mL Nebulization Given 25 0052)   methylPREDNISolone sodium succinate (SOLU-Medrol) injection 125 mg (125 mg Intravenous Given 25 0022)   cefTRIAXone (ROCEPHIN) 1,000 mg in sodium chloride 0.9 % 100 mL MBP (0 mg Intravenous Stopped 25 0138)   azithromycin (ZITHROMAX) 500 mg in sodium chloride 0.9 % 250 mL IVPB-VTB (500 mg Intravenous New Bag 25 0144)   ipratropium-albuterol (DUO-NEB) nebulizer solution 6 mL (6 mL Nebulization Given 25 0234)       Imaging results:  XR Chest 1 View  Result Date: 2025   Redemonstrated mild cardiomegaly.  Redemonstrated bilateral hilar fullness.  There is no consolidation, effusion or pneumothorax.  This report was finalized on 2025 12:02 AM by Dr. Dane Guevara M.D on Workstation: [id number]        Ambulatory status:   - standby assist     Social issues:   Social History     Socioeconomic History    Marital status: Single   Tobacco Use    Smoking status: Former     Current packs/day: 0.00     Average packs/day: 1 pack/day for 15.0 years (15.0 ttl pk-yrs)     Types: Cigarettes     Start date:      Quit date:      Years since quittin.3       Peripheral Neurovascular  Peripheral Neurovascular (Adult)  Peripheral Neurovascular  WDL: WDL    Neuro Cognitive  Neuro Cognitive (Adult)  Cognitive/Neuro/Behavioral WDL: WDL, orientation  Orientation: oriented x 4  Additional Documentation: [place] Coma Scale (Group)  Noehmi Coma Scale  Best Eye Response: 4-->(E4) spontaneous  Best Motor Response: 6-->(M6) obeys commands  Best Verbal Response: 5-->(V5) oriented  Nohemi Coma Scale Score: 15    Learning  Learning Assessment  Learning Readiness and Ability: cultural barrier identified  Individualized Teaching Method: used interpertor 979333  Education Provided  Person Taught: patient  Teaching Method: verbal instruction  Teaching Focus: symptom/problem overview, diagnostic test  Education Outcome Evaluation: acceptance expressed, verbalizes understanding    Respiratory  Respiratory  Airway WDL: .WDL except  Additional Documentation: Breath Sounds (Group)  Respiratory WDL  Respiratory WDL: .WDL except, cough, all  Rhythm/Pattern, Respiratory: shortness of breath  Cough Frequency: frequent  Cough Type: loose, congested  Breath Sounds  All Lung Fields Breath Sounds: All Fields  All Lung Fields Breath Sounds: clear, diminished  LLL Breath Sounds: crackles  Breath Sounds Post-Respiratory Treatment  Breath Sounds Posttreatment All Fields: no change    Abdominal Pain       Pain Assessments  Pain (Adult)  (0-10) Pain Rating: Rest: 7  Pain Location: other (see comments) (ribs)  Pain Side/Orientation: bilateral    NIH Stroke Scale       Nahid Bear RN  05/18/25 02:44 EDT     Electronically signed by Nahid Bear RN at 05/18/25 3848       Sue Andrew MD at 05/17/25 5700          EMERGENCY DEPARTMENT ENCOUNTER    History  Chief Complaint   Patient presents with    Shortness of Breath       History provided by: Patient    HPI:  Context: Chuckie Valdez is a 70 y.o. male with a medical history of chronic respiratory failure, pulmonary hypertension, COPD, CHF who presents to the ED c/o acute shortness of breath.  Symptoms started a few days ago.  He has  had a productive cough, worsening shortness of breath and wheezing.  He does wear 2 L of oxygen at baseline, which not had to be increased.  No real lower extremity swelling today.  No fever, has had some congestion.      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    Acute on chronic diastolic CHF (congestive heart failure) 10/06/2017     Resolved Ambulatory Problems     Diagnosis Date Noted    Pneumonia of left lower lobe due to infectious organism 09/04/2017     Past Medical History:   Diagnosis Date    COPD (chronic obstructive pulmonary disease)     HCV (hepatitis C virus)     History of tracheostomy     Pulmonary hypertension     Type 2 diabetes mellitus        Past Surgical History:  Past Surgical History:   Procedure Laterality Date    BRONCHOSCOPY N/A 9/27/2017    Procedure: BRONCHOSCOPY WITH WASHINGS RIGHT MIDDLE LOBE AND LINGULAR with balloon dilation 12MM-15MM AND 18MM-19MM;  Surgeon: Herb Anglin MD;  Location: [facility] ENDOSCOPY;  Service:     BRONCHOSCOPY N/A 9/29/2017    Procedure: BRONCHOSCOPY WITH BALLON DILATION 19MM AND STENT PLACEMENT;  Surgeon: Herb Anglin MD;  Location: [facility] OR;  Service:     BRONCHOSCOPY N/A 9/29/2017    Procedure: BRONCHOSCOPY WITH STENT REMOVAL;  Surgeon: Herb Anglin MD;  Location: [facility] OR;  Service:          Family History:  No family history on file.      Social History:  Social History     Socioeconomic History    Marital status: Single   Tobacco Use    Smoking status: Former     Current packs/day: 0.00     Average packs/day: 1 pack/day for 15.0 years (15.0 ttl pk-yrs)     Types:  Cigarettes     Start date:      Quit date:      Years since quittin.3         Allergies:  Nitrates, organic and Penicillins        Physical Exam  ED Triage Vitals [25 2328]   Temp Heart Rate Resp BP SpO2   98.9 °F (37.2 °C) 98 20 168/96 96 %      Temp src Heart Rate Source Patient Position BP Location FiO2 (%)   Tympanic -- -- -- --     Physical Exam  Constitutional:       Appearance: Normal appearance.   HENT:      Head: Atraumatic.   Eyes:      Conjunctiva/sclera: Conjunctivae normal.   Cardiovascular:      Rate and Rhythm: Normal rate and regular rhythm.      Heart sounds: No murmur heard.  Pulmonary:      Effort: Pulmonary effort is normal. Tachypnea present.      Breath sounds: Decreased breath sounds and wheezing present.   Abdominal:      Tenderness: There is no abdominal tenderness. There is no guarding or rebound.   Skin:     Capillary Refill: Capillary refill takes less than 2 seconds.   Neurological:      Mental Status: He is alert and oriented to person, place, and time.         Medications Given in ER:   Medications   sodium chloride 0.9 % flush 10 mL (has no administration in time range)   ondansetron (ZOFRAN) injection 4 mg (has no administration in time range)   ipratropium-albuterol (DUO-NEB) nebulizer solution 9 mL (9 mL Nebulization Given 25 0052)   methylPREDNISolone sodium succinate (SOLU-Medrol) injection 125 mg (125 mg Intravenous Given 25 0022)   cefTRIAXone (ROCEPHIN) 1,000 mg in sodium chloride 0.9 % 100 mL MBP (0 mg Intravenous Stopped 25 0138)   azithromycin (ZITHROMAX) 500 mg in sodium chloride 0.9 % 250 mL IVPB-VTB (500 mg Intravenous New Bag 25 0144)   ipratropium-albuterol (DUO-NEB) nebulizer solution 6 mL (6 mL Nebulization Given 25 0234)         Orders Placed:  Orders Placed This Encounter   Procedures    Respiratory Panel PCR w/COVID-19(SARS-CoV-2) AMADEO/XAVI/GERMÁN/PAD/COR/SAMANTHA In-House, NP Swab in UTM/VTM, 2 HR TAT - Swab, Nasopharynx    Blood  Culture - Blood,    Blood Culture - Blood,    XR Chest 1 View    [place] Draw    Comprehensive Metabolic Panel    BNP    High Sensitivity Troponin T    CBC Auto Differential    Lactic Acid, Plasma    High Sensitivity Troponin T 1Hr    Continuous Pulse Oximetry    Vital Signs    [facility]HA (on-call MD unless specified) Details    Oxygen Therapy- Nasal Cannula; Titrate 1-6 LPM Per SpO2; 90 - 95%    ECG 12 Lead Dyspnea    Insert Peripheral IV    Inpatient Admission    CBC & Differential    Green Top (Gel)    Lavender Top    Gold Top - SST    Light Blue Top         Outpatient Medication Management:   Current Facility-Administered Medications Ordered in Epic   Medication Dose Route Frequency Provider Last Rate Last Admin    ondansetron (ZOFRAN) injection 4 mg  4 mg Intravenous Once Sue Andrew MD        sodium chloride 0.9 % flush 10 mL  10 mL Intravenous PRN Sue Andrew MD         Current Outpatient Medications Ordered in Epic   Medication Sig Dispense Refill    acetaminophen (TYLENOL) 325 MG tablet Take 325 mg by mouth Every 4 (Four) Hours As Needed for Mild Pain .      albuterol (PROVENTIL) (2.5 MG/3ML) 0.083% nebulizer solution Take 2.5 mg by nebulization Every 4 (Four) Hours As Needed for Wheezing (every 2 hours as needed for soa related to copd).      amLODIPine (NORVASC) 10 MG tablet Take 1 tablet by mouth Daily. 30 tablet 0    apixaban (ELIQUIS) 5 MG tablet tablet Take 5 mg by mouth 2 (Two) Times a Day.      atorvastatin (LIPITOR) 20 MG tablet Take 20 mg by mouth Every Night.      budesonide-formoterol (SYMBICORT) 160-4.5 MCG/ACT inhaler Inhale 2 puffs 2 (Two) Times a Day.      doxazosin (CARDURA) 4 MG tablet TOME 2 TABLETAS AL PENG 60 tablet 1    escitalopram (LEXAPRO) 20 MG tablet TOME 1 TABLETAS TODAS L[place] NOCHES 14 tablet 0    ferrous sulfate 325 (65 FE) MG tablet Take 325 mg by mouth 2 (Two) Times a Day.      furosemide (LASIX) 40 MG tablet Take 40 mg by mouth Daily.      insulin aspart (novoLOG) 100  UNIT/ML injection Inject 6 Units under the skin 3 (Three) Times a Day Before Meals. 1 each 0    insulin aspart (novoLOG) 100 UNIT/ML injection Inject 0-7 Units under the skin 4 (Four) Times a Day With Meals & at Bedtime.  12    insulin detemir (LEVEMIR) 100 UNIT/ML injection Inject 8 Units under the skin Daily.      ipratropium-albuterol (DUO-NEB) 0.5-2.5 mg/mL nebulizer Take 3 mL by nebulization Every 4 (Four) Hours.      melatonin 1 MG tablet Take 3 mg by mouth At Night As Needed for Sleep (for sleep).      metoprolol tartrate (LOPRESSOR) 25 MG tablet Take 12.5 mg by mouth 2 (Two) Times a Day.      Multiple Vitamins-Minerals (MULTIVITAMIN ADULT PO) Take 1 tablet by mouth Daily.      ondansetron (ZOFRAN) 4 MG tablet Take 4 mg by mouth Every 6 (Six) Hours As Needed for Nausea or Vomiting.      pantoprazole (PROTONIX) 40 MG EC tablet Take 40 mg by mouth Daily.      sennosides-docusate sodium (SENOKOT-S) 8.6-50 MG tablet Take 2 tablets by mouth Every Night. 30 tablet 0    tamsulosin (FLOMAX) 0.4 MG capsule 24 hr capsule Take 1 capsule by mouth Daily. 30 capsule 0    traZODone (DESYREL) 50 MG tablet Take 50 mg by mouth Every Night.             Medical Decision Making:  All labs have been independently interpreted by me.  All radiology studies have been reviewed by me. All EKG's have been independently viewed and interpreted by me.  Discussion below represents my analysis of pertinent findings related to patient's condition, differential diagnosis, treatment plan and final disposition.    Differential Diagnosis includes but not limited to: COPD exacerbation, pneumonia, viral illness, pulmonary edema, pleural effusion, pneumothorax    Review of prior external notes (non-ED) -and- Review of prior external test results outside of this encounter:  I reviewed the pulmonary office visit note from 5/6/2025.  Patient does have a history of A-fib, maintained on Eliquis.  He is on sildenafil for pulmonary hypertension, BiPAP for  CHRISSY.  He is on oral prednisone 5 mg for COPD, Trelegy    Labs Results:  Recent Results (from the past 24 hours)   Comprehensive Metabolic Panel    Collection Time: 05/17/25 11:38 PM    Specimen: Blood   Result Value Ref Range    Glucose 188 (H) 65 - 99 mg/dL    BUN 24 (H) 8 - 23 mg/dL    Creatinine 0.91 0.76 - 1.27 mg/dL    Sodium 139 136 - 145 mmol/L    Potassium 3.5 3.5 - 5.2 mmol/L    Chloride 104 98 - 107 mmol/L    CO2 28.3 22.0 - 29.0 mmol/L    Calcium 8.8 8.6 - 10.5 mg/dL    Total Protein 7.0 6.0 - 8.5 g/dL    Albumin 4.0 3.5 - 5.2 g/dL    ALT (SGPT) 29 1 - 41 U/L    AST (SGOT) 23 1 - 40 U/L    Alkaline Phosphatase 106 39 - 117 U/L    Total Bilirubin <0.2 0.0 - 1.2 mg/dL    Globulin 3.0 gm/dL    A/G Ratio 1.3 g/dL    BUN/Creatinine Ratio 26.4 (H) 7.0 - 25.0    Anion Gap 6.7 5.0 - 15.0 mmol/L    eGFR 90.7 >60.0 mL/min/1.73   BNP    Collection Time: 05/17/25 11:38 PM    Specimen: Blood   Result Value Ref Range    proBNP 482.0 0.0 - 900.0 pg/mL   High Sensitivity Troponin T    Collection Time: 05/17/25 11:38 PM    Specimen: Blood   Result Value Ref Range    HS Troponin T 17 <22 ng/L   Green Top (Gel)    Collection Time: 05/17/25 11:38 PM   Result Value Ref Range    Extra Tube Hold for add-ons.    Lavender Top    Collection Time: 05/17/25 11:38 PM   Result Value Ref Range    Extra Tube hold for add-on    Gold Top - SST    Collection Time: 05/17/25 11:38 PM   Result Value Ref Range    Extra Tube Hold for add-ons.    Light Blue Top    Collection Time: 05/17/25 11:38 PM   Result Value Ref Range    Extra Tube Hold for add-ons.    CBC Auto Differential    Collection Time: 05/17/25 11:38 PM    Specimen: Blood   Result Value Ref Range    WBC 10.07 3.40 - 10.80 10*3/mm3    RBC 5.09 4.14 - 5.80 10*6/mm3    Hemoglobin 14.4 13.0 - 17.7 g/dL    Hematocrit 44.1 37.5 - 51.0 %    MCV 86.6 79.0 - 97.0 fL    MCH 28.3 26.6 - 33.0 pg    MCHC 32.7 31.5 - 35.7 g/dL    RDW 12.6 12.3 - 15.4 %    RDW-SD 40.0 37.0 - 54.0 fl    MPV 9.2 6.0  - 12.0 fL    Platelets 262 140 - 450 10*3/mm3    Neutrophil % 82.3 (H) 42.7 - 76.0 %    Lymphocyte % 8.8 (L) 19.6 - 45.3 %    Monocyte % 7.5 5.0 - 12.0 %    Eosinophil % 0.7 0.3 - 6.2 %    Basophil % 0.3 0.0 - 1.5 %    Immature Grans % 0.4 0.0 - 0.5 %    Neutrophils, Absolute 8.28 (H) 1.70 - 7.00 10*3/mm3    Lymphocytes, Absolute 0.89 0.70 - 3.10 10*3/mm3    Monocytes, Absolute 0.76 0.10 - 0.90 10*3/mm3    Eosinophils, Absolute 0.07 0.00 - 0.40 10*3/mm3    Basophils, Absolute 0.03 0.00 - 0.20 10*3/mm3    Immature Grans, Absolute 0.04 0.00 - 0.05 10*3/mm3    nRBC 0.0 0.0 - 0.2 /100 WBC   ECG 12 Lead Dyspnea    Collection Time: 05/17/25 11:47 PM   Result Value Ref Range    QT Interval 434 ms    QTC Interval 495 ms   Respiratory Panel PCR w/COVID-19(SARS-CoV-2) AMADEO/XAVI/GERMÁN/PAD/COR/SAMANTHA In-House, NP Swab in [place], 2 HR TAT - Swab, Nasopharynx    Collection Time: 05/18/25 12:12 AM    Specimen: Nasopharynx; Swab   Result Value Ref Range    ADENOVIRUS, PCR Not Detected Not Detected    Coronavirus 229E Not Detected Not Detected    Coronavirus HKU1 Not Detected Not Detected    Coronavirus NL63 Not Detected Not Detected    Coronavirus OC43 Not Detected Not Detected    COVID19 Detected (C) Not Detected - Ref. Range    Human Metapneumovirus Not Detected Not Detected    Human Rhinovirus/Enterovirus Not Detected Not Detected    Influenza A PCR Not Detected Not Detected    Influenza B PCR Not Detected Not Detected    Parainfluenza Virus 1 Not Detected Not Detected    Parainfluenza Virus 2 Not Detected Not Detected    Parainfluenza Virus 3 Not Detected Not Detected    Parainfluenza Virus 4 Not Detected Not Detected    RSV, PCR Not Detected Not Detected    Bordetella pertussis pcr Not Detected Not Detected    Bordetella parapertussis PCR Not Detected Not Detected    Chlamydophila pneumoniae PCR Not Detected Not Detected    Mycoplasma pneumo by PCR Not Detected Not Detected   Lactic Acid, Plasma    Collection Time: 05/18/25 12:31 AM     Specimen: Arm, Left; Blood   Result Value Ref Range    Lactate 0.9 0.5 - 2.0 mmol/L   High Sensitivity Troponin T 1Hr    Collection Time: 05/18/25 12:50 AM    Specimen: Arm, Right; Blood   Result Value Ref Range    HS Troponin T 18 <22 ng/L    Troponin T Numeric Delta 1 Abnormal if >/=3 ng/L     Lab Comments:  My independent interpretation of the above labs: Positive for COVID-19      Radiology:  XR Chest 1 View  Result Date: 5/18/2025  CXR ONE VIEW  HISTORY: CHF/COPD Protocol  COMPARISON: 10/1/2017  TECHNIQUE: single portable AP       Redemonstrated mild cardiomegaly.  Redemonstrated bilateral hilar fullness.  There is no consolidation, effusion or pneumothorax.  This report was finalized on 5/18/2025 12:02 AM by Dr. Dane Guevara M.D on Workstation: [id number]      Radiology Comments:  I ordered the above imaging and reviewed the results.    My independent interpretation of the cxr: Predominantly right sided pulmonary opacities    EKG Interpreted by me: Sinus rhythm, right bundle branch block, varying morphology of P waves    (Social Determinants of Health): None and Health Care System (Health Coverage, Provider Availability, Provider Linguistic and Cultural Competency, Quality of Care)    Rationale:  This is a chronically ill-appearing 70-year-old male who is presenting today secondary to complaints of increased shortness of breath.  He presents on his baseline oxygen.  He presents afebrile with fairly benign vital signs.  He is wheezing diffusely on exam.      Patient was evaluated with an EKG, which did not demonstrate any new evidence of ischemia.  This is coupled with 2 times negative troponin levels in the setting of low clinical suspicion for acute coronary syndrome as the cause of his presentation today.    I considered pulmonary embolism.  However, the patient is really not tachycardic, does not have any unilateral leg swelling and is therapeutically anticoagulated given his history of atrial  fibrillation.  I suspect his symptoms are far more likely to be related to his underlying COPD rather than PE, and would not further evaluate with imaging at this time unless he clinically changes or fails to improve with standard therapy.     Chest x-ray did not demonstrate any other acute cardiopulmonary process as emergent cause of symptoms including pneumomediastinum, widened mediastinum, or pneumothorax.     Patient is noted to meet SIRS criteria secondary to heart rate and respiratory rate.  Blood cultures and lactic acid was drawn.  Patient has a chronically abnormal x-ray and it is hard to ascertain superimposed infiltrate.  Therefore, given his history, we did empirically treat with ceftriaxone and azithromycin.  However, the patient was later positive for COVID-19.  He has already received steroids.    Will plan on some bronchodilators and will reevaluate.  Clinical Scores:       CURB-65 Score: 2                             Progress Notes:  1:59 AM EDT: I saw and reevaluated the patient.  Still with some mild increased work of breathing, but overall improved since initial evaluation.  I spoke with the patient about his ED work up and diagnosis. I informed the patient that he will be admitted for further evaluation/treatment. All questions answered.      Consult:  2:41 AM EDT: Discussed case with PATTI Rosales with [facility].  Reviewed history, exam, results and treatments.  Will admit to Dr. Bryant              Complexity of Care:  The patient requires admission.    Diagnosis:  Final diagnoses:   COPD exacerbation   COVID-19   Acute on chronic respiratory failure with hypoxia         Parts of this note may be an electronic transcription/translation of spoken language to printed text using the Dragon dictation system        Provider Note Signed by:     Sue Andrew MD  05/18/25 0322      Electronically signed by Sue Andrew MD at 05/18/25 0322       Medication Administration Report for José Miguel  Chuckie as of 5/18/25 through 5/19/25     Legend:    Given Hold Not Given Due Canceled Entry Other Actions    Time Time (Time) Time Time-Action         Discontinued     Completed     Future     MAR Hold     Linked             Medications 05/18/25 05/19/25      acetaminophen (TYLENOL) tablet 650 mg  Dose: 650 mg  Freq: Every 4 Hours PRN Route: PO  PRN Reason: Mild Pain  Start: 05/18/25 0346     Admin Instructions:   If given for fever, use fever parameter: fever greater than 100.4 °F  Based on patient request - if ordered for moderate or severe pain, provider allows for administration of a medication prescribed for a lower pain scale.    Do not exceed 4 grams of acetaminophen in a 24 hr period. Max dose of 2gm for AST/ALT greater than 120 units/L.    If given for pain, use the following pain scale:   Mild Pain = Pain Score of 1-3, CPOT 1-2  Moderate Pain = Pain Score of 4-6, CPOT 3-4  Severe Pain = Pain Score of 7-10, CPOT 5-8      0756-Given               Or   acetaminophen (TYLENOL) 160 MG/5ML oral solution 650 mg  Dose: 650 mg  Freq: Every 4 Hours PRN Route: PO  PRN Reason: Mild Pain  Start: 05/18/25 0346     Admin Instructions:   If given for fever, use fever parameter: fever greater than 100.4 °F  Based on patient request - if ordered for moderate or severe pain, provider allows for administration of a medication prescribed for a lower pain scale.    Do not exceed 4 grams of acetaminophen in a 24 hr period. Max dose of 2gm for AST/ALT greater than 120 units/L.    If given for pain, use the following pain scale:   Mild Pain = Pain Score of 1-3, CPOT 1-2  Moderate Pain = Pain Score of 4-6, CPOT 3-4  Severe Pain = Pain Score of 7-10, CPOT 5-8      0756-Not Given:  See Alt               Or   acetaminophen (TYLENOL) suppository 650 mg  Dose: 650 mg  Freq: Every 4 Hours PRN Route: RE  PRN Reason: Mild Pain  Start: 05/18/25 0346     Admin Instructions:   If given for fever, use fever parameter: fever greater than 100.4  °F  Based on patient request - if ordered for moderate or severe pain, provider allows for administration of a medication prescribed for a lower pain scale.    Do not exceed 4 grams of acetaminophen in a 24 hr period. Max dose of 2gm for AST/ALT greater than 120 units/L.    If given for pain, use the following pain scale:   Mild Pain = Pain Score of 1-3, CPOT 1-2  Moderate Pain = Pain Score of 4-6, CPOT 3-4  Severe Pain = Pain Score of 7-10, CPOT 5-8      0756-Not Given:  See Alt               albuterol (PROVENTIL) nebulizer solution 0.042% 1.25 mg/3mL  Dose: 1.25 mg  Freq: Every 4 Hours PRN Route: NEBULIZATION  PRN Reason: Shortness of Air  Start: 05/18/25 1321     Admin Instructions:   Include Respiratory Treatment Education          apixaban (ELIQUIS) tablet 2.5 mg  Dose: 2.5 mg  Freq: Every 12 Hours Scheduled Route: PO  Indications of Use: ATRIAL FIBRILLATION  Start: 05/18/25 2100     Admin Instructions:   Tablet may be crushed and suspended in 60 mL of water or D5W and immediately delivered via NG tube.  Avoid grapefruit juice.      2033-Given            0909-Given     2100             aspirin EC tablet 81 mg  Dose: 81 mg  Freq: Daily Route: PO  Start: 05/18/25 1415     Admin Instructions:   Do not crush or chew the capsules or tablets. The drug may not work as designed if the capsule or tablet is crushed or chewed. Swallow whole.  Do not exceed 4 grams of aspirin in a 24 hr period.    If given for pain, use the following pain scale:   Mild Pain = Pain Score of 1-3, CPOT 1-2  Moderate Pain = Pain Score of 4-6, CPOT 3-4  Severe Pain = Pain Score of 7-10, CPOT 5-8      1646-Given            0909-Given               sennosides-docusate (PERICOLACE) 8.6-50 MG per tablet 2 tablet  Dose: 2 tablet  Freq: 2 Times Daily PRN Route: PO  PRN Reason: Constipation  Start: 05/18/25 0346     Admin Instructions:   Start bowel management regimen if patient has not had a bowel movement after 12 hours.          And   polyethylene  glycol (MIRALAX) packet 17 g  Dose: 17 g  Freq: Daily PRN Route: PO  PRN Reason: Constipation  PRN Comment: Use if senna-docusate is ineffective  Start: 05/18/25 0346     Admin Instructions:   Use if no bowel movement after 12 hours. Mix in 6-8 ounces of water.  Use 4-8 ounces of water, tea, or juice for each 17 gram dose.          And   bisacodyl (DULCOLAX) EC tablet 5 mg  Dose: 5 mg  Freq: Daily PRN Route: PO  PRN Reason: Constipation  PRN Comment: Use if polyethylene glycol is ineffective  Start: 05/18/25 0346     Admin Instructions:   Use if no bowel movement after 12 hours.  Swallow whole. Do not crush, split, or chew tablet.          And   bisacodyl (DULCOLAX) suppository 10 mg  Dose: 10 mg  Freq: Daily PRN Route: RE  PRN Reason: Constipation  PRN Comment: Use if bisacodyl oral is ineffective  Start: 05/18/25 0346     Admin Instructions:   Use if no bowel movement after 12 hours.  Hold for diarrhea          brimonidine (ALPHAGAN) 0.2 % ophthalmic solution 1 drop  Dose: 1 drop  Freq: 3 Times Daily Route: LEFT EYE  Start: 05/18/25 1600      1651-Given     2036-Given           0914-Given     1600 2100             budesonide-formoterol (SYMBICORT) 160-4.5 MCG/ACT inhaler 2 puff  Dose: 2 puff  Freq: 2 Times Daily - RT Route: IN  Start: 05/18/25 1415     Admin Instructions:    Shake well.  Rinse mouth after use, do not swallow water.  Send aerosols to pharmacy in ziplock bag for proper disposal.      (1447)-Not Given     1918-Given           0807-Given     2130             And   revefenacin (YUPELRI) nebulizer solution 175 mcg  Dose: 175 mcg  Freq: Daily - RT Route: NEBULIZATION  Start: 05/18/25 1415      1425-Given            0809-Given              bumetanide (BUMEX) tablet 1 mg  Dose: 1 mg  Freq: 2 Times Daily Route: PO  Start: 05/18/25 2100 End: 09/03/25 2059     Admin Instructions:   Hold for SBP less than 100, DBP less than 60.  Take with food if GI upset occurs.      (2033)-Not Given             0908-Given     2100             dexAMETHasone (DECADRON) injection 6 mg  Dose: 6 mg  Freq: Daily Route: IV  Start: 05/19/25 0900     Admin Instructions:   If giving IV, may be pushed over a minimum of 1 minute.       0908-Given              dextrose (D50W) (25 g/50 mL) IV injection 25 g  Dose: 25 g  Freq: Every 15 Minutes PRN Route: IV  PRN Reason: Low Blood Sugar  PRN Comment: Blood Sugar Less Than 70  Start: 05/18/25 0346     Admin Instructions:   Blood sugar less than 70; patient has IV access - Unresponsive, NPO or Unable To Safely Swallow          dextrose (GLUTOSE) oral gel 15 g  Dose: 15 g  Freq: Every 15 Minutes PRN Route: PO  PRN Reason: Low Blood Sugar  PRN Comment: Blood sugar less than 70  Start: 05/18/25 0346     Admin Instructions:   BS<70, Patient Alert, Is not NPO, Can safely swallow.          DULoxetine (CYMBALTA) DR capsule 30 mg  Dose: 30 mg  Freq: Daily Route: PO  Start: 05/18/25 1415     Admin Instructions:   Do not crush or chew the capsules or tablets. The drug may not work as designed if the capsule or tablet is crushed or chewed. Swallow whole.  Caution: Look alike/sound alike drug alert. Capsule may be opened and sprinkled on applesauce or apple juice. Do not crush or chew capsule.      1645-Given            0909-Given              escitalopram (LEXAPRO) tablet 10 mg  Dose: 10 mg  Freq: Daily Route: PO  Start: 05/18/25 1415     Admin Instructions:   Caution: Look alike/sound alike drug alert.      1646-Given            0908-Given              famotidine (PEPCID) tablet 20 mg  Dose: 20 mg  Freq: 2 Times Daily PRN Route: PO  PRN Reasons: Heartburn,Indigestion  Start: 05/18/25 0346          ferrous sulfate tablet 325 mg  Dose: 325 mg  Freq: Daily With Breakfast Route: PO  Start: 05/19/25 0800     Admin Instructions:   Swallow whole. Do not crush, split, or chew. Take with food if GI upset occurs.       0908-Given              glucagon (GLUCAGEN) injection 1 mg  Dose: 1 mg  Freq: Every 15  Minutes PRN Route: IM  PRN Reason: Low Blood Sugar  PRN Comment: Blood Glucose Less Than 70  Start: 05/18/25 0346     Admin Instructions:   Blood Glucose Less Than 70 - Patient Without IV Access - Unresponsive, NPO or Unable To Safely Swallow  Reconstitute powder for injection by adding 1 mL of -supplied sterile diluent or sterile water for injection to a vial containing 1 mg of the drug, to provide solutions containing 1 mg/mL. Shake vial gently to dissolve.          insulin glargine (LANTUS, SEMGLEE) injection 22 Units  Dose: 22 Units  Freq: Daily Route: SC  Start: 05/18/25 1415     Admin Instructions:         1651-Given            0908-Given              insulin lispro (HUMALOG/ADMELOG) injection 2-7 Units  Dose: 2-7 Units  Freq: 4 Times Daily Before Meals & Nightly Route: SC  Start: 05/18/25 0730     Admin Instructions:   Correction Insulin - Low Dose - Total Insulin Dose Less Than 40 units/day (Lean, Elderly or Renal Patients)    Blood Glucose 150-199 mg/dL - 2 units  Blood Glucose 200-249 mg/dL - 3 units  Blood Glucose 250-299 mg/dL - 4 units  Blood Glucose 300-349 mg/dL - 5 units  Blood Glucose 350-400 mg/dL - 6 units  Blood Glucose Greater Than 400 mg/dL - 7 units & Call Provider   Caution: Look alike/sound alike drug alert      0948-Given     1221-Given     1650-Given     2251-Given         0909-Given     1235-Given     1730     2100           insulin lispro (HUMALOG/ADMELOG) injection 6 Units  Dose: 6 Units  Freq: 3 Times Daily With Meals Route: SC  Start: 05/18/25 1800     Admin Instructions:    Caution: Look alike/sound alike drug alert      1923-Canceled Entry            0908-Given     1236-Given     1800            ipratropium-albuterol (DUO-NEB) nebulizer solution 3 mL  Dose: 3 mL  Freq: Every 6 Hours While Awake - RT Route: NEBULIZATION  Start: 05/18/25 1415      1424-Given     1914-Given           0807-Given     1256-Given     1900            ipratropium-albuterol (DUO-NEB) nebulizer  solution 3 mL  Dose: 3 mL  Freq: Every 4 Hours PRN Route: NEBULIZATION  PRN Reasons: Shortness of Air,Wheezing  Start: 05/18/25 0349     Admin Instructions:   Include Respiratory Treatment Education      0940-Given               melatonin tablet 5 mg  Dose: 5 mg  Freq: Nightly PRN Route: PO  PRN Reason: Sleep  Start: 05/18/25 0348          montelukast (SINGULAIR) tablet 10 mg  Dose: 10 mg  Freq: Nightly Route: PO  Start: 05/18/25 2100 2033-Given            2100              moxifloxacin (VIGAMOX) 0.5 % ophthalmic solution 0.05 mL  Dose: 1 drop  Freq: 4 Times Daily Route: LEFT EYE  Start: 05/18/25 1800      (1923)-Not Given     2036-Given           0913-Given     1236-Given     1800 2100           ondansetron (ZOFRAN) injection 4 mg  Dose: 4 mg  Freq: Every 6 Hours PRN Route: IV  PRN Reasons: Nausea,Vomiting  Start: 05/18/25 0346     Admin Instructions:   If BOTH ondansetron (ZOFRAN) and promethazine (PHENERGAN) are ordered use ondansetron first and THEN promethazine IF ondansetron is ineffective.          ondansetron ODT (ZOFRAN-ODT) disintegrating tablet 4 mg  Dose: 4 mg  Freq: Every 8 Hours PRN Route: TL  PRN Reason: Nausea  Start: 05/18/25 1324     Admin Instructions:   Place on tongue and allow to dissolve.          pantoprazole (PROTONIX) EC tablet 40 mg  Dose: 40 mg  Freq: Daily Route: PO  Start: 05/18/25 1415     Admin Instructions:   Do not crush or chew the capsules or tablets. The drug may not work as designed if the capsule or tablet is crushed or chewed. Swallow whole.  Swallow whole; do not crush, split, or chew.      1415-Canceled Entry            0909-Given              prednisoLONE acetate (PRED FORTE) 1 % ophthalmic suspension 1 drop  Dose: 1 drop  Freq: 6 Times Daily Route: LEFT EYE  Start: 05/18/25 1415     Admin Instructions:   Shake well before use.  Caution: Look alike/sound alike drug alert.      1415-Canceled Entry     1651-Given     (2035)-Not Given [C]     2036-Given      2200-Canceled Entry        (0613)-Not Given [C]     0913-Given     1236-Given     1600     1900       2200              pregabalin (LYRICA) capsule 50 mg  Dose: 50 mg  Freq: 2 Times Daily Route: PO  Start: 05/18/25 2100     Admin Instructions:         2033-Given            0909-Given     2100             sennosides-docusate (PERICOLACE) 8.6-50 MG per tablet 2 tablet  Dose: 2 tablet  Freq: Nightly Route: PO  Start: 05/18/25 2100      (2035)-Not Given            2100              sildenafil (REVATIO) tablet 20 mg  Dose: 20 mg  Freq: 3 Times Daily Route: PO  Start: 05/19/25 1600     Admin Instructions:   Avoid grapefruit juice.       1600 2100             sodium chloride 0.9 % flush 10 mL  Dose: 10 mL  Freq: As Needed Route: IV  PRN Reason: Line Care  Start: 05/18/25 0346          sodium chloride 0.9 % flush 10 mL  Dose: 10 mL  Freq: Every 12 Hours Scheduled Route: IV  Start: 05/18/25 0900      0900-Given     2025-Canceled Entry           0914-Given     2100             sodium chloride 0.9 % flush 10 mL  Dose: 10 mL  Freq: As Needed Route: IV  PRN Reason: Line Care  Start: 05/17/25 2332          sodium chloride 0.9 % infusion 40 mL  Dose: 40 mL  Freq: As Needed Route: IV  PRN Reason: Line Care  Start: 05/18/25 0346     Admin Instructions:   Following administration of an IV intermittent medication, flush line with 40mL NS at 100mL/hr.          spironolactone (ALDACTONE) tablet 25 mg  Dose: 25 mg  Freq: Daily Route: PO  Start: 05/18/25 1415     Admin Instructions:   Hold for SBP less than 100, DBP less than 60.  Group 1 (Yellow) Hazardous Drug - See Handling Guide      1645-Given            0908-Given              tamsulosin (FLOMAX) 24 hr capsule 0.4 mg  Dose: 0.4 mg  Freq: Daily Route: PO  Start: 05/18/25 1415     Admin Instructions:   Do not crush or chew the capsules or tablets. The drug may not work as designed if the capsule or tablet is crushed or chewed. Swallow whole. If patient unable to swallow whole,  contact pharmacy for alternative.      1646-Given            0909-Given              traZODone (DESYREL) tablet 150 mg  Dose: 150 mg  Freq: Nightly Route: PO  Start: 05/18/25 2100     Admin Instructions:   Take with food.  Caution: Look alike/sound alike drug alert      2033-Given            2100              vitamin D (ERGOCALCIFEROL) capsule 50,000 Units  Dose: 50,000 Units  Freq: Every 7 Days Route: PO  Start: 05/18/25 1600      1646-Given               Completed Medications  Medications 05/18/25 05/19/25      azithromycin (ZITHROMAX) 500 mg in sodium chloride 0.9 % 250 mL IVPB-VTB  Dose: 500 mg  Freq: Once Route: IV  Indications of Use: EXACERBATION OF CHRONIC OBSTRUCTIVE PULMONARY DISEASE,PNEUMONIA  Start: 05/18/25 0107 End: 05/18/25 0329     Admin Instructions:   Activate vial before use.      0144-New Bag     0329-Stopped              cefTRIAXone (ROCEPHIN) 1,000 mg in sodium chloride 0.9 % 100 mL MBP  Dose: 1,000 mg  Freq: Once Route: IV  Indications of Use: PNEUMONIA  Start: 05/18/25 0107 End: 05/18/25 0138     Admin Instructions:   LR should be paused and flushing of the line with NS is recommended prior to and after completion of ceftriaxone infusion due to incompatibility. Do not co-adminster with calcium-containing solutions.  Caution: Look alike/sound alike drug alert      0100-New Bag     0138-Stopped              ipratropium-albuterol (DUO-NEB) nebulizer solution 6 mL  Dose: 6 mL  Freq: Once Route: NEBULIZATION  Start: 05/18/25 0215 End: 05/18/25 0234     Admin Instructions:   Include Respiratory Treatment Education      0234-Given               ipratropium-albuterol (DUO-NEB) nebulizer solution 9 mL  Dose: 9 mL  Freq: Once Route: NEBULIZATION  Start: 05/18/25 0011 End: 05/18/25 0052     Admin Instructions:   Include Respiratory Treatment Education      0052-Given               methylPREDNISolone sodium succinate (SOLU-Medrol) injection 125 mg  Dose: 125 mg  Freq: Once Route: IV  Start: 05/18/25 0011  "End: 05/18/25 0022     Admin Instructions:   Caution: Look alike/sound alike drug alert      0022-Given               ondansetron (ZOFRAN) injection 4 mg  Dose: 4 mg  Freq: Once Route: IV  Start: 05/18/25 0301 End: 05/18/25 0339     Admin Instructions:   \"If multiple N/V medications ordered, use in the following order: Ondansetron, Prochlorperazine, Promethazine. Use PO unless patient refuses or patient unable to swallow.\"      0339-Given               Discontinued Medications  Medications 05/18/25 05/19/25      amLODIPine (NORVASC) tablet 10 mg  Dose: 10 mg  Freq: Every 24 Hours Scheduled Route: PO  Start: 05/18/25 1415 End: 05/18/25 1455     Admin Instructions:   Hold for SBP less than 100, DBP less than 60.  Caution: Look alike/sound alike drug alert. Avoid grapefruit juice.      (1553)-Not Given               apixaban (ELIQUIS) tablet 5 mg  Dose: 5 mg  Freq: Every 12 Hours Scheduled Route: PO  Indications of Use: ATRIAL FIBRILLATION  Start: 05/18/25 1415 End: 05/18/25 1415     Admin Instructions:   Tablet may be crushed and suspended in 60 mL of water or D5W and immediately delivered via NG tube.      (1604)-Not Given               atorvastatin (LIPITOR) tablet 40 mg  Dose: 40 mg  Freq: Nightly Route: PO  Start: 05/18/25 2100 End: 05/18/25 1414     Admin Instructions:   Avoid grapefruit juice.          budesonide-formoterol (SYMBICORT) 160-4.5 MCG/ACT inhaler 2 puff  Dose: 2 puff  Freq: 2 Times Daily - RT Route: IN  Start: 05/18/25 1415 End: 05/18/25 1326     Admin Instructions:   Include Respiratory Treatment Education   Shake well.  Rinse mouth after use, do not swallow water.  Send aerosols to pharmacy in ziplock bag for proper disposal.          dexAMETHasone (DECADRON) tablet 6 mg  Dose: 6 mg  Freq: Daily With Breakfast Route: PO  Start: 05/18/25 0800 End: 05/18/25 1326     Admin Instructions:   Take with food.      0948-Given               dilTIAZem CD (CARDIZEM CD) 24 hr capsule 120 mg  Dose: 120 " mg  Freq: Every 24 Hours Scheduled Route: PO  Start: 05/18/25 1415 End: 05/18/25 1455     Admin Instructions:   Caution: Look alike/sound alike drug alert.   Swallow capsule whole. Do not crush, chew, or open capsule. Avoid grapefruit juice. Maximum simvastatin dose 10 mg while taking dilTIAZem.      (1554)-Not Given               enoxaparin sodium (LOVENOX) syringe 30 mg  Dose: 30 mg  Freq: Daily Route: SC  Indications of Use: PROPHYLAXIS OF VENOUS THROMBOEMBOLISM  Start: 05/18/25 0900 End: 05/18/25 0711     Admin Instructions:   Give subcutaneous in abdomen only. Do not massage site after injection.          enoxaparin sodium (LOVENOX) syringe 40 mg  Dose: 40 mg  Freq: Daily Route: SC  Indications of Use: PROPHYLAXIS OF VENOUS THROMBOEMBOLISM  Start: 05/18/25 0900 End: 05/18/25 1333     Admin Instructions:   Give subcutaneous in abdomen only. Do not massage site after injection.      0948-Given               metoprolol tartrate (LOPRESSOR) tablet 12.5 mg  Dose: 12.5 mg  Freq: Every 12 Hours Scheduled Route: PO  Start: 05/18/25 1415 End: 05/18/25 1456     Admin Instructions:   Hold for SBP less than 100, DBP less than 60, or heart rate less than 50. If a dose is held, please contact the provider.      (6054)-Not Given               multivitamin with minerals 1 tablet  Dose: 1 tablet  Freq: Daily Route: PO  Start: 05/18/25 1415 End: 05/18/25 1456     Admin Instructions:         (1554)-Not Given               nirmatrelvir and ritonavir (300mg/100mg)(PAXLOVID) 3 tablet pack  Dose: 3 tablet  Freq: 2 Times Daily Route: PO  Start: 05/18/25 2100 End: 05/19/25 1251     Admin Instructions:    Nirmatrelvir 300 mg (two 150-mg tablets) with ritonavir 100 mg (one 100-mg tablet); administer all three tablets together orally twice daily with or without food for 5 days; initiate as soon as possible after COVID-19 diagnosis and within 5 days of symptom onset.     Order specific questions:   Please select the following high risk  criteria for progression to severe illness that is applicable to this patient. >= 65 years old AND > 6 months since last COVID-19 vaccine      2033-Given            0909-Given              Pharmacy Consult  Freq: Continuous PRN Route: XX  PRN Reason: Consult  Start: 05/18/25 1322 End: 05/18/25 1515     Order specific questions:   Consult for Remdesivir          sildenafil (REVATIO) tablet 20 mg  Dose: 20 mg  Freq: 3 Times Daily Route: PO  Start: 05/18/25 1600 End: 05/18/25 1414     Admin Instructions:   Avoid grapefruit juice.          terazosin (HYTRIN) capsule 5 mg  Dose: 5 mg  Freq: Nightly Route: PO  Start: 05/18/25 2100 End: 05/18/25 1455          vitamin D (ERGOCALCIFEROL) capsule 50,000 Units  Dose: 50,000 Units  Freq: Weekly Route: PO  Start: 05/18/25 1600 End: 05/18/25 1502                         Physician Progress Notes (last 48 hours)        Shane Yao MD at 05/19/25 1246          [facility]     Mar/chart reviewed  Follow up Acute on chronic HRF,covid 19 , in a patient with pulmonary hypertension and COPD  No chest pain no increased shorntess of breath    Vital Sign Min/Max for last 24 hours  Temp  Min: 97.2 °F (36.2 °C)  Max: 98.9 °F (37.2 °C)   BP  Min: 144/68  Max: 171/74   Pulse  Min: 62  Max: 84   Resp  Min: 16  Max: 20   SpO2  Min: 92 %  Max: 100 %   Flow (L/min) (Oxygen Therapy)  Min: 5  Max: 6   No data recorded     Appears ill axox3,   perrl, eomi, no icterus,  mmm, no jvd, trachea midline, neck supple,  chest decreased bilaterally, no crackles, no wheezes,   rrr,   soft, nt, nd +bs,  no c/c/ e  Skin warm, dry no rashes    Labs: 5/19: reviewed:  Glucose 276  Bun 22  Cr 0.91  Bicarb 30  Wbc 8.8  Hgb 14.6  Plts 257    A/P:  1. Acute on chronic hypoxemic respiratory failure -- seems he can be weaned at this point, recorded oxygen saturations are high 90's  2. COPD with ae -- bronchodilators,   3. COVID19 infection - continue decadron --stop paxolovid as he would not be able to  continue his sildinafil and it is more improtant to continue the sildinafil  4. Pulmonary hypertension -- CTEPH -- continue home meds  5. Afib  6. CHRISSY    Patient is new to me today    Electronically signed by Shane Yao MD at 25 1411          Consult Notes (last 48 hours)        Diamond Ann MD at 25 1328        Consult Orders    1. Inpatient Pulmonology Consult [[id number]] ordered by Chaparro Cage MD at 25 1138                   Pulmonary Consultation     Patient Name: Chuckie Valdez  Age/Sex: 70 y.o. male  : 1955  MRN: [id number]    Date of Admission: 2025  Date of Encounter Visit: 25  Encounter Provider: Diamond Ann MD  Referring Provider: No ref. provider found  Place of Service: [facility]  Patient Care Team:  Provider, No Known as PCP - General      Subjective:     Consulted for: COVID-19 infection, dyspnea and hypoxemia    Chief Complaint: Worsening shortness of breath    History of Present Illness:  Chuckie Valdez is a 70 y.o. male with history of pulmonary hypertension, likely severe given his pulmonary artery pressure on his CT scan however his echocardiogram done lately showed increased RV pressures suspected given the position of the interventricular septum but could not estimate the actual RVSP.  Patient also has underlying advanced COPD  His chest x-ray on this admission looks like he had pneumonia however when compared to his film from several years ago it looks very identical, this increased perihilar fullness is likely the enlarged pulmonary artery which based on the CAT scan it is huge and his pulmonary arteries are significant dilated even distally specially over the lower lobe consistent with probably severe pulmonary hypertension  Patient is on oxygen 24 7, at 2 L/min during the daytime and bled into his BiPAP at night  He cannot sleep without his BiPAP and last night was a miserable night according to him.  He has been feeling  "sick for the last few days, 4 to 5 days with cough and congestion  His last COVID vaccination was almost 3 years ago  He is not aware of any confirmed sick exposure  He denies any worsening edema  For his pulmonary hypertension is only on sildenafi  He follow-up with a pulmonologist and a pulmonary hypertension specialist  No fever  No hemoptysis  No pleurisy  No GI or  complaints  The interview was done using the translation service provided by [facility]    Pulmonary Functions Testing Results:    No results found for: \"FEV1\", \"FVC\", \"BUO6PMD\", \"TLC\", \"DLCO\"    Review of Systems:   Review of Systems  As per above    Past Medical History:  Past Medical History:   Diagnosis Date    COPD (chronic obstructive pulmonary disease)     CTEPH (chronic thromboembolic pulmonary hypertension)     HCV (hepatitis C virus)     History of tracheostomy     Pulmonary hypertension     Type 2 diabetes mellitus        Past Surgical History:   Procedure Laterality Date    BRONCHOSCOPY N/A 9/27/2017    Procedure: BRONCHOSCOPY WITH WASHINGS RIGHT MIDDLE LOBE AND LINGULAR with balloon dilation 12MM-15MM AND 18MM-19MM;  Surgeon: Herb Anglin MD;  Location: [facility] ENDOSCOPY;  Service:     BRONCHOSCOPY N/A 9/29/2017    Procedure: BRONCHOSCOPY WITH BALLON DILATION 19MM AND STENT PLACEMENT;  Surgeon: Herb Anglin MD;  Location: [facility] OR;  Service:     BRONCHOSCOPY N/A 9/29/2017    Procedure: BRONCHOSCOPY WITH STENT REMOVAL;  Surgeon: Herb Anglin MD;  Location: [facility] OR;  Service:        Home Medications:   Medications Prior to Admission   Medication Sig Dispense Refill Last Dose/Taking    acetaminophen (TYLENOL) 325 MG tablet Take 1 tablet by mouth Every 4 (Four) Hours As Needed for Mild Pain.   Taking As Needed    albuterol (PROVENTIL) (2.5 MG/3ML) 0.083% nebulizer solution Take 2.5 mg by nebulization Every 4 (Four) Hours As Needed for Wheezing (every 2 hours as needed for soa related to copd).   Taking As " Needed    apixaban (ELIQUIS) 5 MG tablet tablet Take 1 tablet by mouth 2 (Two) Times a Day.   Taking    aspirin 81 MG EC tablet Take 1 tablet by mouth Daily.   Taking    atorvastatin (LIPITOR) 20 MG tablet Take 2 tablets by mouth Every Night.   Taking    brimonidine (ALPHAGAN) 0.2 % ophthalmic solution Administer 1 drop into the left eye 3 (Three) Times a Day.   Taking    budesonide-formoterol (SYMBICORT) 80-4.5 MCG/ACT inhaler Inhale 2 puffs 2 (Two) Times a Day.   Taking    bumetanide (BUMEX) 1 MG tablet Take 1 tablet by mouth 2 (Two) Times a Day.   Taking    DULoxetine (CYMBALTA) 30 MG capsule Take 1 capsule by mouth Daily.   Taking    ergocalciferol (ERGOCALCIFEROL) 1.25 MG (89257 UT) capsule Take 1 capsule by mouth 1 (One) Time Per Week.   Taking    escitalopram (LEXAPRO) 20 MG tablet TOME 1 TABLETAS TODAS LAS NOCHES (Patient taking differently: Take 0.5 tablets by mouth Daily.) 14 tablet 0 Taking Differently    ferrous sulfate 325 (65 FE) MG tablet Take 1 tablet by mouth 2 (Two) Times a Day.   Taking    Fluticasone-Umeclidin-Vilant (Trelegy Ellipta) 100-62.5-25 MCG/ACT inhaler inhale 1 erin por via oral cada peng   Taking    insulin aspart (novoLOG) 100 UNIT/ML injection Inject 6 Units under the skin 3 (Three) Times a Day Before Meals. 1 each 0 Taking    Insulin Glargine (Lantus SoloStar) 100 UNIT/ML injection pen Inject 22 Units under the skin into the appropriate area as directed Daily.   Taking    metFORMIN (GLUCOPHAGE) 500 MG tablet JORGE 1 TABLETA POR VIA ORAL 2 VECES AL PENG CON COMIDA   Taking    montelukast (SINGULAIR) 10 MG tablet Take 1 tablet by mouth Every Night.   Taking    moxifloxacin (VIGAMOX) 0.5 % ophthalmic solution Administer 0.05 mL into the left eye 4 (Four) Times a Day.   Taking    pantoprazole (PROTONIX) 40 MG EC tablet Take 1 tablet by mouth Daily.   Taking    prednisoLONE acetate (PRED FORTE) 1 % ophthalmic suspension Administer 1 drop into the left eye 6 (Six) Times a Day.   Taking     predniSONE (DELTASONE) 5 MG tablet Take 1 tablet by mouth Daily.   Taking    pregabalin (LYRICA) 50 MG capsule Take 1 capsule by mouth 2 (Two) Times a Day.   Taking    sildenafil (REVATIO) 20 MG tablet Take 1 tablet by mouth 3 (Three) Times a Day.   Taking    spironolactone (ALDACTONE) 25 MG tablet Take 1 tablet by mouth Daily.   Taking    tamsulosin (FLOMAX) 0.4 MG capsule 24 hr capsule Take 1 capsule by mouth Daily. 30 capsule 0 Taking    traZODone (DESYREL) 50 MG tablet Take 3 tablets by mouth Every Night.   Taking    amLODIPine (NORVASC) 10 MG tablet Take 1 tablet by mouth Daily. 30 tablet 0     budesonide-formoterol (SYMBICORT) 160-4.5 MCG/ACT inhaler Inhale 2 puffs 2 (Two) Times a Day. (Patient not taking: Reported on 5/18/2025)   Not Taking    dilTIAZem CD (CARDIZEM CD) 120 MG 24 hr capsule Take 1 capsule by mouth Daily. (Patient not taking: Reported on 5/18/2025)   Not Taking    dilTIAZem XR (DILACOR XR) 120 MG 24 hr capsule Take 1 capsule by mouth Daily.       doxazosin (CARDURA) 4 MG tablet TOME 2 TABLETAS AL PENG 60 tablet 1     furosemide (LASIX) 40 MG tablet Take 40 mg by mouth Daily. (Patient not taking: Reported on 5/18/2025)   Not Taking    insulin aspart (novoLOG) 100 UNIT/ML injection Inject 0-7 Units under the skin 4 (Four) Times a Day With Meals & at Bedtime.  12     insulin detemir (LEVEMIR) 100 UNIT/ML injection Inject 8 Units under the skin Daily.       ipratropium-albuterol (DUO-NEB) 0.5-2.5 mg/mL nebulizer Take 3 mL by nebulization Every 4 (Four) Hours.       melatonin 1 MG tablet Take 3 mg by mouth At Night As Needed for Sleep (for sleep).       metoprolol tartrate (LOPRESSOR) 25 MG tablet Take 12.5 mg by mouth 2 (Two) Times a Day.       Multiple Vitamins-Minerals (MULTIVITAMIN ADULT PO) Take 1 tablet by mouth Daily.       ondansetron (ZOFRAN) 4 MG tablet Take 4 mg by mouth Every 6 (Six) Hours As Needed for Nausea or Vomiting.       sennosides-docusate sodium (SENOKOT-S) 8.6-50 MG tablet  Take 2 tablets by mouth Every Night. 30 tablet 0        Inpatient Medications:  Scheduled Meds:amLODIPine, 10 mg, Oral, Q24H  apixaban, 5 mg, Oral, Q12H  aspirin, 81 mg, Oral, Daily  atorvastatin, 40 mg, Oral, Nightly  brimonidine, 1 drop, Left Eye, TID  budesonide-formoterol, 2 puff, Inhalation, BID - RT   And  revefenacin, 175 mcg, Nebulization, Daily - RT  bumetanide, 1 mg, Oral, BID  dexAMETHasone, 6 mg, Intravenous, Daily  dilTIAZem CD, 120 mg, Oral, Q24H  DULoxetine, 30 mg, Oral, Daily  enoxaparin sodium, 40 mg, Subcutaneous, Daily  ergocalciferol, 50,000 Units, Oral, Weekly  escitalopram, 10 mg, Oral, Daily  [START ON 5/19/2025] ferrous sulfate, 325 mg, Oral, Daily With Breakfast  insulin glargine, 22 Units, Subcutaneous, Daily  insulin lispro, 2-7 Units, Subcutaneous, 4x Daily AC & at Bedtime  insulin lispro, 6 Units, Subcutaneous, TID With Meals  ipratropium-albuterol, 3 mL, Nebulization, Q6H While Awake - RT  metoprolol tartrate, 12.5 mg, Oral, Q12H  montelukast, 10 mg, Oral, Nightly  moxifloxacin, 1 drop, Left Eye, 4x Daily  multivitamin with minerals, 1 tablet, Oral, Daily  pantoprazole, 40 mg, Oral, Daily  prednisoLONE acetate, 1 drop, Left Eye, 6x Daily  pregabalin, 50 mg, Oral, BID  sennosides-docusate, 2 tablet, Oral, Nightly  sildenafil, 20 mg, Oral, TID  sodium chloride, 10 mL, Intravenous, Q12H  spironolactone, 25 mg, Oral, Daily  tamsulosin, 0.4 mg, Oral, Daily  terazosin, 5 mg, Oral, Nightly  traZODone, 150 mg, Oral, Nightly      Continuous Infusions:Pharmacy Consult,       PRN Meds:.  acetaminophen **OR** acetaminophen **OR** acetaminophen    albuterol    senna-docusate sodium **AND** polyethylene glycol **AND** bisacodyl **AND** bisacodyl    dextrose    dextrose    famotidine    glucagon (human recombinant)    ipratropium-albuterol    melatonin    ondansetron    ondansetron ODT    Pharmacy Consult    sodium chloride    sodium chloride    sodium chloride    Allergies:  Allergies   Allergen  Reactions    Nitrates, Organic     Penicillins        Past Social History:  Social History     Socioeconomic History    Marital status: Single   Tobacco Use    Smoking status: Former     Current packs/day: 0.00     Average packs/day: 1 pack/day for 15.0 years (15.0 ttl pk-yrs)     Types: Cigarettes     Start date:      Quit date:      Years since quittin.3   Vaping Use    Vaping status: Never Used   Substance and Sexual Activity    Alcohol use: Never    Drug use: Never    Sexual activity: Never       Past Family History:  History reviewed. No pertinent family history.        Objective:   Temp:  [97.1 °F (36.2 °C)-98.9 °F (37.2 °C)] 97.1 °F (36.2 °C)  Heart Rate:  [69-98] 69  Resp:  [20-22] 20  BP: (147-176)/(62-96) 147/69  SpO2:  [91 %-98 %] 98 %  on  Flow (L/min) (Oxygen Therapy):  [2-8] 6 Device (Oxygen Therapy): nasal cannula     Intake/Output Summary (Last 24 hours) at 2025 1328  Last data filed at 2025 1320  Gross per 24 hour   Intake 830 ml   Output 400 ml   Net 430 ml     Body mass index is 23.69 kg/m².      25  0621   Weight: 74.9 kg (165 lb 2 oz)     Weight change:     Physical Exam:   Physical Exam   General:    No acute distress, alert and oriented x4, pleasant                   Head:    Normocephalic, atraumatic. External ears and nose are normal   Eyes:          Conjunctivae and sclerae normal, no icterus, PERRLA, no  discharge   Throat:   No oral lesions, no thrush, oral mucosa moist.    Neck:   Supple, trachea midline. No JVD, no cervical or supraclavicular lymphadenopathy    Lungs:     Normal chest on inspection, CTAB, no wheezes. No rhonchi. No crackles. Respirations regular, even and unlabored.      Heart:    Regular rhythm and normal rate.  No murmurs, gallops, or rubs noted.   Abdomen:     Soft, nontender, nondistended, positive bowel sounds. No hepatosplenomegaly    Extremities:   No clubbing, cyanosis, or edema.     Pulses:   Pulses palpable and equal bilaterally.   "  Skin:   No bleeding or rash. No bumps, good turgor pressure    Neuro:   Nonfocal.  Moves all extremities well. Strength 5/5 and symmetrical, no sensory deficit    Psychiatric:   Normal mood and affect.     Lab Review:   Results from last 7 days   Lab Units 05/18/25  0748 05/17/25  2338   SODIUM mmol/L 138 139   POTASSIUM mmol/L 5.0 3.5   CHLORIDE mmol/L 101 104   CO2 mmol/L 29.0 28.3   BUN mg/dL 20 24*   CREATININE mg/dL 0.99 0.91   GLUCOSE mg/dL 220* 188*   CALCIUM mg/dL 9.1 8.8   AST (SGOT) U/L 38 23   ALT (SGPT) U/L 42* 29   ALBUMIN g/dL 4.2 4.0     Results from last 7 days   Lab Units 05/18/25  0050 05/17/25  2338   HSTROP T ng/L 18 17     Results from last 7 days   Lab Units 05/18/25  0749 05/17/25  2338   WBC 10*3/mm3 8.85 10.07   HEMOGLOBIN g/dL 14.6 14.4   HEMATOCRIT % 45.6 44.1   PLATELETS 10*3/mm3 257 262   MCV fL 87.2 86.6   MCH pg 27.9 28.3   MCHC g/dL 32.0 32.7   RDW % 12.3 12.6   RDW-SD fl 39.3 40.0   MPV fL 9.2 9.2   NEUTROPHIL % %  --  82.3*   LYMPHOCYTE % %  --  8.8*   MONOCYTES % %  --  7.5   EOSINOPHIL % %  --  0.7   BASOPHIL % %  --  0.3   IMM GRAN % %  --  0.4   NEUTROS ABS 10*3/mm3  --  8.28*   LYMPHS ABS 10*3/mm3  --  0.89   MONOS ABS 10*3/mm3  --  0.76   EOS ABS 10*3/mm3  --  0.07   BASOS ABS 10*3/mm3  --  0.03   IMMATURE GRANS (ABS) 10*3/mm3  --  0.04   NRBC /100 WBC  --  0.0                   Invalid input(s): \"LDLCALC\"  Results from last 7 days   Lab Units 05/17/25  2338   PROBNP pg/mL 482.0             Results from last 7 days   Lab Units 05/18/25  0031   LACTATE mmol/L 0.9                     Results from last 7 days   Lab Units 05/18/25  0012   COVID19  Detected*   ADENOVIRUS DETECTION BY PCR  Not Detected   CORONAVIRUS 229E  Not Detected   CORONAVIRUS HKU1  Not Detected   CORONAVIRUS NL63  Not Detected   CORONAVIRUS OC43  Not Detected   HUMAN METAPNEUMOVIRUS  Not Detected   HUMAN RHINOVIRUS/ENTEROVIRUS  Not Detected   INFLUENZA B PCR  Not Detected   PARAINFLUENZA 1  Not Detected "   PARAINFLUENZA VIRUS 2  Not Detected   PARAINFLUENZA VIRUS 3  Not Detected   PARAINFLUENZA VIRUS 4  Not Detected   BORDETELLA PERTUSSIS PCR  Not Detected   BORDETELLA PARAPERTUSSIS PCR  Not Detected   CHLAMYDOPHILA PNEUMONIAE PCR  Not Detected   MYCOPLAMA PNEUMO PCR  Not Detected   RSV, PCR  Not Detected             Echocardiogram 6/24/2023:  The left ventricle is normal in size and wall thickness. Left ventricular   systolic function is mildly reduced. Calculated left ventricular ejection   fraction of 44 % (Biplane Benjamin's method).   Moderate right ventricle enlargement. Moderate global right ventricular   hypokinesis. TAPSE is 11 mm.   Flattened septum is consistent with RV pressure/volume overload.   Normal left atrium, moderately dilated right atrium.   Insufficient TR jet to estimate PASP.   No hemodynamically significant valvular abnormalities.   No pericardal effusion.   Normal sized IVC and respirophasic variation.     Imaging:  Imaging Results (Most Recent)       Procedure Component Value Units Date/Time    XR Chest 1 View [[id number]] Collected: 05/18/25 0000     Updated: 05/18/25 0005    Narrative:      CXR ONE VIEW      HISTORY: CHF/COPD Protocol     COMPARISON: 10/1/2017     TECHNIQUE: single portable AP       Impression:         Redemonstrated mild cardiomegaly.     Redemonstrated bilateral hilar fullness.     There is no consolidation, effusion or pneumothorax.     This report was finalized on 5/18/2025 12:02 AM by Dr. Dane Guevara M.D on Workstation: [id number]                 I personally viewed and interpreted the patient's imaging studies.    Assessment:   COVID-19 infection  COPD with acute exacerbation  Pulmonary hypertension, CTEPH, likely severe  Acute on chronic hypoxemic respiratory failure secondary to the above  Atrial fibrillation  Diabetes mellitus type 2  Essential hypertension  BPH  Obstructive sleep apnea    Plan:     His oxygen requirements are at 5 L, which is acute on  chronic since he uses 2 L/min at rest  He needs to have orders for a BiPAP at night because he could not sleep without it  Patient has confirmed COVID infection, I recommend to add oral Paxlovid  While on Paxlovid Eliquis dose will be reduced  While on Paxlovid the sildenafil would be on hold  He is already on the Decadron  Continue with the current bronchodilator nebulizer regimen  Will order his BiPAP for tonight with the instruction to the patient to notify us if the pressure is far from his home setting, we can always modify  I do not think he has pneumonia, even though the chest x-ray is suggestive but when compared to his previous from several years ago it looks almost identical, we will review the CAT scan for final reassurance, will consider stopping the antibiotic if pneumonia is very unlikely.  Otherwise treatment is going to continue with the supportive measures  Continue his home regimen otherwise    Records reviewed, will follow-up    Thank you for allowing me to participate in the care of Chuckie Valdez. Feel free to contact me directly with any further questions or concerns.    Diamond Ann MD  [facility]   05/18/25  13:28 EDT    Dictated utilizing Dragon dictation    Electronically signed by Diamond Ann MD at 05/18/25 9913

## 2025-05-19 NOTE — PROGRESS NOTES
Name: Chuckie Valdez ADMIT: 2025   : 1955  PCP: Provider, No Known    MRN: [id number] LOS: 1 days   AGE/SEX: 70 y.o. male  ROOM: [facility]     Subjective   Subjective   Patient is lying on the bed and does not appear to be in any major distress.  Denies nausea, vomiting abdominal pain, chest pain and shortness of breath and cough is improving.  Currently requiring 6 L of oxygen.       Objective   Objective   Vital Signs  Temp:  [97.2 °F (36.2 °C)-98.9 °F (37.2 °C)] 98.2 °F (36.8 °C)  Heart Rate:  [62-88] 88  Resp:  [16-20] 18  BP: (145-179)/(70-75) 179/75  SpO2:  [96 %-100 %] 99 %  on  Flow (L/min) (Oxygen Therapy):  [6] 6;   Device (Oxygen Therapy): humidified;high-flow nasal cannula  Body mass index is 23.69 kg/m².  Physical Exam  HEENT: PERRLA, extraocular movements intact, Scleras no icterus  Neck: Supple, no JVD  Cardiovascular: Regular rate and rhythm with normal S1 and S2  Respiratory: Diminished breath sounds with faint wheezes  GI: Soft, nontender, bowel sounds are present, obese  Extremities: No edema, palpable pulses  Neurologic: Grossly nonfocal, no facial asymmetry    Results Review     I reviewed the patient's new clinical results.  Results from last 7 days   Lab Units 25  0749 25  2338   WBC 10*3/mm3 8.85 10.07   HEMOGLOBIN g/dL 14.6 14.4   PLATELETS 10*3/mm3 257 262     Results from last 7 days   Lab Units 25  0309 25  0748 25  2338   SODIUM mmol/L 137 138 139   POTASSIUM mmol/L 4.6 5.0 3.5   CHLORIDE mmol/L 100 101 104   CO2 mmol/L 30.0* 29.0 28.3   BUN mg/dL 22 20 24*   CREATININE mg/dL 0.92 0.99 0.91   GLUCOSE mg/dL 276* 220* 188*   EGFR mL/min/1.73 89.5 81.9 90.7     Results from last 7 days   Lab Units 25  0309 25  0748 25  2338   ALBUMIN g/dL 3.6 4.2 4.0   BILIRUBIN mg/dL 0.2 0.3 <0.2   ALK PHOS U/L 101 118* 106   AST (SGOT) U/L 26 38 23   ALT (SGPT) U/L 42* 42* 29     Results from last 7 days   Lab Units 25  0309  05/18/25  0748 05/17/25  2338   CALCIUM mg/dL 9.1 9.1 8.8   ALBUMIN g/dL 3.6 4.2 4.0   MAGNESIUM mg/dL 2.4  --   --    PHOSPHORUS mg/dL 2.9  --   --      Results from last 7 days   Lab Units 05/18/25  0031   LACTATE mmol/L 0.9     Glucose   Date/Time Value Ref Range Status   05/19/2025 1227 154 (H) 70 - 130 mg/dL Final   05/19/2025 0648 251 (H) 70 - 130 mg/dL Final   05/18/2025 2241 207 (H) 70 - 130 mg/dL Final   05/18/2025 1619 246 (H) 70 - 130 mg/dL Final   05/18/2025 1143 201 (H) 70 - 130 mg/dL Final   05/18/2025 0622 208 (H) 70 - 130 mg/dL Final       XR Chest 1 View  Result Date: 5/18/2025   Redemonstrated mild cardiomegaly.  Redemonstrated bilateral hilar fullness.  There is no consolidation, effusion or pneumothorax.  This report was finalized on 5/18/2025 12:02 AM by Dr. Dane Guevara M.D on Workstation: [id number]        I have personally reviewed all medications:  Scheduled Medications  apixaban, 2.5 mg, Oral, Q12H  aspirin, 81 mg, Oral, Daily  brimonidine, 1 drop, Left Eye, TID  budesonide-formoterol, 2 puff, Inhalation, BID - RT   And  revefenacin, 175 mcg, Nebulization, Daily - RT  bumetanide, 1 mg, Oral, BID  dexAMETHasone, 6 mg, Intravenous, Daily  DULoxetine, 30 mg, Oral, Daily  escitalopram, 10 mg, Oral, Daily  ferrous sulfate, 325 mg, Oral, Daily With Breakfast  insulin glargine, 22 Units, Subcutaneous, Daily  insulin lispro, 2-7 Units, Subcutaneous, 4x Daily AC & at Bedtime  insulin lispro, 6 Units, Subcutaneous, TID With Meals  ipratropium-albuterol, 3 mL, Nebulization, Q6H While Awake - RT  montelukast, 10 mg, Oral, Nightly  moxifloxacin, 1 drop, Left Eye, 4x Daily  pantoprazole, 40 mg, Oral, Daily  prednisoLONE acetate, 1 drop, Left Eye, 6x Daily  pregabalin, 50 mg, Oral, BID  sennosides-docusate, 2 tablet, Oral, Nightly  sildenafil, 20 mg, Oral, TID  sodium chloride, 10 mL, Intravenous, Q12H  spironolactone, 25 mg, Oral, Daily  tamsulosin, 0.4 mg, Oral, Daily  traZODone, 150 mg, Oral,  Nightly  vitamin D, 50,000 Units, Oral, Q7 Days    Infusions   Diet  Diet: Cardiac, Diabetic; Healthy Heart (2-3 Na+); Consistent Carbohydrate; Fluid Consistency: Thin (IDDSI 0)    I have personally reviewed:  [x]  Laboratory   [x]  Microbiology   [x]  Radiology   [x]  EKG/Telemetry  [x]  Cardiology/Vascular   []  Pathology    []  Records       Assessment/Plan     Active Hospital Problems    Diagnosis  POA    **Acute hypoxemic respiratory failure due to COVID-19 [U07.1, J96.01]  Yes    BPH (benign prostatic hyperplasia) [N40.0]  Unknown    Chronic respiratory failure with hypoxia [J96.11]  Yes    Dependence on supplemental oxygen [Z99.81]  Not Applicable    CHRISSY (obstructive sleep apnea) [G47.33]  Yes    COPD exacerbation [J44.1]  Yes    Atrial fibrillation [I48.91]  Yes    Diabetes mellitus type 2 without retinopathy [E11.9]  Yes    Essential hypertension [I10]  Yes      Resolved Hospital Problems   No resolved problems to display.         1. Acute hypoxic respiratory failure secondary to COVID-19/COPD exacerbation, continue with p.o. dexamethasone, Paxlovid, DuoNebs and bronchodilators.  Oxygen requirement has improved from 8 L to 6 L and normally uses 2 L at home and pulmonary is following along.    2.  Diabetes mellitus, home metformin is being held and will continue with long-acting insulin, Premeal as well as corrective dose insulin.  Anticipate blood sugars to be on the rise secondary steroids.  Hemoglobin A1c level will be checked.    3.  Paroxysmal atrial fibrillation, continue with aspirin and Eliquis.  Currently normal sinus rhythm.    4.  Hypertension, blood pressure is reasonably well-controlled and is on Aldactone.      5.  Neuropathy, on Lyrica.    6.  History of BPH, on Flomax and doxazosin and currently denies any voiding issues.    7.  GERD, nausea suppressive therapy.    8.  Hyperlipidemia, on statins.    9.  Chronic systolic dysfunction with ejection fraction of 44%, currently compensated and is  on Aldactone.     10.  Pulmonary hypertension, on Revatio which has been resumed.    11.  Obstructive sleep apnea,  Normally uses BiPAP at home and continue during this hospital stay.    12.  DVT prophylaxis, on Eliquis.    13.  CODE STATUS is full code.    Estimated discharge date, 5/22/2025    Copy text on this note has been reviewed by me on 5/19/2025      Chaparro Cage MD  [facility]  05/19/25  16:29 EDT

## 2025-05-19 NOTE — PROGRESS NOTES
[facility]     Mar/chart reviewed  Follow up Acute on chronic HRF,covid 19 , in a patient with pulmonary hypertension and COPD  No chest pain no increased shorntess of breath    Vital Sign Min/Max for last 24 hours  Temp  Min: 97.2 °F (36.2 °C)  Max: 98.9 °F (37.2 °C)   BP  Min: 144/68  Max: 171/74   Pulse  Min: 62  Max: 84   Resp  Min: 16  Max: 20   SpO2  Min: 92 %  Max: 100 %   Flow (L/min) (Oxygen Therapy)  Min: 5  Max: 6   No data recorded     Appears ill axox3,   perrl, eomi, no icterus,  mmm, no jvd, trachea midline, neck supple,  chest decreased bilaterally, no crackles, no wheezes,   rrr,   soft, nt, nd +bs,  no c/c/ e  Skin warm, dry no rashes    Labs: 5/19: reviewed:  Glucose 276  Bun 22  Cr 0.91  Bicarb 30  Wbc 8.8  Hgb 14.6  Plts 257    A/P:  1. Acute on chronic hypoxemic respiratory failure -- seems he can be weaned at this point, recorded oxygen saturations are high 90's  2. COPD with ae -- bronchodilators,   3. COVID19 infection - continue decadron --stop paxolovid as he would not be able to continue his sildinafil and it is more improtant to continue the sildinafil  4. Pulmonary hypertension -- CTEPH -- continue home meds  5. Afib  6. CHRISSY    Patient is new to me today

## 2025-05-20 LAB
ALBUMIN SERPL-MCNC: 3.7 G/DL (ref 3.5–5.2)
ALBUMIN/GLOB SERPL: 1.1 G/DL
ALP SERPL-CCNC: 103 U/L (ref 39–117)
ALT SERPL W P-5'-P-CCNC: 39 U/L (ref 1–41)
ANION GAP SERPL CALCULATED.3IONS-SCNC: 10 MMOL/L (ref 5–15)
AST SERPL-CCNC: 21 U/L (ref 1–40)
BILIRUB SERPL-MCNC: 0.3 MG/DL (ref 0–1.2)
BUN SERPL-MCNC: 24 MG/DL (ref 8–23)
BUN/CREAT SERPL: 22.9 (ref 7–25)
CALCIUM SPEC-SCNC: 8.8 MG/DL (ref 8.6–10.5)
CHLORIDE SERPL-SCNC: 96 MMOL/L (ref 98–107)
CK SERPL-CCNC: 66 U/L (ref 20–200)
CO2 SERPL-SCNC: 31 MMOL/L (ref 22–29)
CREAT SERPL-MCNC: 1.05 MG/DL (ref 0.76–1.27)
EGFRCR SERPLBLD CKD-EPI 2021: 76.4 ML/MIN/1.73
FERRITIN SERPL-MCNC: 267 NG/ML (ref 30–400)
GLOBULIN UR ELPH-MCNC: 3.3 GM/DL
GLUCOSE BLDC GLUCOMTR-MCNC: 300 MG/DL (ref 70–130)
GLUCOSE BLDC GLUCOMTR-MCNC: 322 MG/DL (ref 70–130)
GLUCOSE BLDC GLUCOMTR-MCNC: 345 MG/DL (ref 70–130)
GLUCOSE BLDC GLUCOMTR-MCNC: 349 MG/DL (ref 70–130)
GLUCOSE SERPL-MCNC: 362 MG/DL (ref 65–99)
MAGNESIUM SERPL-MCNC: 2.4 MG/DL (ref 1.6–2.4)
PHOSPHATE SERPL-MCNC: 3.3 MG/DL (ref 2.5–4.5)
POTASSIUM SERPL-SCNC: 4.6 MMOL/L (ref 3.5–5.2)
PROT SERPL-MCNC: 7 G/DL (ref 6–8.5)
SODIUM SERPL-SCNC: 137 MMOL/L (ref 136–145)

## 2025-05-20 PROCEDURE — 94660 CPAP INITIATION&MGMT: CPT

## 2025-05-20 PROCEDURE — 82948 REAGENT STRIP/BLOOD GLUCOSE: CPT

## 2025-05-20 PROCEDURE — 82550 ASSAY OF CK (CPK): CPT | Performed by: INTERNAL MEDICINE

## 2025-05-20 PROCEDURE — 63710000001 INSULIN LISPRO (HUMAN) PER 5 UNITS: Performed by: INTERNAL MEDICINE

## 2025-05-20 PROCEDURE — 63710000001 INSULIN LISPRO (HUMAN) PER 5 UNITS: Performed by: HOSPITALIST

## 2025-05-20 PROCEDURE — 63710000001 REVEFENACIN 175 MCG/3ML SOLUTION: Performed by: INTERNAL MEDICINE

## 2025-05-20 PROCEDURE — 94664 DEMO&/EVAL PT USE INHALER: CPT

## 2025-05-20 PROCEDURE — 97530 THERAPEUTIC ACTIVITIES: CPT

## 2025-05-20 PROCEDURE — 97162 PT EVAL MOD COMPLEX 30 MIN: CPT

## 2025-05-20 PROCEDURE — 94799 UNLISTED PULMONARY SVC/PX: CPT

## 2025-05-20 PROCEDURE — 36415 COLL VENOUS BLD VENIPUNCTURE: CPT | Performed by: INTERNAL MEDICINE

## 2025-05-20 PROCEDURE — 84100 ASSAY OF PHOSPHORUS: CPT | Performed by: INTERNAL MEDICINE

## 2025-05-20 PROCEDURE — 25010000002 DEXAMETHASONE PER 1 MG: Performed by: INTERNAL MEDICINE

## 2025-05-20 PROCEDURE — 82728 ASSAY OF FERRITIN: CPT | Performed by: INTERNAL MEDICINE

## 2025-05-20 PROCEDURE — 80053 COMPREHEN METABOLIC PANEL: CPT | Performed by: INTERNAL MEDICINE

## 2025-05-20 PROCEDURE — 83735 ASSAY OF MAGNESIUM: CPT | Performed by: INTERNAL MEDICINE

## 2025-05-20 PROCEDURE — 63710000001 INSULIN GLARGINE PER 5 UNITS: Performed by: INTERNAL MEDICINE

## 2025-05-20 PROCEDURE — 97116 GAIT TRAINING THERAPY: CPT

## 2025-05-20 RX ORDER — AZITHROMYCIN 250 MG/1
250 TABLET, FILM COATED ORAL DAILY
Status: DISCONTINUED | OUTPATIENT
Start: 2025-05-21 | End: 2025-05-22 | Stop reason: HOSPADM

## 2025-05-20 RX ORDER — DEXAMETHASONE 6 MG/1
6 TABLET ORAL
Status: DISCONTINUED | OUTPATIENT
Start: 2025-05-21 | End: 2025-05-22 | Stop reason: HOSPADM

## 2025-05-20 RX ORDER — INSULIN LISPRO 100 [IU]/ML
9 INJECTION, SOLUTION INTRAVENOUS; SUBCUTANEOUS
Status: DISCONTINUED | OUTPATIENT
Start: 2025-05-20 | End: 2025-05-22 | Stop reason: HOSPADM

## 2025-05-20 RX ORDER — AZITHROMYCIN 250 MG/1
500 TABLET, FILM COATED ORAL DAILY
Status: COMPLETED | OUTPATIENT
Start: 2025-05-20 | End: 2025-05-20

## 2025-05-20 RX ADMIN — AZITHROMYCIN 500 MG: 250 TABLET, FILM COATED ORAL at 22:57

## 2025-05-20 RX ADMIN — INSULIN LISPRO 9 UNITS: 100 INJECTION, SOLUTION INTRAVENOUS; SUBCUTANEOUS at 16:46

## 2025-05-20 RX ADMIN — TAMSULOSIN HYDROCHLORIDE 0.4 MG: 0.4 CAPSULE ORAL at 08:15

## 2025-05-20 RX ADMIN — IPRATROPIUM BROMIDE AND ALBUTEROL SULFATE 3 ML: .5; 3 SOLUTION RESPIRATORY (INHALATION) at 11:52

## 2025-05-20 RX ADMIN — REVEFENACIN 175 MCG: 175 SOLUTION RESPIRATORY (INHALATION) at 08:36

## 2025-05-20 RX ADMIN — MOXIFLOXACIN OPHTHALMIC 0.05 ML: 5 SOLUTION/ DROPS OPHTHALMIC at 08:00

## 2025-05-20 RX ADMIN — SPIRONOLACTONE 25 MG: 25 TABLET ORAL at 08:16

## 2025-05-20 RX ADMIN — APIXABAN 2.5 MG: 2.5 TABLET, FILM COATED ORAL at 08:16

## 2025-05-20 RX ADMIN — ESCITALOPRAM 10 MG: 10 TABLET, FILM COATED ORAL at 08:16

## 2025-05-20 RX ADMIN — PREDNISOLONE ACETATE 1 DROP: 10 SUSPENSION/ DROPS OPHTHALMIC at 21:22

## 2025-05-20 RX ADMIN — IPRATROPIUM BROMIDE AND ALBUTEROL SULFATE 3 ML: .5; 3 SOLUTION RESPIRATORY (INHALATION) at 18:45

## 2025-05-20 RX ADMIN — BRIMONIDINE TARTRATE 1 DROP: 2 SOLUTION/ DROPS OPHTHALMIC at 16:48

## 2025-05-20 RX ADMIN — MOXIFLOXACIN OPHTHALMIC 0.05 ML: 5 SOLUTION/ DROPS OPHTHALMIC at 12:16

## 2025-05-20 RX ADMIN — ASPIRIN 81 MG: 81 TABLET, COATED ORAL at 08:15

## 2025-05-20 RX ADMIN — INSULIN LISPRO 5 UNITS: 100 INJECTION, SOLUTION INTRAVENOUS; SUBCUTANEOUS at 16:46

## 2025-05-20 RX ADMIN — ALBUTEROL SULFATE 1.25 MG: 1.25 SOLUTION RESPIRATORY (INHALATION) at 23:31

## 2025-05-20 RX ADMIN — BUMETANIDE 1 MG: 1 TABLET ORAL at 08:15

## 2025-05-20 RX ADMIN — PREDNISOLONE ACETATE 1 DROP: 10 SUSPENSION/ DROPS OPHTHALMIC at 19:07

## 2025-05-20 RX ADMIN — PREDNISOLONE ACETATE 1 DROP: 10 SUSPENSION/ DROPS OPHTHALMIC at 07:00

## 2025-05-20 RX ADMIN — PREDNISOLONE ACETATE 1 DROP: 10 SUSPENSION/ DROPS OPHTHALMIC at 16:48

## 2025-05-20 RX ADMIN — DEXAMETHASONE SODIUM PHOSPHATE 6 MG: 10 INJECTION INTRAMUSCULAR; INTRAVENOUS at 08:15

## 2025-05-20 RX ADMIN — Medication 10 ML: at 08:16

## 2025-05-20 RX ADMIN — BRIMONIDINE TARTRATE 1 DROP: 2 SOLUTION/ DROPS OPHTHALMIC at 09:00

## 2025-05-20 RX ADMIN — INSULIN LISPRO 5 UNITS: 100 INJECTION, SOLUTION INTRAVENOUS; SUBCUTANEOUS at 08:14

## 2025-05-20 RX ADMIN — PREDNISOLONE ACETATE 1 DROP: 10 SUSPENSION/ DROPS OPHTHALMIC at 10:12

## 2025-05-20 RX ADMIN — TRAZODONE HYDROCHLORIDE 150 MG: 100 TABLET ORAL at 21:22

## 2025-05-20 RX ADMIN — FERROUS SULFATE TAB 325 MG (65 MG ELEMENTAL FE) 325 MG: 325 (65 FE) TAB at 08:15

## 2025-05-20 RX ADMIN — IPRATROPIUM BROMIDE AND ALBUTEROL SULFATE 3 ML: .5; 3 SOLUTION RESPIRATORY (INHALATION) at 08:36

## 2025-05-20 RX ADMIN — DULOXETINE 30 MG: 30 CAPSULE, DELAYED RELEASE ORAL at 08:16

## 2025-05-20 RX ADMIN — PREDNISOLONE ACETATE 1 DROP: 10 SUSPENSION/ DROPS OPHTHALMIC at 12:16

## 2025-05-20 RX ADMIN — PANTOPRAZOLE SODIUM 40 MG: 40 TABLET, DELAYED RELEASE ORAL at 08:16

## 2025-05-20 RX ADMIN — MOXIFLOXACIN OPHTHALMIC 0.05 ML: 5 SOLUTION/ DROPS OPHTHALMIC at 18:00

## 2025-05-20 RX ADMIN — INSULIN LISPRO 9 UNITS: 100 INJECTION, SOLUTION INTRAVENOUS; SUBCUTANEOUS at 12:15

## 2025-05-20 RX ADMIN — PREGABALIN 50 MG: 50 CAPSULE ORAL at 21:22

## 2025-05-20 RX ADMIN — SILDENAFIL 20 MG: 20 TABLET, FILM COATED ORAL at 16:46

## 2025-05-20 RX ADMIN — INSULIN LISPRO 6 UNITS: 100 INJECTION, SOLUTION INTRAVENOUS; SUBCUTANEOUS at 08:17

## 2025-05-20 RX ADMIN — BRIMONIDINE TARTRATE 1 DROP: 2 SOLUTION/ DROPS OPHTHALMIC at 21:22

## 2025-05-20 RX ADMIN — APIXABAN 2.5 MG: 2.5 TABLET, FILM COATED ORAL at 21:22

## 2025-05-20 RX ADMIN — BUMETANIDE 1 MG: 1 TABLET ORAL at 21:22

## 2025-05-20 RX ADMIN — MONTELUKAST 10 MG: 10 TABLET, FILM COATED ORAL at 21:22

## 2025-05-20 RX ADMIN — Medication 10 ML: at 21:23

## 2025-05-20 RX ADMIN — PREGABALIN 50 MG: 50 CAPSULE ORAL at 08:15

## 2025-05-20 RX ADMIN — MOXIFLOXACIN OPHTHALMIC 0.05 ML: 5 SOLUTION/ DROPS OPHTHALMIC at 21:22

## 2025-05-20 RX ADMIN — INSULIN GLARGINE 22 UNITS: 100 INJECTION, SOLUTION SUBCUTANEOUS at 08:14

## 2025-05-20 RX ADMIN — SILDENAFIL 20 MG: 20 TABLET, FILM COATED ORAL at 08:15

## 2025-05-20 RX ADMIN — BUDESONIDE AND FORMOTEROL FUMARATE DIHYDRATE 2 PUFF: 160; 4.5 AEROSOL RESPIRATORY (INHALATION) at 18:57

## 2025-05-20 RX ADMIN — INSULIN LISPRO 5 UNITS: 100 INJECTION, SOLUTION INTRAVENOUS; SUBCUTANEOUS at 12:15

## 2025-05-20 RX ADMIN — SILDENAFIL 20 MG: 20 TABLET, FILM COATED ORAL at 21:21

## 2025-05-20 RX ADMIN — BUDESONIDE AND FORMOTEROL FUMARATE DIHYDRATE 2 PUFF: 160; 4.5 AEROSOL RESPIRATORY (INHALATION) at 08:36

## 2025-05-20 RX ADMIN — INSULIN LISPRO 5 UNITS: 100 INJECTION, SOLUTION INTRAVENOUS; SUBCUTANEOUS at 21:22

## 2025-05-20 NOTE — THERAPY EVALUATION
Patient Name: Chuckie Valdez  : 1955    MRN: [id number]                              Today's Date: 2025       Admit Date: 2025    Visit Dx:     ICD-10-CM ICD-9-CM   1. COPD exacerbation  J44.1 491.21   2. COVID-19  U07.1 079.89   3. Acute on chronic respiratory failure with hypoxia  J96.21 518.84     799.02     Patient Active Problem List   Diagnosis    Acute on chronic respiratory failure with hypoxia and hypercapnia    Tracheal stenosis due to tracheostomy    CTEPH (chronic thromboembolic pulmonary hypertension)    COPD exacerbation    Clostridium difficile enterocolitis    Superior vena cava stenosis    Acute on chronic diastolic CHF (congestive heart failure)    Acute hypoxemic respiratory failure due to COVID-19    Dependence on supplemental oxygen    CHRISSY (obstructive sleep apnea)    Atrial fibrillation    Chronic respiratory failure with hypoxia    Diabetes mellitus type 2 without retinopathy    Essential hypertension    Acute respiratory failure with hypoxia    BPH (benign prostatic hyperplasia)     Past Medical History:   Diagnosis Date    COPD (chronic obstructive pulmonary disease)     CTEPH (chronic thromboembolic pulmonary hypertension)     HCV (hepatitis C virus)     History of tracheostomy     Pulmonary hypertension     Type 2 diabetes mellitus      Past Surgical History:   Procedure Laterality Date    BRONCHOSCOPY N/A 2017    Procedure: BRONCHOSCOPY WITH WASHINGS RIGHT MIDDLE LOBE AND LINGULAR with balloon dilation 12MM-15MM AND 18MM-19MM;  Surgeon: Herb Anglin MD;  Location: [facility] ENDOSCOPY;  Service:     BRONCHOSCOPY N/A 2017    Procedure: BRONCHOSCOPY WITH BALLON DILATION 19MM AND STENT PLACEMENT;  Surgeon: Herb Anglin MD;  Location: [facility] MAIN OR;  Service:     BRONCHOSCOPY N/A 2017    Procedure: BRONCHOSCOPY WITH STENT REMOVAL;  Surgeon: Herb Anglin MD;  Location: [facility] MAIN OR;  Service:       General Information       Row Name  05/20/25 1612          Physical Therapy Time and Intention    Document Type evaluation  -     Mode of Treatment individual therapy;physical therapy  -       Row Name 05/20/25 1612          General Information    Prior Level of Function independent:;gait;transfer;bed mobility  walks with rollator, has a caregiver that assists with medications and showers  -     Existing Precautions/Restrictions fall;oxygen therapy device and L/min  -     Barriers to Rehab medically complex;language barrier  pt speaks Bangladeshi,  tablet used  -       Row Name 05/20/25 1612          Living Environment    Current Living Arrangements apartment  -     People in Home alone  -       Row Name 05/20/25 1612          Home Main Entrance    Number of Stairs, Main Entrance none  -       Row Name 05/20/25 1612          Cognition    Orientation Status (Cognition) oriented x 4  -       Row Name 05/20/25 1612          Safety Issues/Impairments Affecting Functional Mobility    Impairments Affecting Function (Mobility) endurance/activity tolerance;strength  -     Comment, Safety Issues/Impairments (Mobility) pt reports dizziness at end of ambulation, but no LOB noted  -               User Key  (r) = Recorded By, (t) = Taken By, (c) = Cosigned By      Initials Name Provider Type     Mikaela Rosado, PT Physical Therapist                   Mobility       Row Name 05/20/25 1614          Bed Mobility    Bed Mobility supine-sit;sit-supine  -     Supine-Sit [place] (Bed Mobility) not tested  sitting in chair  -     Sit-Supine [place] (Bed Mobility) not tested  sitting in chair  -       Row Name 05/20/25 1614          Sit-Stand Transfer    Sit-Stand [place] (Transfers) verbal cues;nonverbal cues (demo/gesture);contact guard  -     Assistive Device (Sit-Stand Transfers) walker, 4-wheeled  -       Row Name 05/20/25 1614          Gait/Stairs (Locomotion)    [place] Level (Gait) verbal  cues;nonverbal cues (demo/gesture);standby assist  -     Assistive Device (Gait) walker, 4-wheeled  -     Distance in Feet (Gait) 100  -CH     Deviations/Abnormal Patterns (Gait) ileana decreased;gait speed decreased;stride length decreased  -     Comment, (Gait/Stairs) gait slow, no LOB noted, pt reports dizziness at end of ambulation  -               User Key  (r) = Recorded By, (t) = Taken By, (c) = Cosigned By      Initials Name Provider Type     Mikaela Rosado PT Physical Therapist                   Obj/Interventions       Row Name 05/20/25 1615          Range of Motion Comprehensive    General Range of Motion no range of motion deficits identified  -CH       Row Name 05/20/25 1615          Strength Comprehensive (MMT)    General Manual Muscle Testing (MMT) Assessment no strength deficits identified  -CH       Row Name 05/20/25 1615          Balance    Balance Assessment standing static balance;standing dynamic balance  -     Static Standing Balance verbal cues;standby assist  -     Dynamic Standing Balance verbal cues;standby assist  -     Position/Device Used, Standing Balance walker, rolling  -               User Key  (r) = Recorded By, (t) = Taken By, (c) = Cosigned By      Initials Name Provider Type     Mikaela Rosado, PT Physical Therapist                   Goals/Plan       Row Name 05/20/25 1620          Bed Mobility Goal 1 (PT)    Activity/Assistive Device (Bed Mobility Goal 1, PT) bed mobility activities, all  -CH     [place] Level/Cues Needed (Bed Mobility Goal 1, PT) independent  -     Time Frame (Bed Mobility Goal 1, PT) 1 week  -CH       Row Name 05/20/25 1620          Transfer Goal 1 (PT)    Activity/Assistive Device (Transfer Goal 1, PT) transfers, all;walker, rolling  -     [place] Level/Cues Needed (Transfer Goal 1, PT) independent  -     Time Frame (Transfer Goal 1, PT) 1 week  -CH       Row Name 05/20/25 1620          Gait Training Goal 1 (PT)     Activity/Assistive Device (Gait Training Goal 1, PT) gait (walking locomotion);walker, rolling  -CH     [place] Level (Gait Training Goal 1, PT) independent  -CH     Distance (Gait Training Goal 1, PT) 150  -CH     Time Frame (Gait Training Goal 1, PT) 1 week  -       Row Name 05/20/25 1620          Therapy Assessment/Plan (PT)    Planned Therapy Interventions (PT) balance training;bed mobility training;gait training;home exercise program;patient/family education;strengthening;transfer training  -               User Key  (r) = Recorded By, (t) = Taken By, (c) = Cosigned By      Initials Name Provider Type     Mikaela Rosado, PT Physical Therapist                   Clinical Impression       Row Name 05/20/25 1616          Pain    Pretreatment Pain Rating 0/10 - no pain  -CH     Posttreatment Pain Rating 0/10 - no pain  -       Row Name 05/20/25 1616          Plan of Care Review    Plan of Care Reviewed With patient  -CH     Outcome Evaluation Pt is a 71 yo M who was admitted with respiratory failure secondary to COVID-19. Pt presents to PT with some impaired functional mobility and gait secondary to generalized weakness and decreased activity tolerance. Pt was able to ambulate 100 ft in room with rollator and SBA to [facility]. Pt is a Icelandic speaker and interpretter tablet was used to communicate. Pt may benefit from skilled PT to address strength, mobility, and gait.  -       Row Name 05/20/25 1616          Therapy Assessment/Plan (PT)    Patient/Family Therapy Goals Statement (PT) to return to OF  -CH     Rehab Potential (PT) good  -CH     Criteria for Skilled Interventions Met (PT) skilled treatment is necessary  -     Therapy Frequency (PT) 3 times/wk  -       Row Name 05/20/25 1616          Vital Signs    Intra SpO2 (%) 88  -CH     O2 Delivery Intra Treatment supplemental O2  -CH     Post SpO2 (%) 93  -CH     O2 Delivery Post Treatment supplemental O2  -CH       Row Name 05/20/25 1616           Positioning and Restraints    Pre-Treatment Position sitting in chair/recliner  -CH     Post Treatment Position chair  -CH     In Chair sitting;call light within reach;encouraged to call for assist  -               User Key  (r) = Recorded By, (t) = Taken By, (c) = Cosigned By      Initials Name Provider Type     Mikaela Rosado, PT Physical Therapist                   Outcome Measures       Row Name 05/20/25 1621 05/20/25 0820       How much help from another person do you currently need...    Turning from your back to your side while in flat bed without using bedrails? 4  -CH 4  -MM    Moving from lying on back to sitting on the side of a flat bed without bedrails? 3  -CH 4  -MM    Moving to and from a bed to a chair (including a wheelchair)? 3  -CH 3  -MM    Standing up from a chair using your arms (e.g., wheelchair, bedside chair)? 3  -CH 3  -MM    Climbing 3-5 steps with a railing? 3  -CH 2  -MM    To walk in hospital room? 3  -CH 3  -MM    AM-PAC 6 Clicks Score (PT) 19  -CH 19  -MM    Highest Level of Mobility Goal Walk 10 Steps or More-6  -CH Walk 10 Steps or More-6  -MM      Row Name 05/20/25 1621          Functional Assessment    Outcome Measure Options AM-PAC 6 Clicks Basic Mobility (PT)  -               User Key  (r) = Recorded By, (t) = Taken By, (c) = Cosigned By      Initials Name Provider Type     Mikaela Rosado, PT Physical Therapist    Judith Langley, RN Registered Nurse                                 Physical Therapy Education       Title: PT OT SLP Therapies (In Progress)       Topic: Physical Therapy (In Progress)       Point: Mobility training (Done)       Learning Progress Summary            Patient Acceptance, E,TB,D, VU,NR by  at 5/20/2025 1621                      Point: Home exercise program (Not Started)       Learner Progress:  Not documented in this visit.              Point: Body mechanics (Done)       Learning Progress Summary            Patient Acceptance, E,TB,D,  VU,NR by  at 5/20/2025 1621                      Point: Precautions (Done)       Learning Progress Summary            Patient Acceptance, E,TB,D, VU,NR by  at 5/20/2025 1621                                      User Key       Initials Effective Dates Name Provider Type Discipline     06/16/21 -  Mikaela Rosado PT Physical Therapist PT                  PT Recommendation and Plan  Planned Therapy Interventions (PT): balance training, bed mobility training, gait training, home exercise program, patient/family education, strengthening, transfer training  Outcome Evaluation: Pt is a 71 yo M who was admitted with respiratory failure secondary to COVID-19. Pt presents to PT with some impaired functional mobility and gait secondary to generalized weakness and decreased activity tolerance. Pt was able to ambulate 100 ft in room with rollator and SBA to [facility]. Pt is a [place] speaker and interpretter tablet was used to communicate. Pt may benefit from skilled PT to address strength, mobility, and gait.     Time Calculation:         PT Charges       Row Name 05/20/25 1622             Time Calculation    Start Time 1420  -      Stop Time 1448  -      Time Calculation (min) 28 min  -      PT Received On 05/20/25  -      PT - Next Appointment 05/20/25  -      PT Goal Re-Cert Due Date 05/22/25  -         Time Calculation- PT    Total Timed Code Minutes- PT 23 minute(s)  -         Timed Charges    84130 - Gait Training Minutes  13  -      21880 - PT Therapeutic Activity Minutes 10  -         Total Minutes    Timed Charges Total Minutes 23  -       Total Minutes 23  -CH                User Key  (r) = Recorded By, (t) = Taken By, (c) = Cosigned By      Initials Name Provider Type     Mikaela Rosado PT Physical Therapist                  Therapy Charges for Today       Code Description Service Date Service Provider Modifiers Qty    47174759539 HC GAIT TRAINING EA 15 MIN 5/20/2025 Mikaela Rosado,  PT GP 1    15781274900 HC PT THERAPEUTIC ACT EA 15 MIN 5/20/2025 Mikaela Rosado, PT GP 1    38210120395 HC PT EVAL MOD COMPLEXITY 3 5/20/2025 Mikaela Rosado, PT GP 1            PT G-Codes  Outcome Measure Options: AM-PAC 6 Clicks Basic Mobility (PT)  AM-PAC 6 Clicks Score (PT): 19  PT Discharge Summary  Anticipated Discharge Disposition (PT): home with assist (Pt reports he is scheduled to start OPPT on the 27th of this month.)    Mikaela Rosado, PT  5/20/2025

## 2025-05-20 NOTE — PROGRESS NOTES
Name: Chuckie Valdez ADMIT: 2025   : 1955  PCP: Provider, No Known    MRN: [id number] LOS: 2 days   AGE/SEX: 70 y.o. male  ROOM: [facility]     Subjective   Subjective   Denies any complaint. Down to 2 L/min now. About to eat lunch. Breathing improved.     Objective   Objective   Vital Signs  Temp:  [97.3 °F (36.3 °C)-98.2 °F (36.8 °C)] 97.3 °F (36.3 °C)  Heart Rate:  [] 106  Resp:  [14-21] 18  BP: (134-179)/(62-75) 150/68  SpO2:  [90 %-99 %] 95 %  on  Flow (L/min) (Oxygen Therapy):  [2-6] 2;   Device (Oxygen Therapy): humidified;high-flow nasal cannula  Body mass index is 23.69 kg/m².    Physical Exam  Constitutional:       General: He is not in acute distress.     Appearance: He is not toxic-appearing.   HENT:      Head: Normocephalic and atraumatic.   Cardiovascular:      Rate and Rhythm: Normal rate and regular rhythm.   Pulmonary:      Effort: Pulmonary effort is normal. No respiratory distress.      Breath sounds: Normal breath sounds. No wheezing or rhonchi.   Abdominal:      General: Bowel sounds are normal.      Palpations: Abdomen is soft.      Tenderness: There is no abdominal tenderness.   Musculoskeletal:         General: No swelling.      Right lower leg: No edema.      Left lower leg: No edema.   Skin:     General: Skin is warm and dry.   Neurological:      Mental Status: He is alert and oriented to person, place, and time.   Psychiatric:         Mood and Affect: Mood normal.         Behavior: Behavior normal.     Results Review  I reviewed the patient's new clinical results.  Results from last 7 days   Lab Units 25  0749 25  2338   WBC 10*3/mm3 8.85 10.07   HEMOGLOBIN g/dL 14.6 14.4   PLATELETS 10*3/mm3 257 262     Results from last 7 days   Lab Units 25  0516 25  0309 25  0748 25  2338   SODIUM mmol/L 137 137 138 139   POTASSIUM mmol/L 4.6 4.6 5.0 3.5   CHLORIDE mmol/L 96* 100 101 104   CO2 mmol/L 31.0* 30.0* 29.0 28.3   BUN mg/dL 24* 22 20 24*    CREATININE mg/dL 1.05 0.92 0.99 0.91   GLUCOSE mg/dL 362* 276* 220* 188*     Lab Results   Component Value Date    ANIONGAP 10.0 05/20/2025     Estimated Creatinine Clearance: 69.4 mL/min (by C-G formula based on SCr of 1.05 mg/dL).   Lab Results   Component Value Date    EGFR 76.4 05/20/2025     Results from last 7 days   Lab Units 05/20/25  0516 05/19/25  0309 05/18/25  0748 05/17/25  2338   ALBUMIN g/dL 3.7 3.6 4.2 4.0   BILIRUBIN mg/dL 0.3 0.2 0.3 <0.2   ALK PHOS U/L 103 101 118* 106   AST (SGOT) U/L 21 26 38 23   ALT (SGPT) U/L 39 42* 42* 29     Results from last 7 days   Lab Units 05/20/25  0516 05/19/25  0309 05/18/25  0748 05/17/25  2338   CALCIUM mg/dL 8.8 9.1 9.1 8.8   ALBUMIN g/dL 3.7 3.6 4.2 4.0   MAGNESIUM mg/dL 2.4 2.4  --   --    PHOSPHORUS mg/dL 3.3 2.9  --   --      Results from last 7 days   Lab Units 05/18/25  0031   LACTATE mmol/L 0.9     Glucose   Date/Time Value Ref Range Status   05/20/2025 1117 300 (H) 70 - 130 mg/dL Final   05/20/2025 0634 349 (H) 70 - 130 mg/dL Final   05/19/2025 2132 289 (H) 70 - 130 mg/dL Final   05/19/2025 1630 220 (H) 70 - 130 mg/dL Final   05/19/2025 1227 154 (H) 70 - 130 mg/dL Final   05/19/2025 0648 251 (H) 70 - 130 mg/dL Final   05/18/2025 2241 207 (H) 70 - 130 mg/dL Final       CT Angiogram Chest  Result Date: 5/20/2025  1. Right lower lobe segmental/subsegmental eccentric partially occlusive thromboembolus. Given appearance, favor chronic thromboembolus over acute pulmonary thromboembolus. 2. Dilated main pulmonary artery (45 mm) suggesting pulmonary hypertension. 3. Multifocal left lower lobe segmental and subsegmental mucous plugging with associated bronchial wall thickening suggesting aspiration. Associated 5 cm focus of dependent left lower lobe atelectasis/consolidation raises concern for pneumonitis/pneumonia. Recommend a follow-up chest CT in 2 to 3 months to ensure clearance. 4. Similar focal severe stenosis of the proximal subglottic trachea likely  secondary to prior intubation. 5. 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   This report was finalized on 5/20/2025 8:08 AM by Dr. Kt Salgado M.D on Workstation: [id number]        Scheduled Meds  apixaban, 2.5 mg, Oral, Q12H  aspirin, 81 mg, Oral, Daily  brimonidine, 1 drop, Left Eye, TID  budesonide-formoterol, 2 puff, Inhalation, BID - RT   And  revefenacin, 175 mcg, Nebulization, Daily - RT  bumetanide, 1 mg, Oral, BID  [START ON 5/21/2025] dexAMETHasone, 6 mg, Oral, Daily With Breakfast  DULoxetine, 30 mg, Oral, Daily  escitalopram, 10 mg, Oral, Daily  ferrous sulfate, 325 mg, Oral, Daily With Breakfast  [START ON 5/21/2025] insulin glargine, 27 Units, Subcutaneous, Daily  insulin lispro, 2-7 Units, Subcutaneous, 4x Daily AC & at Bedtime  insulin lispro, 9 Units, Subcutaneous, TID With Meals  ipratropium-albuterol, 3 mL, Nebulization, Q6H While Awake - RT  montelukast, 10 mg, Oral, Nightly  moxifloxacin, 1 drop, Left Eye, 4x Daily  pantoprazole, 40 mg, Oral, Daily  prednisoLONE acetate, 1 drop, Left Eye, 6x Daily  pregabalin, 50 mg, Oral, BID  sennosides-docusate, 2 tablet, Oral, Nightly  sildenafil, 20 mg, Oral, TID  sodium chloride, 10 mL, Intravenous, Q12H  spironolactone, 25 mg, Oral, Daily  tamsulosin, 0.4 mg, Oral, Daily  traZODone, 150 mg, Oral, Nightly  vitamin D, 50,000 Units, Oral, Q7 Days    Continuous Infusions   PRN Meds    acetaminophen **OR** acetaminophen **OR** acetaminophen    albuterol    senna-docusate sodium **AND** polyethylene glycol **AND** bisacodyl **AND** bisacodyl    dextrose    dextrose    famotidine    glucagon (human recombinant)    ipratropium-albuterol    melatonin    ondansetron    ondansetron ODT    sodium chloride    sodium chloride    sodium chloride     Diet  Diet: Cardiac, Diabetic; Healthy Heart (2-3 Na+); Consistent Carbohydrate; Fluid Consistency: Thin (IDDSI 0)       Assessment/Plan     Active Hospital Problems    Diagnosis  POA    **Acute hypoxemic  respiratory failure due to COVID-19 [U07.1, J96.01]  Yes    BPH (benign prostatic hyperplasia) [N40.0]  Unknown    Chronic respiratory failure with hypoxia [J96.11]  Yes    Dependence on supplemental oxygen [Z99.81]  Not Applicable    CHRISSY (obstructive sleep apnea) [G47.33]  Yes    COPD exacerbation [J44.1]  Yes    Atrial fibrillation [I48.91]  Yes    Diabetes mellitus type 2 without retinopathy [E11.9]  Yes    Essential hypertension [I10]  Yes      Resolved Hospital Problems   No resolved problems to display.     Patient is a 70 y.o. male     Acute hypoxic respiratory failure  Chronic hypoxic respiratory failure (baseline 2 L/min)  COVID  COPD exacerbation  CHRISSY  Pulmonary hypertension  Switch steroids to p.o.  Finished Paxlovid 5/19/2025  Now down to home oxygen requirement Flow (L/min) (Oxygen Therapy):  [2-6] 2     Chronic systolic heart failure EF 44%  Paroxysmal atrial fibrillation  Hypertension  Hyperlipidemia  Apixaban    DM 2 uncontrolled  Worse with steroids  Increase long-acting and mealtime insulin  Home metformin held    DVT prophylaxis  Eliquis (home med)    Discharge  Probably soon  Expected Discharge Date: 5/21/2025; Expected Discharge Time:     Discussed with patient and nursing staff    Praveen Bolton MD  [facility]  05/20/25 11:58 EDT

## 2025-05-20 NOTE — PLAN OF CARE
Goal Outcome Evaluation:  Plan of Care Reviewed With: patient           Outcome Evaluation: Pt is a 71 yo M who was admitted with respiratory failure secondary to COVID-19. Pt presents to PT with some impaired functional mobility and gait secondary to generalized weakness and decreased activity tolerance. Pt was able to ambulate 100 ft in room with rollator and SBA to [facility]. Pt is a Cypriot speaker and interpretter tablet was used to communicate. Pt may benefit from skilled PT to address strength, mobility, and gait.    Anticipated Discharge Disposition (PT): home with assist (Pt reports he is scheduled to start OPPT on the 27th of this month.)

## 2025-05-20 NOTE — CASE MANAGEMENT/SOCIAL WORK
Discharge Planning Assessment  [facility]     Patient Name: Chuckie Valdez  MRN: [id number]  Today's Date: 5/20/2025    Admit Date: 5/17/2025    Plan: Home, pt may need assistance with transport.   Discharge Needs Assessment       Row Name 05/20/25 1301       Living Environment    People in Home alone    Current Living Arrangements apartment    Able to Return to Prior Arrangements yes       Transition Planning    Patient/Family Anticipates Transition to home    Patient/Family Anticipated Services at Transition     Transportation Anticipated health plan transportation       Discharge Needs Assessment    Readmission Within the Last 30 Days no previous admission in last 30 days    Equipment Currently Used at Home oxygen;bipap;walker, rolling    Concerns to be Addressed discharge planning    Equipment Needed After Discharge none                   Discharge Plan       Row Name 05/20/25 1303       Plan    Plan Home, pt may need assistance with transport.    Patient/Family in Agreement with Plan yes    Plan Comments CCP s/w pt via phone. JOSE Lim assisted with Syriac to English translation. Introduced self and role of CCP. Face sheet information and pharmacy verified. Pt lives in an 11th floor apartment alone. There is elevator access in his apartment. Pt does not drive, needs assistance with some ADL's and has oxygen and Bipap thru [employer] along with a walker. Pt has a POC at bedside. Pt has assistance from caregivers M-F 8hrs a day and Saturday 5hrs a day. Pt has a living will. Pt is enrolled in meds to beds and denies trouble affording or managing his medications. Pt has used HH in the past and has been to [facility]. DC plan is to return home, pt will need assistance with transportation home. Bud RN/CCP                    Expected Discharge Date and Time       Expected Discharge Date Expected Discharge Time    May 21, 2025            Demographic Summary       Row Name 05/20/25 1301       General  Information    Admission Type inpatient    Arrived From home    Referral Source admission list;case finding    Reason for Consult discharge planning    Preferred Language Occitan       Contact Information    Permission Granted to Share Info With                    Functional Status       Row Name 05/20/25 1301       Functional Status    Usual Activity Tolerance moderate    Current Activity Tolerance fair       Assessment of Health Literacy    How often do you have someone help you read hospital materials? Never    How often do you have problems learning about your medical condition because of difficulty understanding written information? Never    How often do you have a problem understanding what is told to you about your medical condition? Never    How confident are you filling out medical forms by yourself? Extremely    Health Literacy Excellent       Functional Status, IADL    Medications assistive person    Meal Preparation assistive person    Housekeeping assistive person    Laundry assistive person    Shopping assistive person                               Geri Mccloud, RN

## 2025-05-20 NOTE — PLAN OF CARE
Problem: Adult Inpatient Plan of Care  Goal: Absence of Hospital-Acquired Illness or Injury  Intervention: Identify and Manage Fall Risk  Recent Flowsheet Documentation  Taken 5/20/2025 1600 by Judith Wyman RN  Safety Promotion/Fall Prevention:   safety round/check completed   room organization consistent   assistive device/personal items within reach   activity supervised  Taken 5/20/2025 1410 by Judith Wyman RN  Safety Promotion/Fall Prevention:   safety round/check completed   room organization consistent   assistive device/personal items within reach   activity supervised  Taken 5/20/2025 0820 by Judith Wyman RN  Safety Promotion/Fall Prevention:   room organization consistent   safety round/check completed   assistive device/personal items within reach   activity supervised   nonskid shoes/slippers when out of bed     Problem: Adult Inpatient Plan of Care  Goal: Absence of Hospital-Acquired Illness or Injury  Intervention: Prevent Skin Injury  Recent Flowsheet Documentation  Taken 5/20/2025 1600 by Judith Wyman RN  Body Position: (up on the chair) other (see comments)  Taken 5/20/2025 1410 by Judith Wyman RN  Body Position: (siting on the chair) --  Taken 5/20/2025 0820 by Judith Wyman RN  Body Position: position changed independently   Goal Outcome Evaluation:           Progress: improving  Outcome Evaluation: Patient male 70 years old VSS, able to ambulate with PT and have a good appetite glucose elevated treated with insulin  doses increase per MD, wear oxygen 2 lt/min as same at home need assistant wih ADL use the urinal. patient apply eyes drop himself due to recent surgery.

## 2025-05-20 NOTE — PROGRESS NOTES
[facility]  [phone]  Dr. Mo Perry     Subjective:  LOS: 2    Chief Complaint:  Follow up Acute on chronic HRF,covid 19 , in a patient with pulmonary hypertension and COPD     Pt doing well. Feels he is improving. NAEON    Objective   Vital Signs past 24hrs  Temp range: Temp (24hrs), Av.6 °F (36.4 °C), Min:97.2 °F (36.2 °C), Max:98.2 °F (36.8 °C)    BP range: BP: (128-175)/(54-74) 128/54  Pulse range: Heart Rate:  [] 102  Resp rate range: Resp:  [14-21] 18  Device (Oxygen Therapy): humidified;high-flow nasal cannulaFlow (L/min) (Oxygen Therapy):  [2-6] 2  Oxygen range:SpO2:  [90 %-99 %] 94 %   Mechanical Ventilator:     Physical Exam  Vitals and nursing note reviewed.   Constitutional:       General: He is not in acute distress.  HENT:      Head: Normocephalic and atraumatic.   Cardiovascular:      Rate and Rhythm: Normal rate and regular rhythm.      Heart sounds: No murmur heard.  Pulmonary:      Effort: Pulmonary effort is normal. No respiratory distress.      Breath sounds: Normal breath sounds. No wheezing, rhonchi or rales.   Abdominal:      General: Abdomen is flat.      Tenderness: There is no abdominal tenderness.   Skin:     General: Skin is warm and dry.      Findings: No rash.   Neurological:      Mental Status: He is alert.       Results Review:    I have reviewed the laboratory and imaging data since the last note by [facility] physician.  My annotations are noted in assessment and plan.      Result Review:  I have personally reviewed the results from last note by [facility] physician to 2025 15:53 EDT and agree with these findings:  [x]  Laboratory list / accordion  [x]  Microbiology  [x]  Radiology  [x]  EKG/Telemetry   [x]  Cardiology/Vascular   [x]  Pathology  [x]  Old records  []  Other:    Medication Review:  I have reviewed the current MAR.  My annotations are noted in assessment and plan.    apixaban, 2.5 mg, Oral, Q12H  aspirin, 81 mg, Oral, Daily  brimonidine, 1  drop, Left Eye, TID  budesonide-formoterol, 2 puff, Inhalation, BID - RT   And  revefenacin, 175 mcg, Nebulization, Daily - RT  bumetanide, 1 mg, Oral, BID  [START ON 5/21/2025] dexAMETHasone, 6 mg, Oral, Daily With Breakfast  DULoxetine, 30 mg, Oral, Daily  escitalopram, 10 mg, Oral, Daily  ferrous sulfate, 325 mg, Oral, Daily With Breakfast  [START ON 5/21/2025] insulin glargine, 27 Units, Subcutaneous, Daily  insulin lispro, 2-7 Units, Subcutaneous, 4x Daily AC & at Bedtime  insulin lispro, 9 Units, Subcutaneous, TID With Meals  ipratropium-albuterol, 3 mL, Nebulization, Q6H While Awake - RT  montelukast, 10 mg, Oral, Nightly  moxifloxacin, 1 drop, Left Eye, 4x Daily  pantoprazole, 40 mg, Oral, Daily  prednisoLONE acetate, 1 drop, Left Eye, 6x Daily  pregabalin, 50 mg, Oral, BID  sennosides-docusate, 2 tablet, Oral, Nightly  sildenafil, 20 mg, Oral, TID  sodium chloride, 10 mL, Intravenous, Q12H  spironolactone, 25 mg, Oral, Daily  tamsulosin, 0.4 mg, Oral, Daily  traZODone, 150 mg, Oral, Nightly  vitamin D, 50,000 Units, Oral, Q7 Days           Lines, Drains & Airways       Active LDAs       Name Placement date Placement time Site Days    Peripheral IV 05/17/25 20 G Anterior;Proximal;Right Forearm 05/17/25  --  Forearm  3                  Enhanced Droplet/Contact   Diet Orders (active) (From admission, onward)       Start     Ordered    05/18/25 0228  Diet: Cardiac, Diabetic; Healthy Heart (2-3 Na+); Consistent Carbohydrate; Fluid Consistency: Thin (IDDSI 0)  Diet Effective Now         05/18/25 8039                      A/P:  1. Acute on chronic hypoxemic respiratory failure -- seems he can be weaned at this point, recorded oxygen saturations are high 90's  2. COPD with ae -- bronchodilators, decadron as for COVID-19 below  3. COVID19 infection - continue decadron --stop paxolovid as he would not be able to continue his sildinafil and it is more improtant to continue the sildinafil  4. Pulmonary hypertension --  CTEPH -- continue home meds  5. Afib  6. CHRISSY    Oxygen weaning appropriately and doing better.  Will need follow-up with his regular pulmonologist at [facility] after discharge.    All problems new to me today    Mo Perry DO   05/20/25  15:53 EDT      Part of this note may be an electronic transcription/translation of spoken language to printed text using the Dragon Dictation System.

## 2025-05-21 LAB
ALBUMIN SERPL-MCNC: 3.5 G/DL (ref 3.5–5.2)
ALBUMIN/GLOB SERPL: 1.3 G/DL
ALP SERPL-CCNC: 90 U/L (ref 39–117)
ALT SERPL W P-5'-P-CCNC: 30 U/L (ref 1–41)
ANION GAP SERPL CALCULATED.3IONS-SCNC: 6.6 MMOL/L (ref 5–15)
AST SERPL-CCNC: 14 U/L (ref 1–40)
BILIRUB SERPL-MCNC: 0.2 MG/DL (ref 0–1.2)
BUN SERPL-MCNC: 31 MG/DL (ref 8–23)
BUN/CREAT SERPL: 34.4 (ref 7–25)
CALCIUM SPEC-SCNC: 9 MG/DL (ref 8.6–10.5)
CHLORIDE SERPL-SCNC: 97 MMOL/L (ref 98–107)
CK SERPL-CCNC: 40 U/L (ref 20–200)
CO2 SERPL-SCNC: 35.4 MMOL/L (ref 22–29)
CREAT SERPL-MCNC: 0.9 MG/DL (ref 0.76–1.27)
D DIMER PPP FEU-MCNC: <0.27 MCGFEU/ML (ref 0–0.7)
EGFRCR SERPLBLD CKD-EPI 2021: 91.9 ML/MIN/1.73
FERRITIN SERPL-MCNC: 247 NG/ML (ref 30–400)
GLOBULIN UR ELPH-MCNC: 2.7 GM/DL
GLUCOSE BLDC GLUCOMTR-MCNC: 187 MG/DL (ref 70–130)
GLUCOSE BLDC GLUCOMTR-MCNC: 200 MG/DL (ref 70–130)
GLUCOSE BLDC GLUCOMTR-MCNC: 378 MG/DL (ref 70–130)
GLUCOSE BLDC GLUCOMTR-MCNC: 393 MG/DL (ref 70–130)
GLUCOSE SERPL-MCNC: 210 MG/DL (ref 65–99)
HBA1C MFR BLD: 7.5 % (ref 4.8–5.6)
LDH SERPL-CCNC: 144 U/L (ref 135–225)
MAGNESIUM SERPL-MCNC: 2.4 MG/DL (ref 1.6–2.4)
PHOSPHATE SERPL-MCNC: 3.3 MG/DL (ref 2.5–4.5)
POTASSIUM SERPL-SCNC: 4.1 MMOL/L (ref 3.5–5.2)
PROT SERPL-MCNC: 6.2 G/DL (ref 6–8.5)
SODIUM SERPL-SCNC: 139 MMOL/L (ref 136–145)

## 2025-05-21 PROCEDURE — 94664 DEMO&/EVAL PT USE INHALER: CPT

## 2025-05-21 PROCEDURE — 80053 COMPREHEN METABOLIC PANEL: CPT | Performed by: INTERNAL MEDICINE

## 2025-05-21 PROCEDURE — 84100 ASSAY OF PHOSPHORUS: CPT | Performed by: INTERNAL MEDICINE

## 2025-05-21 PROCEDURE — 82550 ASSAY OF CK (CPK): CPT | Performed by: INTERNAL MEDICINE

## 2025-05-21 PROCEDURE — 82728 ASSAY OF FERRITIN: CPT | Performed by: INTERNAL MEDICINE

## 2025-05-21 PROCEDURE — 63710000001 INSULIN LISPRO (HUMAN) PER 5 UNITS: Performed by: HOSPITALIST

## 2025-05-21 PROCEDURE — 94799 UNLISTED PULMONARY SVC/PX: CPT

## 2025-05-21 PROCEDURE — 63710000001 INSULIN GLARGINE PER 5 UNITS: Performed by: HOSPITALIST

## 2025-05-21 PROCEDURE — 63710000001 INSULIN LISPRO (HUMAN) PER 5 UNITS: Performed by: INTERNAL MEDICINE

## 2025-05-21 PROCEDURE — 83615 LACTATE (LD) (LDH) ENZYME: CPT | Performed by: INTERNAL MEDICINE

## 2025-05-21 PROCEDURE — 83036 HEMOGLOBIN GLYCOSYLATED A1C: CPT | Performed by: HOSPITALIST

## 2025-05-21 PROCEDURE — 85379 FIBRIN DEGRADATION QUANT: CPT | Performed by: INTERNAL MEDICINE

## 2025-05-21 PROCEDURE — 83735 ASSAY OF MAGNESIUM: CPT | Performed by: INTERNAL MEDICINE

## 2025-05-21 PROCEDURE — 63710000001 REVEFENACIN 175 MCG/3ML SOLUTION: Performed by: INTERNAL MEDICINE

## 2025-05-21 PROCEDURE — 82948 REAGENT STRIP/BLOOD GLUCOSE: CPT

## 2025-05-21 RX ORDER — NICOTINE POLACRILEX 4 MG
15 LOZENGE BUCCAL
Status: DISCONTINUED | OUTPATIENT
Start: 2025-05-21 | End: 2025-05-22 | Stop reason: HOSPADM

## 2025-05-21 RX ORDER — DEXTROSE MONOHYDRATE 25 G/50ML
25 INJECTION, SOLUTION INTRAVENOUS
Status: DISCONTINUED | OUTPATIENT
Start: 2025-05-21 | End: 2025-05-22 | Stop reason: HOSPADM

## 2025-05-21 RX ORDER — INSULIN LISPRO 100 [IU]/ML
2-9 INJECTION, SOLUTION INTRAVENOUS; SUBCUTANEOUS
Status: DISCONTINUED | OUTPATIENT
Start: 2025-05-21 | End: 2025-05-22 | Stop reason: HOSPADM

## 2025-05-21 RX ORDER — IBUPROFEN 600 MG/1
1 TABLET ORAL
Status: DISCONTINUED | OUTPATIENT
Start: 2025-05-21 | End: 2025-05-22 | Stop reason: HOSPADM

## 2025-05-21 RX ADMIN — REVEFENACIN 175 MCG: 175 SOLUTION RESPIRATORY (INHALATION) at 07:29

## 2025-05-21 RX ADMIN — MOXIFLOXACIN OPHTHALMIC 0.05 ML: 5 SOLUTION/ DROPS OPHTHALMIC at 20:12

## 2025-05-21 RX ADMIN — MOXIFLOXACIN OPHTHALMIC 0.05 ML: 5 SOLUTION/ DROPS OPHTHALMIC at 16:44

## 2025-05-21 RX ADMIN — SILDENAFIL 20 MG: 20 TABLET, FILM COATED ORAL at 20:08

## 2025-05-21 RX ADMIN — ESCITALOPRAM 10 MG: 10 TABLET, FILM COATED ORAL at 08:25

## 2025-05-21 RX ADMIN — BRIMONIDINE TARTRATE 1 DROP: 2 SOLUTION/ DROPS OPHTHALMIC at 08:28

## 2025-05-21 RX ADMIN — PREGABALIN 50 MG: 50 CAPSULE ORAL at 20:08

## 2025-05-21 RX ADMIN — PREDNISOLONE ACETATE 1 DROP: 10 SUSPENSION/ DROPS OPHTHALMIC at 16:40

## 2025-05-21 RX ADMIN — SPIRONOLACTONE 25 MG: 25 TABLET ORAL at 08:26

## 2025-05-21 RX ADMIN — BRIMONIDINE TARTRATE 1 DROP: 2 SOLUTION/ DROPS OPHTHALMIC at 16:41

## 2025-05-21 RX ADMIN — AZITHROMYCIN 250 MG: 250 TABLET, FILM COATED ORAL at 20:08

## 2025-05-21 RX ADMIN — BRIMONIDINE TARTRATE 1 DROP: 2 SOLUTION/ DROPS OPHTHALMIC at 20:12

## 2025-05-21 RX ADMIN — MOXIFLOXACIN OPHTHALMIC 0.05 ML: 5 SOLUTION/ DROPS OPHTHALMIC at 08:28

## 2025-05-21 RX ADMIN — DEXAMETHASONE 6 MG: 6 TABLET ORAL at 08:26

## 2025-05-21 RX ADMIN — INSULIN GLARGINE 27 UNITS: 100 INJECTION, SOLUTION SUBCUTANEOUS at 08:27

## 2025-05-21 RX ADMIN — INSULIN LISPRO 9 UNITS: 100 INJECTION, SOLUTION INTRAVENOUS; SUBCUTANEOUS at 12:01

## 2025-05-21 RX ADMIN — Medication 10 ML: at 08:27

## 2025-05-21 RX ADMIN — SILDENAFIL 20 MG: 20 TABLET, FILM COATED ORAL at 08:26

## 2025-05-21 RX ADMIN — PANTOPRAZOLE SODIUM 40 MG: 40 TABLET, DELAYED RELEASE ORAL at 08:26

## 2025-05-21 RX ADMIN — DULOXETINE 30 MG: 30 CAPSULE, DELAYED RELEASE ORAL at 08:26

## 2025-05-21 RX ADMIN — TAMSULOSIN HYDROCHLORIDE 0.4 MG: 0.4 CAPSULE ORAL at 08:26

## 2025-05-21 RX ADMIN — TRAZODONE HYDROCHLORIDE 150 MG: 100 TABLET ORAL at 20:08

## 2025-05-21 RX ADMIN — PREDNISOLONE ACETATE 1 DROP: 10 SUSPENSION/ DROPS OPHTHALMIC at 20:12

## 2025-05-21 RX ADMIN — ASPIRIN 81 MG: 81 TABLET, COATED ORAL at 08:26

## 2025-05-21 RX ADMIN — MONTELUKAST 10 MG: 10 TABLET, FILM COATED ORAL at 20:08

## 2025-05-21 RX ADMIN — INSULIN LISPRO 8 UNITS: 100 INJECTION, SOLUTION INTRAVENOUS; SUBCUTANEOUS at 12:01

## 2025-05-21 RX ADMIN — PREDNISOLONE ACETATE 1 DROP: 10 SUSPENSION/ DROPS OPHTHALMIC at 08:28

## 2025-05-21 RX ADMIN — FAMOTIDINE 20 MG: 20 TABLET, FILM COATED ORAL at 20:07

## 2025-05-21 RX ADMIN — IPRATROPIUM BROMIDE AND ALBUTEROL SULFATE 3 ML: .5; 3 SOLUTION RESPIRATORY (INHALATION) at 07:28

## 2025-05-21 RX ADMIN — SENNOSIDES AND DOCUSATE SODIUM 2 TABLET: 50; 8.6 TABLET ORAL at 20:07

## 2025-05-21 RX ADMIN — INSULIN LISPRO 4 UNITS: 100 INJECTION, SOLUTION INTRAVENOUS; SUBCUTANEOUS at 21:37

## 2025-05-21 RX ADMIN — BUDESONIDE AND FORMOTEROL FUMARATE DIHYDRATE 2 PUFF: 160; 4.5 AEROSOL RESPIRATORY (INHALATION) at 21:17

## 2025-05-21 RX ADMIN — APIXABAN 2.5 MG: 2.5 TABLET, FILM COATED ORAL at 08:26

## 2025-05-21 RX ADMIN — FERROUS SULFATE TAB 325 MG (65 MG ELEMENTAL FE) 325 MG: 325 (65 FE) TAB at 08:26

## 2025-05-21 RX ADMIN — BUDESONIDE AND FORMOTEROL FUMARATE DIHYDRATE 2 PUFF: 160; 4.5 AEROSOL RESPIRATORY (INHALATION) at 07:29

## 2025-05-21 RX ADMIN — BUMETANIDE 1 MG: 1 TABLET ORAL at 08:26

## 2025-05-21 RX ADMIN — SILDENAFIL 20 MG: 20 TABLET, FILM COATED ORAL at 16:41

## 2025-05-21 RX ADMIN — BUMETANIDE 1 MG: 1 TABLET ORAL at 20:08

## 2025-05-21 RX ADMIN — POLYETHYLENE GLYCOL 3350 17 G: 17 POWDER, FOR SOLUTION ORAL at 20:12

## 2025-05-21 RX ADMIN — MOXIFLOXACIN OPHTHALMIC 0.05 ML: 5 SOLUTION/ DROPS OPHTHALMIC at 12:02

## 2025-05-21 RX ADMIN — APIXABAN 2.5 MG: 2.5 TABLET, FILM COATED ORAL at 21:37

## 2025-05-21 RX ADMIN — IPRATROPIUM BROMIDE AND ALBUTEROL SULFATE 3 ML: .5; 3 SOLUTION RESPIRATORY (INHALATION) at 11:24

## 2025-05-21 RX ADMIN — IPRATROPIUM BROMIDE AND ALBUTEROL SULFATE 3 ML: .5; 3 SOLUTION RESPIRATORY (INHALATION) at 21:11

## 2025-05-21 RX ADMIN — INSULIN LISPRO 2 UNITS: 100 INJECTION, SOLUTION INTRAVENOUS; SUBCUTANEOUS at 08:26

## 2025-05-21 RX ADMIN — INSULIN LISPRO 9 UNITS: 100 INJECTION, SOLUTION INTRAVENOUS; SUBCUTANEOUS at 16:44

## 2025-05-21 RX ADMIN — Medication 10 ML: at 20:13

## 2025-05-21 RX ADMIN — INSULIN LISPRO 8 UNITS: 100 INJECTION, SOLUTION INTRAVENOUS; SUBCUTANEOUS at 16:42

## 2025-05-21 RX ADMIN — INSULIN LISPRO 9 UNITS: 100 INJECTION, SOLUTION INTRAVENOUS; SUBCUTANEOUS at 08:27

## 2025-05-21 RX ADMIN — PREGABALIN 50 MG: 50 CAPSULE ORAL at 08:26

## 2025-05-21 NOTE — PROGRESS NOTES
[facility]     Mar/chart reviewed  Follow up COVID19 infection, pulmonary hypertension, chronic hypoxemic respiratory failure.  No increased shortness of breath, still has some cough  Says he is weak   No chest pain  Vital Sign Min/Max for last 24 hours  Temp  Min: 97.2 °F (36.2 °C)  Max: 98.2 °F (36.8 °C)   BP  Min: 128/54  Max: 147/66   Pulse  Min: 74  Max: 112   Resp  Min: 16  Max: 32   SpO2  Min: 91 %  Max: 100 %   Flow (L/min) (Oxygen Therapy)  Min: 2  Max: 2   No data recorded     Nad, axox3,   perrl, eomi, no icterus,  mmm, no jvd, trachea midline, neck supple,  chest cta bilaterally, no crackles, no wheezes,   rrr,   soft, nt, nd +bs,  no c/c/e  Skin warm, dry no rashes    Labs: 5/21: reviewed:  Glucose 210  Bun 31  Cr 0.9  Bicarb 35    A/P:  1. Acute on chronic hypoxemic respiratory failure -- now on baseline O2  2. COPD with ae -- bronchodilators,   3. COVID19 infection - continue decadron --(paxolovid d/c'ed as he would not be able to continue his sildinafil and it is more improtant to continue the sildinafil)  4. Pulmonary hypertension -- CTEPH -- continue home meds  5. Afib  6. CHRISSY    Seems to be at pulmonary baseline,  can d/c dexamethasone on d/c. Follow up with his regular pulmonary MD in about 2 weeks.    Patient wishes to stay in hospital a day or two more due to weakness,  no objection to d/c from a pulmonary standpoint.        Attending and PA/NP shared services statement (NON-critical care): Attending and PA/NP shared services statement (NON-critical care):

## 2025-05-21 NOTE — PROGRESS NOTES
Name: Chuckie Valdez ADMIT: 2025   : 1955  PCP: Savage Keita MD    MRN: [id number] LOS: 3 days   AGE/SEX: 70 y.o. male  [facility]     Subjective   Subjective   Denies any complaint. Back to baseline oxygen requirement. He feels weak does not feel like he is ready to go home. He walked 100 feet with PT yesterday     Objective   Objective   Vital Signs  Temp:  [97.2 °F (36.2 °C)-98.2 °F (36.8 °C)] 98.2 °F (36.8 °C)  Heart Rate:  [] 107  Resp:  [16-32] 18  BP: (128-147)/(54-66) 137/66  SpO2:  [91 %-100 %] 94 %  on  Flow (L/min) (Oxygen Therapy):  [2] 2;   Device (Oxygen Therapy): nasal cannula  Body mass index is 23.69 kg/m².    Physical Exam  Constitutional:       General: He is not in acute distress.     Appearance: He is not toxic-appearing.   HENT:      Head: Normocephalic and atraumatic.   Cardiovascular:      Rate and Rhythm: Normal rate and regular rhythm.   Pulmonary:      Effort: Pulmonary effort is normal. No respiratory distress.      Breath sounds: Normal breath sounds. No wheezing or rhonchi.   Abdominal:      General: Bowel sounds are normal.      Palpations: Abdomen is soft.      Tenderness: There is no abdominal tenderness.   Musculoskeletal:         General: No swelling.      Right lower leg: No edema.      Left lower leg: No edema.   Skin:     General: Skin is warm and dry.   Neurological:      Mental Status: He is alert and oriented to person, place, and time.   Psychiatric:         Mood and Affect: Mood normal.         Behavior: Behavior normal.     Results Review  I reviewed the patient's new clinical results.  Results from last 7 days   Lab Units 25  0749 25  2338   WBC 10*3/mm3 8.85 10.07   HEMOGLOBIN g/dL 14.6 14.4   PLATELETS 10*3/mm3 257 262     Results from last 7 days   Lab Units 25  0504 25  0516 25  0309 25  0748   SODIUM mmol/L 139 137 137 138   POTASSIUM mmol/L 4.1 4.6 4.6 5.0   CHLORIDE mmol/L 97* 96*  100 101   CO2 mmol/L 35.4* 31.0* 30.0* 29.0   BUN mg/dL 31* 24* 22 20   CREATININE mg/dL 0.90 1.05 0.92 0.99   GLUCOSE mg/dL 210* 362* 276* 220*     Lab Results   Component Value Date    ANIONGAP 6.6 05/21/2025     Estimated Creatinine Clearance: 80.9 mL/min (by C-G formula based on SCr of 0.9 mg/dL).   Lab Results   Component Value Date    EGFR 91.9 05/21/2025     Results from last 7 days   Lab Units 05/21/25  0504 05/20/25  0516 05/19/25  0309 05/18/25  0748   ALBUMIN g/dL 3.5 3.7 3.6 4.2   BILIRUBIN mg/dL 0.2 0.3 0.2 0.3   ALK PHOS U/L 90 103 101 118*   AST (SGOT) U/L 14 21 26 38   ALT (SGPT) U/L 30 39 42* 42*     Results from last 7 days   Lab Units 05/21/25  0504 05/20/25  0516 05/19/25  0309 05/18/25  0748   CALCIUM mg/dL 9.0 8.8 9.1 9.1   ALBUMIN g/dL 3.5 3.7 3.6 4.2   MAGNESIUM mg/dL 2.4 2.4 2.4  --    PHOSPHORUS mg/dL 3.3 3.3 2.9  --      Results from last 7 days   Lab Units 05/18/25  0031   LACTATE mmol/L 0.9     Glucose   Date/Time Value Ref Range Status   05/21/2025 1102 378 (H) 70 - 130 mg/dL Final   05/21/2025 0600 187 (H) 70 - 130 mg/dL Final   05/20/2025 2034 345 (H) 70 - 130 mg/dL Final   05/20/2025 1618 322 (H) 70 - 130 mg/dL Final   05/20/2025 1117 300 (H) 70 - 130 mg/dL Final   05/20/2025 0634 349 (H) 70 - 130 mg/dL Final   05/19/2025 2132 289 (H) 70 - 130 mg/dL Final       CT Angiogram Chest  Result Date: 5/20/2025  1. Right lower lobe segmental/subsegmental eccentric partially occlusive thromboembolus. Given appearance, favor chronic thromboembolus over acute pulmonary thromboembolus. 2. Dilated main pulmonary artery (45 mm) suggesting pulmonary hypertension. 3. Multifocal left lower lobe segmental and subsegmental mucous plugging with associated bronchial wall thickening suggesting aspiration. Associated 5 cm focus of dependent left lower lobe atelectasis/consolidation raises concern for pneumonitis/pneumonia. Recommend a follow-up chest CT in 2 to 3 months to ensure clearance. 4. Similar  focal severe stenosis of the proximal subglottic trachea likely secondary to prior intubation. 5. 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   This report was finalized on 5/20/2025 8:08 AM by Dr. Kt Salgado M.D on Workstation: [id number]        Scheduled Meds  apixaban, 2.5 mg, Oral, Q12H  aspirin, 81 mg, Oral, Daily  azithromycin, 250 mg, Oral, Daily  brimonidine, 1 drop, Left Eye, TID  budesonide-formoterol, 2 puff, Inhalation, BID - RT   And  revefenacin, 175 mcg, Nebulization, Daily - RT  bumetanide, 1 mg, Oral, BID  dexAMETHasone, 6 mg, Oral, Daily With Breakfast  DULoxetine, 30 mg, Oral, Daily  escitalopram, 10 mg, Oral, Daily  ferrous sulfate, 325 mg, Oral, Daily With Breakfast  insulin glargine, 27 Units, Subcutaneous, Daily  insulin lispro, 2-7 Units, Subcutaneous, 4x Daily AC & at Bedtime  insulin lispro, 9 Units, Subcutaneous, TID With Meals  ipratropium-albuterol, 3 mL, Nebulization, Q6H While Awake - RT  montelukast, 10 mg, Oral, Nightly  moxifloxacin, 1 drop, Left Eye, 4x Daily  pantoprazole, 40 mg, Oral, Daily  prednisoLONE acetate, 1 drop, Left Eye, 6x Daily  pregabalin, 50 mg, Oral, BID  sennosides-docusate, 2 tablet, Oral, Nightly  sildenafil, 20 mg, Oral, TID  sodium chloride, 10 mL, Intravenous, Q12H  spironolactone, 25 mg, Oral, Daily  tamsulosin, 0.4 mg, Oral, Daily  traZODone, 150 mg, Oral, Nightly  vitamin D, 50,000 Units, Oral, Q7 Days    Continuous Infusions   PRN Meds    acetaminophen **OR** acetaminophen **OR** acetaminophen    albuterol    senna-docusate sodium **AND** polyethylene glycol **AND** bisacodyl **AND** bisacodyl    dextrose    dextrose    famotidine    glucagon (human recombinant)    ipratropium-albuterol    melatonin    ondansetron    ondansetron ODT    sodium chloride    sodium chloride    sodium chloride     Diet  Diet: Cardiac, Diabetic; Healthy Heart (2-3 Na+); Consistent Carbohydrate; Fluid Consistency: Thin (IDDSI 0)       Assessment/Plan     Active  Hospital Problems    Diagnosis  POA    **Acute hypoxemic respiratory failure due to COVID-19 [U07.1, J96.01]  Yes    BPH (benign prostatic hyperplasia) [N40.0]  Unknown    Chronic respiratory failure with hypoxia [J96.11]  Yes    Dependence on supplemental oxygen [Z99.81]  Not Applicable    CHRISSY (obstructive sleep apnea) [G47.33]  Yes    COPD exacerbation [J44.1]  Yes    Atrial fibrillation [I48.91]  Yes    Diabetes mellitus type 2 without retinopathy [E11.9]  Yes    Essential hypertension [I10]  Yes      Resolved Hospital Problems   No resolved problems to display.     Patient is a 70 y.o. male     Acute hypoxic respiratory failure  Chronic hypoxic respiratory failure (baseline 2 L/min)  COVID  COPD exacerbation  CHRISSY  Pulmonary hypertension  Switch steroids to p.o.  Finished Paxlovid 5/19/2025  Now down to home oxygen requirement Flow (L/min) (Oxygen Therapy):  [2] 2     Chronic systolic heart failure EF 44%  Paroxysmal atrial fibrillation  Hypertension  Hyperlipidemia  Apixaban    DM 2 uncontrolled  Worse with steroids  Increas correctional insulin  Home metformin held    DVT prophylaxis  Eliquis (home med)    Discharge  Discussed with patient will plan for tomorrow discharge home  Expected Discharge Date: 5/21/2025; Expected Discharge Time:     Discussed with patient and nursing staff    Praveen Bolton MD  [facility]  05/21/25 11:28 EDT

## 2025-05-21 NOTE — PLAN OF CARE
Goal Outcome Evaluation:      VSS. O2 up to 3L nc. Blood Glucose high. Insulin given. Up in the chair. Care plan ongoing.  Problem: Adult Inpatient Plan of Care  Goal: Plan of Care Review  Outcome: Progressing  Goal: Patient-Specific Goal (Individualized)  Outcome: Progressing  Goal: Absence of Hospital-Acquired Illness or Injury  Outcome: Progressing  Intervention: Identify and Manage Fall Risk  Recent Flowsheet Documentation  Taken 5/21/2025 1409 by Jose Carlos Galvan RN  Safety Promotion/Fall Prevention:   safety round/check completed   room organization consistent  Taken 5/21/2025 0815 by Jose Carlos Galvan RN  Safety Promotion/Fall Prevention:   safety round/check completed   room organization consistent  Intervention: Prevent Skin Injury  Recent Flowsheet Documentation  Taken 5/21/2025 1409 by Jose Carlos Galvan RN  Body Position: position changed independently  Skin Protection:   transparent dressing maintained   incontinence pads utilized  Taken 5/21/2025 0815 by Jose Carlos Galvan RN  Body Position: position changed independently  Skin Protection:   transparent dressing maintained   incontinence pads utilized  Intervention: Prevent Infection  Recent Flowsheet Documentation  Taken 5/21/2025 1409 by Jose Carlos Galvan RN  Infection Prevention:   single patient room provided   rest/sleep promoted   hand hygiene promoted  Taken 5/21/2025 0815 by Jose Carlos Galvan RN  Infection Prevention:   single patient room provided   rest/sleep promoted   hand hygiene promoted  Goal: Optimal Comfort and Wellbeing  Outcome: Progressing  Intervention: Provide Person-Centered Care  Recent Flowsheet Documentation  Taken 5/21/2025 1409 by Jose Carlos Galvan RN  Trust Relationship/Rapport:   care explained   choices provided   emotional support provided   empathic listening provided   questions answered   questions encouraged   reassurance provided    thoughts/feelings acknowledged  Taken 5/21/2025 0815 by Jose Carlos Galvan RN  Trust Relationship/Rapport:   care explained   choices provided   emotional support provided   empathic listening provided   questions answered   questions encouraged   reassurance provided   thoughts/feelings acknowledged  Goal: Readiness for Transition of Care  Outcome: Progressing     Problem: Comorbidity Management  Goal: Maintenance of COPD Symptom Control  Outcome: Progressing  Intervention: Maintain COPD (Chronic Obstructive Pulmonary Disease) Symptom Control  Recent Flowsheet Documentation  Taken 5/21/2025 1409 by Jose Carlos Galvan RN  Medication Review/Management: medications reviewed  Taken 5/21/2025 0815 by Jose Carlos Galvan RN  Medication Review/Management: medications reviewed     Problem: Noninvasive Ventilation Acute  Goal: Effective Unassisted Ventilation and Oxygenation  Outcome: Progressing     Problem: Fall Injury Risk  Goal: Absence of Fall and Fall-Related Injury  Outcome: Progressing  Intervention: Identify and Manage Contributors  Recent Flowsheet Documentation  Taken 5/21/2025 1409 by Jose Carlos Galvan RN  Medication Review/Management: medications reviewed  Self-Care Promotion: independence encouraged  Taken 5/21/2025 0815 by Jose Carlos Galvan RN  Medication Review/Management: medications reviewed  Self-Care Promotion: independence encouraged  Intervention: Promote Injury-Free Environment  Recent Flowsheet Documentation  Taken 5/21/2025 1409 by Jose Carlos Galvan RN  Safety Promotion/Fall Prevention:   safety round/check completed   room organization consistent  Taken 5/21/2025 0815 by Jose Carlos Galvan RN  Safety Promotion/Fall Prevention:   safety round/check completed   room organization consistent

## 2025-05-22 VITALS
RESPIRATION RATE: 20 BRPM | BODY MASS INDEX: 23.64 KG/M2 | TEMPERATURE: 97.7 F | OXYGEN SATURATION: 92 % | SYSTOLIC BLOOD PRESSURE: 139 MMHG | DIASTOLIC BLOOD PRESSURE: 68 MMHG | WEIGHT: 165.12 LBS | HEART RATE: 105 BPM | HEIGHT: 70 IN

## 2025-05-22 PROBLEM — D89.831 CYTOKINE RELEASE SYNDROME, GRADE 1: Status: ACTIVE | Noted: 2025-05-22

## 2025-05-22 LAB
ALBUMIN SERPL-MCNC: 3.6 G/DL (ref 3.5–5.2)
ALBUMIN/GLOB SERPL: 1.2 G/DL
ALP SERPL-CCNC: 91 U/L (ref 39–117)
ALT SERPL W P-5'-P-CCNC: 30 U/L (ref 1–41)
ANION GAP SERPL CALCULATED.3IONS-SCNC: 10 MMOL/L (ref 5–15)
AST SERPL-CCNC: 20 U/L (ref 1–40)
BILIRUB SERPL-MCNC: 0.4 MG/DL (ref 0–1.2)
BUN SERPL-MCNC: 29 MG/DL (ref 8–23)
BUN/CREAT SERPL: 27.9 (ref 7–25)
CALCIUM SPEC-SCNC: 8.4 MG/DL (ref 8.6–10.5)
CHLORIDE SERPL-SCNC: 95 MMOL/L (ref 98–107)
CO2 SERPL-SCNC: 32 MMOL/L (ref 22–29)
CREAT SERPL-MCNC: 1.04 MG/DL (ref 0.76–1.27)
EGFRCR SERPLBLD CKD-EPI 2021: 77.2 ML/MIN/1.73
GLOBULIN UR ELPH-MCNC: 2.9 GM/DL
GLUCOSE BLDC GLUCOMTR-MCNC: 117 MG/DL (ref 70–130)
GLUCOSE BLDC GLUCOMTR-MCNC: 300 MG/DL (ref 70–130)
GLUCOSE SERPL-MCNC: 303 MG/DL (ref 65–99)
POTASSIUM SERPL-SCNC: 4.1 MMOL/L (ref 3.5–5.2)
PROT SERPL-MCNC: 6.5 G/DL (ref 6–8.5)
SODIUM SERPL-SCNC: 137 MMOL/L (ref 136–145)

## 2025-05-22 PROCEDURE — 94799 UNLISTED PULMONARY SVC/PX: CPT

## 2025-05-22 PROCEDURE — 63710000001 INSULIN LISPRO (HUMAN) PER 5 UNITS: Performed by: HOSPITALIST

## 2025-05-22 PROCEDURE — 94660 CPAP INITIATION&MGMT: CPT

## 2025-05-22 PROCEDURE — 80053 COMPREHEN METABOLIC PANEL: CPT | Performed by: INTERNAL MEDICINE

## 2025-05-22 PROCEDURE — 82948 REAGENT STRIP/BLOOD GLUCOSE: CPT

## 2025-05-22 PROCEDURE — 94761 N-INVAS EAR/PLS OXIMETRY MLT: CPT

## 2025-05-22 PROCEDURE — 36415 COLL VENOUS BLD VENIPUNCTURE: CPT | Performed by: INTERNAL MEDICINE

## 2025-05-22 PROCEDURE — 63710000001 INSULIN GLARGINE PER 5 UNITS: Performed by: HOSPITALIST

## 2025-05-22 RX ORDER — AZITHROMYCIN 250 MG/1
250 TABLET, FILM COATED ORAL DAILY
Qty: 3 TABLET | Refills: 0 | Status: SHIPPED | OUTPATIENT
Start: 2025-05-22 | End: 2025-05-25

## 2025-05-22 RX ADMIN — IPRATROPIUM BROMIDE AND ALBUTEROL SULFATE 3 ML: .5; 3 SOLUTION RESPIRATORY (INHALATION) at 12:44

## 2025-05-22 RX ADMIN — SILDENAFIL 20 MG: 20 TABLET, FILM COATED ORAL at 08:27

## 2025-05-22 RX ADMIN — BRIMONIDINE TARTRATE 1 DROP: 2 SOLUTION/ DROPS OPHTHALMIC at 08:53

## 2025-05-22 RX ADMIN — SPIRONOLACTONE 25 MG: 25 TABLET ORAL at 08:27

## 2025-05-22 RX ADMIN — INSULIN LISPRO 9 UNITS: 100 INJECTION, SOLUTION INTRAVENOUS; SUBCUTANEOUS at 12:18

## 2025-05-22 RX ADMIN — APIXABAN 2.5 MG: 2.5 TABLET, FILM COATED ORAL at 08:27

## 2025-05-22 RX ADMIN — ASPIRIN 81 MG: 81 TABLET, COATED ORAL at 08:26

## 2025-05-22 RX ADMIN — PANTOPRAZOLE SODIUM 40 MG: 40 TABLET, DELAYED RELEASE ORAL at 08:27

## 2025-05-22 RX ADMIN — PREDNISOLONE ACETATE 1 DROP: 10 SUSPENSION/ DROPS OPHTHALMIC at 10:00

## 2025-05-22 RX ADMIN — MOXIFLOXACIN OPHTHALMIC 0.05 ML: 5 SOLUTION/ DROPS OPHTHALMIC at 08:53

## 2025-05-22 RX ADMIN — PREGABALIN 50 MG: 50 CAPSULE ORAL at 08:27

## 2025-05-22 RX ADMIN — INSULIN LISPRO 9 UNITS: 100 INJECTION, SOLUTION INTRAVENOUS; SUBCUTANEOUS at 08:26

## 2025-05-22 RX ADMIN — INSULIN GLARGINE 27 UNITS: 100 INJECTION, SOLUTION SUBCUTANEOUS at 08:26

## 2025-05-22 RX ADMIN — Medication 10 ML: at 08:54

## 2025-05-22 RX ADMIN — ESCITALOPRAM 10 MG: 10 TABLET, FILM COATED ORAL at 08:27

## 2025-05-22 RX ADMIN — DULOXETINE 30 MG: 30 CAPSULE, DELAYED RELEASE ORAL at 08:27

## 2025-05-22 RX ADMIN — DEXAMETHASONE 6 MG: 6 TABLET ORAL at 08:27

## 2025-05-22 RX ADMIN — PREDNISOLONE ACETATE 1 DROP: 10 SUSPENSION/ DROPS OPHTHALMIC at 07:00

## 2025-05-22 RX ADMIN — BUMETANIDE 1 MG: 1 TABLET ORAL at 08:26

## 2025-05-22 RX ADMIN — TAMSULOSIN HYDROCHLORIDE 0.4 MG: 0.4 CAPSULE ORAL at 08:27

## 2025-05-22 RX ADMIN — INSULIN LISPRO 7 UNITS: 100 INJECTION, SOLUTION INTRAVENOUS; SUBCUTANEOUS at 08:25

## 2025-05-22 RX ADMIN — MOXIFLOXACIN OPHTHALMIC 0.05 ML: 5 SOLUTION/ DROPS OPHTHALMIC at 12:19

## 2025-05-22 RX ADMIN — PREDNISOLONE ACETATE 1 DROP: 10 SUSPENSION/ DROPS OPHTHALMIC at 13:00

## 2025-05-22 NOTE — PROGRESS NOTES
[facility]      Mar/chart reviewed  Follow up COVID19 infection, pulmonary hypertension, chronic hypoxemic respiratory failure.  No increased shortness of breath, still has some cough  Says he is weak   No chest pain  +constipation    Vital Sign Min/Max for last 24 hours  Temp  Min: 97.5 °F (36.4 °C)  Max: 98.2 °F (36.8 °C)   BP  Min: 137/66  Max: 156/92   Pulse  Min: 77  Max: 107   Resp  Min: 18  Max: 19   SpO2  Min: 93 %  Max: 98 %   Flow (L/min) (Oxygen Therapy)  Min: 2  Max: 3   No data recorded     Nad, axox3,   perrl, eomi, no icterus,  mmm, no jvd, trachea midline, neck supple,  chest cta bilaterally, no crackles, no wheezes,   rrr,   soft, nt, nd +bs,  no c/c/e  Skin warm, dry no rashes    Labs: 5/22: reviewed:  Cmp pending    A/P:  1. Acute on chronic hypoxemic respiratory failure -- now on baseline O2  2. COPD with ae -- bronchodilators,   3. COVID19 infection - continue decadron --(paxolovid d/c'ed as he would not be able to continue his sildinafil and it is more improtant to continue the sildinafil)  4. Pulmonary hypertension -- CTEPH -- continue home meds  5. Afib  6. CHRISSY     Seems to be at pulmonary baseline,  can d/c dexamethasone on d/c. Follow up with his regular pulmonary MD in about 2 weeks.    No objection to d/c from a pulmonary standpoint.

## 2025-05-22 NOTE — PLAN OF CARE
Problem: Adult Inpatient Plan of Care  Goal: Absence of Hospital-Acquired Illness or Injury  Intervention: Identify and Manage Fall Risk  Recent Flowsheet Documentation  Taken 5/22/2025 1421 by Judith Wyman RN  Safety Promotion/Fall Prevention:   safety round/check completed   room organization consistent   assistive device/personal items within reach   activity supervised  Taken 5/22/2025 1200 by Judith Wyman RN  Safety Promotion/Fall Prevention:   safety round/check completed   room organization consistent   assistive device/personal items within reach   activity supervised     Problem: Adult Inpatient Plan of Care  Goal: Absence of Hospital-Acquired Illness or Injury  Intervention: Prevent Skin Injury  Recent Flowsheet Documentation  Taken 5/22/2025 1421 by Judith Wyman RN  Body Position: position changed independently  Taken 5/22/2025 1200 by Judith Wyman RN  Body Position: position changed independently   Goal Outcome Evaluation:  Plan of Care Reviewed With: patient        Progress: improving  Outcome Evaluation: Patient male , 70 years old , Able to make his needs know, AO x 4 with Oxygen continue 2 lt/min will be discharge home today with 3 doses antibiotic Azithromacin for 3 days, educate patient discharge instruction, will be  @ 3 pm Wheelchair transportation.

## 2025-05-22 NOTE — CASE MANAGEMENT/SOCIAL WORK
Case Management Discharge Note      Final Note: Home via [employer]  Van at 3pm. No additional CCP needs.         Selected Continued Care - Admitted Since 5/17/2025       Destination    No services have been selected for the patient.                Durable Medical Equipment    No services have been selected for the patient.                Dialysis/Infusion    No services have been selected for the patient.                Home Medical Care    No services have been selected for the patient.                Therapy    No services have been selected for the patient.                Community Resources    No services have been selected for the patient.                Community & DME    No services have been selected for the patient.                    Transportation Services  W/C Van: [facility]    Final Discharge Disposition Code: 01 - home or self-care

## 2025-05-22 NOTE — DISCHARGE SUMMARY
Patient Name: Chuckie Valdez  : 1955  MRN: [id number]    Date of Admission: 2025  Date of Discharge:  2025  Primary Care Physician: Savage Keita MD      Chief Complaint:   Shortness of Breath      Discharge Diagnoses     Active Hospital Problems    Diagnosis  POA    **Acute hypoxemic respiratory failure due to COVID-19 [U07.1, J96.01]  Yes    Cytokine release syndrome, grade 1 [D89.831]  No    BPH (benign prostatic hyperplasia) [N40.0]  Unknown    Chronic respiratory failure with hypoxia [J96.11]  Yes    Dependence on supplemental oxygen [Z99.81]  Not Applicable    CHRISSY (obstructive sleep apnea) [G47.33]  Yes    COPD exacerbation [J44.1]  Yes    Atrial fibrillation [I48.91]  Yes    Diabetes mellitus type 2 without retinopathy [E11.9]  Yes    Essential hypertension [I10]  Yes      Resolved Hospital Problems   No resolved problems to display.        Hospital Course     Mr. Valdez is a 70 y.o. male with a history of type 2 diabetes, hypertension, obstructive sleep apnea, COPD with chronic respiratory failure, atrial fibrillation and hypertension  who presented to [facility] initially complaining of shortness of breat.  Please see the admitting history and physical for further details.  He was found to have COVID-19 and acute exacerbation of COPD and was admitted to the hospital for further evaluation and treatment.  Admitted with COVID-19 and hypoxia.  He required up to 6 L of oxygen here in the hospital but has been titrated down to 2 to 3 L here.  He is responded well to dexamethasone and Paxlovid at this point is completed treatment.  He remains on breathing treatments and supplemental oxygen which he has available at home.  He has been cleared for discharge by pulmonology and feels his respiratory status is back to his baseline.  Unfortunately he seems a little resistant to going home initially due to the lack of caregiver availability.  I  explained that he is reached the maximal benefit of prolonged hospitalization and that at this point would need to consider either skilled nursing facility or to transition home with additional assistance.  He made a few inflammatory statements saying that if I send him home he is going to die.  He is walked 200 feet with physical therapy here in the hospital and is pretty resistant to even considering going to a nursing home for rehab.  I am not sure he would even qualify with his ambulatory status.  It maintains that he is reached the maximal benefit of ongoing hospitalization I do not think were going to be doing anything for him here that we cannot do for him at home or in rehab.  Discussed with the discharge planner and the nursing staff.  He needs to follow-up with his outpatient pulmonologist in the next week or 2 and follow-up with a primary care physician within the next week.      Day of Discharge     Subjective:  He is worried about going home.  He still feels short of breath and still does not feel back to baseline.  He is requesting to stay through the weekend for his caregiver to arrive on Monday.  Denies any fevers or chills no nausea or vomiting tolerating p.o. medications.    Physical Exam:  Temp:  [97.5 °F (36.4 °C)-98.1 °F (36.7 °C)] 97.5 °F (36.4 °C)  Heart Rate:  [] 96  Resp:  [18-19] 19  BP: (148-156)/(76-92) 148/76  Body mass index is 23.69 kg/m².  Physical Exam  Constitutional:       General: He is not in acute distress.     Appearance: He is obese. He is ill-appearing.   Cardiovascular:      Rate and Rhythm: Normal rate and regular rhythm.   Pulmonary:      Effort: Pulmonary effort is normal. No respiratory distress.      Breath sounds: Wheezing present.   Abdominal:      General: Bowel sounds are normal.      Palpations: Abdomen is soft.   Neurological:      General: No focal deficit present.      Mental Status: He is alert. Mental status is at baseline.         Consultants      Consult Orders (all) (From admission, onward)       Start     Ordered    05/18/25 1136  Inpatient Pulmonology Consult  Once        Specialty:  Pulmonary Disease  Provider:  Diamond Ann MD    05/18/25 1138    05/18/25 0632  Inpatient Case Management  Consult  Once        Provider:  (Not yet assigned)    05/18/25 0632    05/18/25 0200  [facility] (on-call MD unless specified) Details  Once        Specialty:  Hospitalist  Provider:  (Not yet assigned)    05/18/25 0159                  Procedures     * Surgery not found *    Imaging Results (All)       Procedure Component Value Units Date/Time    CT Angiogram Chest [[id number]] Collected: 05/20/25 0745     Updated: 05/20/25 0811    Narrative:      CT ANGIOGRAM CHEST PULMONARY EMBOLISM     TECHNIQUE: Radiation dose reduction techniques were utilized, including  automated exposure control and exposure modulation based on body size. 2  mm images were obtained through the chest after the administration of IV  contrast. 3D reformat images were created. Multiple coronal, sagittal,  and 3-D reconstruction images were obtained.     HISTORY: Shortness of breath.     COMPARISON: CT chest 10/1/2017. CT chest angiogram 9/5/2017.           FINDINGS: Numerous collateral vessels throughout the right greater than  left chest wall with hot quadrate sign within the liver (series 4/image  51). Corresponding occlusion of the distal SVC (series 4/image 81). No  surrounding soft tissue or extrinsic mass effect. This was present in  2017.     Nondistended gallbladder with cholelithiasis.     Dilated main pulmonary artery (45 mm) previously 44 mm with  remeasurement. Right lower lobe segmental/subsegmental eccentric  partially occlusive thromboembolus (series 4/images 98 through 110).  Remaining pulmonary arteries are patent. Somewhat decreased contrast  opacification in the pulmonary arteries associated with the below  mentioned left lower lobe atelectasis/consolidation without  associated  pulmonary thromboembolus (series 4/images 126).     Mild biventricular enlargement. Small volume pericardial fluid.  Nondilated severely atherosclerotic thoracic aorta. No mediastinal/hilar  lymphadenopathy. Nondilated esophagus with distal esophageal intramural  collateral vessels.     Similar focal severe stenosis of the proximal subglottic trachea (series  5/image 22). Multifocal left lower lobe segmental and subsegmental  mucous plugging with moderate bronchial wall thickening (series 5/image  105). Associated 5 cm focus of dependent left lower lobe  atelectasis/consolidation (series 5/image 129). Background moderate  centrilobular and paraseptal emphysema. Medial right lower lobe  subsegmental atelectasis versus scarring with trace right pleural fluid.  A right lower lobe sub-6 mm solid nodule is unchanged dating back to  2017 (series 5/image 116). Dominant right middle lobe calcified  granuloma. Few calcified right pleural plaques.             Impression:      1. Right lower lobe segmental/subsegmental eccentric partially occlusive  thromboembolus. Given appearance, favor chronic thromboembolus over  acute pulmonary thromboembolus.  2. Dilated main pulmonary artery (45 mm) suggesting pulmonary  hypertension.  3. Multifocal left lower lobe segmental and subsegmental mucous plugging  with associated bronchial wall thickening suggesting aspiration.  Associated 5 cm focus of dependent left lower lobe  atelectasis/consolidation raises concern for pneumonitis/pneumonia.  Recommend a follow-up chest CT in 2 to 3 months to ensure clearance.  4. Similar focal severe stenosis of the proximal subglottic trachea  likely secondary to prior intubation.  5. 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        This report was finalized on 5/20/2025 8:08 AM by Dr. Kt Salgado M.D on Workstation: [id number]       XR Chest 1 View [[id number]] Collected: 05/18/25 0000     Updated: 05/18/25 0005     Narrative:      CXR ONE VIEW      HISTORY: CHF/COPD Protocol     COMPARISON: 10/1/2017     TECHNIQUE: single portable AP       Impression:         Redemonstrated mild cardiomegaly.     Redemonstrated bilateral hilar fullness.     There is no consolidation, effusion or pneumothorax.     This report was finalized on 5/18/2025 12:02 AM by Dr. Dane Guevara M.D on Workstation: [id number]               Results for orders placed during the hospital encounter of 09/04/17    Adult Transthoracic Echo Complete W/ Cont if Necessary Per Protocol    Interpretation Summary  · Left ventricular wall thickness is consistent with mild concentric hypertrophy.  · Left ventricular systolic function is normal. Estimated EF = 63%.  · Right ventricular cavity is moderately dilated.  · Moderately reduced right ventricular systolic function noted.  · Mild mitral valve regurgitation is present  · Mild to moderate tricuspid valve regurgitation is present.  · Calculated right ventricular systolic pressure from tricuspid regurgitation is 52.8 mmHg.  · Mild pulmonic valve regurgitation is present.    Pertinent Labs     Results from last 7 days   Lab Units 05/18/25  0749 05/17/25  2338   WBC 10*3/mm3 8.85 10.07   HEMOGLOBIN g/dL 14.6 14.4   PLATELETS 10*3/mm3 257 262     Results from last 7 days   Lab Units 05/22/25  0604 05/21/25  0504 05/20/25  0516 05/19/25  0309   SODIUM mmol/L 137 139 137 137   POTASSIUM mmol/L 4.1 4.1 4.6 4.6   CHLORIDE mmol/L 95* 97* 96* 100   CO2 mmol/L 32.0* 35.4* 31.0* 30.0*   BUN mg/dL 29* 31* 24* 22   CREATININE mg/dL 1.04 0.90 1.05 0.92   GLUCOSE mg/dL 303* 210* 362* 276*   EGFR mL/min/1.73 77.2 91.9 76.4 89.5     Results from last 7 days   Lab Units 05/22/25  0604 05/21/25  0504 05/20/25  0516 05/19/25  0309   ALBUMIN g/dL 3.6 3.5 3.7 3.6   BILIRUBIN mg/dL 0.4 0.2 0.3 0.2   ALK PHOS U/L 91 90 103 101   AST (SGOT) U/L 20 14 21 26   ALT (SGPT) U/L 30 30 39 42*     Results from last 7 days   Lab Units  "05/22/25  0604 05/21/25  0504 05/20/25  0516 05/19/25  0309   CALCIUM mg/dL 8.4* 9.0 8.8 9.1   ALBUMIN g/dL 3.6 3.5 3.7 3.6   MAGNESIUM mg/dL  --  2.4 2.4 2.4   PHOSPHORUS mg/dL  --  3.3 3.3 2.9       Results from last 7 days   Lab Units 05/21/25  0504 05/20/25  0516 05/19/25  0309 05/18/25  0050 05/17/25  2338   CK TOTAL U/L 40 66 91  --   --    HSTROP T ng/L  --   --   --  18 17   PROBNP pg/mL  --   --   --   --  482.0   D DIMER QUANT MCGFEU/mL <0.27  --  <0.27  --   --            Invalid input(s): \"LDLCALC\"  Results from last 7 days   Lab Units 05/18/25  0050 05/18/25  0031   BLOODCX  No growth at 4 days No growth at 4 days     Results from last 7 days   Lab Units 05/18/25  0012   COVID19  Detected*       Test Results Pending at Discharge     Pending Results       None              Discharge Details        Discharge Medications        New Medications        Instructions Start Date   azithromycin 250 MG tablet  Commonly known as: ZITHROMAX   250 mg, Oral, Daily             Changes to Medications        Instructions Start Date   escitalopram 20 MG tablet  Commonly known as: LEXAPRO  What changed: See the new instructions.   TOME 1 TABLETAS TODAS LAS NOCHES      insulin aspart 100 UNIT/ML injection  Commonly known as: novoLOG  What changed: Another medication with the same name was changed. Make sure you understand how and when to take each.   0-7 Units, Subcutaneous, 4 Times Daily With Meals & Nightly      insulin aspart 100 UNIT/ML injection  Commonly known as: novoLOG  What changed:   medication strength  how much to take   10 Units, Subcutaneous, 3 Times Daily Before Meals             Continue These Medications        Instructions Start Date   acetaminophen 325 MG tablet  Commonly known as: TYLENOL   325 mg, Every 4 Hours PRN      albuterol (2.5 MG/3ML) 0.083% nebulizer solution  Commonly known as: PROVENTIL   2.5 mg, Every 4 Hours PRN      amLODIPine 10 MG tablet  Commonly known as: NORVASC   10 mg, Oral, Every " 24 Hours Scheduled      apixaban 5 MG tablet tablet  Commonly known as: ELIQUIS   5 mg, 2 Times Daily      aspirin 81 MG EC tablet   81 mg, Daily      atorvastatin 20 MG tablet  Commonly known as: LIPITOR   40 mg, Nightly      brimonidine 0.2 % ophthalmic solution  Commonly known as: ALPHAGAN   1 drop, Left Eye, 3 Times Daily      bumetanide 1 MG tablet  Commonly known as: BUMEX   1 mg, 2 Times Daily      dilTIAZem  MG 24 hr capsule  Commonly known as: DILACOR XR   120 mg, Oral, Daily      doxazosin 4 MG tablet  Commonly known as: CARDURA   TOME 2 TABLETAS AL PENG      DULoxetine 30 MG capsule  Commonly known as: CYMBALTA   30 mg, Daily      ergocalciferol 1.25 MG (95265 UT) capsule  Commonly known as: ERGOCALCIFEROL   50,000 Units, Weekly      ferrous sulfate 325 (65 FE) MG tablet   325 mg, 2 Times Daily      ipratropium-albuterol 0.5-2.5 mg/3 ml nebulizer  Commonly known as: DUO-NEB   3 mL, Nebulization, Every 4 Hours - RT      Lantus SoloStar 100 UNIT/ML injection pen  Generic drug: Insulin Glargine   22 Units, Daily      melatonin 1 MG tablet   3 mg, Oral, Nightly PRN      metFORMIN 500 MG tablet  Commonly known as: GLUCOPHAGE   JORGE 1 TABLETA POR VIA ORAL 2 VECES AL PENG CON COMIDA      metoprolol tartrate 25 MG tablet  Commonly known as: LOPRESSOR   12.5 mg, Oral, 2 Times Daily      montelukast 10 MG tablet  Commonly known as: SINGULAIR   10 mg, Nightly      moxifloxacin 0.5 % ophthalmic solution  Commonly known as: VIGAMOX   1 drop, Left Eye, 4 Times Daily      multivitamin with minerals tablet tablet   1 tablet, Oral, Daily      ondansetron 4 MG tablet  Commonly known as: ZOFRAN   4 mg, Oral, Every 6 Hours PRN      pantoprazole 40 MG EC tablet  Commonly known as: PROTONIX   40 mg, Daily      prednisoLONE acetate 1 % ophthalmic suspension  Commonly known as: PRED FORTE   1 drop, Left Eye, 6 Times Daily      predniSONE 5 MG tablet  Commonly known as: DELTASONE   5 mg, Daily      pregabalin 50 MG  capsule  Commonly known as: LYRICA   50 mg, Oral, 2 Times Daily      sennosides-docusate 8.6-50 MG per tablet  Commonly known as: PERICOLACE   2 tablets, Oral, Nightly      sildenafil 20 MG tablet  Commonly known as: REVATIO   20 mg, 3 Times Daily      spironolactone 25 MG tablet  Commonly known as: ALDACTONE   25 mg, Daily      tamsulosin 0.4 MG capsule 24 hr capsule  Commonly known as: FLOMAX   0.4 mg, Oral, Daily      traZODone 50 MG tablet  Commonly known as: DESYREL   150 mg, Nightly      Trelegy Ellipta 100-62.5-25 MCG/ACT inhaler  Generic drug: Fluticasone-Umeclidin-Vilant   inhale 1 erin por via oral cada elmer             Stop These Medications      budesonide-formoterol 160-4.5 MCG/ACT inhaler  Commonly known as: SYMBICORT     budesonide-formoterol 80-4.5 MCG/ACT inhaler  Commonly known as: SYMBICORT     dilTIAZem  MG 24 hr capsule  Commonly known as: CARDIZEM CD     furosemide 40 MG tablet  Commonly known as: LASIX              Allergies   Allergen Reactions    Nitrates, Organic     Penicillins        Discharge Disposition:  Home-Health Care [facility]      Discharge Diet:  Diet Order   Procedures    Diet: Cardiac, Diabetic; Healthy Heart (2-3 Na+); Consistent Carbohydrate; Fluid Consistency: Thin (IDDSI 0)       Discharge Activity:   Activity Instructions       Activity as Tolerated              CODE STATUS:    Code Status and Medical Interventions: CPR (Attempt to Resuscitate); Full Support   Ordered at: 05/18/25 0349     Code Status (Patient has no pulse and is not breathing):    CPR (Attempt to Resuscitate)     Medical Interventions (Patient has pulse or is breathing):    Full Support       No future appointments.  Additional Instructions for the Follow-ups that You Need to Schedule       Discharge Follow-up with PCP   As directed       Currently Documented PCP:    Savage Keita MD    PCP Phone Number:    [phone]     Follow Up Details: 1-2 week        Discharge Follow-up with  Specified Provider: Pulmonology; 2 Weeks   As directed      To: Pulmonology   Follow Up: 2 Weeks               Follow-up Information       Savage Keita MD .    Specialty: Family Medicine  Why: 1-2 week  Contact information:  [address]  [facility] [address]  [phone]                             Additional Instructions for the Follow-ups that You Need to Schedule       Discharge Follow-up with PCP   As directed       Currently Documented PCP:    Savage Keita MD    PCP Phone Number:    [phone]     Follow Up Details: 1-2 week        Discharge Follow-up with Specified Provider: Pulmonology; 2 Weeks   As directed      To: Pulmonology   Follow Up: 2 Weeks            Time Spent on Discharge:  Greater than 30 minutes      Byron Pinedo MD  [facility]  05/22/25  08:07 EDT

## 2025-05-22 NOTE — CASE MANAGEMENT/SOCIAL WORK
Continued Stay Note  [facility]     Patient Name: Chuckie Valdez  MRN: [id number]  Today's Date: 5/22/2025    Admit Date: 5/17/2025    Plan: Home via [facility] at 3pm   Discharge Plan       Row Name 05/22/25 1300       Plan    Plan Home via [facility] at 3pm    Patient/Family in Agreement with Plan yes    Plan Comments DC orders noted. [facility] [employer] [facility] transport arranged for 3pm, confirmation #[id number] and cost $100. MD and RN notified of transport time. Bud RN/CCP    Final Discharge Disposition Code 01 - home or self-care    Final Note Home via [facility] at 3pm. No additional CCP needs.                   Discharge Codes    No documentation.                 Expected Discharge Date and Time       Expected Discharge Date Expected Discharge Time    May 22, 2025               Geri Mccloud, RN

## 2025-05-23 ENCOUNTER — READMISSION MANAGEMENT (OUTPATIENT)
Dept: CALL CENTER | Facility: HOSPITAL | Age: 70
End: 2025-05-23
Payer: MEDICAID

## 2025-05-23 LAB
BACTERIA SPEC AEROBE CULT: NORMAL
BACTERIA SPEC AEROBE CULT: NORMAL

## 2025-05-23 NOTE — OUTREACH NOTE
Prep Survey      Flowsheet Row Responses   [facility] facility patient discharged from? [place]   Is LACE score < 7 ? No   Eligibility Readm Mgmt   Discharge diagnosis Acute hypoxemic respiratory failure due to COVID-19   Does the patient have one of the following disease processes/diagnoses(primary or secondary)? COPD   Does the patient have Home health ordered? No   Is there a DME ordered? No   Medication alerts for this patient see AVS   Prep survey completed? Yes            Katharine HART - Registered Nurse              Katharine HART - Registered Nurse

## 2025-05-29 ENCOUNTER — READMISSION MANAGEMENT (OUTPATIENT)
Dept: CALL CENTER | Facility: HOSPITAL | Age: 70
End: 2025-05-29
Payer: MEDICAID

## 2025-06-10 ENCOUNTER — READMISSION MANAGEMENT (OUTPATIENT)
Dept: CALL CENTER | Facility: HOSPITAL | Age: 70
End: 2025-06-10
Payer: MEDICAID

## 2025-06-10 NOTE — OUTREACH NOTE
COPD/PN Week 3 Survey      Flowsheet Row Responses   [facility] [place] facility patient discharged from? [place]   Does the patient have one of the following disease processes/diagnoses(primary or secondary)? COPD   Week 3 attempt successful? No   Unsuccessful attempts Attempt 1            Kylah Olmos Registered Nurse

## 2025-06-17 ENCOUNTER — READMISSION MANAGEMENT (OUTPATIENT)
Dept: CALL CENTER | Facility: HOSPITAL | Age: 70
End: 2025-06-17
Payer: MEDICAID

## 2025-06-17 NOTE — OUTREACH NOTE
COPD/PN Week 3 Survey      Flowsheet Row Responses   [facility] facility patient discharged from? [place]   Does the patient have one of the following disease processes/diagnoses(primary or secondary)? COPD   Week 3 attempt successful? No   Unsuccessful attempts Attempt 2            Edith JEFFERSON - Registered Nurse

## (undated) DEVICE — LN SMPL O2 NASL/ORL SMART/CAPNOLINE PLS A/

## (undated) DEVICE — ADAPT SWVL FIBROPTIC BRONCH

## (undated) DEVICE — Device

## (undated) DEVICE — ESOPHAGEAL BALLOON DILATATION CATHETER: Brand: CRE FIXED WIRE

## (undated) DEVICE — MSK AIRWY LARYNG LMA UNIQUE STD PK SZ4

## (undated) DEVICE — SINGLE USE SUCTION VALVE MAJ-209: Brand: SINGLE USE SUCTION VALVE (STERILE)

## (undated) DEVICE — SINGLE USE BIOPSY VALVE MAJ-210: Brand: SINGLE USE BIOPSY VALVE (STERILE)

## (undated) DEVICE — TOWEL,OR,DSP,ST,BLUE,STD,4/PK,20PK/CS: Brand: MEDLINE

## (undated) DEVICE — BITEBLOCK OMNI BLOC

## (undated) DEVICE — TUBING, SUCTION, 1/4" X 20', STRAIGHT: Brand: MEDLINE INDUSTRIES, INC.

## (undated) DEVICE — THE DISPOSABLE RAPTOR GRASPING DEVICE IS USED TO GRASP TISSUE AND/OR RETRIEVE FOREIGN BODIES, EXCISED TISSUE AND STENTS DURING ENDOSCOPIC PROCEDURES.: Brand: RAPTOR

## (undated) DEVICE — VITAL SIGNS™ JACKSON-REES CIRCUITS: Brand: VITAL SIGNS™

## (undated) DEVICE — TBG 02 CRUSH RESIST LF CLR 7FT

## (undated) DEVICE — TUBING, SUCTION, 1/4" X 10', STRAIGHT: Brand: MEDLINE

## (undated) DEVICE — DEV NDL ASP TRNSBRNCH EXCELON 19G 15MM 130CM

## (undated) DEVICE — COVER,TABLE,44X90,STERILE: Brand: MEDLINE

## (undated) DEVICE — TRAP,MUCUS SPECIMEN, 80CC: Brand: MEDLINE

## (undated) DEVICE — DEV INFL CRE STERIFLATE 60CC DISP